# Patient Record
Sex: FEMALE | Race: BLACK OR AFRICAN AMERICAN | NOT HISPANIC OR LATINO | Employment: FULL TIME | URBAN - METROPOLITAN AREA
[De-identification: names, ages, dates, MRNs, and addresses within clinical notes are randomized per-mention and may not be internally consistent; named-entity substitution may affect disease eponyms.]

---

## 2017-02-06 ENCOUNTER — OFFICE VISIT (OUTPATIENT)
Dept: INTERNAL MEDICINE | Facility: CLINIC | Age: 52
End: 2017-02-06

## 2017-02-06 VITALS
DIASTOLIC BLOOD PRESSURE: 64 MMHG | SYSTOLIC BLOOD PRESSURE: 102 MMHG | BODY MASS INDEX: 35.49 KG/M2 | WEIGHT: 213 LBS | HEIGHT: 65 IN | RESPIRATION RATE: 14 BRPM

## 2017-02-06 DIAGNOSIS — E78.5 HYPERLIPIDEMIA, UNSPECIFIED HYPERLIPIDEMIA TYPE: Primary | ICD-10-CM

## 2017-02-06 LAB
CHOLEST SERPL-MCNC: 156 MG/DL (ref 0–200)
HDLC SERPL-MCNC: 55 MG/DL (ref 40–81)
LDLC SERPL CALC-MCNC: 92 MG/DL (ref 0–100)
LDLC/HDLC SERPL: 1.67 {RATIO}
TRIGL SERPL-MCNC: 46 MG/DL (ref 0–150)
VLDLC SERPL-MCNC: 9.2 MG/DL

## 2017-02-06 PROCEDURE — 80061 LIPID PANEL: CPT | Performed by: INTERNAL MEDICINE

## 2017-02-06 PROCEDURE — 99212 OFFICE O/P EST SF 10 MIN: CPT | Performed by: INTERNAL MEDICINE

## 2017-02-06 PROCEDURE — 36415 COLL VENOUS BLD VENIPUNCTURE: CPT | Performed by: INTERNAL MEDICINE

## 2017-02-06 NOTE — PROGRESS NOTES
"Chief Complaint   Patient presents with   • Hyperlipidemia     6 month check up         Subjective   Palma Roblero is a 51 y.o. female     Hyperlipidemia   This is a chronic problem. The problem is controlled. She has no history of diabetes or liver disease. There are no known factors aggravating her hyperlipidemia. Pertinent negatives include no chest pain, leg pain, myalgias or shortness of breath. Current antihyperlipidemic treatment includes statins.   :     The following portions of the patient's history were reviewed and updated as appropriate: allergies, current medications, past social history, problem list, past surgical history    Review of Systems   Constitutional: Negative for activity change and appetite change.   HENT: Negative for nosebleeds.    Eyes: Negative for visual disturbance.   Respiratory: Negative for cough and shortness of breath.    Cardiovascular: Negative for chest pain, palpitations and leg swelling.   Musculoskeletal: Negative for myalgias.   Neurological: Negative for headaches.   Psychiatric/Behavioral: Negative for sleep disturbance.       Visit Vitals   • /64 (BP Location: Left arm, Patient Position: Sitting, Cuff Size: Adult)   • Resp 14   • Ht 65.25\" (165.7 cm)   • Wt 213 lb (96.6 kg)   • BMI 35.17 kg/m2        Objective   Physical Exam   Constitutional: She is oriented to person, place, and time. She appears well-developed and well-nourished. No distress.   Neck: Normal carotid pulses present. Carotid bruit is not present.   Cardiovascular: Normal rate, regular rhythm, S1 normal, S2 normal and intact distal pulses.  Exam reveals no gallop and no friction rub.    No murmur heard.  Pulses:       Carotid pulses are 2+ on the right side, and 2+ on the left side.  Pulmonary/Chest: Effort normal and breath sounds normal. No respiratory distress. She has no wheezes. She has no rales. Chest wall is not dull to percussion.   Musculoskeletal: She exhibits no edema.   Neurological: " She is alert and oriented to person, place, and time.   Skin: Skin is warm and dry.   Nursing note and vitals reviewed.      Assessment/Plan   Problem List Items Addressed This Visit        Cardiovascular and Mediastinum    Hyperlipidemia - Primary    Relevant Orders    Lipid Panel

## 2017-03-25 DIAGNOSIS — E78.00 HYPERCHOLESTEREMIA: ICD-10-CM

## 2017-03-27 RX ORDER — ATORVASTATIN CALCIUM 10 MG/1
TABLET, FILM COATED ORAL
Qty: 90 TABLET | Refills: 1 | Status: SHIPPED | OUTPATIENT
Start: 2017-03-27 | End: 2017-08-30 | Stop reason: SDUPTHER

## 2017-03-30 ENCOUNTER — OFFICE VISIT (OUTPATIENT)
Dept: ONCOLOGY | Facility: CLINIC | Age: 52
End: 2017-03-30

## 2017-03-30 ENCOUNTER — LAB (OUTPATIENT)
Dept: LAB | Facility: HOSPITAL | Age: 52
End: 2017-03-30

## 2017-03-30 VITALS
WEIGHT: 209.6 LBS | TEMPERATURE: 98.2 F | BODY MASS INDEX: 35.78 KG/M2 | SYSTOLIC BLOOD PRESSURE: 132 MMHG | HEART RATE: 80 BPM | DIASTOLIC BLOOD PRESSURE: 80 MMHG | RESPIRATION RATE: 16 BRPM | HEIGHT: 64 IN

## 2017-03-30 DIAGNOSIS — C50.511 MALIGNANT NEOPLASM OF LOWER-OUTER QUADRANT OF RIGHT FEMALE BREAST (HCC): Primary | ICD-10-CM

## 2017-03-30 DIAGNOSIS — C50.911 MALIGNANT NEOPLASM OF RIGHT FEMALE BREAST, UNSPECIFIED SITE OF BREAST: ICD-10-CM

## 2017-03-30 LAB
ALBUMIN SERPL-MCNC: 4.1 G/DL (ref 3.5–5.2)
ALBUMIN/GLOB SERPL: 1.3 G/DL (ref 1.1–2.4)
ALP SERPL-CCNC: 60 U/L (ref 38–116)
ALT SERPL W P-5'-P-CCNC: 16 U/L (ref 0–33)
ANION GAP SERPL CALCULATED.3IONS-SCNC: 11.1 MMOL/L
AST SERPL-CCNC: 18 U/L (ref 0–32)
BASOPHILS # BLD AUTO: 0.04 10*3/MM3 (ref 0–0.1)
BASOPHILS NFR BLD AUTO: 0.7 % (ref 0–1.1)
BILIRUB SERPL-MCNC: 0.8 MG/DL (ref 0.1–1.2)
BUN BLD-MCNC: 13 MG/DL (ref 6–20)
BUN/CREAT SERPL: 16.5 (ref 7.3–30)
CALCIUM SPEC-SCNC: 9.2 MG/DL (ref 8.5–10.2)
CHLORIDE SERPL-SCNC: 102 MMOL/L (ref 98–107)
CO2 SERPL-SCNC: 27.9 MMOL/L (ref 22–29)
CREAT BLD-MCNC: 0.79 MG/DL (ref 0.6–1.1)
DEPRECATED RDW RBC AUTO: 41.3 FL (ref 37–49)
EOSINOPHIL # BLD AUTO: 0.09 10*3/MM3 (ref 0–0.36)
EOSINOPHIL NFR BLD AUTO: 1.6 % (ref 1–5)
ERYTHROCYTE [DISTWIDTH] IN BLOOD BY AUTOMATED COUNT: 13.7 % (ref 11.7–14.5)
GFR SERPL CREATININE-BSD FRML MDRD: 92 ML/MIN/1.73
GLOBULIN UR ELPH-MCNC: 3.1 GM/DL (ref 1.8–3.5)
GLUCOSE BLD-MCNC: 93 MG/DL (ref 74–124)
HCT VFR BLD AUTO: 41.1 % (ref 34–45)
HGB BLD-MCNC: 13.1 G/DL (ref 11.5–14.9)
IMM GRANULOCYTES # BLD: 0.02 10*3/MM3 (ref 0–0.03)
IMM GRANULOCYTES NFR BLD: 0.4 % (ref 0–0.5)
LYMPHOCYTES # BLD AUTO: 1.69 10*3/MM3 (ref 1–3.5)
LYMPHOCYTES NFR BLD AUTO: 30.7 % (ref 20–49)
MCH RBC QN AUTO: 26.3 PG (ref 27–33)
MCHC RBC AUTO-ENTMCNC: 31.9 G/DL (ref 32–35)
MCV RBC AUTO: 82.5 FL (ref 83–97)
MONOCYTES # BLD AUTO: 0.43 10*3/MM3 (ref 0.25–0.8)
MONOCYTES NFR BLD AUTO: 7.8 % (ref 4–12)
NEUTROPHILS # BLD AUTO: 3.23 10*3/MM3 (ref 1.5–7)
NEUTROPHILS NFR BLD AUTO: 58.8 % (ref 39–75)
NRBC BLD MANUAL-RTO: 0 /100 WBC (ref 0–0)
PLATELET # BLD AUTO: 172 10*3/MM3 (ref 150–375)
PMV BLD AUTO: 11.8 FL (ref 8.9–12.1)
POTASSIUM BLD-SCNC: 4 MMOL/L (ref 3.5–4.7)
PROT SERPL-MCNC: 7.2 G/DL (ref 6.3–8)
RBC # BLD AUTO: 4.98 10*6/MM3 (ref 3.9–5)
SODIUM BLD-SCNC: 141 MMOL/L (ref 134–145)
WBC NRBC COR # BLD: 5.5 10*3/MM3 (ref 4–10)

## 2017-03-30 PROCEDURE — 85025 COMPLETE CBC W/AUTO DIFF WBC: CPT | Performed by: INTERNAL MEDICINE

## 2017-03-30 PROCEDURE — 36415 COLL VENOUS BLD VENIPUNCTURE: CPT | Performed by: INTERNAL MEDICINE

## 2017-03-30 PROCEDURE — 80053 COMPREHEN METABOLIC PANEL: CPT | Performed by: INTERNAL MEDICINE

## 2017-03-30 PROCEDURE — 99213 OFFICE O/P EST LOW 20 MIN: CPT | Performed by: INTERNAL MEDICINE

## 2017-03-30 NOTE — PROGRESS NOTES
Subjective   REASON FOR FOLLOWUP:   History of stage IIIA infiltrating ductal carcinoma of the right breast with 4 positive nodes, ER positive, HER-2/julián negative; treated with lumpectomy, status post 6 cycles of TAC. Received tamoxifen between February 2007 and March 2012. Femara was started in March 2012. Patient switched to Arimidex as of 06/21/2012 because of side effects with severe ankle joint pain with Femara.    10 years of adjuvant therapy completed  March 2017.    HISTORY OF PRESENT ILLNESS:     History of Present Illness Ms. Roblero is a 51-year-old  female with the above noted history of stage IIIA carcinoma of the right breast, treated with lumpectomy, chemotherapy and radiation. She even extended hormonal therapy with 5 years of tamoxifen followed by 5 years of aromatase inhibitor.  She reports that she discontinued her Arimidex a few days ago and is thrilled to be finished with the medication.     She is here today for routine 6 month followup and seems to be doing well.  She will be due for her annual mammogram in September.  Allergies completed hormonal therapy will be seeing her on a yearly basis unless she has any new problems or concerns.    Past Medical History, Past Surgical History, Social History, Family History have been reviewed and are without significant changes except as mentioned.    Review of Systems   Constitutional: Negative for activity change, appetite change, fatigue, fever and unexpected weight change.   HENT: Negative for hearing loss, nosebleeds, trouble swallowing and voice change.    Eyes: Negative for visual disturbance.   Respiratory: Negative for cough, shortness of breath and wheezing.    Cardiovascular: Negative for chest pain and palpitations.   Gastrointestinal: Negative for abdominal pain, diarrhea, nausea and vomiting.   Genitourinary: Negative for difficulty urinating, frequency, hematuria and urgency.   Musculoskeletal: Negative for back pain and  "neck pain.   Skin: Negative for rash.   Neurological: Negative for dizziness, seizures, syncope and headaches.   Hematological: Negative for adenopathy. Does not bruise/bleed easily.   Psychiatric/Behavioral: Negative for behavioral problems. The patient is not nervous/anxious.       A comprehensive 14 point review of systems was performed and was negative except as mentioned.    Medications:  The current medication list was reviewed in the EMR    ALLERGIES:  No Known Allergies    Objective      Vitals:    03/30/17 1320   BP: 132/80   Pulse: 80   Resp: 16   Temp: 98.2 °F (36.8 °C)   Weight: 209 lb 9.6 oz (95.1 kg)   Height: 64.17\" (163 cm)  Comment: new ht. without shoes   PainSc: 0-No pain     Current Status 3/30/2017   ECOG score 0       Physical Exam   Constitutional: She is oriented to person, place, and time. She appears well-developed and well-nourished. No distress.   HENT:   Head: Normocephalic.   Eyes: Conjunctivae and EOM are normal. Pupils are equal, round, and reactive to light. No scleral icterus.   Neck: Normal range of motion. Neck supple. No JVD present. No thyromegaly present.   Cardiovascular: Normal rate and regular rhythm.  Exam reveals no gallop and no friction rub.    No murmur heard.  Pulmonary/Chest: Effort normal and breath sounds normal. She has no wheezes. She has no rales. Right breast exhibits no mass, no nipple discharge and no skin change. Left breast exhibits no mass, no nipple discharge and no skin change.   Right lumpectomy scar.    Abdominal: Soft. Bowel sounds are normal. She exhibits no distension and no mass. There is no tenderness.   Musculoskeletal: Normal range of motion. She exhibits no edema or deformity.   Lymphadenopathy:     She has no cervical adenopathy.   Neurological: She is alert and oriented to person, place, and time. She has normal reflexes. No cranial nerve deficit.   Skin: Skin is warm and dry. No rash noted. No erythema.   Psychiatric: She has a normal mood " and affect. Her behavior is normal. Judgment normal.        RECENT LABS:  Hematology WBC   Date Value Ref Range Status   03/30/2017 5.50 4.00 - 10.00 10*3/mm3 Final     RBC   Date Value Ref Range Status   03/30/2017 4.98 3.90 - 5.00 10*6/mm3 Final     Hemoglobin   Date Value Ref Range Status   03/30/2017 13.1 11.5 - 14.9 g/dL Final     Hematocrit   Date Value Ref Range Status   03/30/2017 41.1 34.0 - 45.0 % Final     Platelets   Date Value Ref Range Status   03/30/2017 172 150 - 375 10*3/mm3 Final            Assessment/Plan     Stage IIIA infiltrating ductal carcinoma of the right breast treated with a lumpectomy, radiation, 6 cycles of TAC chemotherapy and ongoing hormonal therapy. She has now completed 10 years of adjuvant hormonal therapy with 5 years of tamoxifen followed by 5 years of Arimidex.    PLAN:   1. She will discontinue Arimidex at this point after 10 years of hormonal therapy total.  2. She now will be seeing us on an annual basis but knows that she can call any time if she has new concerns.   3. Her next annual mammogram should be due in late September 2017.            3/30/2017      CC:

## 2017-08-14 ENCOUNTER — OFFICE VISIT (OUTPATIENT)
Dept: INTERNAL MEDICINE | Facility: CLINIC | Age: 52
End: 2017-08-14

## 2017-08-14 VITALS
BODY MASS INDEX: 32.95 KG/M2 | TEMPERATURE: 98.1 F | WEIGHT: 193 LBS | SYSTOLIC BLOOD PRESSURE: 112 MMHG | DIASTOLIC BLOOD PRESSURE: 78 MMHG

## 2017-08-14 DIAGNOSIS — L02.212 ABSCESS OF BACK: Primary | ICD-10-CM

## 2017-08-14 PROCEDURE — 99213 OFFICE O/P EST LOW 20 MIN: CPT | Performed by: NURSE PRACTITIONER

## 2017-08-14 RX ORDER — CEPHALEXIN 500 MG/1
500 CAPSULE ORAL 3 TIMES DAILY
Qty: 21 CAPSULE | Refills: 0 | Status: SHIPPED | OUTPATIENT
Start: 2017-08-14 | End: 2017-08-21

## 2017-08-14 NOTE — PROGRESS NOTES
Subjective   Palma Roblero is a 52 y.o. female.     HPI Comments: She presents secondary to lesion noted on her left back. She noticed the area several years ago. However in the past 2-3 weeks it has gotten bigger and become irritated. Her spouse has expelled some  discharge from the area. She denies any fever or chills. She has not placed anything on it.    Cyst   This is a new problem. The current episode started more than 1 year ago. The problem occurs constantly. The problem has been gradually worsening. Pertinent negatives include no abdominal pain, chills, fatigue, fever, nausea or vomiting. Exacerbated by: her bra  Treatments tried: expelling discharge out of it  The treatment provided mild relief.        The following portions of the patient's history were reviewed and updated as appropriate: allergies, current medications, past family history, past medical history, past social history, past surgical history and problem list.    Review of Systems   Constitutional: Negative for activity change, appetite change, chills, fatigue and fever.   Gastrointestinal: Negative for abdominal pain, diarrhea, nausea and vomiting.   Skin:        Lesion noted to left back area        Objective   Physical Exam   Constitutional: She is oriented to person, place, and time. She appears well-developed and well-nourished.   HENT:   Head: Normocephalic.   Nose: Nose normal.   Cardiovascular: Regular rhythm and normal heart sounds.  Exam reveals no S3 and no S4.    No murmur heard.  Pulmonary/Chest: Effort normal and breath sounds normal. She has no decreased breath sounds. She has no wheezes. She has no rhonchi. She has no rales.   Musculoskeletal: She exhibits no edema.   Neurological: She is alert and oriented to person, place, and time. Gait normal.   Skin: Skin is warm and dry. Lesion (7 x 3.5 cm ) noted.   Abscess noted to left thoracic back area with induration measuring  7 cm x 3.5 cm. She has minimal brownish discharge  with odor.    Psychiatric: She has a normal mood and affect.       Assessment/Plan   Palma was seen today for skin problem.    Diagnoses and all orders for this visit:    Abscess of back  Comments:  warm soaks tid; will refer to gen surgery secondary size  Orders:  -     Ambulatory Referral to General Surgery  -     cephalexin (KEFLEX) 500 MG capsule; Take 1 capsule by mouth 3 (Three) Times a Day for 7 days.    She saw Dr Vidal several years ago so will refer back to her if available.

## 2017-08-14 NOTE — PATIENT INSTRUCTIONS
Abscess  An abscess is an infected area that contains a collection of pus and debris. It can occur in almost any part of the body. An abscess is also known as a furuncle or boil.  CAUSES   An abscess occurs when tissue gets infected. This can occur from blockage of oil or sweat glands, infection of hair follicles, or a minor injury to the skin. As the body tries to fight the infection, pus collects in the area and creates pressure under the skin. This pressure causes pain. People with weakened immune systems have difficulty fighting infections and get certain abscesses more often.   SYMPTOMS  Usually an abscess develops on the skin and becomes a painful mass that is red, warm, and tender. If the abscess forms under the skin, you may feel a moveable soft area under the skin. Some abscesses break open (rupture) on their own, but most will continue to get worse without care. The infection can spread deeper into the body and eventually into the bloodstream, causing you to feel ill.   DIAGNOSIS   Your caregiver will take your medical history and perform a physical exam. A sample of fluid may also be taken from the abscess to determine what is causing your infection.  TREATMENT   Your caregiver may prescribe antibiotic medicines to fight the infection. However, taking antibiotics alone usually does not cure an abscess. Your caregiver may need to make a small cut (incision) in the abscess to drain the pus. In some cases, gauze is packed into the abscess to reduce pain and to continue draining the area.  HOME CARE INSTRUCTIONS   · Only take over-the-counter or prescription medicines for pain, discomfort, or fever as directed by your caregiver.  · If you were prescribed antibiotics, take them as directed. Finish them even if you start to feel better.  · If gauze is used, follow your caregiver's directions for changing the gauze.  · To avoid spreading the infection:    Keep your draining abscess covered with a bandage.     Wash your hands well.    Do not share personal care items, towels, or whirlpools with others.    Avoid skin contact with others.  · Keep your skin and clothes clean around the abscess.  · Keep all follow-up appointments as directed by your caregiver.  SEEK MEDICAL CARE IF:   · You have increased pain, swelling, redness, fluid drainage, or bleeding.  · You have muscle aches, chills, or a general ill feeling.  · You have a fever.  MAKE SURE YOU:   · Understand these instructions.  · Will watch your condition.  · Will get help right away if you are not doing well or get worse.     This information is not intended to replace advice given to you by your health care provider. Make sure you discuss any questions you have with your health care provider.     Document Released: 09/27/2006 Document Revised: 06/18/2013 Document Reviewed: 10/26/2016  [employer] Interactive Patient Education ©2017 [employer]

## 2017-08-22 ENCOUNTER — TRANSCRIBE ORDERS (OUTPATIENT)
Dept: INTERNAL MEDICINE | Facility: CLINIC | Age: 52
End: 2017-08-22

## 2017-08-22 DIAGNOSIS — Z12.31 ENCOUNTER FOR MAMMOGRAM TO ESTABLISH BASELINE MAMMOGRAM: Primary | ICD-10-CM

## 2017-08-23 ENCOUNTER — OFFICE VISIT (OUTPATIENT)
Dept: SURGERY | Facility: CLINIC | Age: 52
End: 2017-08-23

## 2017-08-23 VITALS — BODY MASS INDEX: 32.61 KG/M2 | HEIGHT: 64 IN | OXYGEN SATURATION: 98 % | WEIGHT: 191 LBS | HEART RATE: 95 BPM

## 2017-08-23 DIAGNOSIS — L72.3 SEBACEOUS CYST: Primary | ICD-10-CM

## 2017-08-23 PROCEDURE — 99203 OFFICE O/P NEW LOW 30 MIN: CPT | Performed by: SURGERY

## 2017-08-23 RX ORDER — CLINDAMYCIN HYDROCHLORIDE 150 MG/1
150 CAPSULE ORAL 3 TIMES DAILY
Qty: 40 CAPSULE | Refills: 0 | Status: SHIPPED | OUTPATIENT
Start: 2017-08-23 | End: 2017-09-02

## 2017-08-23 RX ORDER — CEPHALEXIN 500 MG/1
500 CAPSULE ORAL 2 TIMES DAILY
COMMUNITY
End: 2017-08-30

## 2017-08-23 RX ORDER — SODIUM CHLORIDE 0.9 % (FLUSH) 0.9 %
1-10 SYRINGE (ML) INJECTION AS NEEDED
Status: CANCELLED | OUTPATIENT
Start: 2017-09-05

## 2017-08-30 ENCOUNTER — APPOINTMENT (OUTPATIENT)
Dept: PREADMISSION TESTING | Facility: HOSPITAL | Age: 52
End: 2017-08-30

## 2017-08-30 VITALS
RESPIRATION RATE: 16 BRPM | DIASTOLIC BLOOD PRESSURE: 72 MMHG | BODY MASS INDEX: 32.91 KG/M2 | SYSTOLIC BLOOD PRESSURE: 108 MMHG | TEMPERATURE: 98.4 F | OXYGEN SATURATION: 100 % | WEIGHT: 192.8 LBS | HEIGHT: 64 IN | HEART RATE: 84 BPM

## 2017-08-30 DIAGNOSIS — L72.3 SEBACEOUS CYST: ICD-10-CM

## 2017-08-30 LAB
ANION GAP SERPL CALCULATED.3IONS-SCNC: 14.8 MMOL/L
BASOPHILS # BLD AUTO: 0.02 10*3/MM3 (ref 0–0.2)
BASOPHILS NFR BLD AUTO: 0.3 % (ref 0–1.5)
BUN BLD-MCNC: 15 MG/DL (ref 6–20)
BUN/CREAT SERPL: 17.9 (ref 7–25)
CALCIUM SPEC-SCNC: 9 MG/DL (ref 8.6–10.5)
CHLORIDE SERPL-SCNC: 101 MMOL/L (ref 98–107)
CO2 SERPL-SCNC: 25.2 MMOL/L (ref 22–29)
CREAT BLD-MCNC: 0.84 MG/DL (ref 0.57–1)
DEPRECATED RDW RBC AUTO: 42 FL (ref 37–54)
EOSINOPHIL # BLD AUTO: 0.13 10*3/MM3 (ref 0–0.7)
EOSINOPHIL NFR BLD AUTO: 1.8 % (ref 0.3–6.2)
ERYTHROCYTE [DISTWIDTH] IN BLOOD BY AUTOMATED COUNT: 13.7 % (ref 11.7–13)
GFR SERPL CREATININE-BSD FRML MDRD: 86 ML/MIN/1.73
GLUCOSE BLD-MCNC: 121 MG/DL (ref 65–99)
HCT VFR BLD AUTO: 41.7 % (ref 35.6–45.5)
HGB BLD-MCNC: 13.4 G/DL (ref 11.9–15.5)
IMM GRANULOCYTES # BLD: 0 10*3/MM3 (ref 0–0.03)
IMM GRANULOCYTES NFR BLD: 0 % (ref 0–0.5)
LYMPHOCYTES # BLD AUTO: 1.65 10*3/MM3 (ref 0.9–4.8)
LYMPHOCYTES NFR BLD AUTO: 23 % (ref 19.6–45.3)
MCH RBC QN AUTO: 27.3 PG (ref 26.9–32)
MCHC RBC AUTO-ENTMCNC: 32.1 G/DL (ref 32.4–36.3)
MCV RBC AUTO: 84.9 FL (ref 80.5–98.2)
MONOCYTES # BLD AUTO: 0.44 10*3/MM3 (ref 0.2–1.2)
MONOCYTES NFR BLD AUTO: 6.1 % (ref 5–12)
NEUTROPHILS # BLD AUTO: 4.93 10*3/MM3 (ref 1.9–8.1)
NEUTROPHILS NFR BLD AUTO: 68.8 % (ref 42.7–76)
PLATELET # BLD AUTO: 200 10*3/MM3 (ref 140–500)
PMV BLD AUTO: 12 FL (ref 6–12)
POTASSIUM BLD-SCNC: 4 MMOL/L (ref 3.5–5.2)
RBC # BLD AUTO: 4.91 10*6/MM3 (ref 3.9–5.2)
SODIUM BLD-SCNC: 141 MMOL/L (ref 136–145)
WBC NRBC COR # BLD: 7.17 10*3/MM3 (ref 4.5–10.7)

## 2017-08-30 PROCEDURE — 80048 BASIC METABOLIC PNL TOTAL CA: CPT | Performed by: SURGERY

## 2017-08-30 PROCEDURE — 85025 COMPLETE CBC W/AUTO DIFF WBC: CPT | Performed by: SURGERY

## 2017-08-30 PROCEDURE — 36415 COLL VENOUS BLD VENIPUNCTURE: CPT

## 2017-08-30 RX ORDER — TRIAMCINOLONE ACETONIDE 55 UG/1
2 SPRAY, METERED NASAL DAILY PRN
COMMUNITY
End: 2017-10-20 | Stop reason: SDUPTHER

## 2017-08-30 RX ORDER — ATORVASTATIN CALCIUM 10 MG/1
10 TABLET, FILM COATED ORAL DAILY
COMMUNITY
End: 2019-03-14

## 2017-09-05 ENCOUNTER — ANESTHESIA EVENT (OUTPATIENT)
Dept: PERIOP | Facility: HOSPITAL | Age: 52
End: 2017-09-05

## 2017-09-05 ENCOUNTER — TELEPHONE (OUTPATIENT)
Dept: SURGERY | Facility: CLINIC | Age: 52
End: 2017-09-05

## 2017-09-05 ENCOUNTER — ANESTHESIA (OUTPATIENT)
Dept: PERIOP | Facility: HOSPITAL | Age: 52
End: 2017-09-05

## 2017-09-05 ENCOUNTER — HOSPITAL ENCOUNTER (OUTPATIENT)
Facility: HOSPITAL | Age: 52
Setting detail: HOSPITAL OUTPATIENT SURGERY
Discharge: HOME | End: 2017-09-05
Attending: SURGERY | Admitting: SURGERY

## 2017-09-05 VITALS
DIASTOLIC BLOOD PRESSURE: 69 MMHG | HEART RATE: 67 BPM | WEIGHT: 193 LBS | BODY MASS INDEX: 32.95 KG/M2 | HEIGHT: 64 IN | OXYGEN SATURATION: 100 % | SYSTOLIC BLOOD PRESSURE: 101 MMHG | TEMPERATURE: 97.6 F | RESPIRATION RATE: 16 BRPM

## 2017-09-05 DIAGNOSIS — L72.3 SEBACEOUS CYST: ICD-10-CM

## 2017-09-05 PROCEDURE — 25010000002 MIDAZOLAM PER 1 MG: Performed by: ANESTHESIOLOGY

## 2017-09-05 PROCEDURE — 88304 TISSUE EXAM BY PATHOLOGIST: CPT | Performed by: SURGERY

## 2017-09-05 PROCEDURE — 25010000002 FENTANYL CITRATE (PF) 100 MCG/2ML SOLUTION: Performed by: ANESTHESIOLOGY

## 2017-09-05 PROCEDURE — 11404 EXC TR-EXT B9+MARG 3.1-4 CM: CPT | Performed by: SURGERY

## 2017-09-05 PROCEDURE — 25010000002 PROPOFOL 1000 MG/ML EMULSION: Performed by: ANESTHESIOLOGY

## 2017-09-05 PROCEDURE — 25010000002 PROPOFOL 10 MG/ML EMULSION: Performed by: ANESTHESIOLOGY

## 2017-09-05 RX ORDER — HYDRALAZINE HYDROCHLORIDE 20 MG/ML
5 INJECTION INTRAMUSCULAR; INTRAVENOUS
Status: DISCONTINUED | OUTPATIENT
Start: 2017-09-05 | End: 2017-09-05 | Stop reason: HOSPADM

## 2017-09-05 RX ORDER — LABETALOL HYDROCHLORIDE 5 MG/ML
5 INJECTION, SOLUTION INTRAVENOUS
Status: DISCONTINUED | OUTPATIENT
Start: 2017-09-05 | End: 2017-09-05 | Stop reason: HOSPADM

## 2017-09-05 RX ORDER — HYDROMORPHONE HYDROCHLORIDE 1 MG/ML
0.5 INJECTION, SOLUTION INTRAMUSCULAR; INTRAVENOUS; SUBCUTANEOUS
Status: DISCONTINUED | OUTPATIENT
Start: 2017-09-05 | End: 2017-09-05 | Stop reason: HOSPADM

## 2017-09-05 RX ORDER — CLINDAMYCIN PHOSPHATE 600 MG/50ML
INJECTION INTRAVENOUS
Status: DISCONTINUED
Start: 2017-09-05 | End: 2017-09-05 | Stop reason: HOSPADM

## 2017-09-05 RX ORDER — MIDAZOLAM HYDROCHLORIDE 1 MG/ML
2 INJECTION INTRAMUSCULAR; INTRAVENOUS
Status: DISCONTINUED | OUTPATIENT
Start: 2017-09-05 | End: 2017-09-05 | Stop reason: HOSPADM

## 2017-09-05 RX ORDER — FAMOTIDINE 10 MG/ML
20 INJECTION, SOLUTION INTRAVENOUS ONCE
Status: COMPLETED | OUTPATIENT
Start: 2017-09-05 | End: 2017-09-05

## 2017-09-05 RX ORDER — OXYCODONE AND ACETAMINOPHEN 7.5; 325 MG/1; MG/1
1 TABLET ORAL ONCE AS NEEDED
Status: DISCONTINUED | OUTPATIENT
Start: 2017-09-05 | End: 2017-09-05 | Stop reason: HOSPADM

## 2017-09-05 RX ORDER — PROMETHAZINE HYDROCHLORIDE 25 MG/ML
12.5 INJECTION, SOLUTION INTRAMUSCULAR; INTRAVENOUS ONCE AS NEEDED
Status: DISCONTINUED | OUTPATIENT
Start: 2017-09-05 | End: 2017-09-05 | Stop reason: HOSPADM

## 2017-09-05 RX ORDER — SODIUM CHLORIDE 0.9 % (FLUSH) 0.9 %
1-10 SYRINGE (ML) INJECTION AS NEEDED
Status: DISCONTINUED | OUTPATIENT
Start: 2017-09-05 | End: 2017-09-05 | Stop reason: HOSPADM

## 2017-09-05 RX ORDER — FENTANYL CITRATE 50 UG/ML
50 INJECTION, SOLUTION INTRAMUSCULAR; INTRAVENOUS
Status: DISCONTINUED | OUTPATIENT
Start: 2017-09-05 | End: 2017-09-05 | Stop reason: HOSPADM

## 2017-09-05 RX ORDER — PROMETHAZINE HYDROCHLORIDE 25 MG/1
12.5 TABLET ORAL ONCE AS NEEDED
Status: DISCONTINUED | OUTPATIENT
Start: 2017-09-05 | End: 2017-09-05 | Stop reason: HOSPADM

## 2017-09-05 RX ORDER — HYDROCODONE BITARTRATE AND ACETAMINOPHEN 7.5; 325 MG/1; MG/1
1 TABLET ORAL ONCE AS NEEDED
Status: DISCONTINUED | OUTPATIENT
Start: 2017-09-05 | End: 2017-09-05 | Stop reason: HOSPADM

## 2017-09-05 RX ORDER — ONDANSETRON 2 MG/ML
4 INJECTION INTRAMUSCULAR; INTRAVENOUS ONCE AS NEEDED
Status: DISCONTINUED | OUTPATIENT
Start: 2017-09-05 | End: 2017-09-05 | Stop reason: HOSPADM

## 2017-09-05 RX ORDER — PROMETHAZINE HYDROCHLORIDE 25 MG/1
25 TABLET ORAL ONCE AS NEEDED
Status: DISCONTINUED | OUTPATIENT
Start: 2017-09-05 | End: 2017-09-05 | Stop reason: HOSPADM

## 2017-09-05 RX ORDER — MIDAZOLAM HYDROCHLORIDE 1 MG/ML
1 INJECTION INTRAMUSCULAR; INTRAVENOUS
Status: DISCONTINUED | OUTPATIENT
Start: 2017-09-05 | End: 2017-09-05 | Stop reason: HOSPADM

## 2017-09-05 RX ORDER — NALOXONE HCL 0.4 MG/ML
0.2 VIAL (ML) INJECTION AS NEEDED
Status: DISCONTINUED | OUTPATIENT
Start: 2017-09-05 | End: 2017-09-05 | Stop reason: HOSPADM

## 2017-09-05 RX ORDER — SODIUM CHLORIDE, SODIUM LACTATE, POTASSIUM CHLORIDE, CALCIUM CHLORIDE 600; 310; 30; 20 MG/100ML; MG/100ML; MG/100ML; MG/100ML
9 INJECTION, SOLUTION INTRAVENOUS CONTINUOUS
Status: DISCONTINUED | OUTPATIENT
Start: 2017-09-05 | End: 2017-09-05 | Stop reason: HOSPADM

## 2017-09-05 RX ORDER — PROMETHAZINE HYDROCHLORIDE 25 MG/1
25 SUPPOSITORY RECTAL ONCE AS NEEDED
Status: DISCONTINUED | OUTPATIENT
Start: 2017-09-05 | End: 2017-09-05 | Stop reason: HOSPADM

## 2017-09-05 RX ORDER — DIPHENHYDRAMINE HYDROCHLORIDE 50 MG/ML
12.5 INJECTION INTRAMUSCULAR; INTRAVENOUS
Status: DISCONTINUED | OUTPATIENT
Start: 2017-09-05 | End: 2017-09-05 | Stop reason: HOSPADM

## 2017-09-05 RX ORDER — PROPOFOL 10 MG/ML
VIAL (ML) INTRAVENOUS AS NEEDED
Status: DISCONTINUED | OUTPATIENT
Start: 2017-09-05 | End: 2017-09-05 | Stop reason: SURG

## 2017-09-05 RX ORDER — MAGNESIUM HYDROXIDE 1200 MG/15ML
LIQUID ORAL AS NEEDED
Status: DISCONTINUED | OUTPATIENT
Start: 2017-09-05 | End: 2017-09-05 | Stop reason: HOSPADM

## 2017-09-05 RX ORDER — FENTANYL CITRATE 50 UG/ML
INJECTION, SOLUTION INTRAMUSCULAR; INTRAVENOUS AS NEEDED
Status: DISCONTINUED | OUTPATIENT
Start: 2017-09-05 | End: 2017-09-05 | Stop reason: SURG

## 2017-09-05 RX ORDER — OXYCODONE AND ACETAMINOPHEN 2.5; 325 MG/1; MG/1
1 TABLET ORAL EVERY 6 HOURS PRN
Qty: 10 TABLET | Refills: 0 | Status: SHIPPED | OUTPATIENT
Start: 2017-09-05 | End: 2017-09-20

## 2017-09-05 RX ORDER — EPHEDRINE SULFATE 50 MG/ML
5 INJECTION, SOLUTION INTRAVENOUS ONCE AS NEEDED
Status: DISCONTINUED | OUTPATIENT
Start: 2017-09-05 | End: 2017-09-05 | Stop reason: HOSPADM

## 2017-09-05 RX ORDER — FLUMAZENIL 0.1 MG/ML
0.2 INJECTION INTRAVENOUS AS NEEDED
Status: DISCONTINUED | OUTPATIENT
Start: 2017-09-05 | End: 2017-09-05 | Stop reason: HOSPADM

## 2017-09-05 RX ADMIN — SODIUM CHLORIDE, POTASSIUM CHLORIDE, SODIUM LACTATE AND CALCIUM CHLORIDE 9 ML/HR: 600; 310; 30; 20 INJECTION, SOLUTION INTRAVENOUS at 07:51

## 2017-09-05 RX ADMIN — FENTANYL CITRATE 100 MCG: 50 INJECTION INTRAMUSCULAR; INTRAVENOUS at 08:02

## 2017-09-05 RX ADMIN — MIDAZOLAM 1 MG: 1 INJECTION INTRAMUSCULAR; INTRAVENOUS at 07:53

## 2017-09-05 RX ADMIN — PROPOFOL 120 MCG/KG/MIN: 10 INJECTION, EMULSION INTRAVENOUS at 08:06

## 2017-09-05 RX ADMIN — PROPOFOL 50 MG: 10 INJECTION, EMULSION INTRAVENOUS at 08:06

## 2017-09-05 RX ADMIN — FAMOTIDINE 20 MG: 10 INJECTION, SOLUTION INTRAVENOUS at 07:52

## 2017-09-05 RX ADMIN — CLINDAMYCIN PHOSPHATE 600 MG: 150 INJECTION, SOLUTION, CONCENTRATE INTRAVENOUS at 08:05

## 2017-09-05 NOTE — ANESTHESIA POSTPROCEDURE EVALUATION
"Patient: Palma Roblero    Procedure Summary     Date Anesthesia Start Anesthesia Stop Room / Location    09/05/17 0802 0855  [facility] / [facility]       Procedure Diagnosis Surgeon Provider    EXCISION OF SEBACEOUS CYST FROM BACK  (N/A ) Sebaceous cyst  (Sebaceous cyst [L72.3]) MD Corey Garcia MD          Anesthesia Type: MAC  Last vitals  BP   90/70 (09/05/17 0915)    Temp   36.4 °C (97.6 °F) (09/05/17 0851)    Pulse   62 (09/05/17 0915)   Resp   16 (09/05/17 0915)    SpO2   100 % (09/05/17 0915)      Post Anesthesia Care and Evaluation    Patient location during evaluation: PHASE II  Patient participation: complete - patient participated  Level of consciousness: awake and alert  Pain management: adequate  Airway patency: patent  Anesthetic complications: No anesthetic complications    Cardiovascular status: acceptable  Respiratory status: acceptable  Hydration status: acceptable    Comments: BP 90/70  Pulse 62  Temp 36.4 °C (97.6 °F) (Oral)   Resp 16  Ht 64\" (162.6 cm)  Wt 193 lb (87.5 kg)  SpO2 100%  BMI 33.13 kg/m2        "

## 2017-09-05 NOTE — PLAN OF CARE
Problem: Patient Care Overview (Adult)  Goal: Plan of Care Review  Outcome: Ongoing (interventions implemented as appropriate)    09/05/17 0652   Coping/Psychosocial Response Interventions   Plan Of Care Reviewed With patient   Patient Care Overview   Progress no change       Goal: Adult Individualization and Mutuality  Outcome: Ongoing (interventions implemented as appropriate)    09/05/17 0652   Individualization   Patient Specific Preferences prefers to b called ronnie       Goal: Discharge Needs Assessment  Outcome: Ongoing (interventions implemented as appropriate)    09/05/17 0652   Discharge Needs Assessment   Concerns To Be Addressed no discharge needs identified;denies needs/concerns at this time         Problem: Perioperative Period (Adult)  Goal: Signs and Symptoms of Listed Potential Problems Will be Absent or Manageable (Perioperative Period)  Outcome: Ongoing (interventions implemented as appropriate)    09/05/17 0652   Perioperative Period   Problems Assessed (Perioperative Period) all   Problems Present (Perioperative Period) none

## 2017-09-05 NOTE — INTERVAL H&P NOTE
H&P reviewed. The patient was examined and there are no changes to the H&P.     Rosendo Crum MD  General and Endoscopic Surgery  [facility] [place], operated [facility]    [address], [address]  [place], [place], [address]  P: [phone]  F: [phone]

## 2017-09-05 NOTE — PLAN OF CARE
Problem: Perioperative Period (Adult)  Goal: Signs and Symptoms of Listed Potential Problems Will be Absent or Manageable (Perioperative Period)  Outcome: Outcome(s) achieved Date Met:  09/05/17 09/05/17 0908   Perioperative Period   Problems Assessed (Perioperative Period) all   Problems Present (Perioperative Period) none

## 2017-09-05 NOTE — PLAN OF CARE
Problem: Patient Care Overview (Adult)  Goal: Discharge Needs Assessment  Outcome: Outcome(s) achieved Date Met:  09/05/17 09/05/17 1829   Discharge Needs Assessment   Concerns To Be Addressed denies needs/concerns at this time

## 2017-09-05 NOTE — TELEPHONE ENCOUNTER
Per Dr Crum she can take Tylenol or Ibuprofen for pain. Spoke with patient and informed her of this

## 2017-09-05 NOTE — PLAN OF CARE
Problem: Patient Care Overview (Adult)  Goal: Adult Individualization and Mutuality  Outcome: Outcome(s) achieved Date Met:  09/05/17

## 2017-09-05 NOTE — H&P (VIEW-ONLY)
Cc: Inflamed sebaceous cyst on back    History of presenting illness:   This is a very nice 52-year-old lady who says that several months ago she noticed a small blackheads in the mid back which seem to wax and wane in size, but a couple of weeks ago she noticed that it would become quite a bit larger.  She had a small amount of drainage after her  attempted to squeeze it.  Is not been associated with any fever.  There is some mild pain which is constant and does not radiate.  She was placed on Keflex about a week ago and hasn't seen much substantial improvement, although there does seem to be slightly less drainage.    Past Medical History: Breast cancer, hyperlipidemia    Past Surgical History: Right breast lumpectomy    Medications: Atorvastatin, Keflex, vitamin D    Allergies: None known    Social History: Patient does not smoke and does not use alcohol.  I stressed with her the importance of avoiding tobacco products.      Review of Systems:  Constitutional: Negative for fever, chills, change in weight  Neck: no swollen glands or dysphagia or odynophagia  Respiratory: negative for SOB, cough, hemoptysis or wheezing  Cardiovascular: negative for chest pain, palpitations or peripheral edema  Gastrointestinal: Negative for abdominal pain, rectal bleeding, constipation or vomiting      Physical Exam:    General: alert and oriented, appropriate, no acute distress  Neck: Supple without lymphadenopathy or thyromegaly, trachea is in the midline  Respiratory: Lungs are clear bilaterally without wheezing, no use of accessory muscles is noted  Cardiovascular: Regular rate and rhythm without murmur, no peripheral edema  Gastrointestinal: Abdomen is soft, benign, no hernia or hepatosplenomegaly, bowel sounds are positive.  Skin: On the mid back just to the right of midline there is an approximately 5 x 7 cm area of induration and inflammation.  There is no significant cellulitis.  There is a poor overlying this  consistent with an inflamed sebaceous cyst.        Assessment and plan:   Inflamed and uncomfortable sebaceous cyst on back, minimally responsive to antibiotic therapy to this point.    I discussed the options with the patient.  I told her that I don't think there is active infection requiring drainage at this time, but that I think allowing it to improve over the course of another week or so and then attempt a formal excision would be wise.  It may be challenging given the overall size and so I suggested doing this in the operating room under optimal conditions.  The patient understands that excision of this cyst may turn into a drainage procedure if there is true fluctuance or abscess in that dressings may become necessary.  She also understands that recurrence is a possibility as well as bleeding infection and pain.  She agrees to proceed with the planned procedure of excision of this large sebaceous cyst.      Rosendo Crum MD, FACS  General, Minimally Invasive and Endoscopic Surgery  [facility]    [address], [address]  [place], [place], [address]  P: [phone]  F: [phone]

## 2017-09-05 NOTE — PLAN OF CARE
Problem: Patient Care Overview (Adult)  Goal: Plan of Care Review  Outcome: Outcome(s) achieved Date Met:  09/05/17 09/05/17 0915   Coping/Psychosocial Response Interventions   Plan Of Care Reviewed With patient;spouse   Patient Care Overview   Progress improving   Outcome Evaluation   Outcome Summary/Follow up Plan ready for discharge

## 2017-09-06 LAB
CYTO UR: NORMAL
LAB AP CASE REPORT: NORMAL
Lab: NORMAL
PATH REPORT.FINAL DX SPEC: NORMAL
PATH REPORT.GROSS SPEC: NORMAL

## 2017-09-08 ENCOUNTER — OFFICE VISIT (OUTPATIENT)
Dept: INTERNAL MEDICINE | Facility: CLINIC | Age: 52
End: 2017-09-08

## 2017-09-08 VITALS
BODY MASS INDEX: 32.78 KG/M2 | SYSTOLIC BLOOD PRESSURE: 104 MMHG | HEIGHT: 64 IN | WEIGHT: 192 LBS | DIASTOLIC BLOOD PRESSURE: 62 MMHG

## 2017-09-08 DIAGNOSIS — E78.5 HYPERLIPIDEMIA, UNSPECIFIED HYPERLIPIDEMIA TYPE: Primary | ICD-10-CM

## 2017-09-08 DIAGNOSIS — Z79.899 ENCOUNTER FOR MEDICATION MANAGEMENT: ICD-10-CM

## 2017-09-08 LAB
ALBUMIN SERPL-MCNC: 3.9 G/DL (ref 3.5–5.2)
ALP SERPL-CCNC: 65 U/L (ref 39–117)
ALT SERPL W P-5'-P-CCNC: 18 U/L (ref 1–33)
AST SERPL-CCNC: 18 U/L (ref 1–32)
BILIRUB CONJ SERPL-MCNC: <0.2 MG/DL (ref 0–0.3)
BILIRUB INDIRECT SERPL-MCNC: NORMAL MG/DL
BILIRUB SERPL-MCNC: 0.6 MG/DL (ref 0.1–1.2)
CHOLEST SERPL-MCNC: 201 MG/DL (ref 0–200)
HDLC SERPL-MCNC: 57 MG/DL (ref 40–60)
LDLC SERPL CALC-MCNC: 131 MG/DL (ref 0–100)
LDLC/HDLC SERPL: 2.29 {RATIO}
PROT SERPL-MCNC: 7.7 G/DL (ref 6–8.5)
TRIGL SERPL-MCNC: 67 MG/DL (ref 0–150)
VLDLC SERPL-MCNC: 13.4 MG/DL (ref 5–40)

## 2017-09-08 PROCEDURE — 36415 COLL VENOUS BLD VENIPUNCTURE: CPT | Performed by: INTERNAL MEDICINE

## 2017-09-08 PROCEDURE — 80061 LIPID PANEL: CPT | Performed by: INTERNAL MEDICINE

## 2017-09-08 PROCEDURE — 99212 OFFICE O/P EST SF 10 MIN: CPT | Performed by: INTERNAL MEDICINE

## 2017-09-08 PROCEDURE — 80076 HEPATIC FUNCTION PANEL: CPT | Performed by: INTERNAL MEDICINE

## 2017-09-08 NOTE — PROGRESS NOTES
"Chief Complaint   Patient presents with   • Hyperlipidemia     6 month follow up        Subjective   Palma Roblero is a 52 y.o. female     HPI: Hyperlipidemia:  This is a chronic problem.   No management changes were made at her last appointment.   Her most recent lipid panel was done  2/2017.  Total cholesterol was 156, Triglycerides 46, HDL 55, LDL 92.   Current treatment: statin.   Prudent diet and regular exercise have also been recommended. By report, there is good compliance with treatment and good tolerance.    She has not experienced statin associated myalgias, dyspepsia.  She has not had liver enzyme elevation.              The following portions of the patient's history were reviewed and updated as appropriate: allergies, current medications, past social history, problem list, past surgical history    Review of Systems   Constitutional: Negative for activity change, appetite change and fatigue.   HENT: Negative for nosebleeds.    Eyes: Negative for visual disturbance.   Respiratory: Negative for cough and shortness of breath.    Cardiovascular: Negative for chest pain, palpitations and leg swelling.   Neurological: Negative for headaches.   Psychiatric/Behavioral: Positive for sleep disturbance (wakes up during the night).           Objective     /62  Ht 64\" (162.6 cm)  Wt 192 lb (87.1 kg)  BMI 32.96 kg/m2     Physical Exam   Constitutional: She is oriented to person, place, and time. She appears well-developed and well-nourished. No distress.   Neck: Normal carotid pulses present. Carotid bruit is not present.   Cardiovascular: Normal rate, regular rhythm, S1 normal, S2 normal and intact distal pulses.  Exam reveals no gallop and no friction rub.    No murmur heard.  Pulses:       Carotid pulses are 2+ on the right side, and 2+ on the left side.  Pulmonary/Chest: Effort normal and breath sounds normal. No respiratory distress. She has no wheezes. She has no rhonchi. She has no rales. Chest wall " is not dull to percussion.   Musculoskeletal: She exhibits no edema.   Neurological: She is alert and oriented to person, place, and time.   Skin: Skin is warm and dry.   Nursing note and vitals reviewed.      Assessment/Plan   Problem List Items Addressed This Visit        Cardiovascular and Mediastinum    Hyperlipidemia - Primary    Relevant Orders    Lipid Panel      Other Visit Diagnoses     Encounter for medication management        Relevant Orders    Hepatic Function Panel

## 2017-09-20 ENCOUNTER — OFFICE VISIT (OUTPATIENT)
Dept: SURGERY | Facility: CLINIC | Age: 52
End: 2017-09-20

## 2017-09-20 DIAGNOSIS — L72.3 SEBACEOUS CYST: Primary | ICD-10-CM

## 2017-09-20 PROCEDURE — 99024 POSTOP FOLLOW-UP VISIT: CPT | Performed by: SURGERY

## 2017-09-20 NOTE — PROGRESS NOTES
Follow-up excision of sebaceous cyst from back    Subjective:  No complaints, no pain    Objective:  Incision is nicely healed.  Sutures were removed.    Assessment and plan:  Status post excision of sebaceous cyst from back.  Wound looks quite good.  Steri-Strips were placed.  Patient may follow-up as needed.    Rosendo Crum MD  General and Endoscopic Surgery  [facility]    [address], [address]  [place], [place], [address]  P: [phone]  F: [phone]

## 2017-09-22 ENCOUNTER — HOSPITAL ENCOUNTER (OUTPATIENT)
Dept: MAMMOGRAPHY | Facility: HOSPITAL | Age: 52
Discharge: HOME | End: 2017-09-22
Attending: INTERNAL MEDICINE | Admitting: INTERNAL MEDICINE

## 2017-09-22 DIAGNOSIS — Z12.31 ENCOUNTER FOR MAMMOGRAM TO ESTABLISH BASELINE MAMMOGRAM: ICD-10-CM

## 2017-09-22 PROCEDURE — G0202 SCR MAMMO BI INCL CAD: HCPCS

## 2017-09-28 DIAGNOSIS — E78.00 HYPERCHOLESTEREMIA: ICD-10-CM

## 2017-09-28 RX ORDER — ATORVASTATIN CALCIUM 10 MG/1
TABLET, FILM COATED ORAL
Qty: 90 TABLET | Refills: 1 | Status: SHIPPED | OUTPATIENT
Start: 2017-09-28 | End: 2017-10-20

## 2017-10-20 ENCOUNTER — OFFICE VISIT (OUTPATIENT)
Dept: INTERNAL MEDICINE | Facility: CLINIC | Age: 52
End: 2017-10-20

## 2017-10-20 VITALS
HEIGHT: 64 IN | BODY MASS INDEX: 31.92 KG/M2 | HEART RATE: 83 BPM | DIASTOLIC BLOOD PRESSURE: 72 MMHG | SYSTOLIC BLOOD PRESSURE: 110 MMHG | WEIGHT: 187 LBS | TEMPERATURE: 98.3 F | OXYGEN SATURATION: 98 %

## 2017-10-20 DIAGNOSIS — J06.9 UPPER RESPIRATORY TRACT INFECTION, UNSPECIFIED TYPE: Primary | ICD-10-CM

## 2017-10-20 PROCEDURE — 99213 OFFICE O/P EST LOW 20 MIN: CPT | Performed by: INTERNAL MEDICINE

## 2017-10-20 RX ORDER — TRIAMCINOLONE ACETONIDE 55 UG/1
2 SPRAY, METERED NASAL DAILY PRN
Qty: 16.5 G | Refills: 1 | Status: SHIPPED | OUTPATIENT
Start: 2017-10-20 | End: 2018-03-09 | Stop reason: SDUPTHER

## 2017-10-20 RX ORDER — AZITHROMYCIN 250 MG/1
TABLET, FILM COATED ORAL
Qty: 6 TABLET | Refills: 0 | Status: SHIPPED | OUTPATIENT
Start: 2017-10-20 | End: 2018-03-09

## 2017-10-20 NOTE — PROGRESS NOTES
Subjective   Palma Roblero is a 52 y.o. female.     URI    This is a new problem. The current episode started in the past 7 days. Associated symptoms include coughing ( Some yellow sputum) and a sore throat. Pertinent negatives include no chest pain or wheezing.        The following portions of the patient's history were reviewed and updated as appropriate: allergies, current medications, past family history, past medical history, past social history, past surgical history and problem list.    Review of Systems   Constitutional:        Here for acute clinic   HENT: Positive for sinus pressure and sore throat.    Respiratory: Positive for cough ( Some yellow sputum). Negative for wheezing.    Cardiovascular: Negative for chest pain and palpitations.   Gastrointestinal: Negative.    Genitourinary: Negative.    Musculoskeletal: Negative.        Objective   Physical Exam   Constitutional: She appears well-developed.   HENT:   Head: Normocephalic.   Nose: Nose normal.   Mouth/Throat: Oropharynx is clear and moist.   No sinus tenderness to percussion   Eyes: EOM are normal.   Neck: Neck supple.   Cardiovascular: Normal rate, regular rhythm and normal heart sounds.    Repeat 110/70   Pulmonary/Chest: Effort normal and breath sounds normal.   Scattered rhonchi   Musculoskeletal: Normal range of motion.   Lymphadenopathy:     She has no cervical adenopathy.   Neurological: She is alert.       Assessment/Plan   Palma was seen today for uri.    Diagnoses and all orders for this visit:    Upper respiratory tract infection, unspecified type  -     azithromycin (ZITHROMAX) 250 MG tablet; Take 2 tablets the first day, then 1 tablet daily for 4 days.  -     Triamcinolone Acetonide (NASACORT) 55 MCG/ACT nasal inhaler; 2 sprays into each nostril Daily As Needed (nasal congestion).    Advised usual OTC RXs

## 2017-11-09 ENCOUNTER — TELEPHONE (OUTPATIENT)
Dept: INTERNAL MEDICINE | Facility: CLINIC | Age: 52
End: 2017-11-09

## 2017-11-09 NOTE — TELEPHONE ENCOUNTER
----- Message from Alivia Velazquez sent at 11/9/2017  1:00 PM EST -----  Contact: Patient  Patient called.  She had seen Dr. Pedraza a few weeks ago, acute clinic.  Patient states pharmacy has never received a script from us for     Triamcinolone Acetonide (NASACORT) 55 MCG/ACT nasal inhaler    Please advise.  Patient needing her inhaler.      Patient:  [phone]    Pharmacy:  [facility] [address] [place] [address] [id number] AT [facility] - [phone] PH - [phone] FX

## 2017-11-09 NOTE — TELEPHONE ENCOUNTER
[facility]ed [facility] to check with them , they have it on file and ready for pt to p/u . Called pt after that informed her that [facility] have it but you have to pay for it ($100). Pt said she will not pay that amount for NASACORT. Advised pt to try nasal spray  instead which is cheaper. She said she will pay OTC.

## 2018-03-09 ENCOUNTER — OFFICE VISIT (OUTPATIENT)
Dept: INTERNAL MEDICINE | Facility: CLINIC | Age: 53
End: 2018-03-09

## 2018-03-09 VITALS
HEIGHT: 64 IN | DIASTOLIC BLOOD PRESSURE: 72 MMHG | BODY MASS INDEX: 30.05 KG/M2 | SYSTOLIC BLOOD PRESSURE: 106 MMHG | WEIGHT: 176 LBS

## 2018-03-09 DIAGNOSIS — Z00.00 ANNUAL PHYSICAL EXAM: Primary | ICD-10-CM

## 2018-03-09 DIAGNOSIS — R09.81 SINUS CONGESTION: ICD-10-CM

## 2018-03-09 LAB
ALBUMIN SERPL-MCNC: 4.1 G/DL (ref 3.5–5.2)
ALBUMIN/GLOB SERPL: 1.2 G/DL
ALP SERPL-CCNC: 60 U/L (ref 39–117)
ALT SERPL W P-5'-P-CCNC: 22 U/L (ref 1–33)
ANION GAP SERPL CALCULATED.3IONS-SCNC: 4.7 MMOL/L
AST SERPL-CCNC: 25 U/L (ref 1–32)
BASOPHILS # BLD AUTO: 0.03 10*3/MM3 (ref 0–0.2)
BASOPHILS NFR BLD AUTO: 0.5 % (ref 0–2)
BILIRUB SERPL-MCNC: 0.9 MG/DL (ref 0.1–1.2)
BILIRUB UR QL STRIP: NEGATIVE
BUN BLD-MCNC: 16 MG/DL (ref 6–20)
BUN/CREAT SERPL: 22.5 (ref 7–25)
CALCIUM SPEC-SCNC: 9 MG/DL (ref 8.6–10.5)
CHLORIDE SERPL-SCNC: 103 MMOL/L (ref 98–107)
CHOLEST SERPL-MCNC: 202 MG/DL (ref 0–200)
CLARITY UR: CLEAR
CO2 SERPL-SCNC: 32.3 MMOL/L (ref 22–29)
COLOR UR: YELLOW
CREAT BLD-MCNC: 0.71 MG/DL (ref 0.57–1)
DEPRECATED RDW RBC AUTO: 42.3 FL (ref 37–54)
EOSINOPHIL # BLD AUTO: 0.1 10*3/MM3 (ref 0–0.7)
EOSINOPHIL NFR BLD AUTO: 1.7 % (ref 0–5)
ERYTHROCYTE [DISTWIDTH] IN BLOOD BY AUTOMATED COUNT: 14.2 % (ref 11.5–15)
GFR SERPL CREATININE-BSD FRML MDRD: 104 ML/MIN/1.73
GLOBULIN UR ELPH-MCNC: 3.3 GM/DL
GLUCOSE BLD-MCNC: 92 MG/DL (ref 65–99)
GLUCOSE UR STRIP-MCNC: NEGATIVE MG/DL
HCT VFR BLD AUTO: 39.3 % (ref 34.1–44.9)
HDLC SERPL-MCNC: 74 MG/DL (ref 40–60)
HGB BLD-MCNC: 12.9 G/DL (ref 11.2–15.7)
HGB UR QL STRIP.AUTO: NEGATIVE
KETONES UR QL STRIP: NEGATIVE
LDLC SERPL CALC-MCNC: 118 MG/DL (ref 0–100)
LDLC/HDLC SERPL: 1.59 {RATIO}
LEUKOCYTE ESTERASE UR QL STRIP.AUTO: NEGATIVE
LYMPHOCYTES # BLD AUTO: 1.43 10*3/MM3 (ref 0.8–7)
LYMPHOCYTES NFR BLD AUTO: 24.5 % (ref 10–60)
MCH RBC QN AUTO: 27.4 PG (ref 26–34)
MCHC RBC AUTO-ENTMCNC: 32.8 G/DL (ref 31–37)
MCV RBC AUTO: 83.4 FL (ref 80–100)
MONOCYTES # BLD AUTO: 0.49 10*3/MM3 (ref 0–1)
MONOCYTES NFR BLD AUTO: 8.4 % (ref 0–13)
NEUTROPHILS # BLD AUTO: 3.78 10*3/MM3 (ref 1–11)
NEUTROPHILS NFR BLD AUTO: 64.9 % (ref 30–85)
NITRITE UR QL STRIP: NEGATIVE
PH UR STRIP.AUTO: 6 [PH] (ref 5–8)
PLATELET # BLD AUTO: 156 10*3/MM3 (ref 150–450)
PMV BLD AUTO: 12.6 FL (ref 6–12)
POTASSIUM BLD-SCNC: 4.2 MMOL/L (ref 3.5–5.2)
PROT SERPL-MCNC: 7.4 G/DL (ref 6–8.5)
PROT UR QL STRIP: NEGATIVE
RBC # BLD AUTO: 4.71 10*6/MM3 (ref 3.93–5.22)
SODIUM BLD-SCNC: 140 MMOL/L (ref 136–145)
SP GR UR STRIP: 1.02 (ref 1–1.03)
TRIGL SERPL-MCNC: 50 MG/DL (ref 0–150)
TSH SERPL DL<=0.05 MIU/L-ACNC: 1.81 MIU/ML (ref 0.27–4.2)
UROBILINOGEN UR QL STRIP: NORMAL
VLDLC SERPL-MCNC: 10 MG/DL (ref 5–40)
WBC NRBC COR # BLD: 5.83 10*3/MM3 (ref 5–10)

## 2018-03-09 PROCEDURE — 80053 COMPREHEN METABOLIC PANEL: CPT | Performed by: INTERNAL MEDICINE

## 2018-03-09 PROCEDURE — 81003 URINALYSIS AUTO W/O SCOPE: CPT | Performed by: INTERNAL MEDICINE

## 2018-03-09 PROCEDURE — 84443 ASSAY THYROID STIM HORMONE: CPT | Performed by: INTERNAL MEDICINE

## 2018-03-09 PROCEDURE — 99396 PREV VISIT EST AGE 40-64: CPT | Performed by: INTERNAL MEDICINE

## 2018-03-09 PROCEDURE — 85025 COMPLETE CBC W/AUTO DIFF WBC: CPT | Performed by: INTERNAL MEDICINE

## 2018-03-09 PROCEDURE — 80061 LIPID PANEL: CPT | Performed by: INTERNAL MEDICINE

## 2018-03-09 RX ORDER — TRIAMCINOLONE ACETONIDE 55 UG/1
2 SPRAY, METERED NASAL DAILY PRN
Qty: 16.5 G | Refills: 1 | Status: SHIPPED | OUTPATIENT
Start: 2018-03-09 | End: 2021-03-24 | Stop reason: SDUPTHER

## 2018-03-09 RX ORDER — AZELASTINE HYDROCHLORIDE 0.5 MG/ML
1 SOLUTION/ DROPS OPHTHALMIC DAILY
Qty: 1 EACH | Refills: 5 | Status: SHIPPED | OUTPATIENT
Start: 2018-03-09 | End: 2021-03-12 | Stop reason: SDUPTHER

## 2018-03-09 NOTE — PROGRESS NOTES
"Chief Complaint   Patient presents with   • Annual Exam     physical        Subjective   Palma Roblero is a 52 y.o. female     HPI:   She is here for annual examination.    She generally feels healthy.    Her  appetite is good.    She  follows a prudent diet.   She sleeps well.    Her  energy is good.     She exercises regularly.  She is up to date on dental and eye exams. She is due for colonoscopy this year.         The following portions of the patient's history were reviewed and updated as appropriate: allergies, current medications, past social history, problem list, past surgical history    Review of Systems   Constitutional: Negative for appetite change, chills, fatigue, fever and unexpected weight change.   HENT: Positive for congestion (sinus). Negative for sore throat and trouble swallowing.    Eyes: Negative for visual disturbance.   Respiratory: Negative for cough and shortness of breath.    Cardiovascular: Negative for chest pain, palpitations and leg swelling.   Gastrointestinal: Negative for abdominal pain, constipation, diarrhea, nausea and vomiting.   Endocrine: Negative for cold intolerance, heat intolerance, polydipsia and polyuria.   Genitourinary: Negative for dysuria.   Musculoskeletal: Positive for arthralgias (knees sometimes, no swelling).   Skin: Negative for rash.   Psychiatric/Behavioral: Positive for sleep disturbance (hot flashes). Negative for dysphoric mood. The patient is not nervous/anxious.            Objective     /72  Ht 162.6 cm (64\")  Wt 79.8 kg (176 lb)  LMP  (LMP Unknown)  BMI 30.21 kg/m2     Physical Exam   Constitutional: She is oriented to person, place, and time. She appears well-developed and well-nourished. No distress.   HENT:   Right Ear: Hearing, tympanic membrane, external ear and ear canal normal.   Left Ear: Hearing, tympanic membrane, external ear and ear canal normal.   Nose: Right sinus exhibits no maxillary sinus tenderness and no frontal sinus " tenderness. Left sinus exhibits no maxillary sinus tenderness and no frontal sinus tenderness.   Eyes: Conjunctivae, EOM and lids are normal. Pupils are equal, round, and reactive to light.   Neck: Trachea normal and phonation normal. Neck supple. Normal carotid pulses and no JVD present. Carotid bruit is not present. No thyroid mass and no thyromegaly present.   Cardiovascular: Normal rate, regular rhythm, S1 normal and S2 normal.  PMI is not displaced.  Exam reveals no gallop and no friction rub.    No murmur heard.  Pulses:       Carotid pulses are 2+ on the right side, and 2+ on the left side.       Radial pulses are 2+ on the right side, and 2+ on the left side.        Dorsalis pedis pulses are 2+ on the right side, and 2+ on the left side.   Pulmonary/Chest: Effort normal and breath sounds normal. She has no wheezes. She has no rhonchi. She has no rales. Chest wall is not dull to percussion.   Abdominal: Soft. Normal appearance, normal aorta and bowel sounds are normal. She exhibits no abdominal bruit and no mass. There is no hepatosplenomegaly. There is no tenderness.   Musculoskeletal: Normal range of motion. She exhibits no edema or tenderness.        Right knee: She exhibits normal range of motion. No tenderness found.        Left knee: She exhibits normal range of motion. No tenderness found.   Pre-patellar palpable crepitus bilaterally   Lymphadenopathy:     She has no cervical adenopathy.        Right: No supraclavicular adenopathy present.        Left: No supraclavicular adenopathy present.   Neurological: She is alert and oriented to person, place, and time. She has normal strength and normal reflexes. No cranial nerve deficit or sensory deficit. Coordination normal.   Skin: Skin is warm and dry. No rash noted.   Psychiatric: She has a normal mood and affect. Her speech is normal and behavior is normal. Judgment and thought content normal. Cognition and memory are normal. She is attentive.   Nursing  note and vitals reviewed.      Assessment/Plan   Problem List Items Addressed This Visit     None      Visit Diagnoses     Annual physical exam    -  Primary    Relevant Orders    Comprehensive Metabolic Panel    CBC & Differential    Urinalysis With / Microscopic If Indicated - Urine, Clean Catch (Completed)    Lipid Panel    TSH Rfx On Abnormal To Free T4    CBC Auto Differential    Sinus congestion        Relevant Medications    Triamcinolone Acetonide (NASACORT) 55 MCG/ACT nasal inhaler

## 2018-04-02 ENCOUNTER — APPOINTMENT (OUTPATIENT)
Dept: ONCOLOGY | Facility: CLINIC | Age: 53
End: 2018-04-02

## 2018-04-02 ENCOUNTER — APPOINTMENT (OUTPATIENT)
Dept: LAB | Facility: HOSPITAL | Age: 53
End: 2018-04-02

## 2018-04-03 ENCOUNTER — OFFICE VISIT (OUTPATIENT)
Dept: ONCOLOGY | Facility: CLINIC | Age: 53
End: 2018-04-03

## 2018-04-03 ENCOUNTER — LAB (OUTPATIENT)
Dept: LAB | Facility: HOSPITAL | Age: 53
End: 2018-04-03

## 2018-04-03 VITALS
BODY MASS INDEX: 30.01 KG/M2 | HEIGHT: 64 IN | SYSTOLIC BLOOD PRESSURE: 112 MMHG | RESPIRATION RATE: 14 BRPM | HEART RATE: 70 BPM | TEMPERATURE: 98.2 F | OXYGEN SATURATION: 99 % | WEIGHT: 175.8 LBS | DIASTOLIC BLOOD PRESSURE: 82 MMHG

## 2018-04-03 DIAGNOSIS — Z17.0 MALIGNANT NEOPLASM OF LOWER-OUTER QUADRANT OF RIGHT BREAST OF FEMALE, ESTROGEN RECEPTOR POSITIVE (HCC): Primary | ICD-10-CM

## 2018-04-03 DIAGNOSIS — C50.511 MALIGNANT NEOPLASM OF LOWER-OUTER QUADRANT OF RIGHT BREAST OF FEMALE, ESTROGEN RECEPTOR POSITIVE (HCC): ICD-10-CM

## 2018-04-03 DIAGNOSIS — Z17.0 MALIGNANT NEOPLASM OF LOWER-OUTER QUADRANT OF RIGHT BREAST OF FEMALE, ESTROGEN RECEPTOR POSITIVE (HCC): ICD-10-CM

## 2018-04-03 DIAGNOSIS — C50.511 MALIGNANT NEOPLASM OF LOWER-OUTER QUADRANT OF RIGHT BREAST OF FEMALE, ESTROGEN RECEPTOR POSITIVE (HCC): Primary | ICD-10-CM

## 2018-04-03 LAB
ALBUMIN SERPL-MCNC: 4.2 G/DL (ref 3.5–5.2)
ALBUMIN/GLOB SERPL: 1.2 G/DL (ref 1.1–2.4)
ALP SERPL-CCNC: 72 U/L (ref 38–116)
ALT SERPL W P-5'-P-CCNC: 26 U/L (ref 0–33)
ANION GAP SERPL CALCULATED.3IONS-SCNC: 10.7 MMOL/L
AST SERPL-CCNC: 28 U/L (ref 0–32)
BASOPHILS # BLD AUTO: 0.04 10*3/MM3 (ref 0–0.1)
BASOPHILS NFR BLD AUTO: 0.6 % (ref 0–1.1)
BILIRUB SERPL-MCNC: 0.4 MG/DL (ref 0.1–1.2)
BUN BLD-MCNC: 21 MG/DL (ref 6–20)
BUN/CREAT SERPL: 25.6 (ref 7.3–30)
CALCIUM SPEC-SCNC: 9.2 MG/DL (ref 8.5–10.2)
CHLORIDE SERPL-SCNC: 103 MMOL/L (ref 98–107)
CO2 SERPL-SCNC: 28.3 MMOL/L (ref 22–29)
CREAT BLD-MCNC: 0.82 MG/DL (ref 0.6–1.1)
DEPRECATED RDW RBC AUTO: 43.2 FL (ref 37–49)
EOSINOPHIL # BLD AUTO: 0.14 10*3/MM3 (ref 0–0.36)
EOSINOPHIL NFR BLD AUTO: 2.3 % (ref 1–5)
ERYTHROCYTE [DISTWIDTH] IN BLOOD BY AUTOMATED COUNT: 14 % (ref 11.7–14.5)
GFR SERPL CREATININE-BSD FRML MDRD: 88 ML/MIN/1.73
GLOBULIN UR ELPH-MCNC: 3.5 GM/DL (ref 1.8–3.5)
GLUCOSE BLD-MCNC: 96 MG/DL (ref 74–124)
HCT VFR BLD AUTO: 43.8 % (ref 34–45)
HGB BLD-MCNC: 14 G/DL (ref 11.5–14.9)
IMM GRANULOCYTES # BLD: 0.02 10*3/MM3 (ref 0–0.03)
IMM GRANULOCYTES NFR BLD: 0.3 % (ref 0–0.5)
LYMPHOCYTES # BLD AUTO: 1.98 10*3/MM3 (ref 1–3.5)
LYMPHOCYTES NFR BLD AUTO: 31.9 % (ref 20–49)
MCH RBC QN AUTO: 27 PG (ref 27–33)
MCHC RBC AUTO-ENTMCNC: 32 G/DL (ref 32–35)
MCV RBC AUTO: 84.6 FL (ref 83–97)
MONOCYTES # BLD AUTO: 0.47 10*3/MM3 (ref 0.25–0.8)
MONOCYTES NFR BLD AUTO: 7.6 % (ref 4–12)
NEUTROPHILS # BLD AUTO: 3.56 10*3/MM3 (ref 1.5–7)
NEUTROPHILS NFR BLD AUTO: 57.3 % (ref 39–75)
NRBC BLD MANUAL-RTO: 0 /100 WBC (ref 0–0)
PLATELET # BLD AUTO: 201 10*3/MM3 (ref 150–375)
PMV BLD AUTO: 12 FL (ref 8.9–12.1)
POTASSIUM BLD-SCNC: 4.4 MMOL/L (ref 3.5–4.7)
PROT SERPL-MCNC: 7.7 G/DL (ref 6.3–8)
RBC # BLD AUTO: 5.18 10*6/MM3 (ref 3.9–5)
SODIUM BLD-SCNC: 142 MMOL/L (ref 134–145)
WBC NRBC COR # BLD: 6.21 10*3/MM3 (ref 4–10)

## 2018-04-03 PROCEDURE — 99213 OFFICE O/P EST LOW 20 MIN: CPT | Performed by: INTERNAL MEDICINE

## 2018-04-03 PROCEDURE — 80053 COMPREHEN METABOLIC PANEL: CPT | Performed by: INTERNAL MEDICINE

## 2018-04-03 PROCEDURE — 36415 COLL VENOUS BLD VENIPUNCTURE: CPT | Performed by: INTERNAL MEDICINE

## 2018-04-03 PROCEDURE — 85025 COMPLETE CBC W/AUTO DIFF WBC: CPT | Performed by: INTERNAL MEDICINE

## 2018-04-03 NOTE — PROGRESS NOTES
Subjective   REASON FOR FOLLOWUP:   History of stage IIIA infiltrating ductal carcinoma of the right breast with 4 positive nodes, ER positive, HER-2/julián negative; treated with lumpectomy, status post 6 cycles of TAC. Received tamoxifen between February 2007 and March 2012. Femara was started in March 2012. Patient switched to Arimidex as of 06/21/2012 because of side effects with severe ankle joint pain with Femara.    10 years of adjuvant therapy completed  March 2017.    HISTORY OF PRESENT ILLNESS:     History of Present Illness Ms. Roblero is a 52 y.o.  female with the above noted history of stage IIIA carcinoma of the right breast, treated with lumpectomy, chemotherapy and radiation. She received extended hormonal therapy with 5 years of tamoxifen followed by 5 years of aromatase inhibitor.  She completed her 10 years of hormonal therapy in March 2017  .     She is here today for routine annual followup and seems to be doing well.  Her annual mammogram was performed on 9/22/2017 with no suspicious findings.    She has been staying active and has actually lost 35-40 pounds in the past year.    Past Medical History, Past Surgical History, Social History, Family History have been reviewed and are without significant changes except as mentioned.    Review of Systems   Constitutional: Negative for activity change, appetite change, fatigue, fever and unexpected weight change.   HENT: Negative for hearing loss, nosebleeds, trouble swallowing and voice change.    Eyes: Negative for visual disturbance.   Respiratory: Negative for cough, shortness of breath and wheezing.    Cardiovascular: Negative for chest pain and palpitations.   Gastrointestinal: Negative for abdominal pain, diarrhea, nausea and vomiting.   Genitourinary: Negative for difficulty urinating, frequency, hematuria and urgency.   Musculoskeletal: Negative for back pain and neck pain.   Skin: Negative for rash.   Neurological: Negative  "for dizziness, seizures, syncope and headaches.   Hematological: Negative for adenopathy. Does not bruise/bleed easily.   Psychiatric/Behavioral: Negative for behavioral problems. The patient is not nervous/anxious.       A comprehensive 14 point review of systems was performed and was negative except as mentioned.    Medications:  The current medication list was reviewed in the EMR    ALLERGIES:  No Known Allergies    Objective      Vitals:    04/03/18 1628   BP: 112/82   Pulse: 70   Resp: 14   Temp: 98.2 °F (36.8 °C)   TempSrc: Oral   SpO2: 99%   Weight: 79.7 kg (175 lb 12.8 oz)   Height: 163.4 cm (64.33\")   PainSc: 1  Comment: R knee- pt has been jogging     Current Status 4/3/2018   ECOG score 0       Physical Exam   Constitutional: She is oriented to person, place, and time. She appears well-developed and well-nourished. No distress.   HENT:   Head: Normocephalic.   Eyes: Conjunctivae and EOM are normal. Pupils are equal, round, and reactive to light. No scleral icterus.   Neck: Normal range of motion. Neck supple. No JVD present. No thyromegaly present.   Cardiovascular: Normal rate and regular rhythm.  Exam reveals no gallop and no friction rub.    No murmur heard.  Pulmonary/Chest: Effort normal and breath sounds normal. She has no wheezes. She has no rales. Right breast exhibits no mass, no nipple discharge and no skin change. Left breast exhibits no mass, no nipple discharge and no skin change.   Right lumpectomy scar.    Abdominal: Soft. Bowel sounds are normal. She exhibits no distension and no mass. There is no tenderness.   Musculoskeletal: Normal range of motion. She exhibits no edema or deformity.   Lymphadenopathy:     She has no cervical adenopathy.   Neurological: She is alert and oriented to person, place, and time. She has normal reflexes. No cranial nerve deficit.   Skin: Skin is warm and dry. No rash noted. No erythema.   Psychiatric: She has a normal mood and affect. Her behavior is normal. " Judgment normal.        RECENT LABS:  Hematology WBC   Date Value Ref Range Status   04/03/2018 6.21 4.00 - 10.00 10*3/mm3 Final     RBC   Date Value Ref Range Status   04/03/2018 5.18 (H) 3.90 - 5.00 10*6/mm3 Final     Hemoglobin   Date Value Ref Range Status   04/03/2018 14.0 11.5 - 14.9 g/dL Final     Hematocrit   Date Value Ref Range Status   04/03/2018 43.8 34.0 - 45.0 % Final     Platelets   Date Value Ref Range Status   04/03/2018 201 150 - 375 10*3/mm3 Final            Assessment/Plan     Stage IIIA infiltrating ductal carcinoma of the right breast treated with a lumpectomy, radiation, 6 cycles of TAC chemotherapy and ongoing hormonal therapy. She completed 10 years of adjuvant hormonal therapy with 5 years of tamoxifen followed by 5 years of Arimidex.    PLAN:   1. She will continue seeing us on an annual basis but knows that she can call any time if she has new concerns.   2. Her next annual mammogram should be due in late September 2018.            4/3/2018      CC:

## 2018-04-04 ENCOUNTER — PREP FOR SURGERY (OUTPATIENT)
Dept: OTHER | Facility: HOSPITAL | Age: 53
End: 2018-04-04

## 2018-04-04 DIAGNOSIS — Z86.010 HISTORY OF COLON POLYPS: ICD-10-CM

## 2018-04-04 DIAGNOSIS — Z12.11 COLON CANCER SCREENING: Primary | ICD-10-CM

## 2018-04-04 DIAGNOSIS — Z85.3 HISTORY OF BREAST CANCER: ICD-10-CM

## 2018-04-06 DIAGNOSIS — E78.00 HYPERCHOLESTEREMIA: ICD-10-CM

## 2018-04-06 RX ORDER — ATORVASTATIN CALCIUM 10 MG/1
TABLET, FILM COATED ORAL
Qty: 90 TABLET | Refills: 0 | Status: SHIPPED | OUTPATIENT
Start: 2018-04-06 | End: 2018-06-11 | Stop reason: HOSPADM

## 2018-04-18 PROBLEM — Z86.010 HISTORY OF COLON POLYPS: Status: ACTIVE | Noted: 2018-04-18

## 2018-04-18 PROBLEM — Z85.3 HISTORY OF BREAST CANCER: Status: ACTIVE | Noted: 2018-04-18

## 2018-04-18 PROBLEM — Z12.11 COLON CANCER SCREENING: Status: ACTIVE | Noted: 2018-04-18

## 2018-04-18 PROBLEM — Z86.0100 HISTORY OF COLON POLYPS: Status: ACTIVE | Noted: 2018-04-18

## 2018-06-11 ENCOUNTER — HOSPITAL ENCOUNTER (OUTPATIENT)
Facility: HOSPITAL | Age: 53
Setting detail: HOSPITAL OUTPATIENT SURGERY
Discharge: HOME | End: 2018-06-11
Attending: SURGERY | Admitting: SURGERY

## 2018-06-11 ENCOUNTER — ANESTHESIA (OUTPATIENT)
Dept: GASTROENTEROLOGY | Facility: HOSPITAL | Age: 53
End: 2018-06-11

## 2018-06-11 ENCOUNTER — ANESTHESIA EVENT (OUTPATIENT)
Dept: GASTROENTEROLOGY | Facility: HOSPITAL | Age: 53
End: 2018-06-11

## 2018-06-11 VITALS
HEIGHT: 64 IN | SYSTOLIC BLOOD PRESSURE: 108 MMHG | OXYGEN SATURATION: 100 % | TEMPERATURE: 97.8 F | HEART RATE: 66 BPM | DIASTOLIC BLOOD PRESSURE: 72 MMHG | BODY MASS INDEX: 29.76 KG/M2 | RESPIRATION RATE: 16 BRPM | WEIGHT: 174.3 LBS

## 2018-06-11 DIAGNOSIS — Z86.010 HISTORY OF COLON POLYPS: ICD-10-CM

## 2018-06-11 DIAGNOSIS — Z12.11 COLON CANCER SCREENING: ICD-10-CM

## 2018-06-11 DIAGNOSIS — Z85.3 HISTORY OF BREAST CANCER: ICD-10-CM

## 2018-06-11 PROCEDURE — 25010000002 PROPOFOL 10 MG/ML EMULSION: Performed by: ANESTHESIOLOGY

## 2018-06-11 PROCEDURE — 88305 TISSUE EXAM BY PATHOLOGIST: CPT | Performed by: SURGERY

## 2018-06-11 PROCEDURE — 45385 COLONOSCOPY W/LESION REMOVAL: CPT | Performed by: SURGERY

## 2018-06-11 PROCEDURE — 25010000002 GLUCAGON (RDNA) PER 1 MG: Performed by: SURGERY

## 2018-06-11 RX ORDER — PROPOFOL 10 MG/ML
VIAL (ML) INTRAVENOUS AS NEEDED
Status: DISCONTINUED | OUTPATIENT
Start: 2018-06-11 | End: 2018-06-11 | Stop reason: SURG

## 2018-06-11 RX ORDER — LIDOCAINE HYDROCHLORIDE 20 MG/ML
INJECTION, SOLUTION INFILTRATION; PERINEURAL AS NEEDED
Status: DISCONTINUED | OUTPATIENT
Start: 2018-06-11 | End: 2018-06-11 | Stop reason: SURG

## 2018-06-11 RX ORDER — PROPOFOL 10 MG/ML
VIAL (ML) INTRAVENOUS CONTINUOUS PRN
Status: DISCONTINUED | OUTPATIENT
Start: 2018-06-11 | End: 2018-06-11 | Stop reason: SURG

## 2018-06-11 RX ORDER — SODIUM CHLORIDE, SODIUM LACTATE, POTASSIUM CHLORIDE, CALCIUM CHLORIDE 600; 310; 30; 20 MG/100ML; MG/100ML; MG/100ML; MG/100ML
1000 INJECTION, SOLUTION INTRAVENOUS CONTINUOUS
Status: DISCONTINUED | OUTPATIENT
Start: 2018-06-11 | End: 2018-06-11 | Stop reason: HOSPADM

## 2018-06-11 RX ADMIN — PROPOFOL 100 MCG/KG/MIN: 10 INJECTION, EMULSION INTRAVENOUS at 08:15

## 2018-06-11 RX ADMIN — LIDOCAINE HYDROCHLORIDE 60 MG: 20 INJECTION, SOLUTION INFILTRATION; PERINEURAL at 08:15

## 2018-06-11 RX ADMIN — PROPOFOL 100 MG: 10 INJECTION, EMULSION INTRAVENOUS at 08:15

## 2018-06-11 RX ADMIN — SODIUM CHLORIDE, POTASSIUM CHLORIDE, SODIUM LACTATE AND CALCIUM CHLORIDE 1000 ML: 600; 310; 30; 20 INJECTION, SOLUTION INTRAVENOUS at 07:19

## 2018-06-11 NOTE — H&P
Cc: Endoscopy Visit    HPI: 53 y.o. female here for screening with a prior history of breast cancer, with 3 tubulovillous adenomas in 2014.  She has no family history of colon cancer.    Past Medical History:   Diagnosis Date   • Breast cancer 2006    Right infiltrating ductal carcinoma, grade I, T2N1, ER/MN positive, 90% HER-2/julián negative   • Cervical polyp     History of benign cervical polyp   • Colon polyps    • Hypercholesteremia    • Hyperthyroidism     NO LONGER PROBLEM   • PONV (postoperative nausea and vomiting)    • Radiation    • Sebaceous cyst     BACK       Past Surgical History:   Procedure Laterality Date   • BREAST BIOPSY Right 08/03/2006    stereotactic biopsy   • BREAST LUMPECTOMY Right 08/18/2006    Right axillary sentinel node biopsy (positive), right axillay dissection, right needle lumpectomy-Dr. Quyen Vidal   • CERVICAL CONE BIOPSY  2005    [facility]   • COLONOSCOPY N/A 04/07/2014    3 mm cecal polyp, 6 mm ascending colon polyp, 8 mm transvese colon polyp-Dr. Quyen Vidal   • COLONOSCOPY N/A 03/16/2009    Tortuous colon-Dr. Quyen Vidal   • COLPOSCOPY     • DILATATION AND CURETTAGE  03/23/2005   • EXCISION MASS TRUNK N/A 9/5/2017    Procedure: EXCISION OF SEBACEOUS CYST FROM BACK ;  Surgeon: Rosendo Crum MD;  Location: [facility];  Service:    • VENOUS ACCESS DEVICE (PORT) INSERTION Left 10/06/2006    Left subclavian Oalrjz-v-[id number] placement-Dr. Quyen Vidal   • VENOUS ACCESS DEVICE (PORT) REMOVAL Left 05/29/2007    Left subclavian Suitol-f-Gywp removal-Dr. Quyen Vidal       has No Known Allergies.       Medication List      ASK your doctor about these medications    * atorvastatin 10 MG tablet  Commonly known as:  LIPITOR     * atorvastatin 10 MG tablet  Commonly known as:  LIPITOR  TAKE 1 TABLET BY MOUTH DAILY     azelastine 0.05 % ophthalmic solution  Commonly known as:  OPTIVAR  Administer 1 drop to both eyes Daily.     CALCIUM 500 + D PO     Triamcinolone Acetonide 55  MCG/ACT nasal inhaler  Commonly known as:  NASACORT  2 sprays into each nostril Daily As Needed (nasal congestion).        * This list has 2 medication(s) that are the same as other medications   prescribed for you. Read the directions carefully, and ask your doctor or   other care provider to review them with you.              Family History   Problem Relation Age of Onset   • Hyperlipidemia Sister    • Alzheimer's disease Mother    • No Known Problems Father    • No Known Problems Brother    • No Known Problems Daughter    • ADD / ADHD Son    • No Known Problems Maternal Grandmother    • No Known Problems Paternal Grandmother    • No Known Problems Maternal Aunt    • No Known Problems Paternal Aunt    • BRCA 1/2 Neg Hx    • Breast cancer Neg Hx    • Colon cancer Neg Hx    • Endometrial cancer Neg Hx    • Ovarian cancer Neg Hx    • Malig Hyperthermia Neg Hx        Social History     Social History   • Marital status:      Spouse name: Philip   • Number of children: N/A   • Years of education: N/A     Occupational History   •  [employer]     Social History Main Topics   • Smoking status: Never Smoker   • Smokeless tobacco: Never Used   • Alcohol use No   • Drug use: No   • Sexual activity: Defer     Other Topics Concern   • Not on file     Social History Narrative   • No narrative on file       Vitals:    06/11/18 0659   BP: 104/75   Pulse: 65   Resp: 18   Temp: 97.8 °F (36.6 °C)   SpO2: 98%       Body mass index is 29.92 kg/m².    Physical Exam    General: No acute distress  Lungs: No labored breathing, Pulse oximetry on room air is 98%.  Heart: RRR  Abdo: Soft  Mental:  Awake, alert, and oriented    Imp:     · Screening  · History of breast cancer  · History of 3 tubulovillous adenomas in 2014     Plan:  · c scope    Quyen Vidal MD  8:17 AM

## 2018-06-11 NOTE — ANESTHESIA POSTPROCEDURE EVALUATION
"Patient: Palma Roblero    Procedure Summary     Date:  06/11/18 Room / Location:   [facility]    Anesthesia Start:  0813 Anesthesia Stop:  0841    Procedure:  COLONOSCOPY TO CECUM TO TERMINAL ILEUM WITH HOT SNARE POLYPECTOMY (N/A ) Diagnosis:       Colon cancer screening      History of breast cancer      History of colon polyps      (Colon cancer screening [Z12.11])      (History of breast cancer [Z85.3])      (History of colon polyps [Z86.010])    Surgeon:  Quyen Vidal MD Provider:  Shahbaz Lowry MD    Anesthesia Type:  MAC ASA Status:  2          Anesthesia Type: MAC  Last vitals  BP   108/72 (06/11/18 0853)   Temp   36.6 °C (97.8 °F) (06/11/18 0659)   Pulse   66 (06/11/18 0853)   Resp   16 (06/11/18 0853)     SpO2   100 % (06/11/18 0853)     Post Anesthesia Care and Evaluation    Patient location during evaluation: PACU  Patient participation: complete - patient participated  Level of consciousness: awake  Pain score: 0  Pain management: adequate  Airway patency: patent  Anesthetic complications: No anesthetic complications  PONV Status: none  Cardiovascular status: acceptable  Respiratory status: acceptable  Hydration status: acceptable    Comments: /72 (BP Location: Left arm, Patient Position: Sitting)   Pulse 66   Temp 36.6 °C (97.8 °F) (Oral)   Resp 16   Ht 162.6 cm (64\")   Wt 79.1 kg (174 lb 4.8 oz)   LMP  (LMP Unknown)   SpO2 100%   BMI 29.92 kg/m²       "

## 2018-06-11 NOTE — ANESTHESIA PREPROCEDURE EVALUATION
Anesthesia Evaluation     Patient summary reviewed and Nursing notes reviewed   history of anesthetic complications: PONV               Airway   Mallampati: I  TM distance: <3 FB  Neck ROM: full  no difficulty expected  Dental - normal exam     Pulmonary - negative pulmonary ROS and normal exam   Cardiovascular - normal exam    (+) hyperlipidemia,       Neuro/Psych- negative ROS  GI/Hepatic/Renal/Endo    (+)   hyperthyroidism    Musculoskeletal (-) negative ROS    Abdominal  - normal exam    Bowel sounds: normal.   Substance History - negative use     OB/GYN negative ob/gyn ROS         Other      history of cancer                    Anesthesia Plan    ASA 2     MAC     Anesthetic plan and risks discussed with patient.

## 2018-06-11 NOTE — OP NOTE
Colonoscopy Procedure Note  Palma Roblero  1965  Date of Procedure: 06/11/18    Pre-operative Diagnosis:    · Screening  · History of breast cancer  · History of 3 tubulovillous adenomas in 2014    Post-operative Diagnosis:  · Ileocecal valve polyp, 6 mm    Procedure: Colonoscopy with snare cautery polypectomy, terminal ileoscopy     Findings/Treatments:   · Ileocecal valve polyp, 6 mm;  Removed via snare cautery       Recommendations:   · C scope based on path, but likely 5 years  · Copy of photographs to be given from today's procedure prior to discharge.    Surgeon: Young    Anesthetic: MAC per Shahbaz Lowry MD    Indications:  As above    Scope Withdrawal Time:  6 minutes  38 seconds    Procedure Details     MAC anesthesia was induced.  The 180 Colonoscopy was inserted blindly into the rectum and advanced to the cecum, with relative ease, but with need for pressure, and lift, at the splenic flexure, without turning.  Cecum was identified by ileocecal valve and appendiceal orifice and photographed for documentation.  Terminal ileum was intubated and was normal.    Prep quality was excellent.  A careful inspection was made as the colonoscope was withdrawn, including a retroflexed view of the rectum; there was no suggestion of presence of angiodysplasias, or but there was the aforementioned polyp, without colitis or diverticula. Settings on the cautery were 1 and 11 with tissue retrieved via suction thru the scope.     Retroflexion in the rectum revealed no abnormalities.    Quyen Vidal MD  06/11/18  8:34 AM

## 2018-06-12 NOTE — PROGRESS NOTES
Nikki (endoscopy liaison),    Please call patient to inform them of these findings and recommendations and ensure that any pamphlets that were to be given to the patient at the hospital were received.     Please make sure a letter is sent to the patient, recall method entered into the computer and the HIM tab updated as far as need for endoscopy follow up.    Thanks  Dr Vidal    Date of Procedure: 06/11/18     Pre-operative Diagnosis:    · Screening  · History of breast cancer  · History of 3 tubulovillous adenomas in 2014     Post-operative Diagnosis:  · Ileocecal valve polyp, 6 mm     Procedure: Colonoscopy with snare cautery polypectomy, terminal ileoscopy      Findings/Treatments:   · Ileocecal valve polyp, 6 mm;  Removed via snare cautery;  TUBULAR ADENOMA, TRIGGERING REPEAT C SCOPE IN 5 YEARS    Recommendations:   · C scope based on path, but likely 5 years  · Copy of photographs to be given from today's procedure prior to discharge.

## 2018-06-14 ENCOUNTER — TELEPHONE (OUTPATIENT)
Dept: SURGERY | Facility: CLINIC | Age: 53
End: 2018-06-14

## 2018-06-14 NOTE — TELEPHONE ENCOUNTER
----- Message from Quyen Vidal MD sent at 6/12/2018  2:13 PM EDT -----  Nikki (endoscopy liaison),    Please call patient to inform them of these findings and recommendations and ensure that any pamphlets that were to be given to the patient at the hospital were received.     Please make sure a letter is sent to the patient, recall method entered into the computer and the HIM tab updated as far as need for endoscopy follow up.    Thanks  Dr Vidal    Date of Procedure: 06/11/18     Pre-operative Diagnosis:    · Screening  · History of breast cancer  · History of 3 tubulovillous adenomas in 2014     Post-operative Diagnosis:  · Ileocecal valve polyp, 6 mm     Procedure: Colonoscopy with snare cautery polypectomy, terminal ileoscopy      Findings/Treatments:   · Ileocecal valve polyp, 6 mm;  Removed via snare cautery;  TUBULAR ADENOMA, TRIGGERING REPEAT C SCOPE IN 5 YEARS                                        Recommendations:   · C scope based on path, but likely 5 years  · Copy of photographs to be given from today's procedure prior to discharge.

## 2018-07-06 ENCOUNTER — OFFICE VISIT (OUTPATIENT)
Dept: INTERNAL MEDICINE | Facility: CLINIC | Age: 53
End: 2018-07-06

## 2018-07-06 VITALS
HEIGHT: 64 IN | BODY MASS INDEX: 30.22 KG/M2 | WEIGHT: 177 LBS | SYSTOLIC BLOOD PRESSURE: 110 MMHG | DIASTOLIC BLOOD PRESSURE: 70 MMHG

## 2018-07-06 DIAGNOSIS — L72.3 SEBACEOUS CYST: Primary | ICD-10-CM

## 2018-07-06 PROCEDURE — 99213 OFFICE O/P EST LOW 20 MIN: CPT | Performed by: NURSE PRACTITIONER

## 2018-07-06 RX ORDER — CEPHALEXIN 500 MG/1
500 CAPSULE ORAL 3 TIMES DAILY
Qty: 21 CAPSULE | Refills: 0 | Status: SHIPPED | OUTPATIENT
Start: 2018-07-06 | End: 2018-07-13

## 2018-07-06 NOTE — PATIENT INSTRUCTIONS
Epidermal Cyst  An epidermal cyst is sometimes called an epidermal inclusion cyst or an infundibular cyst. It is a sac made of skin tissue. The sac contains a substance called keratin. Keratin is a protein that is normally secreted through the hair follicles. When keratin becomes trapped in the top layer of skin (epidermis), it can form an epidermal cyst.  Epidermal cysts are usually found on the face, neck, trunk, and genitals. These cysts are usually harmless (benign), and they may not cause symptoms unless they become infected. It is important not to pop epidermal cysts yourself.  What are the causes?  This condition may be caused by:  · A blocked hair follicle.  · A hair that curls and re-enters the skin instead of growing straight out of the skin (ingrown hair).  · A blocked pore.  · Irritated skin.  · An injury to the skin.  · Certain conditions that are passed along from parent to child (inherited).  · Human papillomavirus (HPV).    What increases the risk?  The following factors may make you more likely to develop an epidermal cyst:  · Having acne.  · Being overweight.  · Wearing tight clothing.    What are the signs or symptoms?  The only symptom of this condition may be a small, painless lump underneath the skin. When an epidermal cyst becomes infected, symptoms may include:  · Redness.  · Inflammation.  · Tenderness.  · Warmth.  · Fever.  · Keratin draining from the cyst. Keratin may look like a grayish-white, bad-smelling substance.  · Pus draining from the cyst.    How is this diagnosed?  This condition is diagnosed with a physical exam. In some cases, you may have a sample of tissue (biopsy) taken from your cyst to be examined under a microscope or tested for bacteria. You may be referred to a health care provider who specializes in skin care (dermatologist).  How is this treated?  In many cases, epidermal cysts go away on their own without treatment. If a cyst becomes infected, treatment may  include:  · Opening and draining the cyst. After draining, minor surgery to remove the rest of the cyst may be done.  · Antibiotic medicine to help prevent infection.  · Injections of medicines (steroids) that help to reduce inflammation.  · Surgery to remove the cyst. Surgery may be done if:  ? The cyst becomes large.  ? The cyst bothers you.  ? There is a chance that the cyst could turn into cancer.    Follow these instructions at home:  · Take over-the-counter and prescription medicines only as told by your health care provider.  · If you were prescribed an antibiotic, use it as told by your health care provider. Do not stop using the antibiotic even if you start to feel better.  · Keep the area around your cyst clean and dry.  · Wear loose, dry clothing.  · Do not try to pop your cyst.  · Avoid touching your cyst.  · Check your cyst every day for signs of infection.  · Keep all follow-up visits as told by your health care provider. This is important.  How is this prevented?  · Wear clean, dry, clothing.  · Avoid wearing tight clothing.  · Keep your skin clean and dry. Shower or take baths every day.  · Wash your body with a benzoyl peroxide wash when you shower or bathe.  Contact a health care provider if:  · Your cyst develops symptoms of infection.  · Your condition is not improving or is getting worse.  · You develop a cyst that looks different from other cysts you have had.  · You have a fever.  Get help right away if:  · Redness spreads from the cyst into the surrounding area.  This information is not intended to replace advice given to you by your health care provider. Make sure you discuss any questions you have with your health care provider.  Document Released: 11/18/2005 Document Revised: 08/16/2017 Document Reviewed: 10/19/2016  [employer] Patient Education © 2018 [employer]

## 2018-07-06 NOTE — PROGRESS NOTES
Subjective   Palma Roblero is a 53 y.o. female.     Cyst   This is a new problem. The current episode started more than 1 month ago. The problem has been gradually worsening. Pertinent negatives include no chest pain, chills, fever, nausea or vomiting. Treatments tried: attempted to drain but unsuccessful         The following portions of the patient's history were reviewed and updated as appropriate: allergies, current medications, past social history and problem list.    Review of Systems   Constitutional: Negative for chills and fever.   Cardiovascular: Negative for chest pain.   Gastrointestinal: Negative for nausea and vomiting.   Skin:        Cyst to left suprapubic area        Objective   Physical Exam   Constitutional: She is oriented to person, place, and time. She appears well-developed and well-nourished.   HENT:   Head: Normocephalic.   Nose: Nose normal.   Cardiovascular: Regular rhythm and normal heart sounds.  Exam reveals no S3 and no S4.    No murmur heard.  Pulmonary/Chest: Effort normal and breath sounds normal. She has no decreased breath sounds. She has no wheezes. She has no rhonchi. She has no rales.   Musculoskeletal: She exhibits no edema.   Neurological: She is alert and oriented to person, place, and time. Gait normal.   Skin: Skin is warm and dry.   Large sebaceous cyst to left suprapubic area with mild erythema, no pus noted    Psychiatric: She has a normal mood and affect.       Assessment/Plan   Palma was seen today for cyst.    Diagnoses and all orders for this visit:    Sebaceous cyst  Comments:  (left suprapubic) apply warm compresses, if no relief will schedule with Gen surg   Orders:  -     cephalexin (KEFLEX) 500 MG capsule; Take 1 capsule by mouth 3 (Three) Times a Day for 7 days.

## 2018-07-26 DIAGNOSIS — E78.00 HYPERCHOLESTEREMIA: ICD-10-CM

## 2018-07-26 RX ORDER — ATORVASTATIN CALCIUM 10 MG/1
TABLET, FILM COATED ORAL
Qty: 90 TABLET | Refills: 1 | Status: SHIPPED | OUTPATIENT
Start: 2018-07-26 | End: 2018-07-30 | Stop reason: SDUPTHER

## 2018-07-30 ENCOUNTER — OFFICE VISIT (OUTPATIENT)
Dept: INTERNAL MEDICINE | Facility: CLINIC | Age: 53
End: 2018-07-30

## 2018-07-30 VITALS
WEIGHT: 174 LBS | BODY MASS INDEX: 29.87 KG/M2 | DIASTOLIC BLOOD PRESSURE: 80 MMHG | SYSTOLIC BLOOD PRESSURE: 120 MMHG | TEMPERATURE: 98 F

## 2018-07-30 DIAGNOSIS — N30.90 CYSTITIS: ICD-10-CM

## 2018-07-30 DIAGNOSIS — R30.0 DYSURIA: Primary | ICD-10-CM

## 2018-07-30 LAB
BACTERIA UR QL AUTO: ABNORMAL /HPF
BILIRUB UR QL STRIP: NEGATIVE
CLARITY UR: ABNORMAL
COLOR UR: YELLOW
GLUCOSE UR STRIP-MCNC: NEGATIVE MG/DL
HGB UR QL STRIP.AUTO: ABNORMAL
HYALINE CASTS UR QL AUTO: ABNORMAL /LPF
KETONES UR QL STRIP: NEGATIVE
LEUKOCYTE ESTERASE UR QL STRIP.AUTO: ABNORMAL
MUCOUS THREADS URNS QL MICRO: ABNORMAL /HPF
NITRITE UR QL STRIP: NEGATIVE
PH UR STRIP.AUTO: 6.5 [PH] (ref 5–8)
PROT UR QL STRIP: ABNORMAL
RBC # UR: ABNORMAL /HPF
REF LAB TEST METHOD: ABNORMAL
SP GR UR STRIP: 1.02 (ref 1–1.03)
SQUAMOUS #/AREA URNS HPF: ABNORMAL /HPF
UROBILINOGEN UR QL STRIP: ABNORMAL
WBC UR QL AUTO: ABNORMAL /HPF

## 2018-07-30 PROCEDURE — 87077 CULTURE AEROBIC IDENTIFY: CPT | Performed by: NURSE PRACTITIONER

## 2018-07-30 PROCEDURE — 81001 URINALYSIS AUTO W/SCOPE: CPT | Performed by: NURSE PRACTITIONER

## 2018-07-30 PROCEDURE — 87186 SC STD MICRODIL/AGAR DIL: CPT | Performed by: NURSE PRACTITIONER

## 2018-07-30 PROCEDURE — 87086 URINE CULTURE/COLONY COUNT: CPT | Performed by: NURSE PRACTITIONER

## 2018-07-30 PROCEDURE — 99213 OFFICE O/P EST LOW 20 MIN: CPT | Performed by: NURSE PRACTITIONER

## 2018-07-30 RX ORDER — SULFAMETHOXAZOLE AND TRIMETHOPRIM 800; 160 MG/1; MG/1
1 TABLET ORAL 2 TIMES DAILY
Qty: 10 TABLET | Refills: 0 | Status: SHIPPED | OUTPATIENT
Start: 2018-07-30 | End: 2018-08-04

## 2018-07-30 NOTE — PROGRESS NOTES
Subjective   Palma Roblero is a 53 y.o. female.     Difficulty Urinating   This is a new problem. The current episode started in the past 7 days. The problem has been gradually worsening. Associated symptoms include urinary symptoms (frequency, urgency, pressure ). Pertinent negatives include no abdominal pain, chest pain, chills, fatigue, fever, nausea or vomiting. Associated symptoms comments: Lower back pain . Treatments tried: cranberry juice and increased water intake  The treatment provided mild relief.        The following portions of the patient's history were reviewed and updated as appropriate: allergies, current medications, past social history and problem list.  .    Review of Systems   Constitutional: Negative for chills, fatigue and fever.   Cardiovascular: Negative for chest pain.   Gastrointestinal: Negative for abdominal pain, nausea and vomiting.   Genitourinary: Positive for difficulty urinating, frequency and urgency. Negative for dysuria, hematuria and vaginal discharge.       Objective   Physical Exam   Constitutional: She is oriented to person, place, and time. She appears well-developed and well-nourished.   HENT:   Head: Normocephalic.   Nose: Nose normal.   Cardiovascular: Regular rhythm and normal heart sounds.  Exam reveals no S3 and no S4.    No murmur heard.  Pulmonary/Chest: Effort normal and breath sounds normal. She has no decreased breath sounds. She has no wheezes. She has no rhonchi. She has no rales.   Abdominal: Normal appearance and bowel sounds are normal. There is no tenderness. There is no CVA tenderness.   Musculoskeletal: She exhibits no edema.   Neurological: She is alert and oriented to person, place, and time. Gait normal.   Skin: Skin is warm and dry.   Psychiatric: She has a normal mood and affect.       Assessment/Plan   Palma was seen today for difficulty urinating.    Diagnoses and all orders for this visit:    Dysuria  -     Urinalysis With Microscopic If  Indicated (No Culture) - Urine, Clean Catch; Future  -     Urinalysis With Microscopic If Indicated (No Culture) - Urine, Clean Catch  -     Urinalysis, Microscopic Only - Urine, Clean Catch; Future  -     Urinalysis, Microscopic Only - Urine, Clean Catch  -     Urine Culture - Urine, Urine, Clean Catch; Future  -     Urine Culture - Urine, Urine, Clean Catch    Cystitis  -     sulfamethoxazole-trimethoprim (BACTRIM DS,SEPTRA DS) 800-160 MG per tablet; Take 1 tablet by mouth 2 (Two) Times a Day for 5 days.    Urine with wbc, rbc, and bacteria. Will start treatment and adjust if warranted based on urine culture.

## 2018-08-01 LAB — BACTERIA SPEC AEROBE CULT: ABNORMAL

## 2018-08-17 ENCOUNTER — OFFICE VISIT (OUTPATIENT)
Dept: INTERNAL MEDICINE | Facility: CLINIC | Age: 53
End: 2018-08-17

## 2018-08-17 VITALS
TEMPERATURE: 98 F | SYSTOLIC BLOOD PRESSURE: 112 MMHG | BODY MASS INDEX: 30.21 KG/M2 | WEIGHT: 176 LBS | DIASTOLIC BLOOD PRESSURE: 74 MMHG

## 2018-08-17 DIAGNOSIS — M54.50 BILATERAL LOW BACK PAIN WITHOUT SCIATICA, UNSPECIFIED CHRONICITY: Primary | ICD-10-CM

## 2018-08-17 LAB
BACTERIA UR QL AUTO: ABNORMAL /HPF
BILIRUB UR QL STRIP: NEGATIVE
CLARITY UR: CLEAR
COLOR UR: YELLOW
GLUCOSE UR STRIP-MCNC: NEGATIVE MG/DL
HGB UR QL STRIP.AUTO: NEGATIVE
HYALINE CASTS UR QL AUTO: ABNORMAL /LPF
KETONES UR QL STRIP: NEGATIVE
LEUKOCYTE ESTERASE UR QL STRIP.AUTO: ABNORMAL
NITRITE UR QL STRIP: NEGATIVE
PH UR STRIP.AUTO: 7 [PH] (ref 5–8)
PROT UR QL STRIP: NEGATIVE
RBC # UR: ABNORMAL /HPF
REF LAB TEST METHOD: ABNORMAL
SP GR UR STRIP: 1.01 (ref 1–1.03)
SQUAMOUS #/AREA URNS HPF: ABNORMAL /HPF
UROBILINOGEN UR QL STRIP: ABNORMAL
WBC UR QL AUTO: ABNORMAL /HPF

## 2018-08-17 PROCEDURE — 99213 OFFICE O/P EST LOW 20 MIN: CPT | Performed by: NURSE PRACTITIONER

## 2018-08-17 PROCEDURE — 81001 URINALYSIS AUTO W/SCOPE: CPT | Performed by: NURSE PRACTITIONER

## 2018-08-17 NOTE — PROGRESS NOTES
Subjective   Palma Roblero is a 53 y.o. female.     She is due for vacation this upcoming weekend . She started with back pain this morning.       Back Pain   This is a new problem. The current episode started today. The problem occurs constantly. The pain is present in the lumbar spine. The quality of the pain is described as aching. The pain does not radiate. The pain is at a severity of 2/10. The pain is mild. The symptoms are aggravated by sitting. Pertinent negatives include no abdominal pain, bladder incontinence, bowel incontinence, chest pain, dysuria, fever, leg pain, numbness or tingling.        The following portions of the patient's history were reviewed and updated as appropriate: allergies, current medications, past family history, past medical history, past social history, past surgical history and problem list.    Review of Systems   Constitutional: Negative for chills and fever.   Cardiovascular: Negative for chest pain and leg swelling.   Gastrointestinal: Negative for abdominal pain and bowel incontinence.   Genitourinary: Negative for bladder incontinence, difficulty urinating, dysuria, frequency, hematuria and urgency.   Musculoskeletal: Positive for back pain (lower back ).   Neurological: Negative for tingling and numbness.       Objective   Physical Exam   Constitutional: She is oriented to person, place, and time. She appears well-developed and well-nourished.   HENT:   Head: Normocephalic.   Nose: Nose normal.   Cardiovascular: Regular rhythm and normal heart sounds.  Exam reveals no S3 and no S4.    No murmur heard.  Pulmonary/Chest: Effort normal and breath sounds normal. She has no decreased breath sounds. She has no wheezes. She has no rhonchi. She has no rales.   Musculoskeletal: She exhibits no edema.        Lumbar back: She exhibits tenderness. She exhibits normal range of motion, no bony tenderness, no swelling, no pain and no spasm.        Back:    (-) SLR    Neurological: She is  alert and oriented to person, place, and time. Gait normal.   Reflex Scores:       Patellar reflexes are 2+ on the right side and 2+ on the left side.  Skin: Skin is warm and dry.   Psychiatric: She has a normal mood and affect.       Assessment/Plan   Palma was seen today for back pain.    Diagnoses and all orders for this visit:    Bilateral low back pain without sciatica, unspecified chronicity  Comments:  may use heat, ibuprofen,   Orders:  -     Urinalysis With Microscopic If Indicated (No Culture) - Urine, Clean Catch; Future  -     Urinalysis With Microscopic If Indicated (No Culture) - Urine, Clean Catch  -     Urinalysis, Microscopic Only - Urine, Clean Catch; Future  -     Urinalysis, Microscopic Only - Urine, Clean Catch      Urine normal. She will stretch (handout provided), apply heat and massage. May use ibuprofen/salon pas as needed.

## 2018-09-10 ENCOUNTER — OFFICE VISIT (OUTPATIENT)
Dept: INTERNAL MEDICINE | Facility: CLINIC | Age: 53
End: 2018-09-10

## 2018-09-10 VITALS
BODY MASS INDEX: 30.05 KG/M2 | WEIGHT: 176 LBS | DIASTOLIC BLOOD PRESSURE: 64 MMHG | HEIGHT: 64 IN | SYSTOLIC BLOOD PRESSURE: 94 MMHG

## 2018-09-10 DIAGNOSIS — E78.5 HYPERLIPIDEMIA, UNSPECIFIED HYPERLIPIDEMIA TYPE: Primary | ICD-10-CM

## 2018-09-10 DIAGNOSIS — Z11.59 SCREENING FOR VIRAL DISEASE: ICD-10-CM

## 2018-09-10 LAB
ALBUMIN SERPL-MCNC: 4.1 G/DL (ref 3.5–5.2)
ALBUMIN/GLOB SERPL: 1.3 G/DL
ALP SERPL-CCNC: 53 U/L (ref 39–117)
ALT SERPL-CCNC: 27 U/L (ref 1–33)
AST SERPL-CCNC: 19 U/L (ref 1–32)
BILIRUB SERPL-MCNC: 0.8 MG/DL (ref 0.1–1.2)
BUN SERPL-MCNC: 16 MG/DL (ref 6–20)
BUN/CREAT SERPL: 18.8 (ref 7–25)
CALCIUM SERPL-MCNC: 9.4 MG/DL (ref 8.6–10.5)
CHLORIDE SERPL-SCNC: 103 MMOL/L (ref 98–107)
CHOLEST SERPL-MCNC: 175 MG/DL (ref 0–200)
CO2 SERPL-SCNC: 28.9 MMOL/L (ref 22–29)
CREAT SERPL-MCNC: 0.85 MG/DL (ref 0.57–1)
GLOBULIN SER CALC-MCNC: 3.1 GM/DL
GLUCOSE SERPL-MCNC: 88 MG/DL (ref 65–99)
HDLC SERPL-MCNC: 72 MG/DL (ref 40–60)
LDLC SERPL CALC-MCNC: 92 MG/DL (ref 0–100)
POTASSIUM SERPL-SCNC: 4.1 MMOL/L (ref 3.5–5.2)
PROT SERPL-MCNC: 7.2 G/DL (ref 6–8.5)
SODIUM SERPL-SCNC: 142 MMOL/L (ref 136–145)
TRIGL SERPL-MCNC: 57 MG/DL (ref 0–150)
VLDLC SERPL-MCNC: 11.4 MG/DL (ref 5–40)

## 2018-09-10 PROCEDURE — 99212 OFFICE O/P EST SF 10 MIN: CPT | Performed by: INTERNAL MEDICINE

## 2018-09-10 NOTE — PROGRESS NOTES
"Chief Complaint   Patient presents with   • Hyperlipidemia     6 month follow up       Subjective   Palma Roblero is a 53 y.o. female.     History of Present Illness     Hyperlipidemia:  This is a chronic problem.   Her most recent lipid panel was done on 3/9/2018.    Total cholesterol was 202, Triglycerides 50, HDL 74, .   Current treatment: Statin medication  Prudent diet and regular exercise have also been recommended.  No management changes were made at her last appointment.   By report, there is good compliance with treatment, good tolerance of treatment.  She has not experienced statin associated myalgias, dyspepsia.  She has not had liver enzyme elevation.              The following portions of the patient's history were reviewed and updated as appropriate: allergies, current medications, past family history, past medical history, past social history, past surgical history and problem list.    Review of Systems   Eyes: Negative for visual disturbance.   Respiratory: Negative for shortness of breath.    Cardiovascular: Negative for chest pain, palpitations and leg swelling.   Musculoskeletal: Positive for back pain ( Occasional).   Neurological: Negative for dizziness and light-headedness.         Current Outpatient Prescriptions:   •  atorvastatin (LIPITOR) 10 MG tablet, Take 10 mg by mouth Daily., Disp: , Rfl:   •  azelastine (OPTIVAR) 0.05 % ophthalmic solution, Administer 1 drop to both eyes Daily., Disp: 1 each, Rfl: 5  •  Triamcinolone Acetonide (NASACORT) 55 MCG/ACT nasal inhaler, 2 sprays into each nostril Daily As Needed (nasal congestion)., Disp: 16.5 g, Rfl: 1        Objective     BP 94/64   Ht 162.6 cm (64\")   Wt 79.8 kg (176 lb)   LMP  (LMP Unknown)   BMI 30.21 kg/m²     Physical Exam   Constitutional: She is oriented to person, place, and time. She appears well-developed and well-nourished. No distress.   Neck: Normal carotid pulses present. Carotid bruit is not present. "   Cardiovascular: Regular rhythm, S1 normal and S2 normal.  Exam reveals no gallop and no friction rub.    No murmur heard.  Pulses:       Carotid pulses are 2+ on the right side, and 2+ on the left side.  Pulmonary/Chest: Effort normal and breath sounds normal. She has no wheezes. She has no rhonchi. She has no rales. Chest wall is not dull to percussion.   Musculoskeletal: She exhibits no edema.        Lumbar back: Normal. She exhibits no tenderness.   Neurological: She is alert and oriented to person, place, and time.   Skin: Skin is warm and dry.   Nursing note and vitals reviewed.        Assessment/Plan   Palma was seen today for hyperlipidemia.    Diagnoses and all orders for this visit:    Hyperlipidemia, unspecified hyperlipidemia type  -     Comprehensive Metabolic Panel; Future  -     Lipid Panel; Future  -     Comprehensive Metabolic Panel  -     Lipid Panel    Screening for viral disease  -     Hepatitis C Antibody; Future  -     Hepatitis C Antibody

## 2018-09-11 LAB — HCV AB S/CO SERPL IA: 0.1 S/CO RATIO (ref 0–0.9)

## 2019-01-25 NOTE — ANESTHESIA PREPROCEDURE EVALUATION
Anesthesia Evaluation     Patient summary reviewed and Nursing notes reviewed   history of anesthetic complications: PONV         Airway   Mallampati: II  TM distance: >3 FB  Neck ROM: full  no difficulty expected  Dental - normal exam     Pulmonary - negative pulmonary ROS    breath sounds clear to auscultation  (-) shortness of breath, sleep apnea, decreased breath sounds, wheezes  Cardiovascular - normal exam  Exercise tolerance: good (4-7 METS)    Rhythm: regular  Rate: normal    (+) hyperlipidemia  (-) past MI, angina, CHF, orthopnea, PND, STAFFORD, PVD      Neuro/Psych- negative ROS  (-) seizures, neuromuscular disease, TIA, CVA, dizziness/light headedness, weakness, numbness  GI/Hepatic/Renal/Endo    (+) obesity,  hyperthyroidism  (-) liver disease, diabetes    Musculoskeletal (-) negative ROS    Abdominal  - normal exam   Substance History - negative use  (-) alcohol use, drug use     OB/GYN negative ob/gyn ROS         Other      history of cancer (Rt Breast) remission                                    Anesthesia Plan    ASA 2     MAC     intravenous induction   Anesthetic plan and risks discussed with patient.    Plan discussed with CRNA.       Leon Alfredo MD  [facility] [address] [facility]             Call, Hb and fe still low likley due to recent colitis. Vargas venofer 200 mg x 5 doses again, then maintenance q month. Labs and MD with 1st maintenance dose      Pt aware and verbalized understanding.

## 2019-01-29 DIAGNOSIS — E78.00 HYPERCHOLESTEREMIA: ICD-10-CM

## 2019-01-29 RX ORDER — ATORVASTATIN CALCIUM 10 MG/1
TABLET, FILM COATED ORAL
Qty: 90 TABLET | Refills: 1 | Status: SHIPPED | OUTPATIENT
Start: 2019-01-29 | End: 2019-08-12 | Stop reason: SDUPTHER

## 2019-03-14 ENCOUNTER — OFFICE VISIT (OUTPATIENT)
Dept: INTERNAL MEDICINE | Facility: CLINIC | Age: 54
End: 2019-03-14

## 2019-03-14 VITALS
BODY MASS INDEX: 30.56 KG/M2 | HEIGHT: 64 IN | WEIGHT: 179 LBS | SYSTOLIC BLOOD PRESSURE: 108 MMHG | DIASTOLIC BLOOD PRESSURE: 64 MMHG

## 2019-03-14 DIAGNOSIS — Z00.00 ANNUAL PHYSICAL EXAM: Primary | ICD-10-CM

## 2019-03-14 LAB
ALBUMIN SERPL-MCNC: 4 G/DL (ref 3.5–5.2)
ALBUMIN/GLOB SERPL: 1.3 G/DL
ALP SERPL-CCNC: 45 U/L (ref 39–117)
ALT SERPL W P-5'-P-CCNC: 23 U/L (ref 1–33)
ANION GAP SERPL CALCULATED.3IONS-SCNC: 10.1 MMOL/L
AST SERPL-CCNC: 21 U/L (ref 1–32)
BASOPHILS # BLD AUTO: 0.02 10*3/MM3 (ref 0–0.2)
BASOPHILS NFR BLD AUTO: 0.5 % (ref 0–1.5)
BILIRUB SERPL-MCNC: 0.8 MG/DL (ref 0.1–1.2)
BILIRUB UR QL STRIP: NEGATIVE
BUN BLD-MCNC: 16 MG/DL (ref 6–20)
BUN/CREAT SERPL: 19.5 (ref 7–25)
CALCIUM SPEC-SCNC: 9.3 MG/DL (ref 8.6–10.5)
CHLORIDE SERPL-SCNC: 102 MMOL/L (ref 98–107)
CHOLEST SERPL-MCNC: 176 MG/DL (ref 0–200)
CLARITY UR: CLEAR
CO2 SERPL-SCNC: 28.9 MMOL/L (ref 22–29)
COLOR UR: YELLOW
CREAT BLD-MCNC: 0.82 MG/DL (ref 0.57–1)
DEPRECATED RDW RBC AUTO: 40.8 FL (ref 37–54)
EOSINOPHIL # BLD AUTO: 0.08 10*3/MM3 (ref 0–0.4)
EOSINOPHIL NFR BLD AUTO: 1.9 % (ref 0.3–6.2)
ERYTHROCYTE [DISTWIDTH] IN BLOOD BY AUTOMATED COUNT: 13.8 % (ref 12.3–15.4)
GFR SERPL CREATININE-BSD FRML MDRD: 88 ML/MIN/1.73
GLOBULIN UR ELPH-MCNC: 3.2 GM/DL
GLUCOSE BLD-MCNC: 88 MG/DL (ref 65–99)
GLUCOSE UR STRIP-MCNC: NEGATIVE MG/DL
HCT VFR BLD AUTO: 39.9 % (ref 34–46.6)
HDLC SERPL-MCNC: 73 MG/DL (ref 40–60)
HGB BLD-MCNC: 13.1 G/DL (ref 12–15.9)
HGB UR QL STRIP.AUTO: NEGATIVE
KETONES UR QL STRIP: NEGATIVE
LDLC SERPL CALC-MCNC: 97 MG/DL (ref 0–100)
LDLC/HDLC SERPL: 1.32 {RATIO}
LEUKOCYTE ESTERASE UR QL STRIP.AUTO: NEGATIVE
LYMPHOCYTES # BLD AUTO: 1.61 10*3/MM3 (ref 0.7–3.1)
LYMPHOCYTES NFR BLD AUTO: 37.7 % (ref 19.6–45.3)
MCH RBC QN AUTO: 27.3 PG (ref 26.6–33)
MCHC RBC AUTO-ENTMCNC: 32.8 G/DL (ref 31.5–35.7)
MCV RBC AUTO: 83.3 FL (ref 79–97)
MONOCYTES # BLD AUTO: 0.38 10*3/MM3 (ref 0.1–0.9)
MONOCYTES NFR BLD AUTO: 8.9 % (ref 5–12)
NEUTROPHILS # BLD AUTO: 2.18 10*3/MM3 (ref 1.4–7)
NEUTROPHILS NFR BLD AUTO: 51 % (ref 42.7–76)
NITRITE UR QL STRIP: NEGATIVE
PH UR STRIP.AUTO: 6.5 [PH] (ref 5–8)
PLATELET # BLD AUTO: 163 10*3/MM3 (ref 140–450)
PMV BLD AUTO: 12.4 FL (ref 6–12)
POTASSIUM BLD-SCNC: 3.9 MMOL/L (ref 3.5–5.2)
PROT SERPL-MCNC: 7.2 G/DL (ref 6–8.5)
PROT UR QL STRIP: NEGATIVE
RBC # BLD AUTO: 4.79 10*6/MM3 (ref 3.77–5.28)
SODIUM BLD-SCNC: 141 MMOL/L (ref 136–145)
SP GR UR STRIP: 1.02 (ref 1–1.03)
TRIGL SERPL-MCNC: 32 MG/DL (ref 0–150)
TSH SERPL DL<=0.05 MIU/L-ACNC: 3.41 MIU/ML (ref 0.27–4.2)
UROBILINOGEN UR QL STRIP: NORMAL
VLDLC SERPL-MCNC: 6.4 MG/DL (ref 5–40)
WBC NRBC COR # BLD: 4.27 10*3/MM3 (ref 3.4–10.8)

## 2019-03-14 PROCEDURE — 80061 LIPID PANEL: CPT | Performed by: INTERNAL MEDICINE

## 2019-03-14 PROCEDURE — 99396 PREV VISIT EST AGE 40-64: CPT | Performed by: INTERNAL MEDICINE

## 2019-03-14 PROCEDURE — 93000 ELECTROCARDIOGRAM COMPLETE: CPT | Performed by: INTERNAL MEDICINE

## 2019-03-14 PROCEDURE — 81003 URINALYSIS AUTO W/O SCOPE: CPT | Performed by: INTERNAL MEDICINE

## 2019-03-14 PROCEDURE — 36415 COLL VENOUS BLD VENIPUNCTURE: CPT | Performed by: INTERNAL MEDICINE

## 2019-03-14 PROCEDURE — 85025 COMPLETE CBC W/AUTO DIFF WBC: CPT | Performed by: INTERNAL MEDICINE

## 2019-03-14 PROCEDURE — 84443 ASSAY THYROID STIM HORMONE: CPT | Performed by: INTERNAL MEDICINE

## 2019-03-14 PROCEDURE — 80053 COMPREHEN METABOLIC PANEL: CPT | Performed by: INTERNAL MEDICINE

## 2019-03-14 NOTE — PROGRESS NOTES
Chief Complaint   Patient presents with   • Annual Exam       Subjective   Palma Rolbero is a 53 y.o. female.     History of Present Illness     She is here for annual examination.  She generally feels healthy. Her  appetite is good.  She  follows a prudent diet.   She gets about 4-5 hours of sleep.  She falls asleep OK but then wakes up.  Her  energy is good.     She exercises 3-4 days a week.  She is up to date on dental and eye exams.               The following portions of the patient's history were reviewed and updated as appropriate: allergies, current medications, past family history, past medical history, past social history, past surgical history and problem list.    Review of Systems   Constitutional: Negative for appetite change, chills, fatigue, fever, unexpected weight gain and unexpected weight loss.   HENT: Negative for congestion, nosebleeds, sinus pressure, sore throat, tinnitus, trouble swallowing and voice change.    Eyes: Negative for blurred vision and double vision.   Respiratory: Negative for cough and shortness of breath.    Cardiovascular: Negative for chest pain, palpitations and leg swelling.   Gastrointestinal: Negative for abdominal pain, constipation, diarrhea, nausea, vomiting and indigestion.   Endocrine: Negative for cold intolerance, heat intolerance, polydipsia and polyuria.   Genitourinary: Negative for breast discharge, dysuria, frequency, breast lump and breast pain.   Musculoskeletal: Negative for arthralgias and back pain.   Skin: Negative for rash.   Neurological: Negative for dizziness and headache.   Hematological: Negative for adenopathy. Does not bruise/bleed easily.   Psychiatric/Behavioral: Positive for sleep disturbance. Negative for depressed mood. The patient is not nervous/anxious.          Current Outpatient Medications:   •  atorvastatin (LIPITOR) 10 MG tablet, TAKE 1 TABLET BY MOUTH DAILY, Disp: 90 tablet, Rfl: 1  •  azelastine (OPTIVAR) 0.05 % ophthalmic  "solution, Administer 1 drop to both eyes Daily., Disp: 1 each, Rfl: 5  •  Triamcinolone Acetonide (NASACORT) 55 MCG/ACT nasal inhaler, 2 sprays into each nostril Daily As Needed (nasal congestion)., Disp: 16.5 g, Rfl: 1        Objective     /64 (BP Location: Left arm, Patient Position: Sitting, Cuff Size: Adult)   Ht 162.6 cm (64\")   Wt 81.2 kg (179 lb)   LMP  (LMP Unknown)   BMI 30.73 kg/m²       Physical Exam   Constitutional: She is oriented to person, place, and time. She appears well-developed and well-nourished. No distress.   HENT:   Right Ear: Tympanic membrane and ear canal normal.   Left Ear: Tympanic membrane and ear canal normal.   Nose: Right sinus exhibits no maxillary sinus tenderness and no frontal sinus tenderness. Left sinus exhibits no maxillary sinus tenderness and no frontal sinus tenderness.   Mouth/Throat: Oropharynx is clear and moist.   Eyes: Conjunctivae and EOM are normal. Pupils are equal, round, and reactive to light. No scleral icterus.   Neck: Normal range of motion. Neck supple. Normal carotid pulses present. Carotid bruit is not present. No tracheal deviation present. No thyromegaly present.   Cardiovascular: Regular rhythm, S1 normal, S2 normal and intact distal pulses. Exam reveals no gallop and no friction rub.   No murmur heard.  Pulses:       Carotid pulses are 2+ on the right side, and 2+ on the left side.  Pulmonary/Chest: Effort normal and breath sounds normal. She has no wheezes. She has no rhonchi. She has no rales. Chest wall is not dull to percussion. Right breast exhibits skin change (biopsy scar). Right breast exhibits no inverted nipple, no mass, no nipple discharge and no tenderness. Left breast exhibits no inverted nipple, no mass, no nipple discharge, no skin change and no tenderness.   Abdominal: Soft. Normal appearance and bowel sounds are normal. She exhibits no abdominal bruit. There is no hepatosplenomegaly. There is no tenderness. There is no rebound " and no guarding.   Musculoskeletal: She exhibits no edema.   Lymphadenopathy:     She has no cervical adenopathy.   Neurological: She is alert and oriented to person, place, and time. No cranial nerve deficit. Coordination normal.   Skin: Skin is warm and dry.   Psychiatric: She has a normal mood and affect.   Nursing note and vitals reviewed.      ECG 12 Lead  Date/Time: 3/14/2019 8:26 AM  Performed by: Alondra Chin LPN  Authorized by: Selena Saunders MD   Comparison: compared with previous ECG from 2/13/2015  Similar to previous ECG  Rhythm: sinus bradycardia  Rate: bradycardic  BPM: 57  Conduction: conduction normal  ST Segments: ST segments normal  T Waves: T waves normal  QRS axis: normal  Clinical impression comment: Normal except for rate              Assessment/Plan   Palma was seen today for annual exam.    Diagnoses and all orders for this visit:    Annual physical exam  -     ECG 12 Lead  -     Comprehensive Metabolic Panel; Future  -     CBC & Differential; Future  -     Urinalysis With Microscopic If Indicated (No Culture) - Urine, Clean Catch; Future  -     Lipid Panel; Future  -     TSH Rfx On Abnormal To Free T4; Future      She will continue prudent diet, regular exercise. Recommended annual flu vaccine.

## 2019-04-18 ENCOUNTER — OFFICE VISIT (OUTPATIENT)
Dept: ONCOLOGY | Facility: CLINIC | Age: 54
End: 2019-04-18

## 2019-04-18 ENCOUNTER — LAB (OUTPATIENT)
Dept: LAB | Facility: HOSPITAL | Age: 54
End: 2019-04-18

## 2019-04-18 VITALS
TEMPERATURE: 98.4 F | SYSTOLIC BLOOD PRESSURE: 115 MMHG | RESPIRATION RATE: 16 BRPM | WEIGHT: 180.1 LBS | DIASTOLIC BLOOD PRESSURE: 72 MMHG | OXYGEN SATURATION: 100 % | HEIGHT: 64 IN | HEART RATE: 67 BPM | BODY MASS INDEX: 30.75 KG/M2

## 2019-04-18 DIAGNOSIS — Z17.0 MALIGNANT NEOPLASM OF LOWER-OUTER QUADRANT OF RIGHT BREAST OF FEMALE, ESTROGEN RECEPTOR POSITIVE (HCC): Primary | ICD-10-CM

## 2019-04-18 DIAGNOSIS — C50.511 MALIGNANT NEOPLASM OF LOWER-OUTER QUADRANT OF RIGHT BREAST OF FEMALE, ESTROGEN RECEPTOR POSITIVE (HCC): Primary | ICD-10-CM

## 2019-04-18 DIAGNOSIS — C50.011 MALIGNANT NEOPLASM OF AREOLA OF RIGHT BREAST IN FEMALE, UNSPECIFIED ESTROGEN RECEPTOR STATUS (HCC): Primary | ICD-10-CM

## 2019-04-18 LAB
ALBUMIN SERPL-MCNC: 4.3 G/DL (ref 3.5–5.2)
ALBUMIN/GLOB SERPL: 1.2 G/DL (ref 1.1–2.4)
ALP SERPL-CCNC: 57 U/L (ref 38–116)
ALT SERPL W P-5'-P-CCNC: 21 U/L (ref 0–33)
ANION GAP SERPL CALCULATED.3IONS-SCNC: 11.5 MMOL/L
AST SERPL-CCNC: 21 U/L (ref 0–32)
BASOPHILS # BLD AUTO: 0.03 10*3/MM3 (ref 0–0.2)
BASOPHILS NFR BLD AUTO: 0.6 % (ref 0–1.5)
BILIRUB SERPL-MCNC: 1.4 MG/DL (ref 0.2–1.2)
BUN BLD-MCNC: 10 MG/DL (ref 6–20)
BUN/CREAT SERPL: 12.8 (ref 7.3–30)
CALCIUM SPEC-SCNC: 9.6 MG/DL (ref 8.5–10.2)
CHLORIDE SERPL-SCNC: 101 MMOL/L (ref 98–107)
CO2 SERPL-SCNC: 28.5 MMOL/L (ref 22–29)
CREAT BLD-MCNC: 0.78 MG/DL (ref 0.6–1.1)
DEPRECATED RDW RBC AUTO: 41.3 FL (ref 37–54)
EOSINOPHIL # BLD AUTO: 0.07 10*3/MM3 (ref 0–0.4)
EOSINOPHIL NFR BLD AUTO: 1.4 % (ref 0.3–6.2)
ERYTHROCYTE [DISTWIDTH] IN BLOOD BY AUTOMATED COUNT: 13.4 % (ref 12.3–15.4)
GFR SERPL CREATININE-BSD FRML MDRD: 93 ML/MIN/1.73
GLOBULIN UR ELPH-MCNC: 3.6 GM/DL (ref 1.8–3.5)
GLUCOSE BLD-MCNC: 87 MG/DL (ref 74–124)
HCT VFR BLD AUTO: 43.6 % (ref 34–46.6)
HGB BLD-MCNC: 14 G/DL (ref 12–15.9)
IMM GRANULOCYTES # BLD AUTO: 0.01 10*3/MM3 (ref 0–0.05)
IMM GRANULOCYTES NFR BLD AUTO: 0.2 % (ref 0–0.5)
LYMPHOCYTES # BLD AUTO: 1.59 10*3/MM3 (ref 0.7–3.1)
LYMPHOCYTES NFR BLD AUTO: 32.4 % (ref 19.6–45.3)
MCH RBC QN AUTO: 27.2 PG (ref 26.6–33)
MCHC RBC AUTO-ENTMCNC: 32.1 G/DL (ref 31.5–35.7)
MCV RBC AUTO: 84.7 FL (ref 79–97)
MONOCYTES # BLD AUTO: 0.43 10*3/MM3 (ref 0.1–0.9)
MONOCYTES NFR BLD AUTO: 8.8 % (ref 5–12)
NEUTROPHILS # BLD AUTO: 2.77 10*3/MM3 (ref 1.4–7)
NEUTROPHILS NFR BLD AUTO: 56.6 % (ref 42.7–76)
NRBC BLD AUTO-RTO: 0 /100 WBC (ref 0–0)
PLATELET # BLD AUTO: 191 10*3/MM3 (ref 140–450)
PMV BLD AUTO: 11.2 FL (ref 6–12)
POTASSIUM BLD-SCNC: 3.9 MMOL/L (ref 3.5–4.7)
PROT SERPL-MCNC: 7.9 G/DL (ref 6.3–8)
RBC # BLD AUTO: 5.15 10*6/MM3 (ref 3.77–5.28)
SODIUM BLD-SCNC: 141 MMOL/L (ref 134–145)
WBC NRBC COR # BLD: 4.9 10*3/MM3 (ref 3.4–10.8)

## 2019-04-18 PROCEDURE — 36415 COLL VENOUS BLD VENIPUNCTURE: CPT | Performed by: INTERNAL MEDICINE

## 2019-04-18 PROCEDURE — 99213 OFFICE O/P EST LOW 20 MIN: CPT | Performed by: INTERNAL MEDICINE

## 2019-04-18 PROCEDURE — 80053 COMPREHEN METABOLIC PANEL: CPT | Performed by: INTERNAL MEDICINE

## 2019-04-18 PROCEDURE — 85025 COMPLETE CBC W/AUTO DIFF WBC: CPT | Performed by: INTERNAL MEDICINE

## 2019-04-18 NOTE — PROGRESS NOTES
Subjective   REASON FOR FOLLOWUP:   History of stage IIIA infiltrating ductal carcinoma of the right breast with 4 positive nodes, ER positive, HER-2/julián negative; treated with lumpectomy, status post 6 cycles of TAC. Received tamoxifen between February 2007 and March 2012. Femara was started in March 2012. Patient switched to Arimidex as of 06/21/2012 because of side effects with severe ankle joint pain with Femara.    10 years of adjuvant therapy completed  March 2017.    HISTORY OF PRESENT ILLNESS:     History of Present Illness Ms. Roblero is a 53 y.o.  female with the above noted history of stage IIIA carcinoma of the right breast, treated with lumpectomy, chemotherapy and radiation. She received extended hormonal therapy with 5 years of tamoxifen followed by 5 years of aromatase inhibitor.  She completed her 10 years of hormonal therapy in March 2017  .     She is here today for routine annual followup and seems to be doing well.  Her annual mammogram was performed on 9/22/2017 with no suspicious findings.    She has been staying active and has actually lost 35-40 pounds in the past year.    Past Medical History, Past Surgical History, Social History, Family History have been reviewed and are without significant changes except as mentioned.    Review of Systems   Constitutional: Negative for activity change, appetite change, fatigue, fever and unexpected weight change.   HENT: Negative for hearing loss, nosebleeds, trouble swallowing and voice change.    Eyes: Negative for visual disturbance.   Respiratory: Negative for cough, shortness of breath and wheezing.    Cardiovascular: Negative for chest pain and palpitations.   Gastrointestinal: Negative for abdominal pain, diarrhea, nausea and vomiting.   Genitourinary: Negative for difficulty urinating, frequency, hematuria and urgency.   Musculoskeletal: Negative for back pain and neck pain.   Skin: Negative for rash.   Neurological: Negative  "for dizziness, seizures, syncope and headaches.   Hematological: Negative for adenopathy. Does not bruise/bleed easily.   Psychiatric/Behavioral: Negative for behavioral problems. The patient is not nervous/anxious.       A comprehensive 14 point review of systems was performed and was negative except as mentioned.    Medications:  The current medication list was reviewed in the EMR    ALLERGIES:  No Known Allergies    Objective      Vitals:    04/18/19 1531   BP: 115/72   Pulse: 67   Resp: 16   Temp: 98.4 °F (36.9 °C)   TempSrc: Oral   SpO2: 100%   Weight: 81.7 kg (180 lb 1.6 oz)   Height: 162.6 cm (64.02\")   PainSc: 0-No pain     Current Status 4/18/2019   ECOG score 0       Physical Exam   Constitutional: She is oriented to person, place, and time. She appears well-developed and well-nourished. No distress.   HENT:   Head: Normocephalic.   Eyes: Conjunctivae and EOM are normal. Pupils are equal, round, and reactive to light. No scleral icterus.   Neck: Normal range of motion. Neck supple. No JVD present. No thyromegaly present.   Cardiovascular: Normal rate and regular rhythm. Exam reveals no gallop and no friction rub.   No murmur heard.  Pulmonary/Chest: Effort normal and breath sounds normal. She has no wheezes. She has no rales. Right breast exhibits no mass, no nipple discharge and no skin change. Left breast exhibits no mass, no nipple discharge and no skin change.   Right lumpectomy scar.    Abdominal: Soft. Bowel sounds are normal. She exhibits no distension and no mass. There is no tenderness.   Musculoskeletal: Normal range of motion. She exhibits no edema or deformity.   Lymphadenopathy:     She has no cervical adenopathy.   Neurological: She is alert and oriented to person, place, and time. She has normal reflexes. No cranial nerve deficit.   Skin: Skin is warm and dry. No rash noted. No erythema.   Psychiatric: She has a normal mood and affect. Her behavior is normal. Judgment normal.        RECENT " LABS:  Hematology WBC   Date Value Ref Range Status   04/18/2019 4.90 3.40 - 10.80 10*3/mm3 Final     RBC   Date Value Ref Range Status   04/18/2019 5.15 3.77 - 5.28 10*6/mm3 Final     Hemoglobin   Date Value Ref Range Status   04/18/2019 14.0 12.0 - 15.9 g/dL Final     Hematocrit   Date Value Ref Range Status   04/18/2019 43.6 34.0 - 46.6 % Final     Platelets   Date Value Ref Range Status   04/18/2019 191 140 - 450 10*3/mm3 Final            Assessment/Plan     Stage IIIA infiltrating ductal carcinoma of the right breast treated with a lumpectomy, radiation, 6 cycles of TAC chemotherapy and ongoing hormonal therapy. She completed 10 years of adjuvant hormonal therapy with 5 years of tamoxifen followed by 5 years of Arimidex.    PLAN:   1. She will continue seeing us on an annual basis but knows that she can call any time if she has new concerns.   2. Her next annual mammogram should be due in late September 2019.            4/18/2019      CC:

## 2019-08-12 DIAGNOSIS — E78.00 HYPERCHOLESTEREMIA: ICD-10-CM

## 2019-08-12 RX ORDER — ATORVASTATIN CALCIUM 10 MG/1
TABLET, FILM COATED ORAL
Qty: 90 TABLET | Refills: 0 | Status: SHIPPED | OUTPATIENT
Start: 2019-08-12 | End: 2019-11-19 | Stop reason: SDUPTHER

## 2019-09-19 ENCOUNTER — TRANSCRIBE ORDERS (OUTPATIENT)
Dept: ADMINISTRATIVE | Facility: HOSPITAL | Age: 54
End: 2019-09-19

## 2019-09-19 DIAGNOSIS — Z12.39 SCREENING BREAST EXAMINATION: Primary | ICD-10-CM

## 2019-09-20 ENCOUNTER — HOSPITAL ENCOUNTER (OUTPATIENT)
Dept: MAMMOGRAPHY | Facility: HOSPITAL | Age: 54
Discharge: HOME | End: 2019-09-20
Admitting: INTERNAL MEDICINE

## 2019-09-20 DIAGNOSIS — Z12.39 SCREENING BREAST EXAMINATION: ICD-10-CM

## 2019-09-20 PROCEDURE — 77063 BREAST TOMOSYNTHESIS BI: CPT

## 2019-09-20 PROCEDURE — 77067 SCR MAMMO BI INCL CAD: CPT

## 2019-09-26 ENCOUNTER — OFFICE VISIT (OUTPATIENT)
Dept: INTERNAL MEDICINE | Facility: CLINIC | Age: 54
End: 2019-09-26

## 2019-09-26 VITALS
HEIGHT: 64 IN | OXYGEN SATURATION: 97 % | WEIGHT: 186 LBS | SYSTOLIC BLOOD PRESSURE: 96 MMHG | HEART RATE: 76 BPM | BODY MASS INDEX: 31.76 KG/M2 | DIASTOLIC BLOOD PRESSURE: 58 MMHG

## 2019-09-26 DIAGNOSIS — E78.5 HYPERLIPIDEMIA, UNSPECIFIED HYPERLIPIDEMIA TYPE: Primary | ICD-10-CM

## 2019-09-26 LAB
ALBUMIN SERPL-MCNC: 4.3 G/DL (ref 3.5–5.2)
ALBUMIN/GLOB SERPL: 1.6 G/DL
ALP SERPL-CCNC: 50 U/L (ref 39–117)
ALT SERPL-CCNC: 18 U/L (ref 1–33)
AST SERPL-CCNC: 21 U/L (ref 1–32)
BILIRUB SERPL-MCNC: 0.7 MG/DL (ref 0.2–1.2)
BUN SERPL-MCNC: 13 MG/DL (ref 6–20)
BUN/CREAT SERPL: 15.7 (ref 7–25)
CALCIUM SERPL-MCNC: 9.2 MG/DL (ref 8.6–10.5)
CHLORIDE SERPL-SCNC: 103 MMOL/L (ref 98–107)
CHOLEST SERPL-MCNC: 196 MG/DL (ref 0–200)
CO2 SERPL-SCNC: 27.5 MMOL/L (ref 22–29)
CREAT SERPL-MCNC: 0.83 MG/DL (ref 0.57–1)
GLOBULIN SER CALC-MCNC: 2.7 GM/DL
GLUCOSE SERPL-MCNC: 91 MG/DL (ref 65–99)
HDLC SERPL-MCNC: 77 MG/DL (ref 40–60)
LDLC SERPL CALC-MCNC: 110 MG/DL (ref 0–100)
POTASSIUM SERPL-SCNC: 4.5 MMOL/L (ref 3.5–5.2)
PROT SERPL-MCNC: 7 G/DL (ref 6–8.5)
SODIUM SERPL-SCNC: 142 MMOL/L (ref 136–145)
TRIGL SERPL-MCNC: 43 MG/DL (ref 0–150)
VLDLC SERPL-MCNC: 8.6 MG/DL

## 2019-09-26 PROCEDURE — 99212 OFFICE O/P EST SF 10 MIN: CPT | Performed by: INTERNAL MEDICINE

## 2019-09-26 NOTE — PROGRESS NOTES
"Chief Complaint   Patient presents with   • Hyperlipidemia     6 month follow u p       Subjective   Palma Roblero is a 54 y.o. female.     Hyperlipidemia   This is a chronic problem. The problem is controlled. Recent lipid tests were reviewed and are normal. Pertinent negatives include no chest pain, myalgias or shortness of breath. Current antihyperlipidemic treatment includes statins. The current treatment provides significant improvement of lipids. There are no compliance problems.         The following portions of the patient's history were reviewed and updated as appropriate: allergies, current medications, past family history, past medical history, past social history, past surgical history and problem list.    Review of Systems   Constitutional: Negative for appetite change.   HENT: Negative for nosebleeds.    Eyes: Negative for blurred vision and double vision.   Respiratory: Negative for cough and shortness of breath.    Cardiovascular: Negative for chest pain, palpitations and leg swelling.   Musculoskeletal: Negative for myalgias.         Current Outpatient Medications:   •  atorvastatin (LIPITOR) 10 MG tablet, TAKE 1 TABLET BY MOUTH DAILY, Disp: 90 tablet, Rfl: 0  •  azelastine (OPTIVAR) 0.05 % ophthalmic solution, Administer 1 drop to both eyes Daily., Disp: 1 each, Rfl: 5  •  Triamcinolone Acetonide (NASACORT) 55 MCG/ACT nasal inhaler, 2 sprays into each nostril Daily As Needed (nasal congestion)., Disp: 16.5 g, Rfl: 1        Objective     BP 96/58   Pulse 76   Ht 162.6 cm (64\")   Wt 84.4 kg (186 lb)   LMP  (LMP Unknown)   SpO2 97%   BMI 31.93 kg/m²     Physical Exam   Constitutional: She is oriented to person, place, and time. She appears well-developed and well-nourished. No distress.   Neck: Normal carotid pulses present. Carotid bruit is not present.   Cardiovascular: Regular rhythm, S1 normal and S2 normal. Exam reveals no gallop and no friction rub.   No murmur heard.  Pulses:       " Carotid pulses are 2+ on the right side, and 2+ on the left side.  Pulmonary/Chest: Effort normal and breath sounds normal. She has no wheezes. She has no rhonchi. She has no rales. Chest wall is not dull to percussion.   Musculoskeletal: She exhibits no edema.   Neurological: She is alert and oriented to person, place, and time.   Skin: Skin is warm and dry.   Nursing note and vitals reviewed.        Assessment/Plan   Palma was seen today for hyperlipidemia.    Diagnoses and all orders for this visit:    Hyperlipidemia, unspecified hyperlipidemia type  -     Comprehensive Metabolic Panel; Future  -     Lipid Panel; Future

## 2019-11-19 DIAGNOSIS — E78.00 HYPERCHOLESTEREMIA: ICD-10-CM

## 2019-11-19 RX ORDER — ATORVASTATIN CALCIUM 10 MG/1
TABLET, FILM COATED ORAL
Qty: 90 TABLET | Refills: 1 | Status: SHIPPED | OUTPATIENT
Start: 2019-11-19 | End: 2020-10-15

## 2019-11-22 ENCOUNTER — OFFICE VISIT (OUTPATIENT)
Dept: INTERNAL MEDICINE | Facility: CLINIC | Age: 54
End: 2019-11-22

## 2019-11-22 VITALS
WEIGHT: 190 LBS | HEART RATE: 66 BPM | SYSTOLIC BLOOD PRESSURE: 116 MMHG | BODY MASS INDEX: 32.61 KG/M2 | DIASTOLIC BLOOD PRESSURE: 80 MMHG | OXYGEN SATURATION: 98 %

## 2019-11-22 DIAGNOSIS — M25.562 ACUTE PAIN OF LEFT KNEE: Primary | ICD-10-CM

## 2019-11-22 DIAGNOSIS — M25.562 ACUTE PAIN OF LEFT KNEE: ICD-10-CM

## 2019-11-22 PROCEDURE — 99213 OFFICE O/P EST LOW 20 MIN: CPT | Performed by: NURSE PRACTITIONER

## 2019-11-22 RX ORDER — MELOXICAM 15 MG/1
15 TABLET ORAL DAILY
Qty: 30 TABLET | Refills: 0 | Status: SHIPPED | OUTPATIENT
Start: 2019-11-22 | End: 2019-11-22 | Stop reason: SDUPTHER

## 2019-11-22 NOTE — PROGRESS NOTES
Subjective     Palma Roblero is a 54 y.o. female.         Patient presents with:  Knee Pain: left knee      She is a jogger.      Knee Pain    The incident occurred 5 to 7 days ago. There was no injury mechanism. The pain is present in the left knee. The pain is mild. The pain has been constant since onset. Pertinent negatives include no inability to bear weight, loss of motion, loss of sensation, muscle weakness, numbness or tingling. Associated symptoms comments: L knee edema. The symptoms are aggravated by movement and weight bearing. She has tried nothing for the symptoms.        The following portions of the patient's history were reviewed and updated as appropriate: allergies, current medications, past social history and problem list.    Review of Systems   Constitutional: Positive for activity change. Negative for fever.   Respiratory: Negative for shortness of breath.    Cardiovascular: Negative for chest pain.   Musculoskeletal: Positive for arthralgias (L knee) and joint swelling (L knee). Negative for gait problem.   Skin: Negative for color change, rash and wound.   Neurological: Negative for tingling and numbness.       Objective     /80 (BP Location: Left arm, Patient Position: Sitting, Cuff Size: Adult)   Pulse 66   Wt 86.2 kg (190 lb)   LMP  (LMP Unknown)   SpO2 98%   BMI 32.61 kg/m²     Physical Exam   Constitutional: She appears well-developed and well-nourished.   HENT:   Head: Normocephalic and atraumatic.   Pulmonary/Chest: Effort normal. No respiratory distress.   Musculoskeletal: Normal range of motion.        Left knee: She exhibits swelling and bony tenderness (distal lateral knee region). She exhibits normal range of motion and no erythema. No tenderness found.   Negative anterior and posterior drawer. Mild pain with L knee internal and external rotation.     Neurological: She is alert.   Skin: Skin is warm and dry.   Psychiatric: She has a normal mood and affect. Her behavior  is normal. Judgment and thought content normal.   Vitals reviewed.      Assessment/Plan     Palma was seen today for knee pain.    Diagnoses and all orders for this visit:    Acute pain of left knee  -     meloxicam (MOBIC) 15 MG tablet; Take 1 tablet by mouth Daily. Take with food    She will not take other NSAIDs but can use tylenol.    She will follow [facility], handout given.    Exercises were demonstrated in office in order to strengthen the surrounding muscles.    Return for worsening of sx.

## 2019-11-22 NOTE — PATIENT INSTRUCTIONS
RICE Therapy for Routine Care of Injuries  Many injuries can be cared for with rest, ice, compression, and elevation (RICE therapy). This includes:  · Resting the injured part.  · Putting ice on the injury.  · Putting pressure (compression) on the injury.  · Raising the injured part (elevation).  Using RICE therapy can help to lessen pain and swelling.  Supplies needed:  · Ice.  · Plastic bag.  · Towel.  · Elastic bandage.  · Pillow or pillows to raise (elevate) your injured body part.  How to care for your injury with RICE therapy  Rest  Limit your normal activities, and try not to use the injured part of your body. You can go back to your normal activities when your doctor says it is okay to do them and you feel okay. Ask your doctor if you should do exercises to help your injury get better.  Ice  Put ice on the injured area. Do not put ice on your bare skin.  · Put ice in a plastic bag.  · Place a towel between your skin and the bag.  · Leave the ice on for 20 minutes, 2-3 times a day. Use ice on as many days as told by your doctor.    Compression  Compression means putting pressure on the injured area. This can be done with an elastic bandage. If an elastic bandage has been put on your injury:  · Do not wrap the bandage too tight. Wrap the bandage more loosely if part of your body away from the bandage is blue, swollen, cold, painful, or loses feeling (gets numb).  · Take off the bandage and put it on again. Do this every 3-4 hours or as told by your doctor.  · See your doctor if the bandage seems to make your problems worse.    Elevation  Elevation means keeping the injured area raised. If you can, raise the injured area above your heart or the center of your chest.  Contact a doctor if:  · You keep having pain and swelling.  · Your symptoms get worse.  Get help right away if:  · You have sudden bad pain at your injury or lower than your injury.  · You have redness or more swelling around your injury.  · You  have tingling or numbness at your injury or lower than your injury, and it does not go away when you take off the bandage.  Summary  · Many injuries can be cared for using rest, ice, compression, and elevation (RICE therapy).  · You can go back to your normal activities when you feel okay and your doctor says it is okay.  · Put ice on the injured area as told by your doctor.  · Get help if your symptoms get worse or if you keep having pain and swelling.  This information is not intended to replace advice given to you by your health care provider. Make sure you discuss any questions you have with your health care provider.  Document Released: 06/05/2009 Document Revised: 09/07/2018 Document Reviewed: 09/07/2018  Elsevier Interactive Patient Education © 2019 [employer]

## 2019-11-25 RX ORDER — MELOXICAM 15 MG/1
TABLET ORAL
Qty: 90 TABLET | Refills: 1 | Status: SHIPPED | OUTPATIENT
Start: 2019-11-25 | End: 2021-03-24

## 2020-04-15 NOTE — PROGRESS NOTES
Cc: Inflamed sebaceous cyst on back    History of presenting illness:   This is a very nice 52-year-old lady who says that several months ago she noticed a small blackheads in the mid back which seem to wax and wane in size, but a couple of weeks ago she noticed that it would become quite a bit larger.  She had a small amount of drainage after her  attempted to squeeze it.  Is not been associated with any fever.  There is some mild pain which is constant and does not radiate.  She was placed on Keflex about a week ago and hasn't seen much substantial improvement, although there does seem to be slightly less drainage.    Past Medical History: Breast cancer, hyperlipidemia    Past Surgical History: Right breast lumpectomy    Medications: Atorvastatin, Keflex, vitamin D    Allergies: None known    Social History: Patient does not smoke and does not use alcohol.  I stressed with her the importance of avoiding tobacco products.      Review of Systems:  Constitutional: Negative for fever, chills, change in weight  Neck: no swollen glands or dysphagia or odynophagia  Respiratory: negative for SOB, cough, hemoptysis or wheezing  Cardiovascular: negative for chest pain, palpitations or peripheral edema  Gastrointestinal: Negative for abdominal pain, rectal bleeding, constipation or vomiting      Physical Exam:    General: alert and oriented, appropriate, no acute distress  Neck: Supple without lymphadenopathy or thyromegaly, trachea is in the midline  Respiratory: Lungs are clear bilaterally without wheezing, no use of accessory muscles is noted  Cardiovascular: Regular rate and rhythm without murmur, no peripheral edema  Gastrointestinal: Abdomen is soft, benign, no hernia or hepatosplenomegaly, bowel sounds are positive.  Skin: On the mid back just to the right of midline there is an approximately 5 x 7 cm area of induration and inflammation.  There is no significant cellulitis.  There is a poor overlying this  consistent with an inflamed sebaceous cyst.        Assessment and plan:   Inflamed and uncomfortable sebaceous cyst on back, minimally responsive to antibiotic therapy to this point.    I discussed the options with the patient.  I told her that I don't think there is active infection requiring drainage at this time, but that I think allowing it to improve over the course of another week or so and then attempt a formal excision would be wise.  It may be challenging given the overall size and so I suggested doing this in the operating room under optimal conditions.  The patient understands that excision of this cyst may turn into a drainage procedure if there is true fluctuance or abscess in that dressings may become necessary.  She also understands that recurrence is a possibility as well as bleeding infection and pain.  She agrees to proceed with the planned procedure of excision of this large sebaceous cyst.      Rosendo Crum MD, FACS  General, Minimally Invasive and Endoscopic Surgery  [facility]    [address], [address]  [place], [place], [address]  P: [phone]  F: [phone]          1

## 2020-06-24 NOTE — OP NOTE
OZURDEX 9 18 19 - MILD EDEMA - INCREASING 9 4 19 - RECURRENT. Operative Note :   MD Palma Kam  1965    Procedure Date: 09/05/17    Pre-op Diagnosis:  · Large sebaceous cyst of the mid back    Post-op Diagnosis:  · Same, ruptured    Procedure:   · Excision of large sebaceous cyst of the back, size is 3X4 cm    Surgeon: Rosendo Crum MD    Assistant: Palma Navarrete PAC    Anesthesia:  MAC (monitored anesthetic care)    Indications:  · 52-year-old lady with a large cyst on her back which had previously been drained and recently recurred.  She is placed on antibiotics and seemed to get smaller but in the office it was still quite large.  I told her we could re-drainage or bring her back after.  Antibiotics with an attempted formal excision.    Findings:   · Large chronically inflamed sebaceous cyst    Recommendations:   · Routine postoperative care    Description of procedure:    After obtaining informed consent, patient brought to the operating room placed under monitored anesthesia care.  Her back was sterilely prepped and draped.  She was positioned left lateral decubitus.  The area surrounding the incision was infiltrated with a mixture of lidocaine and Marcaine.  An incision was made directly over the pore, horizontally oriented.  Then began dissecting around the cyst wall.  It was ruptured and seemed to have a couple tunnels went in different directions.  Eventually I was able to dissect around it entirely get back to what appeared to be healthy fat.  Total size of the area excised was estimated at 3 cm x 4 cm.  There was an area which extended superiorly and further to the patient's left side.  This was also excised.  The wound was then thoroughly irrigated and then I closed the deep tissues with 3-0 Vicryl suture and the skin was closed with interrupted 4-0 nylon's in a vertical mattress fashion.  Patient tolerated the procedure well.  There were no problems or complications identified.    Rosendo Crum MD  General and Endoscopic  Surgery  [facility]    [address], [address]  [place], [place], [address]  P: [phone]  F: [phone]

## 2020-08-20 ENCOUNTER — OFFICE VISIT (OUTPATIENT)
Dept: INTERNAL MEDICINE | Facility: CLINIC | Age: 55
End: 2020-08-20

## 2020-08-20 VITALS
DIASTOLIC BLOOD PRESSURE: 60 MMHG | HEIGHT: 64 IN | OXYGEN SATURATION: 99 % | TEMPERATURE: 97.8 F | WEIGHT: 194 LBS | HEART RATE: 65 BPM | BODY MASS INDEX: 33.12 KG/M2 | SYSTOLIC BLOOD PRESSURE: 104 MMHG

## 2020-08-20 DIAGNOSIS — Z00.00 ANNUAL PHYSICAL EXAM: Primary | ICD-10-CM

## 2020-08-20 DIAGNOSIS — Z17.0 MALIGNANT NEOPLASM OF LOWER-OUTER QUADRANT OF RIGHT BREAST OF FEMALE, ESTROGEN RECEPTOR POSITIVE (HCC): ICD-10-CM

## 2020-08-20 DIAGNOSIS — C50.511 MALIGNANT NEOPLASM OF LOWER-OUTER QUADRANT OF RIGHT BREAST OF FEMALE, ESTROGEN RECEPTOR POSITIVE (HCC): ICD-10-CM

## 2020-08-20 PROCEDURE — 99396 PREV VISIT EST AGE 40-64: CPT | Performed by: INTERNAL MEDICINE

## 2020-08-20 NOTE — PROGRESS NOTES
"Chief Complaint   Patient presents with   • Annual Exam     physical       Subjective   Palma Roblero is a 55 y.o. female.     History of Present Illness     She is here for annual examination.    She generally feels healthy.  Her appetite is good.  She tries to follow a balanced diet.  She sleeps well.  Her energy is good.  She exercises regularly.  She is up to date on dental and eye exams. She sees gyn for pap smears.  She is scheduled for mammogram next month.     She sometimes has a \"different sensation\" down her right arm to her thumb.  It is very mild, she has a hard time characterizing it, maybe like electricity. It has not caused problems with using her arm, her  strength is good.      The following portions of the patient's history were reviewed and updated as appropriate: allergies, current medications, past family history, past medical history, past social history, past surgical history and problem list.    Review of Systems   Constitutional: Negative for appetite change, chills, fatigue, fever, unexpected weight gain and unexpected weight loss.   HENT: Negative for congestion, sinus pressure, sore throat and trouble swallowing.    Eyes: Negative for blurred vision and double vision.   Respiratory: Negative for cough and shortness of breath.    Cardiovascular: Negative for chest pain, palpitations and leg swelling.   Gastrointestinal: Negative for abdominal pain, constipation, diarrhea, nausea, vomiting and indigestion.   Endocrine: Negative for cold intolerance, heat intolerance, polydipsia and polyuria.   Genitourinary: Negative for frequency.   Musculoskeletal: Negative for arthralgias and back pain.   Skin: Negative for rash.   Neurological: Negative for dizziness and headache.   Hematological: Negative for adenopathy. Does not bruise/bleed easily.   Psychiatric/Behavioral: Negative for sleep disturbance and depressed mood. The patient is not nervous/anxious.          Current Outpatient " "Medications:   •  atorvastatin (LIPITOR) 10 MG tablet, TAKE 1 TABLET BY MOUTH DAILY, Disp: 90 tablet, Rfl: 1  •  azelastine (OPTIVAR) 0.05 % ophthalmic solution, Administer 1 drop to both eyes Daily., Disp: 1 each, Rfl: 5  •  meloxicam (MOBIC) 15 MG tablet, TAKE 1 TABLET BY MOUTH DAILY WITH FOOD, Disp: 90 tablet, Rfl: 1  •  Triamcinolone Acetonide (NASACORT) 55 MCG/ACT nasal inhaler, 2 sprays into each nostril Daily As Needed (nasal congestion)., Disp: 16.5 g, Rfl: 1        Objective     /60   Pulse 65   Temp 97.8 °F (36.6 °C)   Ht 162.6 cm (64\")   Wt 88 kg (194 lb)   LMP  (LMP Unknown)   SpO2 99%   BMI 33.30 kg/m²     Physical Exam   Constitutional: She is oriented to person, place, and time. She appears well-developed and well-nourished. No distress.   HENT:   Right Ear: Tympanic membrane and ear canal normal.   Left Ear: Tympanic membrane and ear canal normal.   Nose: Right sinus exhibits no maxillary sinus tenderness and no frontal sinus tenderness. Left sinus exhibits no maxillary sinus tenderness and no frontal sinus tenderness.   Mouth/Throat: Oropharynx is clear and moist.   Eyes: Pupils are equal, round, and reactive to light. Conjunctivae and EOM are normal. No scleral icterus.   Neck: Normal range of motion. Neck supple. Normal carotid pulses present. Carotid bruit is not present. No tracheal deviation present. No thyromegaly present.   Cardiovascular: Regular rhythm, S1 normal, S2 normal and intact distal pulses. Exam reveals no gallop and no friction rub.   No murmur heard.  Pulses:       Carotid pulses are 2+ on the right side, and 2+ on the left side.  Pulmonary/Chest: Effort normal and breath sounds normal. She has no wheezes. She has no rhonchi. She has no rales. Chest wall is not dull to percussion. Right breast exhibits skin change (biospy scar). Right breast exhibits no inverted nipple, no mass, no nipple discharge and no tenderness. Left breast exhibits no inverted nipple, no mass, " no nipple discharge, no skin change and no tenderness.   Abdominal: Soft. Normal appearance and bowel sounds are normal. She exhibits no abdominal bruit. There is no hepatosplenomegaly. There is no tenderness. There is no rebound and no guarding.   Musculoskeletal: She exhibits no edema.   Lymphadenopathy:     She has no cervical adenopathy.   Neurological: She is alert and oriented to person, place, and time. No cranial nerve deficit. Coordination normal.   Skin: Skin is warm and dry.   Psychiatric: She has a normal mood and affect.   Nursing note and vitals reviewed.        Assessment/Plan   Palma was seen today for annual exam.    Diagnoses and all orders for this visit:    Annual physical exam  -     Comprehensive Metabolic Panel  -     Lipid Panel With / Chol / HDL Ratio  -     CBC (No Diff)  -     Urinalysis With Culture If Indicated - Urine, Clean Catch  -     TSH Rfx On Abnormal To Free T4      Encouraged prudent diet, regular exercise, maintenance of healthy weight, good sleep habits,  avoidance of tobacco products, excessive alcohol.   In regard to COVID pandemic, encouraged social distancing, wearing a mask while out, frequent handwashing.

## 2020-08-22 LAB
ALBUMIN SERPL-MCNC: 4.5 G/DL (ref 3.5–5.2)
ALBUMIN/GLOB SERPL: 2 G/DL
ALP SERPL-CCNC: 48 U/L (ref 39–117)
ALT SERPL-CCNC: 16 U/L (ref 1–33)
APPEARANCE UR: CLEAR
AST SERPL-CCNC: 19 U/L (ref 1–32)
BACTERIA #/AREA URNS HPF: ABNORMAL /HPF
BACTERIA UR CULT: NORMAL
BACTERIA UR CULT: NORMAL
BILIRUB SERPL-MCNC: 0.8 MG/DL (ref 0–1.2)
BILIRUB UR QL STRIP: NEGATIVE
BUN SERPL-MCNC: 15 MG/DL (ref 6–20)
BUN/CREAT SERPL: 18.1 (ref 7–25)
CALCIUM SERPL-MCNC: 9.2 MG/DL (ref 8.6–10.5)
CHLORIDE SERPL-SCNC: 102 MMOL/L (ref 98–107)
CHOLEST SERPL-MCNC: 195 MG/DL (ref 0–200)
CHOLEST/HDLC SERPL: 2.87 {RATIO}
CO2 SERPL-SCNC: 30.2 MMOL/L (ref 22–29)
COLOR UR: YELLOW
CREAT SERPL-MCNC: 0.83 MG/DL (ref 0.57–1)
EPI CELLS #/AREA URNS HPF: ABNORMAL /HPF (ref 0–10)
ERYTHROCYTE [DISTWIDTH] IN BLOOD BY AUTOMATED COUNT: 13.3 % (ref 12.3–15.4)
GLOBULIN SER CALC-MCNC: 2.3 GM/DL
GLUCOSE SERPL-MCNC: 88 MG/DL (ref 65–99)
GLUCOSE UR QL: NEGATIVE
HCT VFR BLD AUTO: 41.6 % (ref 34–46.6)
HDLC SERPL-MCNC: 68 MG/DL (ref 40–60)
HGB BLD-MCNC: 13.6 G/DL (ref 12–15.9)
HGB UR QL STRIP: NEGATIVE
KETONES UR QL STRIP: NEGATIVE
LDLC SERPL CALC-MCNC: 116 MG/DL (ref 0–100)
LEUKOCYTE ESTERASE UR QL STRIP: ABNORMAL
MCH RBC QN AUTO: 26.8 PG (ref 26.6–33)
MCHC RBC AUTO-ENTMCNC: 32.7 G/DL (ref 31.5–35.7)
MCV RBC AUTO: 82.1 FL (ref 79–97)
MICRO URNS: ABNORMAL
MUCOUS THREADS URNS QL MICRO: PRESENT /HPF
NITRITE UR QL STRIP: NEGATIVE
PH UR STRIP: 6 [PH] (ref 5–7.5)
PLATELET # BLD AUTO: 175 10*3/MM3 (ref 140–450)
POTASSIUM SERPL-SCNC: 3.8 MMOL/L (ref 3.5–5.2)
PROT SERPL-MCNC: 6.8 G/DL (ref 6–8.5)
PROT UR QL STRIP: NEGATIVE
RBC # BLD AUTO: 5.07 10*6/MM3 (ref 3.77–5.28)
RBC #/AREA URNS HPF: ABNORMAL /HPF (ref 0–2)
SODIUM SERPL-SCNC: 141 MMOL/L (ref 136–145)
SP GR UR: 1.02 (ref 1–1.03)
TRIGL SERPL-MCNC: 53 MG/DL (ref 0–150)
TSH SERPL DL<=0.005 MIU/L-ACNC: 2.11 UIU/ML (ref 0.27–4.2)
URINALYSIS REFLEX: ABNORMAL
UROBILINOGEN UR STRIP-MCNC: 0.2 MG/DL (ref 0.2–1)
VLDLC SERPL CALC-MCNC: 10.6 MG/DL
WBC # BLD AUTO: 5.11 10*3/MM3 (ref 3.4–10.8)
WBC #/AREA URNS HPF: >30 /HPF (ref 0–5)

## 2020-09-16 ENCOUNTER — TRANSCRIBE ORDERS (OUTPATIENT)
Dept: ADMINISTRATIVE | Facility: HOSPITAL | Age: 55
End: 2020-09-16

## 2020-09-16 DIAGNOSIS — Z12.31 VISIT FOR SCREENING MAMMOGRAM: Primary | ICD-10-CM

## 2020-10-15 DIAGNOSIS — E78.00 HYPERCHOLESTEREMIA: ICD-10-CM

## 2020-10-15 RX ORDER — ATORVASTATIN CALCIUM 10 MG/1
TABLET, FILM COATED ORAL
Qty: 90 TABLET | Refills: 1 | Status: SHIPPED | OUTPATIENT
Start: 2020-10-15 | End: 2021-06-15

## 2020-12-09 ENCOUNTER — TELEPHONE (OUTPATIENT)
Dept: INTERNAL MEDICINE | Facility: CLINIC | Age: 55
End: 2020-12-09

## 2020-12-09 NOTE — TELEPHONE ENCOUNTER
Patient previously tested positive for covid    Hub was recently sent new changes regarding procedures, but I am unclear if we can do in-office re-testing for Covid.     Please advise patient if she can get done here or if needs to go back to urgent care.    [phone]

## 2020-12-09 NOTE — TELEPHONE ENCOUNTER
Patient was informed per provider there is no need to re-test again for COVID. Patient verbalized she understood and thank you.

## 2020-12-11 ENCOUNTER — APPOINTMENT (OUTPATIENT)
Dept: MAMMOGRAPHY | Facility: HOSPITAL | Age: 55
End: 2020-12-11

## 2021-01-07 ENCOUNTER — TELEPHONE (OUTPATIENT)
Dept: ONCOLOGY | Facility: CLINIC | Age: 56
End: 2021-01-07

## 2021-01-07 NOTE — TELEPHONE ENCOUNTER
CALLED PT WITH HER APPT DATE AND TIME AS SHE WAS NOT ABLE TO SCHEDULE APPT LAST YEAR DUE TO COVID - SHE WANTED TO MAKE SURE THAT SHE WAS SCHEDULED FOR THIS YEAR

## 2021-01-07 NOTE — TELEPHONE ENCOUNTER
Caller: Palma    Relationship to patient: Pt    Best call back number: [phone]    Type of visit: Yearly lab and follow up     Requested date: 01/11 @ 2:30pm     Additional notes: Palma missed her yearly follow up in 2020 due to pandemic

## 2021-01-11 ENCOUNTER — LAB (OUTPATIENT)
Dept: LAB | Facility: HOSPITAL | Age: 56
End: 2021-01-11

## 2021-01-11 ENCOUNTER — OFFICE VISIT (OUTPATIENT)
Dept: ONCOLOGY | Facility: CLINIC | Age: 56
End: 2021-01-11

## 2021-01-11 VITALS
BODY MASS INDEX: 32.01 KG/M2 | HEIGHT: 65 IN | SYSTOLIC BLOOD PRESSURE: 115 MMHG | DIASTOLIC BLOOD PRESSURE: 73 MMHG | RESPIRATION RATE: 16 BRPM | TEMPERATURE: 98 F | WEIGHT: 192.1 LBS | HEART RATE: 79 BPM | OXYGEN SATURATION: 100 %

## 2021-01-11 DIAGNOSIS — C50.511 MALIGNANT NEOPLASM OF LOWER-OUTER QUADRANT OF RIGHT BREAST OF FEMALE, ESTROGEN RECEPTOR POSITIVE (HCC): Primary | ICD-10-CM

## 2021-01-11 DIAGNOSIS — Z17.0 MALIGNANT NEOPLASM OF LOWER-OUTER QUADRANT OF RIGHT BREAST OF FEMALE, ESTROGEN RECEPTOR POSITIVE (HCC): Primary | ICD-10-CM

## 2021-01-11 LAB
ALBUMIN SERPL-MCNC: 4 G/DL (ref 3.5–5.2)
ALBUMIN/GLOB SERPL: 1.2 G/DL (ref 1.1–2.4)
ALP SERPL-CCNC: 63 U/L (ref 38–116)
ALT SERPL W P-5'-P-CCNC: 15 U/L (ref 0–33)
ANION GAP SERPL CALCULATED.3IONS-SCNC: 7.6 MMOL/L (ref 5–15)
AST SERPL-CCNC: 17 U/L (ref 0–32)
BASOPHILS # BLD AUTO: 0.03 10*3/MM3 (ref 0–0.2)
BASOPHILS NFR BLD AUTO: 0.6 % (ref 0–1.5)
BILIRUB SERPL-MCNC: 0.6 MG/DL (ref 0.2–1.2)
BUN SERPL-MCNC: 13 MG/DL (ref 6–20)
BUN/CREAT SERPL: 17.1 (ref 7.3–30)
CALCIUM SPEC-SCNC: 9.3 MG/DL (ref 8.5–10.2)
CHLORIDE SERPL-SCNC: 105 MMOL/L (ref 98–107)
CO2 SERPL-SCNC: 28.4 MMOL/L (ref 22–29)
CREAT SERPL-MCNC: 0.76 MG/DL (ref 0.6–1.1)
DEPRECATED RDW RBC AUTO: 41.7 FL (ref 37–54)
EOSINOPHIL # BLD AUTO: 0.07 10*3/MM3 (ref 0–0.4)
EOSINOPHIL NFR BLD AUTO: 1.4 % (ref 0.3–6.2)
ERYTHROCYTE [DISTWIDTH] IN BLOOD BY AUTOMATED COUNT: 13.8 % (ref 12.3–15.4)
GFR SERPL CREATININE-BSD FRML MDRD: 95 ML/MIN/1.73
GLOBULIN UR ELPH-MCNC: 3.3 GM/DL (ref 1.8–3.5)
GLUCOSE SERPL-MCNC: 127 MG/DL (ref 74–124)
HCT VFR BLD AUTO: 38.4 % (ref 34–46.6)
HGB BLD-MCNC: 12.4 G/DL (ref 12–15.9)
IMM GRANULOCYTES # BLD AUTO: 0.01 10*3/MM3 (ref 0–0.05)
IMM GRANULOCYTES NFR BLD AUTO: 0.2 % (ref 0–0.5)
LYMPHOCYTES # BLD AUTO: 1.33 10*3/MM3 (ref 0.7–3.1)
LYMPHOCYTES NFR BLD AUTO: 27 % (ref 19.6–45.3)
MCH RBC QN AUTO: 26.9 PG (ref 26.6–33)
MCHC RBC AUTO-ENTMCNC: 32.3 G/DL (ref 31.5–35.7)
MCV RBC AUTO: 83.3 FL (ref 79–97)
MONOCYTES # BLD AUTO: 0.49 10*3/MM3 (ref 0.1–0.9)
MONOCYTES NFR BLD AUTO: 10 % (ref 5–12)
NEUTROPHILS NFR BLD AUTO: 2.99 10*3/MM3 (ref 1.7–7)
NEUTROPHILS NFR BLD AUTO: 60.8 % (ref 42.7–76)
NRBC BLD AUTO-RTO: 0 /100 WBC (ref 0–0.2)
PLATELET # BLD AUTO: 203 10*3/MM3 (ref 140–450)
PMV BLD AUTO: 11.1 FL (ref 6–12)
POTASSIUM SERPL-SCNC: 4.1 MMOL/L (ref 3.5–4.7)
PROT SERPL-MCNC: 7.3 G/DL (ref 6.3–8)
RBC # BLD AUTO: 4.61 10*6/MM3 (ref 3.77–5.28)
SODIUM SERPL-SCNC: 141 MMOL/L (ref 134–145)
WBC # BLD AUTO: 4.92 10*3/MM3 (ref 3.4–10.8)

## 2021-01-11 PROCEDURE — 85025 COMPLETE CBC W/AUTO DIFF WBC: CPT

## 2021-01-11 PROCEDURE — 99213 OFFICE O/P EST LOW 20 MIN: CPT | Performed by: INTERNAL MEDICINE

## 2021-01-11 PROCEDURE — 36415 COLL VENOUS BLD VENIPUNCTURE: CPT

## 2021-01-11 PROCEDURE — 80053 COMPREHEN METABOLIC PANEL: CPT

## 2021-01-11 NOTE — PROGRESS NOTES
Subjective   REASON FOR FOLLOWUP:   History of stage IIIA infiltrating ductal carcinoma of the right breast with 4 positive nodes, ER positive, HER-2/julián negative; treated with lumpectomy, status post 6 cycles of TAC. Received tamoxifen between February 2007 and March 2012. Femara was started in March 2012. Patient switched to Arimidex as of 06/21/2012 because of side effects with severe ankle joint pain with Femara.    10 years of adjuvant therapy completed  March 2017.    HISTORY OF PRESENT ILLNESS:     History of Present Illness Ms. Roblero is a 55 y.o.  female with the above noted history of stage IIIA carcinoma of the right breast, treated with lumpectomy, chemotherapy and radiation. She received extended hormonal therapy with 5 years of tamoxifen followed by 5 years of aromatase inhibitor.  She completed her 10 years of hormonal therapy in March 2017  .     She is here today for routine annual followup and seems to be doing well.  Her annual mammogram was performed on 9/20/2019 with no suspicious findings.    Her mammogram is delayed due to the fact that she developed Covid in November.  She reports that she had a mild case with loss of taste and few days of fever.  She did not require hospitalization and now seems to be fully recovered.  She tells me her mammogram has actually been scheduled for later this week on Thursday, 1/14/2021.      Past Medical History, Past Surgical History, Social History, Family History have been reviewed and are without significant changes except as mentioned.    Review of Systems   Constitutional: Negative for activity change, appetite change, fatigue, fever and unexpected weight change.   HENT: Negative for hearing loss, nosebleeds, trouble swallowing and voice change.    Eyes: Negative for visual disturbance.   Respiratory: Negative for cough, shortness of breath and wheezing.    Cardiovascular: Negative for chest pain and palpitations.   Gastrointestinal:  "Negative for abdominal pain, diarrhea, nausea and vomiting.   Genitourinary: Negative for difficulty urinating, frequency, hematuria and urgency.   Musculoskeletal: Negative for back pain and neck pain.   Skin: Negative for rash.   Neurological: Negative for dizziness, seizures, syncope and headaches.   Hematological: Negative for adenopathy. Does not bruise/bleed easily.   Psychiatric/Behavioral: Negative for behavioral problems. The patient is not nervous/anxious.       A comprehensive 14 point review of systems was performed and was negative except as mentioned.    Medications:  The current medication list was reviewed in the EMR    ALLERGIES:  No Known Allergies    Objective      Vitals:    01/11/21 1523   BP: 115/73   Pulse: 79   Resp: 16   Temp: 98 °F (36.7 °C)   TempSrc: Skin   SpO2: 100%   Weight: 87.1 kg (192 lb 1.6 oz)   Height: 165 cm (64.96\")  Comment: new height   PainSc: 0-No pain     Current Status 4/18/2019   ECOG score 0       Physical Exam   Constitutional: She is oriented to person, place, and time. She appears well-developed. No distress.   HENT:   Head: Normocephalic.   Eyes: Pupils are equal, round, and reactive to light. Conjunctivae are normal. No scleral icterus.   Neck: Normal range of motion. Neck supple. No JVD present. No thyromegaly present.   Cardiovascular: Normal rate and regular rhythm. Exam reveals no gallop and no friction rub.   No murmur heard.  Pulmonary/Chest: Effort normal and breath sounds normal. She has no wheezes. She has no rales. Right breast exhibits no mass, no nipple discharge and no skin change. Left breast exhibits no mass, no nipple discharge and no skin change.   Right lumpectomy scar.    Abdominal: Soft. Bowel sounds are normal. She exhibits no distension and no mass. There is no abdominal tenderness.   Musculoskeletal: Normal range of motion. No deformity.   Lymphadenopathy:     She has no cervical adenopathy.   Neurological: She is alert and oriented to " person, place, and time. She has normal reflexes. No cranial nerve deficit.   Skin: Skin is warm and dry. No rash noted. No erythema.   Psychiatric: Her behavior is normal. Judgment normal.        RECENT LABS:  Hematology WBC   Date Value Ref Range Status   01/11/2021 4.92 3.40 - 10.80 10*3/mm3 Final   08/20/2020 5.11 3.40 - 10.80 10*3/mm3 Final     RBC   Date Value Ref Range Status   01/11/2021 4.61 3.77 - 5.28 10*6/mm3 Final   08/20/2020 5.07 3.77 - 5.28 10*6/mm3 Final     Hemoglobin   Date Value Ref Range Status   01/11/2021 12.4 12.0 - 15.9 g/dL Final     Hematocrit   Date Value Ref Range Status   01/11/2021 38.4 34.0 - 46.6 % Final     Platelets   Date Value Ref Range Status   01/11/2021 203 140 - 450 10*3/mm3 Final            Assessment/Plan     Stage IIIA infiltrating ductal carcinoma of the right breast treated with a lumpectomy, radiation, 6 cycles of TAC chemotherapy and ongoing hormonal therapy. She completed 10 years of adjuvant hormonal therapy with 5 years of tamoxifen followed by 5 years of Arimidex.    PLAN:   1. She will continue seeing us on an annual basis but knows that she can call any time if she has new concerns.   2. Her annual mammogram is actually scheduled for later this week at [facility] and we have requested that we receive a copy of the report..            1/11/2021      CC:

## 2021-01-14 ENCOUNTER — HOSPITAL ENCOUNTER (OUTPATIENT)
Dept: MAMMOGRAPHY | Facility: HOSPITAL | Age: 56
Discharge: HOME | End: 2021-01-14
Admitting: INTERNAL MEDICINE

## 2021-01-14 DIAGNOSIS — Z12.31 VISIT FOR SCREENING MAMMOGRAM: ICD-10-CM

## 2021-01-14 PROCEDURE — 77067 SCR MAMMO BI INCL CAD: CPT

## 2021-01-14 PROCEDURE — 77063 BREAST TOMOSYNTHESIS BI: CPT

## 2021-03-12 RX ORDER — AZELASTINE HYDROCHLORIDE 0.5 MG/ML
1 SOLUTION/ DROPS OPHTHALMIC DAILY
Qty: 1 EACH | Refills: 5 | Status: SHIPPED | OUTPATIENT
Start: 2021-03-12 | End: 2021-03-18

## 2021-03-12 NOTE — TELEPHONE ENCOUNTER
Caller: Palma Roblero    Relationship: Self    Best call back number: [phone]     Medication needed:   Requested Prescriptions     Pending Prescriptions Disp Refills   • azelastine (OPTIVAR) 0.05 % ophthalmic solution 1 each 5     Sig: Administer 1 drop to both eyes Daily.       When do you need the refill by: 03/12/21    Does the patient have less than a 3 day supply:  [x] Yes  [] No    What is the patient's preferred pharmacy: [id number] [id number] AT [facility] - [phone] [facility] [phone] FX

## 2021-03-18 RX ORDER — OLOPATADINE HYDROCHLORIDE 1 MG/ML
1 SOLUTION/ DROPS OPHTHALMIC 2 TIMES DAILY
Qty: 5 ML | Refills: 5 | Status: SHIPPED | OUTPATIENT
Start: 2021-03-18 | End: 2021-03-24 | Stop reason: SDUPTHER

## 2021-03-24 ENCOUNTER — OFFICE VISIT (OUTPATIENT)
Dept: INTERNAL MEDICINE | Facility: CLINIC | Age: 56
End: 2021-03-24

## 2021-03-24 VITALS
WEIGHT: 200 LBS | BODY MASS INDEX: 34.15 KG/M2 | OXYGEN SATURATION: 100 % | HEIGHT: 64 IN | TEMPERATURE: 97.8 F | DIASTOLIC BLOOD PRESSURE: 60 MMHG | SYSTOLIC BLOOD PRESSURE: 104 MMHG | HEART RATE: 74 BPM

## 2021-03-24 DIAGNOSIS — R09.81 SINUS CONGESTION: ICD-10-CM

## 2021-03-24 DIAGNOSIS — E78.5 HYPERLIPIDEMIA, UNSPECIFIED HYPERLIPIDEMIA TYPE: Primary | Chronic | ICD-10-CM

## 2021-03-24 PROCEDURE — 99213 OFFICE O/P EST LOW 20 MIN: CPT | Performed by: INTERNAL MEDICINE

## 2021-03-24 RX ORDER — OLOPATADINE HCL 0.1 %
1 DROPS OPHTHALMIC (EYE) 2 TIMES DAILY
Qty: 5 ML | Refills: 5 | Status: SHIPPED | OUTPATIENT
Start: 2021-03-24

## 2021-03-24 RX ORDER — TRIAMCINOLONE ACETONIDE 55 UG/1
2 SPRAY, METERED NASAL DAILY PRN
Qty: 16.5 G | Refills: 5 | Status: SHIPPED | OUTPATIENT
Start: 2021-03-24

## 2021-03-24 NOTE — PROGRESS NOTES
"Chief Complaint   Patient presents with   • Hyperlipidemia       Subjective   Palma Roblero is a 55 y.o. female.     Hyperlipidemia  Pertinent negatives include no chest pain or shortness of breath.        Hyperlipidemia:    This is a chronic problem.  Her most recent lipid panel showed:  Lab Results   Component Value Date    CHOL 176 03/14/2019    CHLPL 195 08/20/2020    TRIG 53 08/20/2020    HDL 68 (H) 08/20/2020     (H) 08/20/2020     Current treatment: atorvastatin.  Prudent diet and regular exercise have also been recommended.  No management changes were made at her last appointment.      The following portions of the patient's history were reviewed and updated as appropriate: allergies, current medications, past family history, past medical history, past social history, past surgical history and problem list.    Review of Systems   Constitutional: Negative for appetite change.   HENT: Negative for nosebleeds.    Eyes: Negative for blurred vision and double vision.   Respiratory: Negative for cough and shortness of breath.    Cardiovascular: Negative for chest pain, palpitations and leg swelling.           Current Outpatient Medications:   •  atorvastatin (LIPITOR) 10 MG tablet, TAKE 1 TABLET BY MOUTH DAILY, Disp: 90 tablet, Rfl: 1  •  Patanol 0.1 % ophthalmic solution, Administer 1 drop to both eyes 2 (Two) Times a Day., Disp: 5 mL, Rfl: 5  •  Triamcinolone Acetonide (NASACORT) 55 MCG/ACT nasal inhaler, 2 sprays into the nostril(s) as directed by provider Daily As Needed (nasal congestion)., Disp: 16.5 g, Rfl: 5        Objective     /60   Pulse 74   Temp 97.8 °F (36.6 °C)   Ht 162.6 cm (64\")   Wt 90.7 kg (200 lb)   LMP  (LMP Unknown)   SpO2 100%   BMI 34.33 kg/m²     Physical Exam  Vitals and nursing note reviewed.   Constitutional:       General: She is not in acute distress.     Appearance: She is well-developed.   Neck:      Vascular: Normal carotid pulses. No carotid bruit. "   Cardiovascular:      Rate and Rhythm: Regular rhythm.      Pulses:           Carotid pulses are 2+ on the right side and 2+ on the left side.     Heart sounds: S1 normal and S2 normal. No murmur heard.   No friction rub. No gallop.    Pulmonary:      Effort: Pulmonary effort is normal.      Breath sounds: Normal breath sounds. No wheezing, rhonchi or rales.   Musculoskeletal:      Right lower leg: No edema.      Left lower leg: No edema.   Skin:     General: Skin is warm and dry.   Neurological:      Mental Status: She is alert and oriented to person, place, and time.           Assessment/Plan   Diagnoses and all orders for this visit:    1. Hyperlipidemia, unspecified hyperlipidemia type (Primary)  -     Comprehensive Metabolic Panel  -     Lipid Panel With / Chol / HDL Ratio    2. Sinus congestion  -     Triamcinolone Acetonide (NASACORT) 55 MCG/ACT nasal inhaler; 2 sprays into the nostril(s) as directed by provider Daily As Needed (nasal congestion).  Dispense: 16.5 g; Refill: 5    Other orders  -     Patanol 0.1 % ophthalmic solution; Administer 1 drop to both eyes 2 (Two) Times a Day.  Dispense: 5 mL; Refill: 5

## 2021-03-25 LAB
ALBUMIN SERPL-MCNC: 4.3 G/DL (ref 3.5–5.2)
ALBUMIN/GLOB SERPL: 1.5 G/DL
ALP SERPL-CCNC: 56 U/L (ref 39–117)
ALT SERPL-CCNC: 19 U/L (ref 1–33)
AST SERPL-CCNC: 18 U/L (ref 1–32)
BILIRUB SERPL-MCNC: 0.9 MG/DL (ref 0–1.2)
BUN SERPL-MCNC: 14 MG/DL (ref 6–20)
BUN/CREAT SERPL: 17.3 (ref 7–25)
CALCIUM SERPL-MCNC: 9.3 MG/DL (ref 8.6–10.5)
CHLORIDE SERPL-SCNC: 103 MMOL/L (ref 98–107)
CHOLEST SERPL-MCNC: 201 MG/DL (ref 0–200)
CHOLEST/HDLC SERPL: 2.64 {RATIO}
CO2 SERPL-SCNC: 28.6 MMOL/L (ref 22–29)
CREAT SERPL-MCNC: 0.81 MG/DL (ref 0.57–1)
GLOBULIN SER CALC-MCNC: 2.9 GM/DL
GLUCOSE SERPL-MCNC: 86 MG/DL (ref 65–99)
HDLC SERPL-MCNC: 76 MG/DL (ref 40–60)
LDLC SERPL CALC-MCNC: 114 MG/DL (ref 0–100)
POTASSIUM SERPL-SCNC: 3.8 MMOL/L (ref 3.5–5.2)
PROT SERPL-MCNC: 7.2 G/DL (ref 6–8.5)
SODIUM SERPL-SCNC: 140 MMOL/L (ref 136–145)
TRIGL SERPL-MCNC: 59 MG/DL (ref 0–150)
VLDLC SERPL CALC-MCNC: 11 MG/DL (ref 5–40)

## 2021-03-30 ENCOUNTER — BULK ORDERING (OUTPATIENT)
Dept: CASE MANAGEMENT | Facility: OTHER | Age: 56
End: 2021-03-30

## 2021-03-30 DIAGNOSIS — Z23 IMMUNIZATION DUE: ICD-10-CM

## 2021-04-05 ENCOUNTER — IMMUNIZATION (OUTPATIENT)
Dept: VACCINE CLINIC | Facility: HOSPITAL | Age: 56
End: 2021-04-05

## 2021-04-05 DIAGNOSIS — Z23 IMMUNIZATION DUE: ICD-10-CM

## 2021-04-05 PROCEDURE — 91300 HC SARSCOV02 VAC 30MCG/0.3ML IM: CPT | Performed by: INTERNAL MEDICINE

## 2021-04-05 PROCEDURE — 0001A: CPT | Performed by: INTERNAL MEDICINE

## 2021-04-26 ENCOUNTER — IMMUNIZATION (OUTPATIENT)
Dept: VACCINE CLINIC | Facility: HOSPITAL | Age: 56
End: 2021-04-26

## 2021-04-26 PROCEDURE — 91300 HC SARSCOV02 VAC 30MCG/0.3ML IM: CPT | Performed by: INTERNAL MEDICINE

## 2021-04-26 PROCEDURE — 0002A: CPT | Performed by: INTERNAL MEDICINE

## 2021-06-15 DIAGNOSIS — E78.00 HYPERCHOLESTEREMIA: ICD-10-CM

## 2021-06-15 RX ORDER — ATORVASTATIN CALCIUM 10 MG/1
TABLET, FILM COATED ORAL
Qty: 90 TABLET | Refills: 1 | Status: SHIPPED | OUTPATIENT
Start: 2021-06-15 | End: 2022-01-06

## 2021-11-01 ENCOUNTER — OFFICE VISIT (OUTPATIENT)
Dept: INTERNAL MEDICINE | Facility: CLINIC | Age: 56
End: 2021-11-01

## 2021-11-01 VITALS
WEIGHT: 209 LBS | BODY MASS INDEX: 34.82 KG/M2 | TEMPERATURE: 98.1 F | HEIGHT: 65 IN | SYSTOLIC BLOOD PRESSURE: 134 MMHG | OXYGEN SATURATION: 99 % | HEART RATE: 75 BPM | DIASTOLIC BLOOD PRESSURE: 66 MMHG

## 2021-11-01 DIAGNOSIS — Z00.00 ANNUAL PHYSICAL EXAM: Primary | ICD-10-CM

## 2021-11-01 PROCEDURE — 99396 PREV VISIT EST AGE 40-64: CPT | Performed by: INTERNAL MEDICINE

## 2021-11-01 NOTE — PROGRESS NOTES
Chief Complaint   Patient presents with   • Annual Exam       Subjective   Palma Roblero is a 56 y.o. female.     History of Present Illness     She is here for annual examination.    She generally feels healthy.    Her appetite is good.    She tries to follow a balanced diet.    She sleeps well most of the time.    Her energy is good.    She exercises regularly.    She is up to date on dental and eye exams.       The following portions of the patient's history were reviewed and updated as appropriate: allergies, current medications, past family history, past medical history, past social history, past surgical history and problem list.      Current Outpatient Medications:   •  atorvastatin (LIPITOR) 10 MG tablet, TAKE 1 TABLET BY MOUTH DAILY, Disp: 90 tablet, Rfl: 1  •  Patanol 0.1 % ophthalmic solution, Administer 1 drop to both eyes 2 (Two) Times a Day., Disp: 5 mL, Rfl: 5  •  Triamcinolone Acetonide (NASACORT) 55 MCG/ACT nasal inhaler, 2 sprays into the nostril(s) as directed by provider Daily As Needed (nasal congestion)., Disp: 16.5 g, Rfl: 5           Review of Systems   Constitutional: Negative for appetite change, fever, unexpected weight gain and unexpected weight loss.   HENT: Positive for sinus pressure (at times). Negative for ear pain, nosebleeds, sore throat and trouble swallowing.    Eyes: Negative for blurred vision and double vision.   Respiratory: Negative for cough and shortness of breath.    Cardiovascular: Negative for chest pain, palpitations and leg swelling.   Gastrointestinal: Negative for abdominal pain, constipation, diarrhea, nausea and vomiting.   Endocrine: Negative for cold intolerance, heat intolerance, polydipsia and polyuria.   Genitourinary: Negative for breast discharge, breast lump and frequency.   Musculoskeletal: Negative for back pain.   Skin: Negative for rash.   Neurological: Negative for headache.   Hematological: Negative for adenopathy. Does not bruise/bleed easily.  "  Psychiatric/Behavioral: Negative for sleep disturbance and depressed mood. The patient is not nervous/anxious.            Objective     /66   Pulse 75   Temp 98.1 °F (36.7 °C)   Ht 165 cm (64.96\")   Wt 94.8 kg (209 lb)   LMP  (LMP Unknown)   SpO2 99%   BMI 34.82 kg/m²       Physical Exam  Vitals and nursing note reviewed.   Constitutional:       General: She is not in acute distress.     Appearance: Normal appearance. She is well-developed.   HENT:      Right Ear: Tympanic membrane and ear canal normal.      Left Ear: Tympanic membrane and ear canal normal.      Nose:      Right Sinus: No maxillary sinus tenderness or frontal sinus tenderness.      Left Sinus: No maxillary sinus tenderness or frontal sinus tenderness.   Eyes:      General: No scleral icterus.     Conjunctiva/sclera: Conjunctivae normal.      Pupils: Pupils are equal, round, and reactive to light.   Neck:      Thyroid: No thyromegaly.      Vascular: Normal carotid pulses. No carotid bruit.      Trachea: No tracheal deviation.   Cardiovascular:      Rate and Rhythm: Regular rhythm.      Pulses:           Carotid pulses are 2+ on the right side and 2+ on the left side.     Heart sounds: S1 normal and S2 normal. No murmur heard.  No friction rub. No gallop.    Pulmonary:      Effort: Pulmonary effort is normal.      Breath sounds: Normal breath sounds. No wheezing, rhonchi or rales.   Abdominal:      General: Bowel sounds are normal.      Palpations: Abdomen is soft.      Tenderness: There is no abdominal tenderness. There is no guarding or rebound.   Musculoskeletal:      Cervical back: Normal range of motion and neck supple.      Right lower leg: No edema.      Left lower leg: No edema.   Lymphadenopathy:      Cervical: No cervical adenopathy.   Skin:     General: Skin is warm and dry.   Neurological:      Mental Status: She is alert and oriented to person, place, and time.      Cranial Nerves: No cranial nerve deficit.      " Coordination: Coordination normal.      Deep Tendon Reflexes:      Reflex Scores:       Patellar reflexes are 1+ on the right side and 1+ on the left side.      ECG 12 Lead    Date/Time: 11/1/2021 9:21 AM  Performed by: Rosi Mercado MA  Authorized by: Selena Saunders MD   Comparison: compared with previous ECG from 3/14/2019  Similar to previous ECG  Rhythm: sinus bradycardia  Rate: bradycardic  BPM: 55  Conduction: conduction normal  ST Segments: ST segments normal  T Waves: T waves normal  QRS axis: normal  Clinical impression comment: Normal ECG except for rate                  Assessment/Plan   Diagnoses and all orders for this visit:    1. Annual physical exam (Primary)  -     ECG 12 Lead  -     Comprehensive Metabolic Panel  -     Lipid Panel With / Chol / HDL Ratio  -     CBC (No Diff)  -     TSH Rfx On Abnormal To Free T4  -     Urinalysis With Microscopic If Indicated (No Culture) - Urine, Clean Catch    Encouraged prudent diet, regular exercise, maintenance of healthy weight, good sleep habits,  avoidance of tobacco products, excessive alcohol.                Return in about 6 months (around 5/1/2022) for 30 (THIRTY) min, Fasting.

## 2021-11-02 LAB
ALBUMIN SERPL-MCNC: 4.4 G/DL (ref 3.8–4.9)
ALBUMIN/GLOB SERPL: 1.5 {RATIO} (ref 1.2–2.2)
ALP SERPL-CCNC: 62 IU/L (ref 44–121)
ALT SERPL-CCNC: 21 IU/L (ref 0–32)
APPEARANCE UR: CLEAR
AST SERPL-CCNC: 35 IU/L (ref 0–40)
BACTERIA #/AREA URNS HPF: NORMAL /[HPF]
BILIRUB SERPL-MCNC: 0.7 MG/DL (ref 0–1.2)
BILIRUB UR QL STRIP: NEGATIVE
BUN SERPL-MCNC: 13 MG/DL (ref 6–24)
BUN/CREAT SERPL: 16 (ref 9–23)
CALCIUM SERPL-MCNC: 9.4 MG/DL (ref 8.7–10.2)
CASTS URNS QL MICRO: NORMAL /LPF
CHLORIDE SERPL-SCNC: 103 MMOL/L (ref 96–106)
CHOLEST SERPL-MCNC: 189 MG/DL (ref 100–199)
CHOLEST/HDLC SERPL: 2.8 RATIO (ref 0–4.4)
CO2 SERPL-SCNC: 27 MMOL/L (ref 20–29)
COLOR UR: YELLOW
CREAT SERPL-MCNC: 0.8 MG/DL (ref 0.57–1)
EPI CELLS #/AREA URNS HPF: NORMAL /HPF (ref 0–10)
ERYTHROCYTE [DISTWIDTH] IN BLOOD BY AUTOMATED COUNT: 13.2 % (ref 11.7–15.4)
GLOBULIN SER CALC-MCNC: 3 G/DL (ref 1.5–4.5)
GLUCOSE SERPL-MCNC: 95 MG/DL (ref 65–99)
GLUCOSE UR QL: NEGATIVE
HCT VFR BLD AUTO: 41.7 % (ref 34–46.6)
HDLC SERPL-MCNC: 68 MG/DL
HGB BLD-MCNC: 13.6 G/DL (ref 11.1–15.9)
HGB UR QL STRIP: NEGATIVE
KETONES UR QL STRIP: NEGATIVE
LDLC SERPL CALC-MCNC: 110 MG/DL (ref 0–99)
LEUKOCYTE ESTERASE UR QL STRIP: ABNORMAL
Lab: NORMAL
MCH RBC QN AUTO: 26.5 PG (ref 26.6–33)
MCHC RBC AUTO-ENTMCNC: 32.6 G/DL (ref 31.5–35.7)
MCV RBC AUTO: 81 FL (ref 79–97)
MICRO URNS: ABNORMAL
NITRITE UR QL STRIP: NEGATIVE
PH UR STRIP: 6 [PH] (ref 5–7.5)
PLATELET # BLD AUTO: 189 X10E3/UL (ref 150–450)
POTASSIUM SERPL-SCNC: 3.8 MMOL/L (ref 3.5–5.2)
PROT SERPL-MCNC: 7.4 G/DL (ref 6–8.5)
PROT UR QL STRIP: NEGATIVE
RBC # BLD AUTO: 5.14 X10E6/UL (ref 3.77–5.28)
RBC #/AREA URNS HPF: NORMAL /HPF (ref 0–2)
SODIUM SERPL-SCNC: 141 MMOL/L (ref 134–144)
SP GR UR: 1.01 (ref 1–1.03)
TRIGL SERPL-MCNC: 61 MG/DL (ref 0–149)
TSH SERPL DL<=0.005 MIU/L-ACNC: 1.94 UIU/ML (ref 0.45–4.5)
UROBILINOGEN UR STRIP-MCNC: 0.2 MG/DL (ref 0.2–1)
VLDLC SERPL CALC-MCNC: 11 MG/DL (ref 5–40)
WBC # BLD AUTO: 5.9 X10E3/UL (ref 3.4–10.8)
WBC #/AREA URNS HPF: NORMAL /HPF (ref 0–5)

## 2021-12-28 ENCOUNTER — APPOINTMENT (OUTPATIENT)
Dept: VACCINE CLINIC | Facility: HOSPITAL | Age: 56
End: 2021-12-28

## 2021-12-29 ENCOUNTER — IMMUNIZATION (OUTPATIENT)
Dept: VACCINE CLINIC | Facility: HOSPITAL | Age: 56
End: 2021-12-29

## 2021-12-29 PROCEDURE — 91300 HC SARSCOV02 VAC 30MCG/0.3ML IM: CPT | Performed by: INTERNAL MEDICINE

## 2021-12-29 PROCEDURE — 0004A HC ADM SARSCOV2 30MCG/0.3ML BOOSTER: CPT | Performed by: INTERNAL MEDICINE

## 2022-01-06 DIAGNOSIS — E78.00 HYPERCHOLESTEREMIA: ICD-10-CM

## 2022-01-06 RX ORDER — ATORVASTATIN CALCIUM 10 MG/1
TABLET, FILM COATED ORAL
Qty: 90 TABLET | Refills: 3 | Status: SHIPPED | OUTPATIENT
Start: 2022-01-06 | End: 2023-01-30 | Stop reason: SDUPTHER

## 2022-01-13 ENCOUNTER — TELEMEDICINE (OUTPATIENT)
Dept: ONCOLOGY | Facility: CLINIC | Age: 57
End: 2022-01-13

## 2022-01-13 ENCOUNTER — LAB (OUTPATIENT)
Dept: LAB | Facility: HOSPITAL | Age: 57
End: 2022-01-13

## 2022-01-13 VITALS
OXYGEN SATURATION: 100 % | RESPIRATION RATE: 16 BRPM | TEMPERATURE: 97.1 F | DIASTOLIC BLOOD PRESSURE: 67 MMHG | HEART RATE: 74 BPM | SYSTOLIC BLOOD PRESSURE: 103 MMHG | HEIGHT: 64 IN | WEIGHT: 206 LBS | BODY MASS INDEX: 35.17 KG/M2

## 2022-01-13 DIAGNOSIS — C50.511 MALIGNANT NEOPLASM OF LOWER-OUTER QUADRANT OF RIGHT BREAST OF FEMALE, ESTROGEN RECEPTOR POSITIVE: Primary | ICD-10-CM

## 2022-01-13 DIAGNOSIS — Z17.0 MALIGNANT NEOPLASM OF LOWER-OUTER QUADRANT OF RIGHT BREAST OF FEMALE, ESTROGEN RECEPTOR POSITIVE: Primary | ICD-10-CM

## 2022-01-13 LAB
ALBUMIN SERPL-MCNC: 4.1 G/DL (ref 3.5–5.2)
ALBUMIN/GLOB SERPL: 1.3 G/DL (ref 1.1–2.4)
ALP SERPL-CCNC: 62 U/L (ref 38–116)
ALT SERPL W P-5'-P-CCNC: 18 U/L (ref 0–33)
ANION GAP SERPL CALCULATED.3IONS-SCNC: 8.3 MMOL/L (ref 5–15)
AST SERPL-CCNC: 17 U/L (ref 0–32)
BASOPHILS # BLD AUTO: 0.04 10*3/MM3 (ref 0–0.2)
BASOPHILS NFR BLD AUTO: 0.6 % (ref 0–1.5)
BILIRUB SERPL-MCNC: 0.3 MG/DL (ref 0.2–1.2)
BUN SERPL-MCNC: 19 MG/DL (ref 6–20)
BUN/CREAT SERPL: 25 (ref 7.3–30)
CALCIUM SPEC-SCNC: 9.4 MG/DL (ref 8.5–10.2)
CHLORIDE SERPL-SCNC: 104 MMOL/L (ref 98–107)
CO2 SERPL-SCNC: 28.7 MMOL/L (ref 22–29)
CREAT SERPL-MCNC: 0.76 MG/DL (ref 0.6–1.1)
DEPRECATED RDW RBC AUTO: 41.6 FL (ref 37–54)
EOSINOPHIL # BLD AUTO: 0.11 10*3/MM3 (ref 0–0.4)
EOSINOPHIL NFR BLD AUTO: 1.7 % (ref 0.3–6.2)
ERYTHROCYTE [DISTWIDTH] IN BLOOD BY AUTOMATED COUNT: 13.5 % (ref 12.3–15.4)
GFR SERPL CREATININE-BSD FRML MDRD: 95 ML/MIN/1.73
GLOBULIN UR ELPH-MCNC: 3.2 GM/DL (ref 1.8–3.5)
GLUCOSE SERPL-MCNC: 87 MG/DL (ref 74–124)
HCT VFR BLD AUTO: 41.8 % (ref 34–46.6)
HGB BLD-MCNC: 13.3 G/DL (ref 12–15.9)
IMM GRANULOCYTES # BLD AUTO: 0.02 10*3/MM3 (ref 0–0.05)
IMM GRANULOCYTES NFR BLD AUTO: 0.3 % (ref 0–0.5)
LYMPHOCYTES # BLD AUTO: 2.25 10*3/MM3 (ref 0.7–3.1)
LYMPHOCYTES NFR BLD AUTO: 35.1 % (ref 19.6–45.3)
MCH RBC QN AUTO: 26.9 PG (ref 26.6–33)
MCHC RBC AUTO-ENTMCNC: 31.8 G/DL (ref 31.5–35.7)
MCV RBC AUTO: 84.4 FL (ref 79–97)
MONOCYTES # BLD AUTO: 0.52 10*3/MM3 (ref 0.1–0.9)
MONOCYTES NFR BLD AUTO: 8.1 % (ref 5–12)
NEUTROPHILS NFR BLD AUTO: 3.47 10*3/MM3 (ref 1.7–7)
NEUTROPHILS NFR BLD AUTO: 54.2 % (ref 42.7–76)
NRBC BLD AUTO-RTO: 0 /100 WBC (ref 0–0.2)
PLATELET # BLD AUTO: 210 10*3/MM3 (ref 140–450)
PMV BLD AUTO: 11.2 FL (ref 6–12)
POTASSIUM SERPL-SCNC: 3.9 MMOL/L (ref 3.5–4.7)
PROT SERPL-MCNC: 7.3 G/DL (ref 6.3–8)
RBC # BLD AUTO: 4.95 10*6/MM3 (ref 3.77–5.28)
SODIUM SERPL-SCNC: 141 MMOL/L (ref 134–145)
WBC NRBC COR # BLD: 6.41 10*3/MM3 (ref 3.4–10.8)

## 2022-01-13 PROCEDURE — 80053 COMPREHEN METABOLIC PANEL: CPT

## 2022-01-13 PROCEDURE — 85025 COMPLETE CBC W/AUTO DIFF WBC: CPT

## 2022-01-13 PROCEDURE — 36415 COLL VENOUS BLD VENIPUNCTURE: CPT

## 2022-01-13 PROCEDURE — 99213 OFFICE O/P EST LOW 20 MIN: CPT | Performed by: INTERNAL MEDICINE

## 2022-01-13 NOTE — PROGRESS NOTES
This was an audio and video enabled telemedicine encounter.    Subjective   REASON FOR FOLLOWUP:   History of stage IIIA infiltrating ductal carcinoma of the right breast with 4 positive nodes, ER positive, HER-2/julián negative; treated with lumpectomy, status post 6 cycles of TAC. Received tamoxifen between February 2007 and March 2012. Femara was started in March 2012. Patient switched to Arimidex as of 06/21/2012 because of side effects with severe ankle joint pain with Femara.    10 years of adjuvant therapy completed  March 2017.    HISTORY OF PRESENT ILLNESS:     History of Present Illness Ms. Roblero is a 56 y.o.  female with the above noted history of stage IIIA carcinoma of the right breast, treated with lumpectomy, chemotherapy and radiation. She received extended hormonal therapy with 5 years of tamoxifen followed by 5 years of aromatase inhibitor.  She completed her 10 years of hormonal therapy in March 2017  .     She is here today for routine annual followup and seems to be doing well.  Her annual mammogram is due later this month. She reports she had an abnormal finding on her annual PAP smear and will be following up with her Gyn Dr Hood for further evaluation.      Past Medical History, Past Surgical History, Social History, Family History have been reviewed and are without significant changes except as mentioned.    Review of Systems   Constitutional: Negative for activity change, appetite change, fatigue, fever and unexpected weight change.   HENT: Negative for hearing loss, nosebleeds, trouble swallowing and voice change.    Eyes: Negative for visual disturbance.   Respiratory: Negative for cough, shortness of breath and wheezing.    Cardiovascular: Negative for chest pain and palpitations.   Gastrointestinal: Negative for abdominal pain, diarrhea, nausea and vomiting.   Genitourinary: Negative for difficulty urinating, frequency, hematuria and urgency.   Musculoskeletal:  "Negative for back pain and neck pain.   Skin: Negative for rash.   Neurological: Negative for dizziness, seizures, syncope and headaches.   Hematological: Negative for adenopathy. Does not bruise/bleed easily.   Psychiatric/Behavioral: Negative for behavioral problems. The patient is not nervous/anxious.       A comprehensive 14 point review of systems was performed and was negative except as mentioned.    Medications:  The current medication list was reviewed in the EMR    ALLERGIES:  No Known Allergies    Objective      Vitals:    01/13/22 1548   BP: 103/67   Pulse: 74   Resp: 16   Temp: 97.1 °F (36.2 °C)   TempSrc: Temporal   SpO2: 100%   Weight: 93.4 kg (206 lb)   Height: 162.5 cm (63.98\")  Comment: new ht without shoes   PainSc: 0-No pain     Current Status 1/13/2022   ECOG score 0       Physical Exam   Constitutional: She is oriented to person, place, and time. She appears well-developed. No distress.   HENT:   Head: Normocephalic.   Eyes: Pupils are equal, round, and reactive to light. Conjunctivae are normal. No scleral icterus.   Neck: No JVD present. No thyromegaly present.   Cardiovascular: Normal rate and regular rhythm. Exam reveals no gallop and no friction rub.   No murmur heard.  Pulmonary/Chest: Effort normal and breath sounds normal. She has no wheezes. She has no rales. Right breast exhibits no mass, no nipple discharge and no skin change. Left breast exhibits no mass, no nipple discharge and no skin change.   Right lumpectomy scar.    Abdominal: Soft. Bowel sounds are normal. She exhibits no distension and no mass. There is no abdominal tenderness.   Musculoskeletal: Normal range of motion. No deformity.   Lymphadenopathy:     She has no cervical adenopathy.   Neurological: She is alert and oriented to person, place, and time. She has normal reflexes. No cranial nerve deficit.   Skin: Skin is warm and dry. No rash noted. No erythema.   Psychiatric: Her behavior is normal. Judgment normal.    "     RECENT LABS:  Hematology WBC   Date Value Ref Range Status   01/13/2022 6.41 3.40 - 10.80 10*3/mm3 Final   11/01/2021 5.9 3.4 - 10.8 x10E3/uL Final     RBC   Date Value Ref Range Status   01/13/2022 4.95 3.77 - 5.28 10*6/mm3 Final   11/01/2021 5.14 3.77 - 5.28 x10E6/uL Final     Hemoglobin   Date Value Ref Range Status   01/13/2022 13.3 12.0 - 15.9 g/dL Final     Hematocrit   Date Value Ref Range Status   01/13/2022 41.8 34.0 - 46.6 % Final     Platelets   Date Value Ref Range Status   01/13/2022 210 140 - 450 10*3/mm3 Final            Assessment/Plan     Stage IIIA infiltrating ductal carcinoma of the right breast treated with a lumpectomy, radiation, 6 cycles of TAC chemotherapy and ongoing hormonal therapy. She completed 10 years of adjuvant hormonal therapy with 5 years of tamoxifen followed by 5 years of Arimidex.    PLAN:   1. She will continue seeing us on an annual basis but knows that she can call any time if she has new concerns.   2. Her annual mammogram is due this month but has not yet been scheduled. She plans to get it scheduled ASAP.            1/13/2022      CC:

## 2022-01-17 ENCOUNTER — TRANSCRIBE ORDERS (OUTPATIENT)
Dept: ADMINISTRATIVE | Facility: HOSPITAL | Age: 57
End: 2022-01-17

## 2022-01-17 DIAGNOSIS — Z12.39 BREAST SCREENING: Primary | ICD-10-CM

## 2022-04-06 ENCOUNTER — APPOINTMENT (OUTPATIENT)
Dept: GENERAL RADIOLOGY | Facility: HOSPITAL | Age: 57
End: 2022-04-06

## 2022-04-06 ENCOUNTER — HOSPITAL ENCOUNTER (EMERGENCY)
Facility: HOSPITAL | Age: 57
Discharge: HOME | End: 2022-04-06
Attending: EMERGENCY MEDICINE | Admitting: EMERGENCY MEDICINE

## 2022-04-06 VITALS
HEART RATE: 97 BPM | SYSTOLIC BLOOD PRESSURE: 143 MMHG | DIASTOLIC BLOOD PRESSURE: 86 MMHG | TEMPERATURE: 96.9 F | OXYGEN SATURATION: 98 % | RESPIRATION RATE: 18 BRPM
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
## 2022-04-06 DIAGNOSIS — S20.212A CHEST WALL CONTUSION, LEFT, INITIAL ENCOUNTER: ICD-10-CM

## 2022-04-06 DIAGNOSIS — S10.91XA ABRASION OF NECK, INITIAL ENCOUNTER: Primary | ICD-10-CM

## 2022-04-06 LAB — QT INTERVAL: 430 MS

## 2022-04-06 PROCEDURE — 93010 ELECTROCARDIOGRAM REPORT: CPT | Performed by: INTERNAL MEDICINE

## 2022-04-06 PROCEDURE — 99283 EMERGENCY DEPT VISIT LOW MDM: CPT

## 2022-04-06 PROCEDURE — 90715 TDAP VACCINE 7 YRS/> IM: CPT | Performed by: NURSE PRACTITIONER

## 2022-04-06 PROCEDURE — 25010000002 TETANUS-DIPHTH-ACELL PERTUSSIS 5-2.5-18.5 LF-MCG/0.5 SUSPENSION PREFILLED SYRINGE: Performed by: NURSE PRACTITIONER

## 2022-04-06 PROCEDURE — 73000 X-RAY EXAM OF COLLAR BONE: CPT

## 2022-04-06 PROCEDURE — 90471 IMMUNIZATION ADMIN: CPT | Performed by: NURSE PRACTITIONER

## 2022-04-06 PROCEDURE — 93005 ELECTROCARDIOGRAM TRACING: CPT | Performed by: NURSE PRACTITIONER

## 2022-04-06 PROCEDURE — 71045 X-RAY EXAM CHEST 1 VIEW: CPT

## 2022-04-06 RX ADMIN — TETANUS TOXOID, REDUCED DIPHTHERIA TOXOID AND ACELLULAR PERTUSSIS VACCINE, ADSORBED 0.5 ML: 5; 2.5; 8; 8; 2.5 SUSPENSION INTRAMUSCULAR at 17:44
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
## 2022-04-06 NOTE — DISCHARGE INSTRUCTIONS
Clean the left neck abrasion twice a day with soap and water.  Apply a thin film of over-the-counter triple antibiotic ointment twice a day until healed    Please expect soreness    Return Precautions    Although you are being discharged from the ED today, I encourage you to return for worsening symptoms.  Things can, and do, change such that treatment at home with medication may not be adequate.      Specifically, return for any of the following:    Chest pain, shortness of breath, pain or nausea and vomiting not controlled by medications provided.    Please make a follow up with your Primary Care Provider for a blood pressure recheck.

## 2022-04-08 ENCOUNTER — HOSPITAL ENCOUNTER (OUTPATIENT)
Dept: MAMMOGRAPHY | Facility: HOSPITAL | Age: 57
Discharge: HOME | End: 2022-04-08
Admitting: INTERNAL MEDICINE

## 2022-04-08 DIAGNOSIS — Z12.39 BREAST SCREENING: ICD-10-CM

## 2022-04-08 PROCEDURE — 77067 SCR MAMMO BI INCL CAD: CPT

## 2022-04-08 PROCEDURE — 77063 BREAST TOMOSYNTHESIS BI: CPT

## 2022-05-24 ENCOUNTER — OFFICE VISIT (OUTPATIENT)
Dept: INTERNAL MEDICINE | Facility: CLINIC | Age: 57
End: 2022-05-24

## 2022-05-24 VITALS
BODY MASS INDEX: 35 KG/M2 | WEIGHT: 205 LBS | SYSTOLIC BLOOD PRESSURE: 94 MMHG | TEMPERATURE: 97.9 F | DIASTOLIC BLOOD PRESSURE: 60 MMHG | OXYGEN SATURATION: 97 % | HEART RATE: 75 BPM | HEIGHT: 64 IN

## 2022-05-24 DIAGNOSIS — Z76.89 ENCOUNTER TO ESTABLISH CARE: Primary | ICD-10-CM

## 2022-05-24 DIAGNOSIS — E78.5 HYPERLIPIDEMIA, UNSPECIFIED HYPERLIPIDEMIA TYPE: Chronic | ICD-10-CM

## 2022-05-24 PROCEDURE — 99213 OFFICE O/P EST LOW 20 MIN: CPT | Performed by: FAMILY MEDICINE

## 2022-05-24 NOTE — PROGRESS NOTES
"    Chief Complaint  Establish Care    Subjective    History of Present Illness {CC  Problem List  Visit  Diagnosis   Encounters  Notes  Medications  Labs  Result Review Imaging  Media :23}     Palma Roblero presents to [facility] for   History of Present Illness   58 yo female present to establish care. She is new to me, previously seeing Dr. Saunders. She has a history of breast cancer, hyperlipidemia, and allergies.   States she seeing orthopedic for shoulder pain. She is took steroids which help. Still some pain night when laying down. Has MRI scheduled soon.        Social History     Socioeconomic History   • Marital status:      Spouse name: Philip   Tobacco Use   • Smoking status: Never Smoker   • Smokeless tobacco: Never Used   Vaping Use   • Vaping Use: Never used   Substance and Sexual Activity   • Alcohol use: No   • Drug use: No   • Sexual activity: Not Currently     Partners: Male     Birth control/protection: None      Objective     Vital Signs:   BP 94/60   Pulse 75   Temp 97.9 °F (36.6 °C)   Ht 162.5 cm (63.98\")   Wt 93 kg (205 lb)   SpO2 97%   BMI 35.21 kg/m²   Physical Exam  Constitutional:       General: She is not in acute distress.     Appearance: She is not ill-appearing.   HENT:      Head: Normocephalic.   Eyes:      Conjunctiva/sclera: Conjunctivae normal.   Cardiovascular:      Rate and Rhythm: Regular rhythm.      Heart sounds: Normal heart sounds.   Pulmonary:      Effort: No respiratory distress.      Breath sounds: Normal breath sounds. No wheezing.   Musculoskeletal:      Right lower leg: No edema.      Left lower leg: No edema.   Neurological:      Mental Status: She is alert.          Result Review  Data Reviewed:{ Labs  Result Review  Imaging  Med Tab  Media :23}   The following data was reviewed by: Alivia Lubin MD on 05/24/2022  Lab Results - Last 18 Months   Lab Units 01/13/22  1543 11/01/21  0000 03/24/21  1508 " 01/11/21  1531   BUN mg/dL 19 13 14 13   CREATININE mg/dL 0.76 0.80 0.81 0.76   EGFR IF NONAFRICN AM mL/min/1.73  --  82 73  --    EGFR IF AFRICN AM mL/min/1.73 95 94 88 95   SODIUM mmol/L 141 141 140 141   POTASSIUM mmol/L 3.9 3.8 3.8 4.1   CHLORIDE mmol/L 104 103 103 105   CALCIUM mg/dL 9.4 9.4 9.3 9.3   ALBUMIN g/dL 4.10 4.4 4.30 4.00   BILIRUBIN mg/dL 0.3 0.7 0.9 0.6   ALK PHOS U/L 62 62 56 63   AST (SGOT) U/L 17 35 18 17   ALT (SGPT) U/L 18 21 19 15   CHOLESTEROL mg/dL  --  189 201*  --    TRIGLYCERIDES mg/dL  --  61 59  --    HDL CHOL mg/dL  --  68 76*  --    VLDL CHOLESTEROL LISSETH mg/dL  --  11 11  --    LDL CHOL mg/dL  --  110* 114*  --    WBC 10*3/mm3 6.41 5.9  --  4.92   RBC 10*6/mm3 4.95 5.14  --  4.61   HEMATOCRIT % 41.8 41.7  --  38.4   MCV fL 84.4 81  --  83.3   MCH pg 26.9 26.5*  --  26.9   TSH uIU/mL  --  1.940  --   --               Current Outpatient Medications:   •  atorvastatin (LIPITOR) 10 MG tablet, TAKE 1 TABLET BY MOUTH DAILY, Disp: 90 tablet, Rfl: 3  •  Patanol 0.1 % ophthalmic solution, Administer 1 drop to both eyes 2 (Two) Times a Day., Disp: 5 mL, Rfl: 5  •  Triamcinolone Acetonide (NASACORT) 55 MCG/ACT nasal inhaler, 2 sprays into the nostril(s) as directed by provider Daily As Needed (nasal congestion)., Disp: 16.5 g, Rfl: 5      Assessment and Plan {CC Problem List  Visit Diagnosis  ROS  Review (Popup)  Health Maintenance  Quality  BestPractice  Medications  SmartSets  SnapShot Encounters  Media :23}   Problem List Items Addressed This Visit        Cardiac and Vasculature    Hyperlipidemia (Chronic)    Current Assessment & Plan     Lipid abnormalities are chronic, stable .  check lipid panel today   Continue current medication   Lipids will be reassessed in 6 months.           Relevant Orders    Lipid panel    Comprehensive metabolic panel      Other Visit Diagnoses     Encounter to establish care    -  Primary          Follow Up   Return in about 6 months (around  11/24/2022) for Annual physical.  Patient was given instructions and counseling regarding her condition or for health maintenance advice. Please see specific information pulled into the AVS if appropriate.    EpicAct:MR_WS_AMB_ORDERS,RunParams:STARTUPTYPE=FOLLOW    MR_[email or web address]_DISCHARGE

## 2022-05-24 NOTE — ASSESSMENT & PLAN NOTE
Lipid abnormalities are chronic, stable .  check lipid panel today   Continue current medication   Lipids will be reassessed in 6 months.

## 2022-05-25 LAB
ALBUMIN SERPL-MCNC: 4.2 G/DL (ref 3.8–4.9)
ALBUMIN/GLOB SERPL: 1.3 {RATIO} (ref 1.2–2.2)
ALP SERPL-CCNC: 64 IU/L (ref 44–121)
ALT SERPL-CCNC: 16 IU/L (ref 0–32)
AST SERPL-CCNC: 17 IU/L (ref 0–40)
BILIRUB SERPL-MCNC: 0.9 MG/DL (ref 0–1.2)
BUN SERPL-MCNC: 15 MG/DL (ref 6–24)
BUN/CREAT SERPL: 17 (ref 9–23)
CALCIUM SERPL-MCNC: 9.5 MG/DL (ref 8.7–10.2)
CHLORIDE SERPL-SCNC: 100 MMOL/L (ref 96–106)
CHOLEST SERPL-MCNC: 197 MG/DL (ref 100–199)
CO2 SERPL-SCNC: 25 MMOL/L (ref 20–29)
CREAT SERPL-MCNC: 0.88 MG/DL (ref 0.57–1)
EGFRCR SERPLBLD CKD-EPI 2021: 76 ML/MIN/1.73
GLOBULIN SER CALC-MCNC: 3.2 G/DL (ref 1.5–4.5)
GLUCOSE SERPL-MCNC: 89 MG/DL (ref 65–99)
HDLC SERPL-MCNC: 64 MG/DL
LDLC SERPL CALC-MCNC: 122 MG/DL (ref 0–99)
POTASSIUM SERPL-SCNC: 4.1 MMOL/L (ref 3.5–5.2)
PROT SERPL-MCNC: 7.4 G/DL (ref 6–8.5)
SODIUM SERPL-SCNC: 139 MMOL/L (ref 134–144)
TRIGL SERPL-MCNC: 57 MG/DL (ref 0–149)
VLDLC SERPL CALC-MCNC: 11 MG/DL (ref 5–40)

## 2022-07-15 ENCOUNTER — TRANSCRIBE ORDERS (OUTPATIENT)
Dept: ADMINISTRATIVE | Facility: HOSPITAL | Age: 57
End: 2022-07-15

## 2022-07-15 ENCOUNTER — LAB (OUTPATIENT)
Dept: LAB | Facility: HOSPITAL | Age: 57
End: 2022-07-15

## 2022-07-15 ENCOUNTER — HOSPITAL ENCOUNTER (OUTPATIENT)
Dept: CARDIOLOGY | Facility: HOSPITAL | Age: 57
Discharge: HOME | End: 2022-07-15

## 2022-07-15 DIAGNOSIS — Z01.818 PRE-OP EXAM: Primary | ICD-10-CM

## 2022-07-15 DIAGNOSIS — Z01.818 PRE-OP EXAM: ICD-10-CM

## 2022-07-15 LAB
ANION GAP SERPL CALCULATED.3IONS-SCNC: 8 MMOL/L (ref 5–15)
BUN SERPL-MCNC: 15 MG/DL (ref 6–20)
BUN/CREAT SERPL: 17.6 (ref 7–25)
CALCIUM SPEC-SCNC: 9.2 MG/DL (ref 8.6–10.5)
CHLORIDE SERPL-SCNC: 105 MMOL/L (ref 98–107)
CO2 SERPL-SCNC: 27 MMOL/L (ref 22–29)
CREAT SERPL-MCNC: 0.85 MG/DL (ref 0.57–1)
DEPRECATED RDW RBC AUTO: 38 FL (ref 37–54)
EGFRCR SERPLBLD CKD-EPI 2021: 79 ML/MIN/1.73
ERYTHROCYTE [DISTWIDTH] IN BLOOD BY AUTOMATED COUNT: 12.8 % (ref 12.3–15.4)
GLUCOSE SERPL-MCNC: 82 MG/DL (ref 65–99)
HCT VFR BLD AUTO: 41.7 % (ref 34–46.6)
HGB BLD-MCNC: 13.7 G/DL (ref 12–15.9)
MCH RBC QN AUTO: 27.1 PG (ref 26.6–33)
MCHC RBC AUTO-ENTMCNC: 32.9 G/DL (ref 31.5–35.7)
MCV RBC AUTO: 82.6 FL (ref 79–97)
PLATELET # BLD AUTO: 190 10*3/MM3 (ref 140–450)
PMV BLD AUTO: 11.9 FL (ref 6–12)
POTASSIUM SERPL-SCNC: 3.9 MMOL/L (ref 3.5–5.2)
QT INTERVAL: 381 MS
RBC # BLD AUTO: 5.05 10*6/MM3 (ref 3.77–5.28)
SODIUM SERPL-SCNC: 140 MMOL/L (ref 136–145)
WBC NRBC COR # BLD: 6.53 10*3/MM3 (ref 3.4–10.8)

## 2022-07-15 PROCEDURE — 80048 BASIC METABOLIC PNL TOTAL CA: CPT

## 2022-07-15 PROCEDURE — 85027 COMPLETE CBC AUTOMATED: CPT

## 2022-07-15 PROCEDURE — 93010 ELECTROCARDIOGRAM REPORT: CPT | Performed by: INTERNAL MEDICINE

## 2022-07-15 PROCEDURE — 36415 COLL VENOUS BLD VENIPUNCTURE: CPT

## 2022-07-15 PROCEDURE — 93005 ELECTROCARDIOGRAM TRACING: CPT | Performed by: SPECIALIST

## 2022-11-30 ENCOUNTER — OFFICE VISIT (OUTPATIENT)
Dept: INTERNAL MEDICINE | Facility: CLINIC | Age: 57
End: 2022-11-30

## 2022-11-30 VITALS
SYSTOLIC BLOOD PRESSURE: 114 MMHG | HEIGHT: 63 IN | OXYGEN SATURATION: 99 % | HEART RATE: 74 BPM | DIASTOLIC BLOOD PRESSURE: 80 MMHG | TEMPERATURE: 97.7 F | WEIGHT: 209.6 LBS | BODY MASS INDEX: 37.14 KG/M2

## 2022-11-30 DIAGNOSIS — E78.5 HYPERLIPIDEMIA, UNSPECIFIED HYPERLIPIDEMIA TYPE: Chronic | ICD-10-CM

## 2022-11-30 DIAGNOSIS — Z00.00 ROUTINE GENERAL MEDICAL EXAMINATION AT A HEALTH CARE FACILITY: Primary | ICD-10-CM

## 2022-11-30 PROCEDURE — 99396 PREV VISIT EST AGE 40-64: CPT | Performed by: FAMILY MEDICINE

## 2022-12-01 LAB
ALBUMIN SERPL-MCNC: 4.2 G/DL (ref 3.5–5.2)
ALBUMIN/GLOB SERPL: 1.8 G/DL
ALP SERPL-CCNC: 59 U/L (ref 39–117)
ALT SERPL-CCNC: 18 U/L (ref 1–33)
AST SERPL-CCNC: 15 U/L (ref 1–32)
BILIRUB SERPL-MCNC: 0.9 MG/DL (ref 0–1.2)
BUN SERPL-MCNC: 13 MG/DL (ref 6–20)
BUN/CREAT SERPL: 17.3 (ref 7–25)
CALCIUM SERPL-MCNC: 9.2 MG/DL (ref 8.6–10.5)
CHLORIDE SERPL-SCNC: 105 MMOL/L (ref 98–107)
CHOLEST SERPL-MCNC: 196 MG/DL (ref 0–200)
CO2 SERPL-SCNC: 28 MMOL/L (ref 22–29)
CREAT SERPL-MCNC: 0.75 MG/DL (ref 0.57–1)
EGFRCR SERPLBLD CKD-EPI 2021: 91.8 ML/MIN/1.73
GLOBULIN SER CALC-MCNC: 2.4 GM/DL
GLUCOSE SERPL-MCNC: 90 MG/DL (ref 65–99)
HDLC SERPL-MCNC: 62 MG/DL (ref 40–60)
LDLC SERPL CALC-MCNC: 123 MG/DL (ref 0–100)
POTASSIUM SERPL-SCNC: 3.7 MMOL/L (ref 3.5–5.2)
PROT SERPL-MCNC: 6.6 G/DL (ref 6–8.5)
SODIUM SERPL-SCNC: 141 MMOL/L (ref 136–145)
TRIGL SERPL-MCNC: 62 MG/DL (ref 0–150)
TSH SERPL DL<=0.005 MIU/L-ACNC: 1.36 UIU/ML (ref 0.27–4.2)
VLDLC SERPL CALC-MCNC: 11 MG/DL (ref 5–40)

## 2022-12-01 NOTE — ASSESSMENT & PLAN NOTE
Lipid abnormalities are chronic on medication.  monitor lipid panel, continue current medication adjust if indicated  Lipids will be reassessed in 6 months.

## 2023-01-12 ENCOUNTER — LAB (OUTPATIENT)
Dept: LAB | Facility: HOSPITAL | Age: 58
End: 2023-01-12
Payer: COMMERCIAL

## 2023-01-12 ENCOUNTER — OFFICE VISIT (OUTPATIENT)
Dept: ONCOLOGY | Facility: CLINIC | Age: 58
End: 2023-01-12
Payer: COMMERCIAL

## 2023-01-12 VITALS
WEIGHT: 212.2 LBS | TEMPERATURE: 97.5 F | OXYGEN SATURATION: 98 % | HEIGHT: 63 IN | BODY MASS INDEX: 37.6 KG/M2 | SYSTOLIC BLOOD PRESSURE: 107 MMHG | DIASTOLIC BLOOD PRESSURE: 69 MMHG | HEART RATE: 78 BPM | RESPIRATION RATE: 16 BRPM

## 2023-01-12 DIAGNOSIS — Z17.0 MALIGNANT NEOPLASM OF LOWER-OUTER QUADRANT OF RIGHT BREAST OF FEMALE, ESTROGEN RECEPTOR POSITIVE: ICD-10-CM

## 2023-01-12 DIAGNOSIS — C50.511 MALIGNANT NEOPLASM OF LOWER-OUTER QUADRANT OF RIGHT BREAST OF FEMALE, ESTROGEN RECEPTOR POSITIVE: ICD-10-CM

## 2023-01-12 DIAGNOSIS — Z17.0 MALIGNANT NEOPLASM OF LOWER-OUTER QUADRANT OF RIGHT BREAST OF FEMALE, ESTROGEN RECEPTOR POSITIVE: Primary | ICD-10-CM

## 2023-01-12 DIAGNOSIS — C50.511 MALIGNANT NEOPLASM OF LOWER-OUTER QUADRANT OF RIGHT BREAST OF FEMALE, ESTROGEN RECEPTOR POSITIVE: Primary | ICD-10-CM

## 2023-01-12 LAB
ALBUMIN SERPL-MCNC: 4 G/DL (ref 3.5–5.2)
ALBUMIN/GLOB SERPL: 1.2 G/DL (ref 1.1–2.4)
ALP SERPL-CCNC: 65 U/L (ref 38–116)
ALT SERPL W P-5'-P-CCNC: 21 U/L (ref 0–33)
ANION GAP SERPL CALCULATED.3IONS-SCNC: 10.7 MMOL/L (ref 5–15)
AST SERPL-CCNC: 18 U/L (ref 0–32)
BASOPHILS # BLD AUTO: 0.05 10*3/MM3 (ref 0–0.2)
BASOPHILS NFR BLD AUTO: 0.8 % (ref 0–1.5)
BILIRUB SERPL-MCNC: 0.4 MG/DL (ref 0.2–1.2)
BUN SERPL-MCNC: 14 MG/DL (ref 6–20)
BUN/CREAT SERPL: 17.7 (ref 7.3–30)
CALCIUM SPEC-SCNC: 9.8 MG/DL (ref 8.5–10.2)
CHLORIDE SERPL-SCNC: 106 MMOL/L (ref 98–107)
CO2 SERPL-SCNC: 27.3 MMOL/L (ref 22–29)
CREAT SERPL-MCNC: 0.79 MG/DL (ref 0.6–1.1)
DEPRECATED RDW RBC AUTO: 42 FL (ref 37–54)
EGFRCR SERPLBLD CKD-EPI 2021: 86.3 ML/MIN/1.73
EOSINOPHIL # BLD AUTO: 0.13 10*3/MM3 (ref 0–0.4)
EOSINOPHIL NFR BLD AUTO: 2 % (ref 0.3–6.2)
ERYTHROCYTE [DISTWIDTH] IN BLOOD BY AUTOMATED COUNT: 13.6 % (ref 12.3–15.4)
GLOBULIN UR ELPH-MCNC: 3.3 GM/DL (ref 1.8–3.5)
GLUCOSE SERPL-MCNC: 110 MG/DL (ref 74–124)
HCT VFR BLD AUTO: 43.6 % (ref 34–46.6)
HGB BLD-MCNC: 13.8 G/DL (ref 12–15.9)
IMM GRANULOCYTES # BLD AUTO: 0.03 10*3/MM3 (ref 0–0.05)
IMM GRANULOCYTES NFR BLD AUTO: 0.5 % (ref 0–0.5)
LYMPHOCYTES # BLD AUTO: 2.02 10*3/MM3 (ref 0.7–3.1)
LYMPHOCYTES NFR BLD AUTO: 30.6 % (ref 19.6–45.3)
MCH RBC QN AUTO: 26.7 PG (ref 26.6–33)
MCHC RBC AUTO-ENTMCNC: 31.7 G/DL (ref 31.5–35.7)
MCV RBC AUTO: 84.3 FL (ref 79–97)
MONOCYTES # BLD AUTO: 0.44 10*3/MM3 (ref 0.1–0.9)
MONOCYTES NFR BLD AUTO: 6.7 % (ref 5–12)
NEUTROPHILS NFR BLD AUTO: 3.93 10*3/MM3 (ref 1.7–7)
NEUTROPHILS NFR BLD AUTO: 59.4 % (ref 42.7–76)
NRBC BLD AUTO-RTO: 0 /100 WBC (ref 0–0.2)
PLATELET # BLD AUTO: 215 10*3/MM3 (ref 140–450)
PMV BLD AUTO: 11.5 FL (ref 6–12)
POTASSIUM SERPL-SCNC: 4.1 MMOL/L (ref 3.5–4.7)
PROT SERPL-MCNC: 7.3 G/DL (ref 6.3–8)
RBC # BLD AUTO: 5.17 10*6/MM3 (ref 3.77–5.28)
SODIUM SERPL-SCNC: 144 MMOL/L (ref 134–145)
WBC NRBC COR # BLD: 6.6 10*3/MM3 (ref 3.4–10.8)

## 2023-01-12 PROCEDURE — 85025 COMPLETE CBC W/AUTO DIFF WBC: CPT

## 2023-01-12 PROCEDURE — 80053 COMPREHEN METABOLIC PANEL: CPT

## 2023-01-12 PROCEDURE — 36415 COLL VENOUS BLD VENIPUNCTURE: CPT

## 2023-01-12 PROCEDURE — 99213 OFFICE O/P EST LOW 20 MIN: CPT | Performed by: INTERNAL MEDICINE

## 2023-01-12 NOTE — PROGRESS NOTES
Subjective   REASON FOR FOLLOWUP:   History of stage IIIA infiltrating ductal carcinoma of the right breast with 4 positive nodes, ER positive, HER-2/julián negative; treated with lumpectomy, status post 6 cycles of TAC. Received tamoxifen between February 2007 and March 2012. Femara was started in March 2012. Patient switched to Arimidex as of 06/21/2012 because of side effects with severe ankle joint pain with Femara.    10 years of adjuvant therapy completed  March 2017.    HISTORY OF PRESENT ILLNESS:     History of Present Illness Ms. Roblero is a 57 y.o.  female with the above noted history of stage IIIA carcinoma of the right breast, treated with lumpectomy, chemotherapy and radiation. She received extended hormonal therapy with 5 years of tamoxifen followed by 5 years of aromatase inhibitor.  She completed her 10 years of hormonal therapy in March 2017  .     She is here today for routine annual followup and seems to be doing well.  Her annual mammogram was performed on 4/8/2022 with no suspicious findings.    Past Medical History, Past Surgical History, Social History, Family History have been reviewed and are without significant changes except as mentioned.    Review of Systems   Constitutional: Negative for activity change, appetite change, fatigue, fever and unexpected weight change.   HENT: Negative for hearing loss, nosebleeds, trouble swallowing and voice change.    Eyes: Negative for visual disturbance.   Respiratory: Negative for cough, shortness of breath and wheezing.    Cardiovascular: Negative for chest pain and palpitations.   Gastrointestinal: Negative for abdominal pain, diarrhea, nausea and vomiting.   Genitourinary: Negative for difficulty urinating, frequency, hematuria and urgency.   Musculoskeletal: Negative for back pain and neck pain.   Skin: Negative for rash.   Neurological: Negative for dizziness, seizures, syncope and headaches.   Hematological: Negative for adenopathy.  "Does not bruise/bleed easily.   Psychiatric/Behavioral: Negative for behavioral problems. The patient is not nervous/anxious.       A comprehensive 14 point review of systems was performed and was negative except as mentioned.    Medications:  The current medication list was reviewed in the EMR    ALLERGIES:  No Known Allergies    Objective      Vitals:    01/12/23 1631   BP: 107/69   Pulse: 78   Resp: 16   Temp: 97.5 °F (36.4 °C)   TempSrc: Temporal   SpO2: 98%   Weight: 96.3 kg (212 lb 3.2 oz)   Height: 160 cm (62.99\")   PainSc: 0-No pain     Current Status 1/12/2023   ECOG score 0       Physical Exam   Constitutional: She is oriented to person, place, and time. She appears well-developed. No distress.   HENT:   Head: Normocephalic.   Eyes: Pupils are equal, round, and reactive to light. Conjunctivae are normal. No scleral icterus.   Neck: No JVD present. No thyromegaly present.   Cardiovascular: Normal rate and regular rhythm. Exam reveals no gallop and no friction rub.   No murmur heard.  Pulmonary/Chest: Effort normal and breath sounds normal. She has no wheezes. She has no rales. Right breast exhibits no mass, no nipple discharge and no skin change. Left breast exhibits no mass, no nipple discharge and no skin change.   Right lumpectomy scar.    Abdominal: Soft. Bowel sounds are normal. She exhibits no distension and no mass. There is no abdominal tenderness.   Musculoskeletal: Normal range of motion. No deformity.   Lymphadenopathy:     She has no cervical adenopathy.   Neurological: She is alert and oriented to person, place, and time. She has normal reflexes. No cranial nerve deficit.   Skin: Skin is warm and dry. No rash noted. No erythema.   Psychiatric: Her behavior is normal. Judgment normal.        RECENT LABS:  Hematology WBC   Date Value Ref Range Status   01/12/2023 6.60 3.40 - 10.80 10*3/mm3 Final   11/01/2021 5.9 3.4 - 10.8 x10E3/uL Final     RBC   Date Value Ref Range Status   01/12/2023 5.17 " 3.77 - 5.28 10*6/mm3 Final   11/01/2021 5.14 3.77 - 5.28 x10E6/uL Final     Hemoglobin   Date Value Ref Range Status   01/12/2023 13.8 12.0 - 15.9 g/dL Final     Hematocrit   Date Value Ref Range Status   01/12/2023 43.6 34.0 - 46.6 % Final     Platelets   Date Value Ref Range Status   01/12/2023 215 140 - 450 10*3/mm3 Final          MAMMO SCREENING DIGITAL TOMOSYNTHESIS BILATERAL W CAD- 4/8/2022     CLINICAL INDICATION: 56 years old female presents for annual screening  mammogram.      COMPARISON: Prior examinations dating back to 8/9/2013.     TECHNIQUE: 2-D and tomosynthesis MLO and CC views of the breast(s) were  obtained      FINDINGS:  There are scattered areas of fibroglandular density.     There are benign-appearing postsurgical and treatment-related changes in  the right breast and axilla. There are no suspicious masses,  calcifications, or areas of architectural distortion.        IMPRESSION/RECOMMENDATION(S):  No mammographic evidence of malignancy. Recommend annual screening  mammogram in one year.     BI-RADS Category 2: Benign     Assessment & Plan     Stage IIIA infiltrating ductal carcinoma of the right breast treated with a lumpectomy, radiation, 6 cycles of TAC chemotherapy and ongoing hormonal therapy. She completed 10 years of adjuvant hormonal therapy with 5 years of tamoxifen followed by 5 years of Arimidex.    PLAN:   1. She will continue seeing us on an annual basis but knows that she can call any time if she has new concerns.   2. Her annual mammogram will be due again in April..            1/12/2023      CC:

## 2023-01-30 DIAGNOSIS — E78.00 HYPERCHOLESTEREMIA: ICD-10-CM

## 2023-01-30 RX ORDER — ATORVASTATIN CALCIUM 10 MG/1
10 TABLET, FILM COATED ORAL DAILY
Qty: 90 TABLET | Refills: 3 | Status: SHIPPED | OUTPATIENT
Start: 2023-01-30

## 2023-01-30 NOTE — TELEPHONE ENCOUNTER
Caller: Palma Roblero    Relationship: Self    Best call back number: [phone]    Requested Prescriptions:   Requested Prescriptions     Pending Prescriptions Disp Refills   • atorvastatin (LIPITOR) 10 MG tablet 90 tablet 3     Sig: Take 1 tablet by mouth Daily.        Ph[facility] be sent[id number] [place], [place] - [address] [facility] [facility] [phone] [facility] [phone] FX     Additional details provided by patient: PATIENT HAS ONE DAY SUPPLY LEFT    Does the patient have less than a 3 day supply:  [x] Yes  [] No    Would you like a call back once the refill request has been completed: [x] Yes [] No    If the office needs to give you a call back, can they leave a voicemail: [x] Yes [] No    Felipe Lambert Rep   01/30/23 09:34 EST

## 2023-03-23 ENCOUNTER — PREP FOR SURGERY (OUTPATIENT)
Dept: OTHER | Facility: HOSPITAL | Age: 58
End: 2023-03-23
Payer: COMMERCIAL

## 2023-03-23 DIAGNOSIS — Z12.11 SCREENING FOR MALIGNANT NEOPLASM OF COLON: Primary | ICD-10-CM

## 2023-03-23 DIAGNOSIS — Z86.010 HISTORY OF ADENOMATOUS POLYP OF COLON: ICD-10-CM

## 2023-04-14 ENCOUNTER — HOSPITAL ENCOUNTER (OUTPATIENT)
Dept: MAMMOGRAPHY | Facility: HOSPITAL | Age: 58
Discharge: HOME | End: 2023-04-14
Admitting: INTERNAL MEDICINE
Payer: COMMERCIAL

## 2023-04-14 DIAGNOSIS — Z17.0 MALIGNANT NEOPLASM OF LOWER-OUTER QUADRANT OF RIGHT BREAST OF FEMALE, ESTROGEN RECEPTOR POSITIVE: ICD-10-CM

## 2023-04-14 DIAGNOSIS — C50.511 MALIGNANT NEOPLASM OF LOWER-OUTER QUADRANT OF RIGHT BREAST OF FEMALE, ESTROGEN RECEPTOR POSITIVE: ICD-10-CM

## 2023-04-14 PROCEDURE — 77063 BREAST TOMOSYNTHESIS BI: CPT

## 2023-04-14 PROCEDURE — 77067 SCR MAMMO BI INCL CAD: CPT

## 2023-04-17 ENCOUNTER — TELEPHONE (OUTPATIENT)
Dept: ONCOLOGY | Facility: CLINIC | Age: 58
End: 2023-04-17
Payer: COMMERCIAL

## 2023-04-17 DIAGNOSIS — R92.8 ABNORMALITY OF RIGHT BREAST ON SCREENING MAMMOGRAM: Primary | ICD-10-CM

## 2023-04-17 DIAGNOSIS — Z17.0 MALIGNANT NEOPLASM OF LOWER-OUTER QUADRANT OF RIGHT BREAST OF FEMALE, ESTROGEN RECEPTOR POSITIVE: ICD-10-CM

## 2023-04-17 DIAGNOSIS — C50.511 MALIGNANT NEOPLASM OF LOWER-OUTER QUADRANT OF RIGHT BREAST OF FEMALE, ESTROGEN RECEPTOR POSITIVE: ICD-10-CM

## 2023-04-17 NOTE — TELEPHONE ENCOUNTER
----- Message from Weston Solares MD sent at 4/14/2023  4:48 PM EDT -----  Rebecca,    Please put in an order for the recommended additional imaging studies of the right breast.    Thanks  ----- Message -----  From: Interface, Rad Results [place] In  Sent: 4/14/2023   4:40 PM EDT  To: Weston Solares MD    Patient made aware of the need for additional imaging. Coordinating with .

## 2023-04-18 ENCOUNTER — TELEPHONE (OUTPATIENT)
Dept: ONCOLOGY | Facility: CLINIC | Age: 58
End: 2023-04-18
Payer: COMMERCIAL

## 2023-04-18 NOTE — TELEPHONE ENCOUNTER
----- Message from Rebecca Paula RN sent at 4/17/2023 12:35 PM EDT -----  Please kindly schedule patient for the mammo and ultrasound, orders were placed, patient awaiting call for the appts. Thank you!  ----- Message -----  From: Weston Solares MD  Sent: 4/14/2023   4:49 PM EDT  To: JAIRO Prather,    Please put in an order for the recommended additional imaging studies of the right breast.    Thanks  ----- Message -----  From: Interface, Rad Results [place] In  Sent: 4/14/2023   4:40 PM EDT  To: Weston Solares MD

## 2023-05-17 ENCOUNTER — HOSPITAL ENCOUNTER (OUTPATIENT)
Dept: MAMMOGRAPHY | Facility: HOSPITAL | Age: 58
Discharge: HOME | End: 2023-05-17
Payer: COMMERCIAL

## 2023-05-17 ENCOUNTER — HOSPITAL ENCOUNTER (OUTPATIENT)
Dept: ULTRASOUND IMAGING | Facility: HOSPITAL | Age: 58
Discharge: HOME | End: 2023-05-17
Payer: COMMERCIAL

## 2023-05-17 DIAGNOSIS — R92.8 ABNORMALITY OF RIGHT BREAST ON SCREENING MAMMOGRAM: ICD-10-CM

## 2023-05-17 DIAGNOSIS — Z17.0 MALIGNANT NEOPLASM OF LOWER-OUTER QUADRANT OF RIGHT BREAST OF FEMALE, ESTROGEN RECEPTOR POSITIVE: ICD-10-CM

## 2023-05-17 DIAGNOSIS — R92.8 ABNORMAL MAMMOGRAM OF RIGHT BREAST: Primary | ICD-10-CM

## 2023-05-17 DIAGNOSIS — C50.511 MALIGNANT NEOPLASM OF LOWER-OUTER QUADRANT OF RIGHT BREAST OF FEMALE, ESTROGEN RECEPTOR POSITIVE: ICD-10-CM

## 2023-05-17 PROCEDURE — G0279 TOMOSYNTHESIS, MAMMO: HCPCS

## 2023-05-17 PROCEDURE — 77065 DX MAMMO INCL CAD UNI: CPT

## 2023-05-17 PROCEDURE — 76642 ULTRASOUND BREAST LIMITED: CPT

## 2023-05-19 ENCOUNTER — TELEPHONE (OUTPATIENT)
Dept: ONCOLOGY | Facility: CLINIC | Age: 58
End: 2023-05-19

## 2023-05-19 ENCOUNTER — TELEPHONE (OUTPATIENT)
Dept: ONCOLOGY | Facility: CLINIC | Age: 58
End: 2023-05-19
Payer: COMMERCIAL

## 2023-05-19 DIAGNOSIS — R92.8 ABNORMALITY OF RIGHT BREAST ON SCREENING MAMMOGRAM: ICD-10-CM

## 2023-05-19 DIAGNOSIS — Z17.0 MALIGNANT NEOPLASM OF LOWER-OUTER QUADRANT OF RIGHT BREAST OF FEMALE, ESTROGEN RECEPTOR POSITIVE: ICD-10-CM

## 2023-05-19 DIAGNOSIS — R92.8 ABNORMAL MAMMOGRAM OF RIGHT BREAST: Primary | ICD-10-CM

## 2023-05-19 DIAGNOSIS — C50.511 MALIGNANT NEOPLASM OF LOWER-OUTER QUADRANT OF RIGHT BREAST OF FEMALE, ESTROGEN RECEPTOR POSITIVE: ICD-10-CM

## 2023-05-19 NOTE — TELEPHONE ENCOUNTER
Caller: Palma Roblero    Relationship: Self    Best call back number: [phone]    Who are you requesting to speak with (clinical staff, provider,  specific staff member): CLINICAL      What was the call regarding: PATIENT CALLING TO VERIFY THE STATUS OF BREAST BIOPSY SCHEDULING. COULD NOT VERIFY ORDERS IN CHART    Do you require a callback: YES

## 2023-05-19 NOTE — PROGRESS NOTES
Weston Solares MD Guevara, Jeane Kathleen P, RN Jeane,     Please contact the patient and let her know about the worrisome findings of the mammogram.  If she is agreeable please enter order for ultrasound-guided breast biopsy.     Patient has been called and is aware. She is amenable to undergo biopsy. She will await another call to get this scheduled.

## 2023-06-01 NOTE — PROGRESS NOTES
06/08/23 0001   Pre-Procedure Phone Call   Procedure Time Verified Yes   Arrival Time 0930   Procedure Location Verified Yes   Medical History Reviewed Yes   NPO Status Reinforced No   Ride and Caregiver Arranged No   Patient Knows to Bring Current Medications No   Bring Outside Films Requested No

## 2023-06-08 ENCOUNTER — HOSPITAL ENCOUNTER (OUTPATIENT)
Dept: MAMMOGRAPHY | Facility: HOSPITAL | Age: 58
Discharge: HOME | End: 2023-06-08
Payer: COMMERCIAL

## 2023-06-08 ENCOUNTER — HOSPITAL ENCOUNTER (OUTPATIENT)
Dept: ULTRASOUND IMAGING | Facility: HOSPITAL | Age: 58
Discharge: HOME | End: 2023-06-08
Payer: COMMERCIAL

## 2023-06-08 VITALS
TEMPERATURE: 98.2 F | BODY MASS INDEX: 36.19 KG/M2 | RESPIRATION RATE: 17 BRPM | WEIGHT: 212 LBS | OXYGEN SATURATION: 100 % | HEART RATE: 74 BPM | HEIGHT: 64 IN | SYSTOLIC BLOOD PRESSURE: 117 MMHG | DIASTOLIC BLOOD PRESSURE: 68 MMHG

## 2023-06-08 DIAGNOSIS — Z17.0 MALIGNANT NEOPLASM OF LOWER-OUTER QUADRANT OF RIGHT BREAST OF FEMALE, ESTROGEN RECEPTOR POSITIVE: ICD-10-CM

## 2023-06-08 DIAGNOSIS — C50.511 MALIGNANT NEOPLASM OF LOWER-OUTER QUADRANT OF RIGHT BREAST OF FEMALE, ESTROGEN RECEPTOR POSITIVE: ICD-10-CM

## 2023-06-08 DIAGNOSIS — R92.8 ABNORMAL MAMMOGRAM OF RIGHT BREAST: ICD-10-CM

## 2023-06-08 PROCEDURE — 88360 TUMOR IMMUNOHISTOCHEM/MANUAL: CPT | Performed by: INTERNAL MEDICINE

## 2023-06-08 PROCEDURE — 88341 IMHCHEM/IMCYTCHM EA ADD ANTB: CPT | Performed by: INTERNAL MEDICINE

## 2023-06-08 PROCEDURE — A4648 IMPLANTABLE TISSUE MARKER: HCPCS

## 2023-06-08 PROCEDURE — 0 LIDOCAINE 1 % SOLUTION: Performed by: RADIOLOGY

## 2023-06-08 PROCEDURE — 88305 TISSUE EXAM BY PATHOLOGIST: CPT | Performed by: INTERNAL MEDICINE

## 2023-06-08 PROCEDURE — 77065 DX MAMMO INCL CAD UNI: CPT

## 2023-06-08 PROCEDURE — 88342 IMHCHEM/IMCYTCHM 1ST ANTB: CPT | Performed by: INTERNAL MEDICINE

## 2023-06-08 RX ORDER — LIDOCAINE HYDROCHLORIDE 10 MG/ML
10 INJECTION, SOLUTION INFILTRATION; PERINEURAL ONCE
Status: COMPLETED | OUTPATIENT
Start: 2023-06-08 | End: 2023-06-08

## 2023-06-08 RX ORDER — DIAZEPAM 5 MG/1
5 TABLET ORAL ONCE AS NEEDED
Status: DISCONTINUED | OUTPATIENT
Start: 2023-06-08 | End: 2023-06-09 | Stop reason: HOSPADM

## 2023-06-08 RX ADMIN — LIDOCAINE HYDROCHLORIDE 8 ML: 10 INJECTION, SOLUTION INFILTRATION; PERINEURAL at 10:49

## 2023-06-08 RX ADMIN — LIDOCAINE HYDROCHLORIDE 10 ML: 10; .005 INJECTION, SOLUTION EPIDURAL; INFILTRATION; INTRACAUDAL; PERINEURAL at 10:49

## 2023-06-08 NOTE — NURSING NOTE
Biopsy site to outer right breast clear with Exofin dry and intact. No firmness or swelling noted at or around biopsy site. Denies pain. Ice pack with protective covering applied to biopsy site. Discharge instructions discussed with understanding voiced by patient. Copies provided to patient. No distress noted. To home via personal vehicle.

## 2023-06-08 NOTE — H&P
Name: Palma Roblero ADMIT: 2023   : 1965  PCP: Alivia Lubin MD    MRN: [id number] LOS: 0 days   AGE/SEX: 58 y.o. female  ROOM: Room/bed info not found       Chief complaint right breast lesions x 2    Present Illness or Internal History:  Patient is a 58 y.o. female presents with right breast lesions x 2.     Past Surgical History:  Past Surgical History:   Procedure Laterality Date    BREAST BIOPSY Right 2006    stereotactic biopsy    BREAST LUMPECTOMY Right 2006    Right axillary sentinel node biopsy (positive), right axillay dissection, right needle lumpectomy-Dr. Quyen Vidal    CERVICAL CONE BIOPSY      [facility]    COLONOSCOPY N/A 2014    3 mm cecal polyp, 6 mm ascending colon polyp, 8 mm transvese colon polyp-Dr. Quyen Vidal    COLONOSCOPY N/A 2009    Tortuous colon-Dr. Quyen Vidal    COLONOSCOPY N/A 2018    Procedure: COLONOSCOPY TO CECUM TO TERMINAL ILEUM WITH HOT SNARE POLYPECTOMY;  Surgeon: Quyen Vidal MD;  Location: [facility] ENDOSCOPY;  Service: Gastroenterology    COLPOSCOPY      DILATATION AND CURETTAGE  2005    EXCISION MASS TRUNK N/A 2017    Procedure: EXCISION OF SEBACEOUS CYST FROM BACK ;  Surgeon: Rosendo Crum MD;  Location: [facility] OR;  Service:     SHOULDER ROTATOR CUFF REPAIR Right 2022    VENOUS ACCESS DEVICE (PORT) INSERTION Left 10/06/2006    Left subclavian Eieukg-n-Gmzu placement-Dr. Quyen Vidal    VENOUS ACCESS DEVICE (PORT) REMOVAL Left 2007    Left subclavian Ujqqea-h-Stpr removal-Dr. Quyen Vidal       Past Medical History:  Past Medical History:   Diagnosis Date    Allergic     Breast cancer 2006    Right infiltrating ductal carcinoma, grade I, T2N1, ER/IL positive, 90% HER-2/julián negative    Cervical polyp     History of benign cervical polyp    Colon polyps     Drug therapy     Hx of radiation therapy     Hypercholesteremia     Hyperthyroidism     NO LONGER PROBLEM    PONV  (postoperative nausea and vomiting)     Radiation     Sebaceous cyst     BACK       Home Medications:  (Not in a hospital admission)      Allergies:  Patient has no known allergies.    Family History:  Family History   Problem Relation Age of Onset    Hyperlipidemia Sister     Alzheimer's disease Mother     No Known Problems Father     No Known Problems Brother     No Known Problems Daughter     ADD / ADHD Son     No Known Problems Maternal Grandmother     No Known Problems Paternal Grandmother     No Known Problems Maternal Aunt     No Known Problems Paternal Aunt     BRCA 1/2 Neg Hx     Breast cancer Neg Hx     Colon cancer Neg Hx     Endometrial cancer Neg Hx     Ovarian cancer Neg Hx     Malig Hyperthermia Neg Hx        Social History:  Social History     Tobacco Use    Smoking status: Never    Smokeless tobacco: Never   Vaping Use    Vaping Use: Never used   Substance Use Topics    Alcohol use: No    Drug use: No        Objective     Physical Exam:    No exam performed today,    Vital Signs  Temp:  [98.2 °F (36.8 °C)] 98.2 °F (36.8 °C)  Heart Rate:  [91] 91  Resp:  [17] 17  BP: (126)/(79) 126/79    Anticipated Surgical Procedure:  Ultrasound guided right breast biopsies with clip placement x 2    The risks, benefits and alternatives of this procedure have been discussed with the patient or responsible party: Yes        Christian Omalley Jr., MD  06/08/23  10:34 EDT

## 2023-06-09 ENCOUNTER — TELEPHONE (OUTPATIENT)
Dept: MAMMOGRAPHY | Facility: HOSPITAL | Age: 58
End: 2023-06-09
Payer: COMMERCIAL

## 2023-06-09 LAB
CYTO UR: NORMAL
LAB AP CASE REPORT: NORMAL
LAB AP CLINICAL INFORMATION: NORMAL
LAB AP INTRADEPARTMENTAL CONSULT: NORMAL
LAB AP SPECIAL STAINS: NORMAL
LAB AP SYNOPTIC CHECKLIST: NORMAL
PATH REPORT.FINAL DX SPEC: NORMAL
PATH REPORT.GROSS SPEC: NORMAL

## 2023-06-09 NOTE — TELEPHONE ENCOUNTER
Left message checking on patient after biopsies yesterday. Requested call back with any questions or concerns.

## 2023-06-12 ENCOUNTER — TELEPHONE (OUTPATIENT)
Dept: SURGERY | Facility: CLINIC | Age: 58
End: 2023-06-12
Payer: COMMERCIAL

## 2023-06-12 ENCOUNTER — TELEPHONE (OUTPATIENT)
Dept: ONCOLOGY | Facility: CLINIC | Age: 58
End: 2023-06-12
Payer: COMMERCIAL

## 2023-06-12 DIAGNOSIS — Z17.0 MALIGNANT NEOPLASM OF LOWER-OUTER QUADRANT OF RIGHT BREAST OF FEMALE, ESTROGEN RECEPTOR POSITIVE: Primary | ICD-10-CM

## 2023-06-12 DIAGNOSIS — C50.511 MALIGNANT NEOPLASM OF LOWER-OUTER QUADRANT OF RIGHT BREAST OF FEMALE, ESTROGEN RECEPTOR POSITIVE: Primary | ICD-10-CM

## 2023-06-12 NOTE — TELEPHONE ENCOUNTER
----- Message from Weston Solares MD sent at 6/12/2023 11:47 AM EDT -----  Palma's recent breast biopsies were positive for carcinoma.  We need to get her scheduled to see Dr. Clay Vidal as soon as possible (she did her previous breast surgery and knows the patient well).    She currently is scheduled for follow-up MD visit at [place] with me on Friday, 6/16/2023 but we can cancel that appointment and we can see her back in the office sometime after she has seen Dr. Vidal.    Thanks

## 2023-06-12 NOTE — TELEPHONE ENCOUNTER
----- Message from Weston Solares MD sent at 6/12/2023 11:47 AM EDT -----  Palma's recent breast biopsies were positive for carcinoma.  We need to get her scheduled to see Dr. Clay Vidal as soon as possible (she did her previous breast surgery and knows the patient well).    She currently is scheduled for follow-up MD visit at [place] with me on Friday, 6/16/2023 but we can cancel that appointment and we can see her back in the office sometime after she has seen Dr. Vidal.    Thanks

## 2023-06-12 NOTE — TELEPHONE ENCOUNTER
DR CARRIZALES ASKED ME TO CONTACT THIS PT REGARDING HER APPT ON 6/14 @ 11 AM. SHE'S NOT HERE THAT DAY & NEEDED THE PT MOVED TO 6/13 @ 11 AM. CALLED THE PT TO INFORM & SHE'S ABLE TO MAKE IT ON 6/13.

## 2023-06-13 ENCOUNTER — OFFICE VISIT (OUTPATIENT)
Dept: SURGERY | Facility: CLINIC | Age: 58
End: 2023-06-13
Payer: COMMERCIAL

## 2023-06-13 VITALS
WEIGHT: 215.6 LBS | BODY MASS INDEX: 36.81 KG/M2 | DIASTOLIC BLOOD PRESSURE: 72 MMHG | SYSTOLIC BLOOD PRESSURE: 122 MMHG | HEIGHT: 64 IN

## 2023-06-13 DIAGNOSIS — C50.511 MALIGNANT NEOPLASM OF LOWER-OUTER QUADRANT OF RIGHT BREAST OF FEMALE, ESTROGEN RECEPTOR POSITIVE: Primary | ICD-10-CM

## 2023-06-13 DIAGNOSIS — Z17.0 MALIGNANT NEOPLASM OF LOWER-OUTER QUADRANT OF RIGHT BREAST OF FEMALE, ESTROGEN RECEPTOR POSITIVE: Primary | ICD-10-CM

## 2023-06-13 NOTE — PROGRESS NOTES
SURGERY  Palma ANTHONY Roblero   1965 06/12/23    Chief Complaint: Newly diagnosed right lower outer quadrant invasive ductal breast cancer, 6:30, 2.7 cm, grade 2, ER/IL positive, HER2/julián equivocal    HPI    Ms. Roblero is a very nice 58 y.o. female known to me from my care for her in 2016 with a right upper outer quadrant breast cancer treated with lumpectomy and radiation postop chemo and hormonal modulation.  She now comes in with screening test showing below.    Screening mammogram 4/14/2023;  - Right breast focal asymmetry middle third retroareolar separate from postsurgical change.    Diagnostic mammogram 5/17/2023;  - Right breast middle third retroareolar area of asymmetry that persist, 2.8 cm in greatest dimension, with ill-defined margins at the 6:00 location.    Ultrasound right breast 5/17/2023;  - Right breast irregular 1 cm hypoechoic mass with internal peripheral vascularity at 6:30 position, 3 cm from the nipple, immediately adjacent to a 1.9 cm oval circumscribed hypoechoic mass, together both masses measuring 2.7 cm.    Ultrasound-guided biopsy x2 6/8/2023;  - TWO lesions sampled, clips at each, with post mammogram showing no clip migration and clip  by 9 mm    Pathology;  -Right breast 6:30 location both sites invasive mammary carcinoma, ductal, Cory grade 2, measuring 10 and 6 mm respectively, no DCIS or lymphovascular invasion, ER positive (91 to 100%), IL negative (less than 1%), HER2/julián equivocal, 2+, FISH pending.   Right lateral sample portion with Ki 67 of 35%   Right medial sample portion with Ki 67 of 40%    Her pathology from 2016 was a Cory 1 rather than 9 him to, 2.5 cm with DCIS with necrosis, with 2 positive sentinel nodes, axillary dissection total of 16 nodes.  It was ER/IL positive HER2/julián negative, PT2N1.  Her hormonal modulation was continued for 10 years postop.  She had a left subclavian port placed which was subsequently removed.  She has not had  difficulty with lymphedema.    We talked about genetic testing, the effect of that resolved on decision making, the need to wait for HER2/julián results to determine if preop chemo is needed, need for mastectomy rather than lumpectomy with prior radiation, attentive need to lymphedema risk with prior axillary dissection, option of bilateral mastectomy even if genetic testing negative but no medical reason to do so, and opportunity for referral to plastic surgery.  Discussed the need for care of the whole person rather than just the breast cancer, in particular for maintaining arm mobility, absence of lymphedema, avoidance of cardiac injury with chemotherapy etc.  Discussed the option of Oncotype DX if it becomes a question as to whether chemo is needed.    This was done in the presence of her daughter Morenita who participated and asked questions and took Some notes for our patient.    Past Medical History:   Diagnosis Date    Allergic     Breast cancer 2006    Right infiltrating ductal carcinoma, grade I, T2N1, ER/MS positive, 90% HER-2/julián negative    Cervical polyp     History of benign cervical polyp    Colon polyps     Drug therapy     Hx of radiation therapy     Hypercholesteremia     Hyperthyroidism     NO LONGER PROBLEM    PONV (postoperative nausea and vomiting)     Radiation     Sebaceous cyst     BACK     Past Surgical History:   Procedure Laterality Date    BREAST BIOPSY Right 08/03/2006    stereotactic biopsy    BREAST LUMPECTOMY Right 08/18/2006    Right axillary sentinel node biopsy (positive), right axillay dissection, right needle lumpectomy-Dr. Quyen Vidal    CERVICAL CONE BIOPSY  2005    [facility]    COLONOSCOPY N/A 04/07/2014    3 mm cecal polyp, 6 mm ascending colon polyp, 8 mm transvese colon polyp-Dr. Quyen Vidal    COLONOSCOPY N/A 03/16/2009    Tortuous colon-Dr. Quyen Vidal    COLONOSCOPY N/A 06/11/2018    Procedure: COLONOSCOPY TO CECUM TO TERMINAL ILEUM WITH HOT SNARE POLYPECTOMY;   Surgeon: Quyen Vidal MD;  Location: [facility] ENDOSCOPY;  Service: Gastroenterology    COLPOSCOPY      DILATATION AND CURETTAGE  03/23/2005    EXCISION MASS TRUNK N/A 09/05/2017    Procedure: EXCISION OF SEBACEOUS CYST FROM BACK ;  Surgeon: Rosendo Crum MD;  Location: [facility] OR;  Service:     SHOULDER ROTATOR CUFF REPAIR Right 07/2022    VENOUS ACCESS DEVICE (PORT) INSERTION Left 10/06/2006    Left subclavian Hdblsn-d-Tfug placement-Dr. Quyen Vidal    VENOUS ACCESS DEVICE (PORT) REMOVAL Left 05/29/2007    Left subclavian Uvhptg-e-Myvu removal-Dr. Quyen Vidal     Family History   Problem Relation Age of Onset    Hyperlipidemia Sister     Alzheimer's disease Mother     No Known Problems Father     No Known Problems Brother     No Known Problems Daughter     ADD / ADHD Son     No Known Problems Maternal Grandmother     No Known Problems Paternal Grandmother     No Known Problems Maternal Aunt     No Known Problems Paternal Aunt     BRCA 1/2 Neg Hx     Breast cancer Neg Hx     Colon cancer Neg Hx     Endometrial cancer Neg Hx     Ovarian cancer Neg Hx     Malig Hyperthermia Neg Hx      Social History     Socioeconomic History    Marital status:      Spouse name: Philip   Tobacco Use    Smoking status: Never    Smokeless tobacco: Never   Vaping Use    Vaping Use: Never used   Substance and Sexual Activity    Alcohol use: No    Drug use: No    Sexual activity: Not Currently     Partners: Male     Birth control/protection: None         Current Outpatient Medications:     atorvastatin (LIPITOR) 10 MG tablet, Take 1 tablet by mouth Daily., Disp: 90 tablet, Rfl: 3    Patanol 0.1 % ophthalmic solution, Administer 1 drop to both eyes 2 (Two) Times a Day., Disp: 5 mL, Rfl: 5    Triamcinolone Acetonide (NASACORT) 55 MCG/ACT nasal inhaler, 2 sprays into the nostril(s) as directed by provider Daily As Needed (nasal congestion)., Disp: 16.5 g, Rfl: 5    No Known Allergies    PHYSICAL EXAM:    /72   Ht  "162.6 cm (64\")   Wt 97.8 kg (215 lb 9.6 oz)   LMP  (LMP Unknown)   BMI 37.01 kg/m²     Constitutional: well developed, well nourished, appears healthy, stated age, -American  Eyes: sclera nonicteric, conjunctiva not injected   ENMT: Hearing intact, trachea midline, dentitia in good order  CVS: RRR, no murmur, peripheral edema not present  Respiratory: CTA, normal respiratory effort   Gastrointestinal: no hepatosplenomegaly, abdomen soft, nontender  Musculoskeletal: gait normal, muscle mass normal  Skin: warm and dry, no rashes visible.  Good integrity, even in the breast, with thick versus crepey skin and little appearance of radiation injury.  Neurological: awake and alert, seems to have reasonable capacity for understanding for medical decision making  Psychiatric: appears to have reasonable judgement   Lymphatics: no cervical adenopathy or axillary adenopathy.  She does have considerable fibrosis in her right axilla where her prior axillary dissection was carried out but no distinctly palpable lymph nodes  Breasts: Double D at least on the right, triple D at least on the left, indentation in the right upper outer quadrant where prior lumpectomy was carried out, no palpable mass at the retroareolar region, nick incision about 8 cm from the areola, at about 730, no bruising.  No skin dimpling or nipple changes other than the postsurgical findings in the right upper outer quadrant.    Radiographic Findings:   SCREENING 4/14/23      DIAGNOSTIC MAMMO 5/17/23 RIGHT      RIGHT BREAST US 5/17      Lab: Path as above    Reviewed: Treatment options for breast cancer    IMPRESSION:  Second breast cancer right breast, right lower outer quadrant, grade 2, ER positive NV negative, HER2/julián equivocal, Ki-67 35 to 40%, 2.7 cm  Initial breast cancer right upper outer quadrant, grade 1, ER/NV positive HER2/julián negative, treated with lumpectomy, sentinel node (positive), concurrent axillary dissection (2 of 16 nodes " positive), radiation, chemo, hormonal modulation, 2006  History of tubulovillous adenomatous polyps, last colonoscopy 2014.  Scope had been planned for July 2023  Body mass index is 37.01 kg/m².  Obesity    PLAN:  Await final FISH on HER2/julián.  If positive then she understands she will need to have chemo neoadjuvantly and thus a port placement.  We will plan for an attempt on the left side again since she will have a second axillary surgery on the right and would like to avoid that side.  We will proceed with a neck access on the right if that becomes necessary.  Ultrasound of the right axilla in the context of the fibrosis just to make sure there is not a suspicious node and if there are ears we will do a preoperative needle biopsy.  Bilateral breast MRI to have a better assessment of the size of this tumor and an idea as to any suspicious right axillary nodes.  We discussed the fact that this tumor has different characteristics than her first and is in a different quadrant and thus is a second cancer versus a recurrence.  We discussed the difference in approach now of not getting frozen sections on axillary sentinel nodes, and a preference towards radiation if those nodes are positive on final pathology.  Right mastectomy with sentinel node and possibly axillary dissection if the sentinel node is not effective in the context of the prior dissection.  With her prior radiation a lumpectomy is not an option in my opinion.  Potential for left mastectomy at the same time if her genetic testing is positive or if she decides to do so in an effort to avoid future psychosocial stress and risk.  Referral to plastic surgery, Dr. Alan Brooke to discuss options for reconstruction.  Did mention with the prior radiation that those options may be reduced.  Referral to lymphedema clinic.  This is an absolute must with her prior axillary dissection.  Discussed the goal of maintaining range of motion, absence of lymphedema  etc..  Genetic testing today, blood draw.  If positive her daughter needs to be tested as well.    Quyen Vidal MD  06/12/23  12:10 EDT      In order to provide a more personal and interactive patient experience as well as improve efficiency, this note was started prior to the office visit, including review of past history, xrays.    Greater than 1 hour spent    ADDEND  -breast MRI with 2 sites of biopsy proven malignancy plus suspicious areas of enhancement extending up to the 2:00 to 3:00 area mostly retroareolar, measuring 8.2 cm in total.  No axillary adenopathy.  Post surgical area benign.  Clips in axilla from prior axillary dissection.   -FDS4dob right medial 60-70% positive, right lateral negative.   Await axillary US.

## 2023-06-14 ENCOUNTER — PREP FOR SURGERY (OUTPATIENT)
Dept: SURGERY | Facility: CLINIC | Age: 58
End: 2023-06-14

## 2023-06-14 ENCOUNTER — TELEPHONE (OUTPATIENT)
Dept: SURGERY | Facility: CLINIC | Age: 58
End: 2023-06-14

## 2023-06-14 DIAGNOSIS — C50.511 MALIGNANT NEOPLASM OF LOWER-OUTER QUADRANT OF RIGHT BREAST OF FEMALE, ESTROGEN RECEPTOR POSITIVE: Primary | ICD-10-CM

## 2023-06-14 DIAGNOSIS — Z17.0 MALIGNANT NEOPLASM OF LOWER-OUTER QUADRANT OF RIGHT BREAST OF FEMALE, ESTROGEN RECEPTOR POSITIVE: Primary | ICD-10-CM

## 2023-06-14 DIAGNOSIS — Z79.899 HIGH RISK MEDICATIONS (NOT ANTICOAGULANTS) LONG-TERM USE: ICD-10-CM

## 2023-06-14 LAB
CYTO UR: NORMAL
LAB AP CASE REPORT: NORMAL
LAB AP CLINICAL INFORMATION: NORMAL
LAB AP INTRADEPARTMENTAL CONSULT: NORMAL
LAB AP SPECIAL STAINS: NORMAL
LAB AP SYNOPTIC CHECKLIST: NORMAL
PATH REPORT.ADDENDUM SPEC: NORMAL
PATH REPORT.FINAL DX SPEC: NORMAL
PATH REPORT.GROSS SPEC: NORMAL

## 2023-06-14 RX ORDER — OXYCODONE HCL 10 MG/1
10 TABLET, FILM COATED, EXTENDED RELEASE ORAL ONCE
OUTPATIENT
Start: 2023-06-14 | End: 2023-06-14

## 2023-06-14 RX ORDER — ACETAMINOPHEN 325 MG/1
650 TABLET ORAL ONCE
OUTPATIENT
Start: 2023-06-14 | End: 2023-06-14

## 2023-06-14 NOTE — TELEPHONE ENCOUNTER
SPOKE TO PT REGARDING APPT W/DR ESTEVEZ ON 6/27 @ 2 PM, ARRIVAL 1:45 PM. GAVE OFFICE LOCATION & INSTRUCTED PT TO TAKE HER PIC ID, INS CARD & LIST OF ANY CURRENT MEDS.

## 2023-06-14 NOTE — PROGRESS NOTES
Orders entered for ECHO and referral to cardio-oncology re: planned Herceptin treatment per in-basket message Dr. Solares

## 2023-06-14 NOTE — PROGRESS NOTES
I reviewed the HER2/julián FISH results and discussed the case with Dr. Vidal and with Dr. Kesslre.  One of the patient's 2 biopsy specimens was positive for HER2/julián by FISH.  She will likely need neoadjuvant chemotherapy with Herceptin.  She is scheduled to undergo MRI of the breasts 6/17/2023 which may give us a better idea of the size of the tumor.    After the MRI results we plan to present her in the multidisciplinary breast conference.  I checked with our research staff and she is not eligible for any of our current clinical trials due to her previous history of breast cancer and prior treatment.    She will be scheduled for an echocardiogram given her previous treatment with anthracyclines and need for Herceptin therapy.  She likely will need to follow-up with cardiac oncology.    Dr. Vidal is planning on putting in a port in preparation for preoperative chemotherapy.

## 2023-06-16 ENCOUNTER — TELEPHONE (OUTPATIENT)
Dept: ONCOLOGY | Facility: CLINIC | Age: 58
End: 2023-06-16
Payer: COMMERCIAL

## 2023-06-16 NOTE — TELEPHONE ENCOUNTER
----- Message from Weston Solares MD sent at 6/14/2023 12:39 PM EDT -----  Please get Ms. Roblero scheduled for an echocardiogram as baseline prior to Herceptin.    She will also need a 2 unit MD appointment with me plus labs in 2 weeks.    Thanks

## 2023-06-16 NOTE — TELEPHONE ENCOUNTER
CALLED PT W/ APPT FOR DR BARBER EXPLAINED TO PT TO CALL ME IS THIS APPT NEEDS TO BE MOVED AROUND DUE TO ALL HER OTHER APPTS

## 2023-06-17 ENCOUNTER — HOSPITAL ENCOUNTER (OUTPATIENT)
Dept: MRI IMAGING | Facility: HOSPITAL | Age: 58
Discharge: HOME | End: 2023-06-17
Payer: COMMERCIAL

## 2023-06-17 DIAGNOSIS — C50.511 MALIGNANT NEOPLASM OF LOWER-OUTER QUADRANT OF RIGHT BREAST OF FEMALE, ESTROGEN RECEPTOR POSITIVE: ICD-10-CM

## 2023-06-17 DIAGNOSIS — Z17.0 MALIGNANT NEOPLASM OF LOWER-OUTER QUADRANT OF RIGHT BREAST OF FEMALE, ESTROGEN RECEPTOR POSITIVE: ICD-10-CM

## 2023-06-17 PROCEDURE — 82565 ASSAY OF CREATININE: CPT

## 2023-06-17 PROCEDURE — A9577 INJ MULTIHANCE: HCPCS | Performed by: SURGERY

## 2023-06-17 PROCEDURE — 0 GADOBENATE DIMEGLUMINE 529 MG/ML SOLUTION: Performed by: SURGERY

## 2023-06-17 PROCEDURE — 77049 MRI BREAST C-+ W/CAD BI: CPT

## 2023-06-17 RX ADMIN — GADOBENATE DIMEGLUMINE 19.8 ML: 529 INJECTION, SOLUTION INTRAVENOUS at 15:56

## 2023-06-18 LAB — CREAT BLDA-MCNC: 0.9 MG/DL (ref 0.6–1.3)

## 2023-06-28 ENCOUNTER — TELEPHONE (OUTPATIENT)
Dept: SURGERY | Facility: CLINIC | Age: 58
End: 2023-06-28

## 2023-06-28 NOTE — TELEPHONE ENCOUNTER
Provider: DR. BABITA CARRIZALES    RECEIVED LETTER FROM [facility] - OFFICE EVALUATION. INDEXED INTO CHART AS EXT MED RECS 6/27/23.

## 2023-06-29 ENCOUNTER — TELEPHONE (OUTPATIENT)
Dept: ONCOLOGY | Facility: CLINIC | Age: 58
End: 2023-06-29

## 2023-06-29 NOTE — TELEPHONE ENCOUNTER
Caller: MAY    Relationship: [employer]     Best call back number: [phone]    REF # [id number]     What is the best time to reach you: 8AM-8PM EST     Who are you requesting to speak with (clinical staff, provider,  specific staff member): DR BARBER OR NURSE        What was the call regarding:     NEEDING SOME ADDITIONAL INFORMATION ON THIS PATIENT.     CONFIRM IF SCHEDULED  FOR A SURGERY FOR DX     AND IF HOSPITALIZED DUE TO DX      TREATMENT PLAN / RESTRICTIONS AND LIMITATIONS    WILL ALSO BE SENDING A FAX FOR PAPER WORK ALSO TODAY [phone]

## 2023-07-03 PROBLEM — Z17.0 MALIGNANT NEOPLASM OF OVERLAPPING SITES OF RIGHT BREAST IN FEMALE, ESTROGEN RECEPTOR POSITIVE: Status: ACTIVE | Noted: 2023-07-03

## 2023-07-03 PROBLEM — C50.811 MALIGNANT NEOPLASM OF OVERLAPPING SITES OF RIGHT BREAST IN FEMALE, ESTROGEN RECEPTOR POSITIVE: Status: ACTIVE | Noted: 2023-07-03

## 2023-07-05 PROBLEM — C50.919 BREAST CANCER: Status: ACTIVE | Noted: 2023-07-05

## 2023-07-20 ENCOUNTER — TELEPHONE (OUTPATIENT)
Dept: ONCOLOGY | Facility: CLINIC | Age: 58
End: 2023-07-20

## 2023-07-20 NOTE — TELEPHONE ENCOUNTER
Caller: Palma Roblero    Relationship: Self    Best call back number: [phone]    What is the best time to reach you: ANYTIME TODAY UNTIL 3:30PM    Who are you requesting to speak with (clinical staff, provider,  specific staff member): MALLORY OVERTON         What was the call regarding:     FMLA FORM THAT WAS SENT, HAD SOME QUESTIONS ABOUT IT.     Is it okay if the provider responds through MyChart: NO

## 2023-07-31 ENCOUNTER — TELEPHONE (OUTPATIENT)
Dept: ONCOLOGY | Facility: CLINIC | Age: 58
End: 2023-07-31

## 2023-08-02 ENCOUNTER — PATIENT OUTREACH (OUTPATIENT)
Dept: OTHER | Facility: HOSPITAL | Age: 58
End: 2023-08-02
Payer: COMMERCIAL

## 2023-08-09 ENCOUNTER — PRE-ADMISSION TESTING (OUTPATIENT)
Dept: PREADMISSION TESTING | Facility: HOSPITAL | Age: 58
End: 2023-08-09
Payer: COMMERCIAL

## 2023-08-09 VITALS
WEIGHT: 218 LBS | TEMPERATURE: 98.2 F | OXYGEN SATURATION: 100 % | HEART RATE: 73 BPM | RESPIRATION RATE: 16 BRPM | DIASTOLIC BLOOD PRESSURE: 73 MMHG | BODY MASS INDEX: 37.22 KG/M2 | SYSTOLIC BLOOD PRESSURE: 113 MMHG | HEIGHT: 64 IN

## 2023-08-09 DIAGNOSIS — C50.511 MALIGNANT NEOPLASM OF LOWER-OUTER QUADRANT OF RIGHT BREAST OF FEMALE, ESTROGEN RECEPTOR POSITIVE: ICD-10-CM

## 2023-08-09 DIAGNOSIS — Z17.0 MALIGNANT NEOPLASM OF LOWER-OUTER QUADRANT OF RIGHT BREAST OF FEMALE, ESTROGEN RECEPTOR POSITIVE: ICD-10-CM

## 2023-08-09 LAB
ANION GAP SERPL CALCULATED.3IONS-SCNC: 8.2 MMOL/L (ref 5–15)
BASOPHILS # BLD AUTO: 0.05 10*3/MM3 (ref 0–0.2)
BASOPHILS NFR BLD AUTO: 1 % (ref 0–1.5)
BUN SERPL-MCNC: 13 MG/DL (ref 8–23)
BUN/CREAT SERPL: 15.5 (ref 7–25)
CALCIUM SPEC-SCNC: 9.1 MG/DL (ref 8.6–10.5)
CHLORIDE SERPL-SCNC: 105 MMOL/L (ref 98–107)
CO2 SERPL-SCNC: 26.8 MMOL/L (ref 22–29)
CREAT SERPL-MCNC: 0.84 MG/DL (ref 0.57–1)
DEPRECATED RDW RBC AUTO: 41 FL (ref 37–54)
EGFRCR SERPLBLD CKD-EPI 2021: 79.7 ML/MIN/1.73
EOSINOPHIL # BLD AUTO: 0.08 10*3/MM3 (ref 0–0.4)
EOSINOPHIL NFR BLD AUTO: 1.5 % (ref 0.3–6.2)
ERYTHROCYTE [DISTWIDTH] IN BLOOD BY AUTOMATED COUNT: 13.5 % (ref 12.3–15.4)
GLUCOSE SERPL-MCNC: 99 MG/DL (ref 65–99)
HCT VFR BLD AUTO: 44.6 % (ref 34–46.6)
HGB BLD-MCNC: 14.1 G/DL (ref 12–15.9)
IMM GRANULOCYTES # BLD AUTO: 0.02 10*3/MM3 (ref 0–0.05)
IMM GRANULOCYTES NFR BLD AUTO: 0.4 % (ref 0–0.5)
LYMPHOCYTES # BLD AUTO: 1.7 10*3/MM3 (ref 0.7–3.1)
LYMPHOCYTES NFR BLD AUTO: 32.7 % (ref 19.6–45.3)
MCH RBC QN AUTO: 26.2 PG (ref 26.6–33)
MCHC RBC AUTO-ENTMCNC: 31.6 G/DL (ref 31.5–35.7)
MCV RBC AUTO: 82.7 FL (ref 79–97)
MONOCYTES # BLD AUTO: 0.51 10*3/MM3 (ref 0.1–0.9)
MONOCYTES NFR BLD AUTO: 9.8 % (ref 5–12)
NEUTROPHILS NFR BLD AUTO: 2.84 10*3/MM3 (ref 1.7–7)
NEUTROPHILS NFR BLD AUTO: 54.6 % (ref 42.7–76)
NRBC BLD AUTO-RTO: 0 /100 WBC (ref 0–0.2)
PLATELET # BLD AUTO: 182 10*3/MM3 (ref 140–450)
PMV BLD AUTO: 11.6 FL (ref 6–12)
POTASSIUM SERPL-SCNC: 3.9 MMOL/L (ref 3.5–5.2)
RBC # BLD AUTO: 5.39 10*6/MM3 (ref 3.77–5.28)
SODIUM SERPL-SCNC: 140 MMOL/L (ref 136–145)
WBC NRBC COR # BLD: 5.2 10*3/MM3 (ref 3.4–10.8)

## 2023-08-09 PROCEDURE — 80048 BASIC METABOLIC PNL TOTAL CA: CPT

## 2023-08-09 PROCEDURE — 36415 COLL VENOUS BLD VENIPUNCTURE: CPT

## 2023-08-09 PROCEDURE — 85025 COMPLETE CBC W/AUTO DIFF WBC: CPT

## 2023-08-09 PROCEDURE — 87081 CULTURE SCREEN ONLY: CPT

## 2023-08-10 LAB — MRSA SPEC QL CULT: NORMAL

## 2023-08-16 ENCOUNTER — ANESTHESIA EVENT (OUTPATIENT)
Dept: PERIOP | Facility: HOSPITAL | Age: 58
End: 2023-08-16
Payer: COMMERCIAL

## 2023-08-16 ENCOUNTER — HOSPITAL ENCOUNTER (OUTPATIENT)
Facility: HOSPITAL | Age: 58
Discharge: HOME | End: 2023-08-17
Attending: SURGERY | Admitting: SURGERY
Payer: COMMERCIAL

## 2023-08-16 ENCOUNTER — HOSPITAL ENCOUNTER (OUTPATIENT)
Dept: NUCLEAR MEDICINE | Facility: HOSPITAL | Age: 58
Discharge: HOME | End: 2023-08-16
Payer: COMMERCIAL

## 2023-08-16 ENCOUNTER — ANESTHESIA (OUTPATIENT)
Dept: PERIOP | Facility: HOSPITAL | Age: 58
End: 2023-08-16
Payer: COMMERCIAL

## 2023-08-16 ENCOUNTER — APPOINTMENT (OUTPATIENT)
Dept: GENERAL RADIOLOGY | Facility: HOSPITAL | Age: 58
End: 2023-08-16
Payer: COMMERCIAL

## 2023-08-16 DIAGNOSIS — C50.511 MALIGNANT NEOPLASM OF LOWER-OUTER QUADRANT OF RIGHT BREAST OF FEMALE, ESTROGEN RECEPTOR POSITIVE: ICD-10-CM

## 2023-08-16 DIAGNOSIS — Z17.0 MALIGNANT NEOPLASM OF LOWER-OUTER QUADRANT OF RIGHT BREAST OF FEMALE, ESTROGEN RECEPTOR POSITIVE: ICD-10-CM

## 2023-08-16 DIAGNOSIS — Z17.0 MALIGNANT NEOPLASM OF OVERLAPPING SITES OF RIGHT BREAST IN FEMALE, ESTROGEN RECEPTOR POSITIVE: Primary | ICD-10-CM

## 2023-08-16 DIAGNOSIS — C50.811 MALIGNANT NEOPLASM OF OVERLAPPING SITES OF RIGHT BREAST IN FEMALE, ESTROGEN RECEPTOR POSITIVE: Primary | ICD-10-CM

## 2023-08-16 PROCEDURE — 88307 TISSUE EXAM BY PATHOLOGIST: CPT | Performed by: SURGERY

## 2023-08-16 PROCEDURE — 25010000002 DEXAMETHASONE SODIUM PHOSPHATE 20 MG/5ML SOLUTION: Performed by: NURSE ANESTHETIST, CERTIFIED REGISTERED

## 2023-08-16 PROCEDURE — 0 TECHETIUM TC99M TILMANOCEPT: Performed by: SURGERY

## 2023-08-16 PROCEDURE — 77001 FLUOROGUIDE FOR VEIN DEVICE: CPT | Performed by: SURGERY

## 2023-08-16 PROCEDURE — 25010000002 CEFAZOLIN PER 500 MG: Performed by: PLASTIC SURGERY

## 2023-08-16 PROCEDURE — 25010000002 ROPIVACAINE PER 1 MG: Performed by: PLASTIC SURGERY

## 2023-08-16 PROCEDURE — 25010000002 HYDROMORPHONE PER 4 MG: Performed by: NURSE ANESTHETIST, CERTIFIED REGISTERED

## 2023-08-16 PROCEDURE — 38900 IO MAP OF SENT LYMPH NODE: CPT | Performed by: SURGERY

## 2023-08-16 PROCEDURE — 19303 MAST SIMPLE COMPLETE: CPT | Performed by: PHYSICIAN ASSISTANT

## 2023-08-16 PROCEDURE — 38525 BIOPSY/REMOVAL LYMPH NODES: CPT | Performed by: SURGERY

## 2023-08-16 PROCEDURE — 25010000002 PROPOFOL 10 MG/ML EMULSION: Performed by: NURSE ANESTHETIST, CERTIFIED REGISTERED

## 2023-08-16 PROCEDURE — 38792 RA TRACER ID OF SENTINL NODE: CPT

## 2023-08-16 PROCEDURE — 25010000002 FENTANYL CITRATE (PF) 50 MCG/ML SOLUTION: Performed by: NURSE ANESTHETIST, CERTIFIED REGISTERED

## 2023-08-16 PROCEDURE — 25010000002 MAGNESIUM SULFATE PER 500 MG OF MAGNESIUM: Performed by: NURSE ANESTHETIST, CERTIFIED REGISTERED

## 2023-08-16 PROCEDURE — 25010000002 LABETALOL 5 MG/ML SOLUTION: Performed by: NURSE ANESTHETIST, CERTIFIED REGISTERED

## 2023-08-16 PROCEDURE — 88342 IMHCHEM/IMCYTCHM 1ST ANTB: CPT | Performed by: SURGERY

## 2023-08-16 PROCEDURE — 63710000001 ONDANSETRON PER 8 MG: Performed by: PLASTIC SURGERY

## 2023-08-16 PROCEDURE — 25010000002 CEFAZOLIN IN DEXTROSE 2-4 GM/100ML-% SOLUTION: Performed by: ANESTHESIOLOGY

## 2023-08-16 PROCEDURE — C1788 PORT, INDWELLING, IMP: HCPCS | Performed by: SURGERY

## 2023-08-16 PROCEDURE — 25010000002 INDOCYANINE GREEN 25 MG RECONSTITUTED SOLUTION: Performed by: NURSE ANESTHETIST, CERTIFIED REGISTERED

## 2023-08-16 PROCEDURE — 25010000002 PHENYLEPHRINE 10 MG/ML SOLUTION: Performed by: NURSE ANESTHETIST, CERTIFIED REGISTERED

## 2023-08-16 PROCEDURE — 76000 FLUOROSCOPY <1 HR PHYS/QHP: CPT

## 2023-08-16 PROCEDURE — 25010000002 ISOSULFAN BLUE 1 % SOLUTION: Performed by: SURGERY

## 2023-08-16 PROCEDURE — 25010000002 ONDANSETRON PER 1 MG: Performed by: NURSE ANESTHETIST, CERTIFIED REGISTERED

## 2023-08-16 PROCEDURE — C1789 PROSTHESIS, BREAST, IMP: HCPCS | Performed by: SURGERY

## 2023-08-16 PROCEDURE — 25010000002 CEFAZOLIN IN DEXTROSE 2000 MG/ 100 ML SOLUTION: Performed by: SURGERY

## 2023-08-16 PROCEDURE — 25010000002 LIDOCAINE PER 10 MG: Performed by: NURSE ANESTHETIST, CERTIFIED REGISTERED

## 2023-08-16 PROCEDURE — A9520 TC99 TILMANOCEPT DIAG 0.5MCI: HCPCS | Performed by: SURGERY

## 2023-08-16 PROCEDURE — 19303 MAST SIMPLE COMPLETE: CPT | Performed by: SURGERY

## 2023-08-16 PROCEDURE — 88341 IMHCHEM/IMCYTCHM EA ADD ANTB: CPT | Performed by: SURGERY

## 2023-08-16 PROCEDURE — 25010000002 GENTAMICIN PER 80 MG: Performed by: PLASTIC SURGERY

## 2023-08-16 PROCEDURE — 36561 INSERT TUNNELED CV CATH: CPT | Performed by: SURGERY

## 2023-08-16 DEVICE — HORIZON TI SMALL RED 6 CLIPS/CART
Type: IMPLANTABLE DEVICE | Site: BREAST | Status: FUNCTIONAL
Brand: WECK

## 2023-08-16 DEVICE — TISSUE EXPANDER, SMOOTH SURFACE, INTEGRATED PORT, FULL HEIGHT, 480-575 CC
Type: IMPLANTABLE DEVICE | Site: BREAST | Status: FUNCTIONAL
Brand: DERMASPAN TISSUE EXPANDER

## 2023-08-16 DEVICE — HORIZON TI MED 6/CART
Type: IMPLANTABLE DEVICE | Site: BREAST | Status: FUNCTIONAL
Brand: WECK

## 2023-08-16 DEVICE — PRT INTRO VASC/INTERV VORTEX FILL/HL DETACH/POLYURET/CATH 8F: Type: IMPLANTABLE DEVICE | Site: CHEST | Status: FUNCTIONAL

## 2023-08-16 DEVICE — GRFT TISS ALLODERM RTM PERF 16X20MM: Type: IMPLANTABLE DEVICE | Site: BREAST | Status: FUNCTIONAL

## 2023-08-16 RX ORDER — PROPOFOL 10 MG/ML
VIAL (ML) INTRAVENOUS AS NEEDED
Status: DISCONTINUED | OUTPATIENT
Start: 2023-08-16 | End: 2023-08-16 | Stop reason: SURG

## 2023-08-16 RX ORDER — HYDROMORPHONE HYDROCHLORIDE 1 MG/ML
0.25 INJECTION, SOLUTION INTRAMUSCULAR; INTRAVENOUS; SUBCUTANEOUS
Status: DISCONTINUED | OUTPATIENT
Start: 2023-08-16 | End: 2023-08-17 | Stop reason: HOSPADM

## 2023-08-16 RX ORDER — SODIUM CHLORIDE 9 MG/ML
40 INJECTION, SOLUTION INTRAVENOUS AS NEEDED
Status: DISCONTINUED | OUTPATIENT
Start: 2023-08-16 | End: 2023-08-16 | Stop reason: HOSPADM

## 2023-08-16 RX ORDER — ONDANSETRON 2 MG/ML
4 INJECTION INTRAMUSCULAR; INTRAVENOUS EVERY 6 HOURS PRN
Status: DISCONTINUED | OUTPATIENT
Start: 2023-08-16 | End: 2023-08-17 | Stop reason: HOSPADM

## 2023-08-16 RX ORDER — IPRATROPIUM BROMIDE AND ALBUTEROL SULFATE 2.5; .5 MG/3ML; MG/3ML
3 SOLUTION RESPIRATORY (INHALATION) ONCE AS NEEDED
Status: DISCONTINUED | OUTPATIENT
Start: 2023-08-16 | End: 2023-08-16 | Stop reason: HOSPADM

## 2023-08-16 RX ORDER — CEFAZOLIN SODIUM 2 G/100ML
INJECTION, SOLUTION INTRAVENOUS AS NEEDED
Status: DISCONTINUED | OUTPATIENT
Start: 2023-08-16 | End: 2023-08-16 | Stop reason: SURG

## 2023-08-16 RX ORDER — INDOCYANINE GREEN AND WATER 25 MG
KIT INJECTION AS NEEDED
Status: DISCONTINUED | OUTPATIENT
Start: 2023-08-16 | End: 2023-08-16 | Stop reason: SURG

## 2023-08-16 RX ORDER — CEFAZOLIN SODIUM 2 G/100ML
2 INJECTION, SOLUTION INTRAVENOUS ONCE
Status: DISCONTINUED | OUTPATIENT
Start: 2023-08-16 | End: 2023-08-16

## 2023-08-16 RX ORDER — OXYCODONE HCL 10 MG/1
10 TABLET, FILM COATED, EXTENDED RELEASE ORAL ONCE
Status: DISCONTINUED | OUTPATIENT
Start: 2023-08-16 | End: 2023-08-16

## 2023-08-16 RX ORDER — LIDOCAINE AND PRILOCAINE 25; 25 MG/G; MG/G
1 CREAM TOPICAL ONCE
Status: COMPLETED | OUTPATIENT
Start: 2023-08-16 | End: 2023-08-16

## 2023-08-16 RX ORDER — LIDOCAINE HYDROCHLORIDE 20 MG/ML
INJECTION, SOLUTION EPIDURAL; INFILTRATION; INTRACAUDAL; PERINEURAL AS NEEDED
Status: DISCONTINUED | OUTPATIENT
Start: 2023-08-16 | End: 2023-08-16 | Stop reason: SURG

## 2023-08-16 RX ORDER — DIAZEPAM 5 MG/1
10 TABLET ORAL ONCE
Status: COMPLETED | OUTPATIENT
Start: 2023-08-16 | End: 2023-08-16

## 2023-08-16 RX ORDER — SODIUM CHLORIDE 0.9 % (FLUSH) 0.9 %
10 SYRINGE (ML) INJECTION AS NEEDED
Status: DISCONTINUED | OUTPATIENT
Start: 2023-08-16 | End: 2023-08-16 | Stop reason: HOSPADM

## 2023-08-16 RX ORDER — ONDANSETRON 4 MG/1
8 TABLET, FILM COATED ORAL ONCE
Status: COMPLETED | OUTPATIENT
Start: 2023-08-16 | End: 2023-08-16

## 2023-08-16 RX ORDER — DEXAMETHASONE SODIUM PHOSPHATE 4 MG/ML
INJECTION, SOLUTION INTRA-ARTICULAR; INTRALESIONAL; INTRAMUSCULAR; INTRAVENOUS; SOFT TISSUE AS NEEDED
Status: DISCONTINUED | OUTPATIENT
Start: 2023-08-16 | End: 2023-08-16 | Stop reason: SURG

## 2023-08-16 RX ORDER — SODIUM CHLORIDE 0.9 % (FLUSH) 0.9 %
10 SYRINGE (ML) INJECTION EVERY 12 HOURS SCHEDULED
Status: DISCONTINUED | OUTPATIENT
Start: 2023-08-16 | End: 2023-08-16 | Stop reason: HOSPADM

## 2023-08-16 RX ORDER — CEFAZOLIN SODIUM 2 G/100ML
2 INJECTION, SOLUTION INTRAVENOUS ONCE
Status: COMPLETED | OUTPATIENT
Start: 2023-08-16 | End: 2023-08-16

## 2023-08-16 RX ORDER — SODIUM CHLORIDE, SODIUM LACTATE, POTASSIUM CHLORIDE, CALCIUM CHLORIDE 600; 310; 30; 20 MG/100ML; MG/100ML; MG/100ML; MG/100ML
9 INJECTION, SOLUTION INTRAVENOUS CONTINUOUS PRN
Status: DISCONTINUED | OUTPATIENT
Start: 2023-08-16 | End: 2023-08-16 | Stop reason: HOSPADM

## 2023-08-16 RX ORDER — MIDAZOLAM HYDROCHLORIDE 1 MG/ML
1 INJECTION INTRAMUSCULAR; INTRAVENOUS
Status: DISCONTINUED | OUTPATIENT
Start: 2023-08-16 | End: 2023-08-16 | Stop reason: HOSPADM

## 2023-08-16 RX ORDER — KETAMINE HYDROCHLORIDE 10 MG/ML
INJECTION INTRAMUSCULAR; INTRAVENOUS AS NEEDED
Status: DISCONTINUED | OUTPATIENT
Start: 2023-08-16 | End: 2023-08-16 | Stop reason: SURG

## 2023-08-16 RX ORDER — OXYCODONE HYDROCHLORIDE 5 MG/1
5 TABLET ORAL EVERY 4 HOURS PRN
Status: DISCONTINUED | OUTPATIENT
Start: 2023-08-16 | End: 2023-08-17 | Stop reason: HOSPADM

## 2023-08-16 RX ORDER — PHENYLEPHRINE HYDROCHLORIDE 10 MG/ML
INJECTION INTRAVENOUS AS NEEDED
Status: DISCONTINUED | OUTPATIENT
Start: 2023-08-16 | End: 2023-08-16 | Stop reason: SURG

## 2023-08-16 RX ORDER — FLUMAZENIL 0.1 MG/ML
0.2 INJECTION INTRAVENOUS AS NEEDED
Status: DISCONTINUED | OUTPATIENT
Start: 2023-08-16 | End: 2023-08-16 | Stop reason: HOSPADM

## 2023-08-16 RX ORDER — ONDANSETRON 4 MG/1
4 TABLET, FILM COATED ORAL EVERY 6 HOURS PRN
Status: DISCONTINUED | OUTPATIENT
Start: 2023-08-16 | End: 2023-08-17 | Stop reason: HOSPADM

## 2023-08-16 RX ORDER — SODIUM CHLORIDE 0.9 % (FLUSH) 0.9 %
3-10 SYRINGE (ML) INJECTION AS NEEDED
Status: DISCONTINUED | OUTPATIENT
Start: 2023-08-16 | End: 2023-08-17 | Stop reason: HOSPADM

## 2023-08-16 RX ORDER — ACETAMINOPHEN 325 MG/1
1000 TABLET ORAL ONCE
Status: DISCONTINUED | OUTPATIENT
Start: 2023-08-16 | End: 2023-08-16

## 2023-08-16 RX ORDER — DROPERIDOL 2.5 MG/ML
0.62 INJECTION, SOLUTION INTRAMUSCULAR; INTRAVENOUS
Status: DISCONTINUED | OUTPATIENT
Start: 2023-08-16 | End: 2023-08-16 | Stop reason: HOSPADM

## 2023-08-16 RX ORDER — ACETAMINOPHEN 325 MG/1
650 TABLET ORAL ONCE
Status: DISCONTINUED | OUTPATIENT
Start: 2023-08-16 | End: 2023-08-16

## 2023-08-16 RX ORDER — FENTANYL CITRATE 50 UG/ML
INJECTION, SOLUTION INTRAMUSCULAR; INTRAVENOUS AS NEEDED
Status: DISCONTINUED | OUTPATIENT
Start: 2023-08-16 | End: 2023-08-16 | Stop reason: SURG

## 2023-08-16 RX ORDER — HYDRALAZINE HYDROCHLORIDE 20 MG/ML
5 INJECTION INTRAMUSCULAR; INTRAVENOUS
Status: DISCONTINUED | OUTPATIENT
Start: 2023-08-16 | End: 2023-08-16 | Stop reason: HOSPADM

## 2023-08-16 RX ORDER — HYDROMORPHONE HYDROCHLORIDE 1 MG/ML
0.5 INJECTION, SOLUTION INTRAMUSCULAR; INTRAVENOUS; SUBCUTANEOUS
Status: DISCONTINUED | OUTPATIENT
Start: 2023-08-16 | End: 2023-08-16 | Stop reason: HOSPADM

## 2023-08-16 RX ORDER — OXYCODONE AND ACETAMINOPHEN 7.5; 325 MG/1; MG/1
1 TABLET ORAL EVERY 4 HOURS PRN
Status: DISCONTINUED | OUTPATIENT
Start: 2023-08-16 | End: 2023-08-16 | Stop reason: HOSPADM

## 2023-08-16 RX ORDER — ROCURONIUM BROMIDE 10 MG/ML
INJECTION, SOLUTION INTRAVENOUS AS NEEDED
Status: DISCONTINUED | OUTPATIENT
Start: 2023-08-16 | End: 2023-08-16 | Stop reason: SURG

## 2023-08-16 RX ORDER — GABAPENTIN 300 MG/1
300 CAPSULE ORAL ONCE
Status: COMPLETED | OUTPATIENT
Start: 2023-08-16 | End: 2023-08-16

## 2023-08-16 RX ORDER — ISOSULFAN BLUE 50 MG/5ML
INJECTION, SOLUTION SUBCUTANEOUS AS NEEDED
Status: DISCONTINUED | OUTPATIENT
Start: 2023-08-16 | End: 2023-08-16 | Stop reason: HOSPADM

## 2023-08-16 RX ORDER — DOXYCYCLINE 100 MG/1
100 CAPSULE ORAL EVERY 12 HOURS SCHEDULED
Status: DISCONTINUED | OUTPATIENT
Start: 2023-08-16 | End: 2023-08-17 | Stop reason: HOSPADM

## 2023-08-16 RX ORDER — ACETAMINOPHEN 500 MG
1000 TABLET ORAL ONCE
Status: COMPLETED | OUTPATIENT
Start: 2023-08-16 | End: 2023-08-16

## 2023-08-16 RX ORDER — DIPHENHYDRAMINE HYDROCHLORIDE 50 MG/ML
12.5 INJECTION INTRAMUSCULAR; INTRAVENOUS
Status: DISCONTINUED | OUTPATIENT
Start: 2023-08-16 | End: 2023-08-16 | Stop reason: HOSPADM

## 2023-08-16 RX ORDER — SODIUM CHLORIDE, SODIUM LACTATE, POTASSIUM CHLORIDE, CALCIUM CHLORIDE 600; 310; 30; 20 MG/100ML; MG/100ML; MG/100ML; MG/100ML
125 INJECTION, SOLUTION INTRAVENOUS CONTINUOUS
Status: DISCONTINUED | OUTPATIENT
Start: 2023-08-16 | End: 2023-08-17

## 2023-08-16 RX ORDER — BACITRACIN ZINC 500 [USP'U]/G
OINTMENT TOPICAL AS NEEDED
Status: DISCONTINUED | OUTPATIENT
Start: 2023-08-16 | End: 2023-08-16 | Stop reason: HOSPADM

## 2023-08-16 RX ORDER — GABAPENTIN 100 MG/1
100 CAPSULE ORAL EVERY 8 HOURS SCHEDULED
Status: DISCONTINUED | OUTPATIENT
Start: 2023-08-16 | End: 2023-08-17 | Stop reason: HOSPADM

## 2023-08-16 RX ORDER — ONDANSETRON 2 MG/ML
4 INJECTION INTRAMUSCULAR; INTRAVENOUS ONCE AS NEEDED
Status: DISCONTINUED | OUTPATIENT
Start: 2023-08-16 | End: 2023-08-16 | Stop reason: HOSPADM

## 2023-08-16 RX ORDER — DOXYCYCLINE 100 MG/1
200 CAPSULE ORAL ONCE
Status: COMPLETED | OUTPATIENT
Start: 2023-08-16 | End: 2023-08-16

## 2023-08-16 RX ORDER — ACETAMINOPHEN 325 MG/1
650 TABLET ORAL EVERY 4 HOURS PRN
Status: DISCONTINUED | OUTPATIENT
Start: 2023-08-16 | End: 2023-08-17 | Stop reason: HOSPADM

## 2023-08-16 RX ORDER — EPHEDRINE SULFATE 50 MG/ML
5 INJECTION, SOLUTION INTRAVENOUS ONCE AS NEEDED
Status: DISCONTINUED | OUTPATIENT
Start: 2023-08-16 | End: 2023-08-16 | Stop reason: HOSPADM

## 2023-08-16 RX ORDER — LIDOCAINE HYDROCHLORIDE ANHYDROUS AND DEXTROSE MONOHYDRATE 5; 400 G/100ML; MG/100ML
INJECTION, SOLUTION INTRAVENOUS CONTINUOUS PRN
Status: DISCONTINUED | OUTPATIENT
Start: 2023-08-16 | End: 2023-08-16 | Stop reason: SURG

## 2023-08-16 RX ORDER — PROMETHAZINE HYDROCHLORIDE 25 MG/1
25 SUPPOSITORY RECTAL ONCE AS NEEDED
Status: DISCONTINUED | OUTPATIENT
Start: 2023-08-16 | End: 2023-08-16 | Stop reason: HOSPADM

## 2023-08-16 RX ORDER — PROMETHAZINE HYDROCHLORIDE 25 MG/1
25 TABLET ORAL ONCE AS NEEDED
Status: DISCONTINUED | OUTPATIENT
Start: 2023-08-16 | End: 2023-08-16 | Stop reason: HOSPADM

## 2023-08-16 RX ORDER — NALOXONE HCL 0.4 MG/ML
0.1 VIAL (ML) INJECTION
Status: DISCONTINUED | OUTPATIENT
Start: 2023-08-16 | End: 2023-08-17 | Stop reason: HOSPADM

## 2023-08-16 RX ORDER — LABETALOL HYDROCHLORIDE 5 MG/ML
INJECTION, SOLUTION INTRAVENOUS AS NEEDED
Status: DISCONTINUED | OUTPATIENT
Start: 2023-08-16 | End: 2023-08-16

## 2023-08-16 RX ORDER — PREGABALIN 75 MG/1
75 CAPSULE ORAL ONCE
Status: DISCONTINUED | OUTPATIENT
Start: 2023-08-16 | End: 2023-08-16

## 2023-08-16 RX ORDER — SODIUM CHLORIDE 0.9 % (FLUSH) 0.9 %
3 SYRINGE (ML) INJECTION EVERY 12 HOURS SCHEDULED
Status: DISCONTINUED | OUTPATIENT
Start: 2023-08-16 | End: 2023-08-17 | Stop reason: HOSPADM

## 2023-08-16 RX ORDER — ROPIVACAINE HYDROCHLORIDE 5 MG/ML
INJECTION, SOLUTION EPIDURAL; INFILTRATION; PERINEURAL AS NEEDED
Status: DISCONTINUED | OUTPATIENT
Start: 2023-08-16 | End: 2023-08-16 | Stop reason: HOSPADM

## 2023-08-16 RX ORDER — OXYCODONE HYDROCHLORIDE 5 MG/1
10 TABLET ORAL ONCE
Status: DISCONTINUED | OUTPATIENT
Start: 2023-08-16 | End: 2023-08-17

## 2023-08-16 RX ORDER — SCOLOPAMINE TRANSDERMAL SYSTEM 1 MG/1
1 PATCH, EXTENDED RELEASE TRANSDERMAL ONCE
Status: DISCONTINUED | OUTPATIENT
Start: 2023-08-16 | End: 2023-08-17

## 2023-08-16 RX ORDER — MAGNESIUM SULFATE HEPTAHYDRATE 500 MG/ML
INJECTION, SOLUTION INTRAMUSCULAR; INTRAVENOUS AS NEEDED
Status: DISCONTINUED | OUTPATIENT
Start: 2023-08-16 | End: 2023-08-16 | Stop reason: SURG

## 2023-08-16 RX ORDER — CELECOXIB 200 MG/1
400 CAPSULE ORAL ONCE
Status: COMPLETED | OUTPATIENT
Start: 2023-08-16 | End: 2023-08-16

## 2023-08-16 RX ORDER — FENTANYL CITRATE 50 UG/ML
50 INJECTION, SOLUTION INTRAMUSCULAR; INTRAVENOUS
Status: DISCONTINUED | OUTPATIENT
Start: 2023-08-16 | End: 2023-08-16 | Stop reason: HOSPADM

## 2023-08-16 RX ORDER — ONDANSETRON 2 MG/ML
INJECTION INTRAMUSCULAR; INTRAVENOUS AS NEEDED
Status: DISCONTINUED | OUTPATIENT
Start: 2023-08-16 | End: 2023-08-16 | Stop reason: SURG

## 2023-08-16 RX ORDER — HYDROCODONE BITARTRATE AND ACETAMINOPHEN 7.5; 325 MG/1; MG/1
1 TABLET ORAL ONCE AS NEEDED
Status: DISCONTINUED | OUTPATIENT
Start: 2023-08-16 | End: 2023-08-16 | Stop reason: HOSPADM

## 2023-08-16 RX ORDER — LABETALOL HYDROCHLORIDE 5 MG/ML
INJECTION, SOLUTION INTRAVENOUS AS NEEDED
Status: DISCONTINUED | OUTPATIENT
Start: 2023-08-16 | End: 2023-08-16 | Stop reason: SURG

## 2023-08-16 RX ORDER — NALOXONE HCL 0.4 MG/ML
0.2 VIAL (ML) INJECTION AS NEEDED
Status: DISCONTINUED | OUTPATIENT
Start: 2023-08-16 | End: 2023-08-16 | Stop reason: HOSPADM

## 2023-08-16 RX ORDER — LABETALOL HYDROCHLORIDE 5 MG/ML
5 INJECTION, SOLUTION INTRAVENOUS
Status: DISCONTINUED | OUTPATIENT
Start: 2023-08-16 | End: 2023-08-16 | Stop reason: HOSPADM

## 2023-08-16 RX ADMIN — DOXYCYCLINE 100 MG: 100 CAPSULE ORAL at 23:33

## 2023-08-16 RX ADMIN — KETAMINE HYDROCHLORIDE 10 MG: 10 INJECTION INTRAMUSCULAR; INTRAVENOUS at 16:54

## 2023-08-16 RX ADMIN — GABAPENTIN 300 MG: 300 CAPSULE ORAL at 14:13

## 2023-08-16 RX ADMIN — SCOPALAMINE 1 PATCH: 1 PATCH, EXTENDED RELEASE TRANSDERMAL at 14:13

## 2023-08-16 RX ADMIN — INDOCYANINE GREEN 7.5 MG: KIT INTRAVENOUS at 20:29

## 2023-08-16 RX ADMIN — MAGNESIUM SULFATE HEPTAHYDRATE 2 G: 500 INJECTION, SOLUTION INTRAMUSCULAR; INTRAVENOUS at 16:16

## 2023-08-16 RX ADMIN — ONDANSETRON HYDROCHLORIDE 8 MG: 4 TABLET, FILM COATED ORAL at 14:13

## 2023-08-16 RX ADMIN — HYDROMORPHONE HYDROCHLORIDE 0.5 MG: 1 INJECTION, SOLUTION INTRAMUSCULAR; INTRAVENOUS; SUBCUTANEOUS at 21:26

## 2023-08-16 RX ADMIN — SODIUM CHLORIDE, POTASSIUM CHLORIDE, SODIUM LACTATE AND CALCIUM CHLORIDE 9 ML/HR: 600; 310; 30; 20 INJECTION, SOLUTION INTRAVENOUS at 13:30

## 2023-08-16 RX ADMIN — FENTANYL CITRATE 50 MCG: 50 INJECTION, SOLUTION INTRAMUSCULAR; INTRAVENOUS at 16:24

## 2023-08-16 RX ADMIN — DOXYCYCLINE 200 MG: 100 CAPSULE ORAL at 14:12

## 2023-08-16 RX ADMIN — PHENYLEPHRINE HYDROCHLORIDE 200 MCG: 10 INJECTION INTRAVENOUS at 19:16

## 2023-08-16 RX ADMIN — LIDOCAINE HYDROCHLORIDE 2 MG/KG/HR: 4 INJECTION, SOLUTION INTRAVENOUS at 15:52

## 2023-08-16 RX ADMIN — INDOCYANINE GREEN 7.5 MG: KIT INTRAVENOUS at 19:02

## 2023-08-16 RX ADMIN — PROPOFOL 150 MG: 10 INJECTION, EMULSION INTRAVENOUS at 15:55

## 2023-08-16 RX ADMIN — HYDROMORPHONE HYDROCHLORIDE 0.5 MG: 1 INJECTION, SOLUTION INTRAMUSCULAR; INTRAVENOUS; SUBCUTANEOUS at 21:38

## 2023-08-16 RX ADMIN — SODIUM CHLORIDE, POTASSIUM CHLORIDE, SODIUM LACTATE AND CALCIUM CHLORIDE: 600; 310; 30; 20 INJECTION, SOLUTION INTRAVENOUS at 18:21

## 2023-08-16 RX ADMIN — PHENYLEPHRINE HYDROCHLORIDE 200 MCG: 10 INJECTION INTRAVENOUS at 18:58

## 2023-08-16 RX ADMIN — KETAMINE HYDROCHLORIDE 10 MG: 10 INJECTION INTRAMUSCULAR; INTRAVENOUS at 17:58

## 2023-08-16 RX ADMIN — LIDOCAINE HYDROCHLORIDE 60 MG: 20 INJECTION, SOLUTION EPIDURAL; INFILTRATION; INTRACAUDAL; PERINEURAL at 15:56

## 2023-08-16 RX ADMIN — ONDANSETRON 4 MG: 2 INJECTION INTRAMUSCULAR; INTRAVENOUS at 16:01

## 2023-08-16 RX ADMIN — DIAZEPAM 10 MG: 5 TABLET ORAL at 14:13

## 2023-08-16 RX ADMIN — CEFAZOLIN SODIUM 2 G: 2 INJECTION, SOLUTION INTRAVENOUS at 19:42

## 2023-08-16 RX ADMIN — LABETALOL HYDROCHLORIDE 5 MG: 5 INJECTION, SOLUTION INTRAVENOUS at 16:55

## 2023-08-16 RX ADMIN — ACETAMINOPHEN 1000 MG: 500 TABLET, FILM COATED ORAL at 14:13

## 2023-08-16 RX ADMIN — FENTANYL CITRATE 50 MCG: 50 INJECTION, SOLUTION INTRAMUSCULAR; INTRAVENOUS at 19:39

## 2023-08-16 RX ADMIN — DEXAMETHASONE SODIUM PHOSPHATE 4 MG: 4 INJECTION, SOLUTION INTRAMUSCULAR; INTRAVENOUS at 16:01

## 2023-08-16 RX ADMIN — PHENYLEPHRINE HYDROCHLORIDE 200 MCG: 10 INJECTION INTRAVENOUS at 19:29

## 2023-08-16 RX ADMIN — ROCURONIUM BROMIDE 70 MG: 10 INJECTION, SOLUTION INTRAVENOUS at 15:58

## 2023-08-16 RX ADMIN — CEFAZOLIN SODIUM 2 G: 2 INJECTION, SOLUTION INTRAVENOUS at 15:42

## 2023-08-16 RX ADMIN — ONDANSETRON 4 MG: 2 INJECTION INTRAMUSCULAR; INTRAVENOUS at 20:34

## 2023-08-16 RX ADMIN — LIDOCAINE AND PRILOCAINE 1 APPLICATION: 25; 25 CREAM TOPICAL at 14:13

## 2023-08-16 RX ADMIN — PHENYLEPHRINE HYDROCHLORIDE 100 MCG: 10 INJECTION INTRAVENOUS at 18:43

## 2023-08-16 RX ADMIN — KETAMINE HYDROCHLORIDE 20 MG: 10 INJECTION INTRAMUSCULAR; INTRAVENOUS at 15:55

## 2023-08-16 RX ADMIN — KETAMINE HYDROCHLORIDE 10 MG: 10 INJECTION INTRAMUSCULAR; INTRAVENOUS at 18:58

## 2023-08-16 RX ADMIN — TILMANOCEPT 1 DOSE: KIT at 15:00

## 2023-08-16 RX ADMIN — GABAPENTIN 100 MG: 100 CAPSULE ORAL at 23:33

## 2023-08-16 RX ADMIN — CELECOXIB 400 MG: 200 CAPSULE ORAL at 14:13

## 2023-08-16 NOTE — ANESTHESIA PROCEDURE NOTES
Airway  Date/Time: 8/16/2023 3:58 PM    General Information and Staff    Patient location during procedure: OR    Indications and Patient Condition    Preoxygenated: yes  Mask difficulty assessment: 1 - vent by mask    Final Airway Details  Final airway type: endotracheal airway      Successful airway: ETT  Cuffed: yes   Successful intubation technique: direct laryngoscopy  Endotracheal tube insertion site: oral  Blade: Martin  Blade size: 3  ETT size (mm): 7.0  Number of attempts at approach: 1  Assessment: lips, teeth, and gum same as pre-op

## 2023-08-16 NOTE — H&P
SURGERY  Palma ANTHONY Roblero   1965 08/16/23       Chief Complaint: Newly diagnosed right lower outer quadrant invasive ductal breast cancer, 6:30, 2.7 cm, grade 2, ER/MO positive, HER2/julián equivocal, 2nd cancer in right breast     HPI    Here for bilateral mastectomy with right sentinel node (has had prior right axillary dissection) and mediport for second right breast cancer.  US and MRI axilla negative.      Ms. Roblero is a very nice 58 y.o. female known to me from my care for her in 2006 with a right upper outer quadrant breast cancer treated with lumpectomy and radiation postop chemo and hormonal modulation.  She now comes in with screening test showing below.     Screening mammogram 4/14/2023;  - Right breast focal asymmetry middle third retroareolar separate from postsurgical change.     Diagnostic mammogram 5/17/2023;  - Right breast middle third retroareolar area of asymmetry that persist, 2.8 cm in greatest dimension, with ill-defined margins at the 6:00 location.     Ultrasound right breast 5/17/2023;  - Right breast irregular 1 cm hypoechoic mass with internal peripheral vascularity at 6:30 position, 3 cm from the nipple, immediately adjacent to a 1.9 cm oval circumscribed hypoechoic mass, together both masses measuring 2.7 cm.     Ultrasound-guided biopsy x2 6/8/2023;  - TWO lesions sampled, clips at each, with post mammogram showing no clip migration and clip  by 9 mm     Pathology;  -Right breast 6:30 location both sites invasive mammary carcinoma, ductal, Perez grade 2, measuring 10 and 6 mm respectively, no DCIS or lymphovascular invasion, ER positive (91 to 100%), MO negative (less than 1%), HER2/julián equivocal, 2+, FISH pending.              Right lateral sample portion with Ki 67 of 35%              Right medial sample portion with Ki 67 of 40%     Path summary 2006;  Invasive ductal, grade 1, with DCIS, 2/16 + nodes, ERPR+ [id number] -     Her pathology from 2006 was a [place]  1, 2.5 cm with DCIS with necrosis, with 2 positive sentinel nodes (macros with extracapsular extension), axillary dissection total of 16 nodes.  It was ER/IL positive HER2/julián negative, PT2N1.  Her hormonal modulation was continued for 10 years postop.  She had a left subclavian port placed which was subsequently removed.  She has not had difficulty with lymphedema.     We talked about genetic testing, the effect of that resolved on decision making, the need to wait for HER2/julián results to determine if preop chemo is needed, need for mastectomy rather than lumpectomy with prior radiation, attentive need to lymphedema risk with prior axillary dissection, option of bilateral mastectomy even if genetic testing negative but no medical reason to do so, and opportunity for referral to plastic surgery.  Discussed the need for care of the whole person rather than just the breast cancer, in particular for maintaining arm mobility, absence of lymphedema, avoidance of cardiac injury with chemotherapy etc.  Discussed the option of Oncotype DX if it becomes a question as to whether chemo is needed.     This was done in the presence of her daughter Morenita who participated and asked questions and took Some notes for our patient.          Past Medical History:   Diagnosis Date    Allergic      Breast cancer 2006     Right infiltrating ductal carcinoma, grade I, T2N1, ER/IL positive, 90% HER-2/julián negative    Cervical polyp       History of benign cervical polyp    Colon polyps      Drug therapy      Hx of radiation therapy      Hypercholesteremia      Hyperthyroidism       NO LONGER PROBLEM    PONV (postoperative nausea and vomiting)      Radiation      Sebaceous cyst       BACK            Past Surgical History:   Procedure Laterality Date    BREAST BIOPSY Right 08/03/2006     stereotactic biopsy    BREAST LUMPECTOMY Right 08/18/2006     Right axillary sentinel node biopsy (positive), right axillay dissection, right needle  lumpectomy-Dr. Quyen Vidal    CERVICAL [place]E BIOPSY   2005     [facility]    COLONOSCOPY N/A 04/07/2014     3 mm cecal polyp, 6 mm ascending colon polyp, 8 mm transvese colon polyp-Dr. Quyen Vidal    COLONOSCOPY N/A 03/16/2009     Tortuous colon-Dr. Quyen Vidal    COLONOSCOPY N/A 06/11/2018     Procedure: COLONOSCOPY TO CECUM TO TERMINAL ILEUM WITH HOT SNARE POLYPECTOMY;  Surgeon: Quyen Vidal MD;  Location: [facility] ENDOSCOPY;  Service: Gastroenterology    COLPOSCOPY        DILATATION AND CURETTAGE   03/23/2005    EXCISION MASS TRUNK N/A 09/05/2017     Procedure: EXCISION OF SEBACEOUS CYST FROM BACK ;  Surgeon: Rosendo Crum MD;  Location: [facility] MAIN OR;  Service:     SHOULDER ROTATOR CUFF REPAIR Right 07/2022    VENOUS ACCESS DEVICE (PORT) INSERTION Left 10/06/2006     Left subclavian Heypvy-w-Tfbt placement-Dr. Quyen Vidal    VENOUS ACCESS DEVICE (PORT) REMOVAL Left 05/29/2007     Left subclavian Vdkwzh-v-Jait removal-Dr. Quyen Vidal            Family History   Problem Relation Age of Onset    Hyperlipidemia Sister      Alzheimer's disease Mother      No Known Problems Father      No Known Problems Brother      No Known Problems Daughter      ADD / ADHD Son      No Known Problems Maternal Grandmother      No Known Problems Paternal Grandmother      No Known Problems Maternal Aunt      No Known Problems Paternal Aunt      BRCA 1/2 Neg Hx      Breast cancer Neg Hx      Colon cancer Neg Hx      Endometrial cancer Neg Hx      Ovarian cancer Neg Hx      Malig Hyperthermia Neg Hx        Social History            Socioeconomic History    Marital status:        Spouse name: Philip   Tobacco Use    Smoking status: Never    Smokeless tobacco: Never   Vaping Use    Vaping Use: Never used   Substance and Sexual Activity    Alcohol use: No    Drug use: No    Sexual activity: Not Currently       Partners: Male       Birth control/protection: None            Current Outpatient Medications:      "atorvastatin (LIPITOR) 10 MG tablet, Take 1 tablet by mouth Daily., Disp: 90 tablet, Rfl: 3    Patanol 0.1 % ophthalmic solution, Administer 1 drop to both eyes 2 (Two) Times a Day., Disp: 5 mL, Rfl: 5    Triamcinolone Acetonide (NASACORT) 55 MCG/ACT nasal inhaler, 2 sprays into the nostril(s) as directed by provider Daily As Needed (nasal congestion)., Disp: 16.5 g, Rfl: 5     No Known Allergies     PHYSICAL EXAM:     /72   Ht 162.6 cm (64\")   Wt 97.8 kg (215 lb 9.6 oz)   LMP  (LMP Unknown)   BMI 37.01 kg/mý      Constitutional: well developed, well nourished, appears healthy, stated age, -American  Eyes: sclera nonicteric, conjunctiva not injected   ENMT: Hearing intact, trachea midline, dentitia in good order  CVS: RRR, no murmur, peripheral edema not present  Respiratory: CTA, normal respiratory effort   Gastrointestinal: no hepatosplenomegaly, abdomen soft, nontender  Musculoskeletal: gait normal, muscle mass normal  Skin: warm and dry, no rashes visible.  Good integrity, even in the breast, with thick versus crepey skin and little appearance of radiation injury.  Neurological: awake and alert, seems to have reasonable capacity for understanding for medical decision making  Psychiatric: appears to have reasonable judgement   Lymphatics: no cervical adenopathy or axillary adenopathy.  She does have considerable fibrosis in her right axilla where her prior axillary dissection was carried out but no distinctly palpable lymph nodes  Breasts: Double D at least on the right, triple D at least on the left, indentation in the right upper outer quadrant where prior lumpectomy was carried out, no palpable mass at the retroareolar region, nick incision about 8 cm from the areola, at about 730, no bruising.  No skin dimpling or nipple changes other than the postsurgical findings in the right upper outer quadrant.     Radiographic Findings:   SCREENING 4/14/23       DIAGNOSTIC MAMMO 5/17/23 RIGHT     "   RIGHT BREAST US 5/17       Lab: Path as above     Reviewed: Treatment options for breast cancer     IMPRESSION:  Second breast cancer right breast, right lower outer quadrant, grade 2, ER positive [place] negative, HER2/julián equivocal, Ki-67 35 to 40%, 2.7 cm  Initial breast cancer right upper outer quadrant, grade 1, ER/[place] positive HER2/julián negative, treated with lumpectomy, sentinel node (positive), concurrent axillary dissection (2 of 16 nodes positive), radiation, chemo, hormonal modulation, 2006  History of tubulovillous adenomatous polyps, last colonoscopy 2014.  Scope had been planned for July 2023  Body mass index is 37.01 kg/mý.  Obesity     PLAN:  Await final FISH on HER2/julián.  If positive then she understands she will need to have chemo neoadjuvantly and thus a port placement.  We will plan for an attempt on the left side again since she will have a second axillary surgery on the right and would like to avoid that side.  We will proceed with a neck access on the right if that becomes necessary.  Ultrasound of the right axilla in the context of the fibrosis just to make sure there is not a suspicious node and if there are ears we will do a preoperative needle biopsy.  Bilateral breast MRI to have a better assessment of the size of this tumor and an idea as to any suspicious right axillary nodes.  We discussed the fact that this tumor has different characteristics than her first and is in a different quadrant and thus is a second cancer versus a recurrence.  We discussed the difference in approach now of not getting frozen sections on axillary sentinel nodes, and a preference towards radiation if those nodes are positive on final pathology.  Right mastectomy with sentinel node and possibly axillary dissection if the sentinel node is not effective in the context of the prior dissection.  With her prior radiation a lumpectomy is not an option in my opinion.  Potential for left mastectomy at the same time if her  genetic testing is positive or if she decides to do so in an effort to avoid future psychosocial stress and risk.  Referral to plastic surgery, Dr. Alan Ziegler to discuss options for reconstruction.  Did mention with the prior radiation that those options may be reduced.  Referral to lymphedema clinic.  This is an absolute must with her prior axillary dissection.  Discussed the goal of maintaining range of motion, absence of lymphedema etc..  Genetic testing today, blood draw.  If positive her daughter needs to be tested as well.     Quyen Vidal MD  06/12/23  12:10 EDT        In order to provide a more personal and interactive patient experience as well as improve efficiency, this note was started prior to the office visit, including review of past history, xrays.     Greater than 1 hour spent     ADDEND  -breast MRI with 2 sites of biopsy proven malignancy plus suspicious areas of enhancement extending up to the 2:00 to 3:00 area mostly retroareolar, measuring 8.2 cm in total.  No axillary adenopathy.  Post surgical area benign.  Clips in axilla from prior axillary dissection.   -NAV1xsl right medial 60-70% positive, right lateral negative.   -Await axillary US.scheduled 7/3.  -genetic testing negative.      ADDEND  -Presented at tumor board this morning.  With MRI extending to the medial right breast just under the skin, need to be alert to get that area oiut.  -Onc reluctant to decide on chemo type until extent of disease known.  Recommend staging now (i lrdered CT chest, abdo, pelvis and bone scan) and proceed with breast and port surgery unless staging positive.  -phoned patient.  She is seeing Dr ziegler next Tuesday.  Will cancel port and reschedule.  She wants bilateral.    -lymphedema clinic scheduled 7/5.  -Dr Solares 7/3/2023  06/23/23     ADDEND  Right axilla US negative.  Orders placed for bilateral mastectomy with right sentinel node, possible axillary dissection (limited) if sentinel node  ineffective, plus port.  Can do port prior if desired which would be preferable.  Dr Brooke to do bilateral implants.  Scheduled in August.  Staging work up in progress.  Discussed with dr felipe.  Quyen Vidal MD  07/05/23     ADDEND  CT abdo and pelvis and chest negative for mets.    Bone scan negative.   On the schedule for August 16 for bilaterral mastectomy with reconstruction and port.   07/27/23

## 2023-08-16 NOTE — OP NOTE
SURGERY  Operative Note :  YOUNG    Palma CRUZ Roblero  1965    Procedure Date: 23    Pre-op Diagnosis:  Second breast cancer right breast, right lower outer quadrant, grade 2, ER positive [place] negative, HER2/julián 70% one sample, negative second sample, Ki-67 35 to 40%, 2.7 cm  Initial breast cancer right upper outer quadrant, grade 1, ER/[place] positive HER2/julián negative, treated with lumpectomy, sentinel node (positive), concurrent axillary dissection (2 of 16 nodes positive), radiation, chemo, hormonal modulation,   Bone scan, CT chest abdo negative.  MRI/US axilla negative.  Genetic testing negative.    Post-op Diagnosis:  same    Procedure:   Bilateral mastectomy, skin sparing, with  of lymphatic tracing and deep sentinel node biopsy due to absence of tracking of tracer.  Left subclavian mediport placement, angiodynamic 8 [place], with fluoroscopy    [place] Node Biopsy for Breast Cancer - Right  Operation performed with curative intent. Yes   Tracer(s) used to identify sentinel nodes in the upfront surgery (non-neoadjuvant) setting (select all that apply). Dye and Radioactive tracer   Tracer(s) used to identify sentinel nodes in the neoadjuvant setting (select all that apply). N/A   All nodes (colored or non-colored) present at the end of a dye-filled lymphatic channel were removed. No no tracking of dye.  All went medial and inferior   All significantly radioactive nodes were removed. No no nodes removed.    All palpably suspicious nodes were removed. No no palpable nodes, prior axillary dissection   Biopsy-proven positive nodes marked with clips prior to chemotherapy were identified and removed. N/A      Surgeon: Young    Assistant: daniel    Indications:  As above  Body mass index is 37.01 kg/mý.  Obesity    Findings:   Flaps somewhat irregular and very thin on the right where prior lumpectomy and radiation had occurred  Lymphatic tracing obviously went medial and inferior in the skin.   None to axilla.  Single lymphatic noted at the pectoralis border was actually going into the breast and medially.    Recommendations:   Will  regarding path postoperatively  CXR in recovery    Technique:     The patient went to radiology were technician sulfur colloid was injected at the areolar border on the right, then to the operating room were general anesthetic was induced.  Bilateral breasts were prepped with Hibiclens and draped sterilely.  5 ml of Lymphazurin was injected under the areolar border on the right prior to prepping to allow plenty of time for migration.    Elliptical excisions were enlarged, to include a skin paddle at the 2:00 and 7:00 area due to MRI findings..  Incision was made at the superior and inferior aspect on the right, carried down to the subcutaneous tissue with the cautery, then lifted with the Swain's.  The dissection superiorly on the right was difficult, with thin flaps and a very fatty breast.   I went up superiorly to the clavicle medially to the sternum inferiorly to the rectus and laterally to the anterior aspect of the serratus.  This was very tedious as I did this with a skin sparing approach, and lead to a lot of re-orientation and circumferential work.  I then scored the pectoralis fascia at the most radial aspect of the dissection, and brought the breast off the chest wall, with the cautery.    We then tried the sentinel node evaluation again.  Counts in the axilla were less than 100 directly on the tissue.  Lympatic visualization was not seen at all, with the use of loops for magnification.  Since she had a prior axillary dissection, had radiation tx and had negative US and MRI, i opted not to do the axillary dissection.    We then went to the opposite side and carried out our dissection similarly.  However, on this side, i did not resect the pectoralis fascia.  Once the superior flap was completed, i placed the wire for the port.  The port was sutured under  the flap, fluoro used to confirm that the length would be appropriate at 25.5 cm.  The sheath and dilator were placed over the wire and the pre cut catheter placed thru the tear away sheath.  The port had already been sutured in with PDS.  It aspirated and flushed well.    Dr. Brooke will now proceed with her reconstruction.    Quyen Vidal MD      was responsible for performing the following activities: Retraction, Suction, and Irrigation and their skilled assistance was necessary for the success of this case.

## 2023-08-16 NOTE — ANESTHESIA PREPROCEDURE EVALUATION
Anesthesia Evaluation     Patient summary reviewed and Nursing notes reviewed   history of anesthetic complications:  PONV  NPO Solid Status: > 8 hours  NPO Liquid Status: > 2 hours           Airway   Mallampati: II  TM distance: >3 FB  Neck ROM: full  No difficulty expected  Dental - normal exam   (+) partials    Pulmonary - negative pulmonary ROS and normal exam   Cardiovascular - normal exam  Exercise tolerance: good (4-7 METS)    (+) hyperlipidemia      Neuro/Psych- negative ROS  GI/Hepatic/Renal/Endo    (+) thyroid problem hypothyroidism    Musculoskeletal (-) negative ROS    Abdominal    Substance History - negative use     OB/GYN negative ob/gyn ROS         Other      history of cancer                  Anesthesia Plan    ASA 3     general     intravenous induction     Anesthetic plan, risks, benefits, and alternatives have been provided, discussed and informed consent has been obtained with: patient.    CODE STATUS:

## 2023-08-17 ENCOUNTER — TRANSCRIBE ORDERS (OUTPATIENT)
Dept: LAB | Facility: HOSPITAL | Age: 58
End: 2023-08-17
Payer: COMMERCIAL

## 2023-08-17 ENCOUNTER — ANCILLARY PROCEDURE (OUTPATIENT)
Dept: LAB | Facility: HOSPITAL | Age: 58
End: 2023-08-17
Payer: COMMERCIAL

## 2023-08-17 VITALS
BODY MASS INDEX: 37.22 KG/M2 | WEIGHT: 218 LBS | DIASTOLIC BLOOD PRESSURE: 64 MMHG | OXYGEN SATURATION: 97 % | RESPIRATION RATE: 16 BRPM | HEART RATE: 92 BPM | SYSTOLIC BLOOD PRESSURE: 110 MMHG | TEMPERATURE: 97 F | HEIGHT: 64 IN

## 2023-08-17 DIAGNOSIS — C50.919: ICD-10-CM

## 2023-08-17 DIAGNOSIS — C50.919: Primary | ICD-10-CM

## 2023-08-17 PROCEDURE — 76098 X-RAY EXAM SURGICAL SPECIMEN: CPT

## 2023-08-17 RX ORDER — ATORVASTATIN CALCIUM 20 MG/1
10 TABLET, FILM COATED ORAL DAILY
Status: DISCONTINUED | OUTPATIENT
Start: 2023-08-17 | End: 2023-08-17 | Stop reason: HOSPADM

## 2023-08-17 RX ORDER — HYDROCODONE BITARTRATE AND ACETAMINOPHEN 5; 325 MG/1; MG/1
1-2 TABLET ORAL EVERY 4 HOURS PRN
Qty: 20 TABLET | Refills: 0 | Status: SHIPPED | OUTPATIENT
Start: 2023-08-17

## 2023-08-17 RX ORDER — DOXYCYCLINE 100 MG/1
100 CAPSULE ORAL 2 TIMES DAILY
Qty: 10 CAPSULE | Refills: 0 | Status: SHIPPED | OUTPATIENT
Start: 2023-08-17 | End: 2023-08-22

## 2023-08-17 RX ORDER — AMOXICILLIN 250 MG
2 CAPSULE ORAL DAILY PRN
Qty: 30 TABLET | Refills: 1 | Status: SHIPPED | OUTPATIENT
Start: 2023-08-17 | End: 2024-08-16

## 2023-08-17 RX ORDER — ONDANSETRON 8 MG/1
8 TABLET, ORALLY DISINTEGRATING ORAL EVERY 8 HOURS PRN
Qty: 10 TABLET | Refills: 1 | Status: SHIPPED | OUTPATIENT
Start: 2023-08-17

## 2023-08-17 RX ORDER — ONDANSETRON 8 MG/1
8 TABLET, ORALLY DISINTEGRATING ORAL EVERY 6 HOURS PRN
Status: DISCONTINUED | OUTPATIENT
Start: 2023-08-17 | End: 2023-08-17 | Stop reason: HOSPADM

## 2023-08-17 RX ORDER — ACETAMINOPHEN 325 MG/1
650 TABLET ORAL EVERY 4 HOURS PRN
Qty: 60 TABLET | Refills: 0 | Status: SHIPPED | OUTPATIENT
Start: 2023-08-17

## 2023-08-17 RX ORDER — GABAPENTIN 100 MG/1
100 CAPSULE ORAL EVERY 8 HOURS
Qty: 60 CAPSULE | Refills: 2 | Status: SHIPPED | OUTPATIENT
Start: 2023-08-17

## 2023-08-17 RX ORDER — SODIUM CHLORIDE 9 MG/ML
40 INJECTION, SOLUTION INTRAVENOUS AS NEEDED
Status: DISCONTINUED | OUTPATIENT
Start: 2023-08-17 | End: 2023-08-17 | Stop reason: HOSPADM

## 2023-08-17 RX ORDER — DOCUSATE SODIUM 250 MG
250 CAPSULE ORAL 2 TIMES DAILY PRN
Qty: 60 CAPSULE | Refills: 1 | Status: SHIPPED | OUTPATIENT
Start: 2023-08-17

## 2023-08-17 RX ADMIN — GABAPENTIN 100 MG: 100 CAPSULE ORAL at 06:34

## 2023-08-17 RX ADMIN — DOXYCYCLINE 100 MG: 100 CAPSULE ORAL at 09:01

## 2023-08-17 RX ADMIN — SODIUM CHLORIDE, POTASSIUM CHLORIDE, SODIUM LACTATE AND CALCIUM CHLORIDE 125 ML/HR: 600; 310; 30; 20 INJECTION, SOLUTION INTRAVENOUS at 04:21

## 2023-08-17 RX ADMIN — OXYCODONE HYDROCHLORIDE 5 MG: 5 TABLET ORAL at 11:29

## 2023-08-17 NOTE — DISCHARGE SUMMARY
September 11, 2019       Patient: Angelina Jarquin   YOB: 1991   Date of Visit: 9/11/2019         To Whom It May Concern:    It is my medical opinion that Angelina Jarquin remain out of work until 9/25/19.    If you have any questions or concerns, please don't hesitate to call [phone]          Sincerely,          Ashleigh Arenas A.P.R.N.  Electronically Signed      SUBJECTIVE:   Did well overnight, has walked and tolerated PO intake.  Not yet voided but has the urge now.    OBJECTIVE:  Vitals:    08/17/23 0520   BP: 109/66   Pulse: 73   Resp: 16   Temp: 98.1 øF (36.7 øC)   SpO2: 98%     Physical Exam   Cardiovascular: Normal rate.     Pulmonary/Chest  Effort normal.   Incisions c/d/i, no fluid collection or erythema, drains serosanguinous  Flap skin viable    PLAN:  POD1 bilat ssm, left port placement, immediate expander recon  - doing well, ok for dc  - drain care teaching  - minimal activity  - f/u with me Wednesday    Dc Dx: breast cancer  Dc Condition: good

## 2023-08-17 NOTE — PROGRESS NOTES
Continued Stay Note  [facility]     Patient Name: Palma Roblero  MRN: [id number]  Today's Date: 8/17/2023    Admit Date: 8/16/2023    Plan: Home no needs   Discharge Plan       Row Name 08/17/23 1043       Plan    Plan Home no needs    Plan Comments Discharge order noted. Met with patient who confirmed DC plan is to return home. Family will assist as needed and will provide transportation at DC. Denies any needs/equipment.                   Discharge Codes    No documentation.                 Expected Discharge Date and Time       Expected Discharge Date Expected Discharge Time    Aug 17, 2023               Nikki Chambers RN

## 2023-08-17 NOTE — NURSING NOTE
Patient and daughter were given discharge instructions by Senia Diggs RN.  Daughter was instructed on drain care, and supplies were given at discharge.  Patient received her medications from the retail pharmacy prior to leaving.  We gave patient a softee camisole at discharge.  All questions answered.  Patient ready to go home.

## 2023-08-17 NOTE — OP NOTE
Pre-Operative Diagnosis: Acquired absence bilateral breast     Post-Operative Diagnosis: Same     Procedure Performed:   1. placement of bilateral breast prepectoral tissue expander for reconstruction (lpp [id number])  2. placement of bilateral breast alloderm acellular dermal matrix (10 x 16 cm in each)  3. Bilateral breast ICG laser angiography  4.  Adjacent tissue rearrangement left breast 10 x 6 cm     Surgeon: LISANDRO Brooke MD     Assistant: None     Anesthesia: General     Estimated Blood Loss: 30     Specimens: None     Complications: None     Indications: She presented after diagnosis of breast cancer. She discussed her treatment with breast oncology and decided on bilateral skin sparing mastectomy. We discussed the placement of an expander for reconstruction.  With her previous history of right breast radiation and thin skin we discussed the significant limitations in the ventral size of the reconstruction and the increased palpability of the implant beneath the skin envelope.     We discussed risks, benefits and alternatives including but not limited to: bleeding, infection, asymmetry, poor or slow wound healing, need for further surgery, possible recurrence.  The patient elected to proceed.     Description of Procedure: The patient was met in the preoperative holding area.  All questions were answered and informed consent was assured.       She was marked in a standing position of the breast landmarks were drawn and fusiform incision designed around the nac.  She was transferred to the operating placed supine on table with arms abducted and padded.     After induction of appropriate anesthesia, a timeout was performed correctly identifying the patient, operative site, and procedure to be performed.  All present were in agreement.     After completion of the mastectomy and left port placement the skin flaps did appear viable but the right-sided mastectomy was quite thin..   The breast was copiously  irrigated and immaculate hemostasis was achieved.      10cc ICG dye reconstituted and 3cc injected intravenously.  Laser angiography used and visualized good perfusion of the skin despite the thinness of the right skin flap.    On the left flap the significant redundant skin was overlapped and the inferior flap marked with overlap in this area de-epithelialized.  This large lateral mastectomy flap was then backcut where the overlap occurred in this lateral flap advanced medially and anchored to the chest wall to recreate a lateral breast border and consolidate some of the lateral dead space.  This flap measured 10 x 6 cm and was maintained on a random pattern subdermal pedicle based inferiorly.      50% Betadine solution was left to dwell for several minutes and rinsed clear with antibiotic solution.  Gloves were changed and the ADM and expander was brought on the field.  The ADM was then additionally fenestrated and anterior wrap created around the expander.  Expander was placed in the pocket after changing gloves and reprepped the skin.  The expander was sutured in 4 locations . 2 drains were placed. Topical TXA was placed throughout the pocket. Then closure performed with 2-0 Vicryl in the subcutaneous layer anchoring the de-epithelialized flap to the underside of the superior flap, 3-0 Monocryl deep dermal layer and 4-0 Monocryl in the intracuticular on the left side and a running 3-0 nylon on the right.     Skin was dressed with skin glue and Dermabond and Nitropaste applied and she was placed in a well-padded Ace wrap.     The patient was then aroused from anesthesia with ease and transferred to the postoperative care area in good condition. All sponge, needle, and instrument counts were correct.

## 2023-08-17 NOTE — PROGRESS NOTES
GENERAL SURGERY  Palma Roblero  1965  1 Day Post-Op    HPI:  POD #1 port, bilateral mastectomies ([facility]) with expanders ([facility]).  She looks great.  Charting indicates no prn pain meds given.  Looks great.  Sitting up in a chair.  Cheerful.    Diet:  regular     Vitals:    08/16/23 2200 08/16/23 2234 08/17/23 0153 08/17/23 0520   BP: 112/71 111/67 99/63 109/66   BP Location:  Left arm Left arm Left arm   Patient Position:  Lying Lying Lying   Pulse: 71 70 83 73   Resp: 16 16 16 16   Temp: 97.7 øF (36.5 øC) 97.7 øF (36.5 øC) 98 øF (36.7 øC) 98.1 øF (36.7 øC)   TempSrc: Oral Oral Oral Oral   SpO2: 96% 96% 95% 98%   Weight:       Height:         Intake & Output (last day)         08/16 0701  08/17 0700 08/17 0701  08/18 0700    I.V. (mL/kg) 3136 (31.7)     Total Intake(mL/kg) 3136 (31.7)     Urine (mL/kg/hr) 780 600 (5.7)    Drains 140     Total Output 920 600    Net +2216 -600          Urine Unmeasured Occurrence 0 x             Physical Exam  Port palpable left chest  Drains OK    Pertinent labs/imaging:        Assessment:   1 Day Post-Op after port, bilateral mastectomies with expanders    Plan  Discharge today.  I will call with path.     Quyen Vidal MD  08/17/23  08:08 EDT

## 2023-08-17 NOTE — PROGRESS NOTES
Case Management Discharge Note      Final Note: Discharged home. Nikki Chambers RN         Selected Continued Care - Discharged on 8/17/2023 Admission date: 8/16/2023 - Discharge disposition: Home or Self Care         Transportation Services  Private: Car    Final Discharge Disposition Code: 01 - home or self-care

## 2023-08-17 NOTE — ANESTHESIA POSTPROCEDURE EVALUATION
"Patient: Palma Roblero    Procedure Summary       Date: 08/16/23 Room / Location: [facility] OR  / [facility] MAIN OR    Anesthesia Start: 1545 Anesthesia Stop: 2106    Procedures:       BREAST MASTECTOMY BILATERAL (Bilateral: Breast)      with port placement using fluoroscopy and ultrasound      BILATERAL PREPECTORAL PLACEMENT  TISSUE EXPANDER AND ALLODERM, ADJACENT LEFT BREAST TISSUE REARRANGEMENT (Bilateral: Breast) Diagnosis:       Malignant neoplasm of lower-outer quadrant of right breast of female, estrogen receptor positive      (Malignant neoplasm of lower-outer quadrant of right breast of female, estrogen receptor positive [C50.511, Z17.0])    Surgeons: Quyen Vidal MD; Favian Brooke MD Provider: Jerry Holman MD    Anesthesia Type: general ASA Status: 3            Anesthesia Type: general    Vitals  Vitals Value Taken Time   /75 08/16/23 2145   Temp 36.5 øC (97.7 øF) 08/16/23 2100   Pulse 73 08/16/23 2145   Resp 16 08/16/23 2145   SpO2 98 % 08/16/23 2145           Post Anesthesia Care and Evaluation    Patient location during evaluation: bedside  Level of consciousness: awake  Pain management: adequate    Airway patency: patent  Anesthetic complications: No anesthetic complications    Cardiovascular status: acceptable  Respiratory status: acceptable  Hydration status: acceptable    Comments: /75   Pulse 73   Temp 36.5 øC (97.7 øF) (Oral)   Resp 16   Ht 162.6 cm (64\")   Wt 98.9 kg (218 lb)   LMP  (LMP Unknown)   SpO2 98%   BMI 37.42 kg/mý       "

## 2023-08-21 ENCOUNTER — PATIENT OUTREACH (OUTPATIENT)
Dept: OTHER | Facility: HOSPITAL | Age: 58
End: 2023-08-21
Payer: COMMERCIAL

## 2023-08-21 LAB
LAB AP CASE REPORT: NORMAL
LAB AP DIAGNOSIS COMMENT: NORMAL
LAB AP SPECIAL STAINS: NORMAL
LAB AP SYNOPTIC CHECKLIST: NORMAL
PATH REPORT.FINAL DX SPEC: NORMAL
PATH REPORT.GROSS SPEC: NORMAL

## 2023-08-21 NOTE — PROGRESS NOTES
Called Ms. Roblero to see how she was doing. She stated she is recovering well from surgery and is doing well. She has no needs at this time. She was thankful for the call and will reach out if any questions or needs arise.

## 2023-08-31 ENCOUNTER — OFFICE VISIT (OUTPATIENT)
Dept: ONCOLOGY | Facility: CLINIC | Age: 58
End: 2023-08-31
Payer: COMMERCIAL

## 2023-08-31 ENCOUNTER — LAB (OUTPATIENT)
Dept: LAB | Facility: HOSPITAL | Age: 58
End: 2023-08-31
Payer: COMMERCIAL

## 2023-08-31 VITALS
TEMPERATURE: 98 F | HEART RATE: 85 BPM | BODY MASS INDEX: 36.19 KG/M2 | HEIGHT: 64 IN | OXYGEN SATURATION: 100 % | DIASTOLIC BLOOD PRESSURE: 73 MMHG | RESPIRATION RATE: 16 BRPM | SYSTOLIC BLOOD PRESSURE: 109 MMHG | WEIGHT: 212 LBS

## 2023-08-31 DIAGNOSIS — Z79.899 HIGH RISK MEDICATIONS (NOT ANTICOAGULANTS) LONG-TERM USE: ICD-10-CM

## 2023-08-31 DIAGNOSIS — Z17.0 MALIGNANT NEOPLASM OF LOWER-OUTER QUADRANT OF RIGHT BREAST OF FEMALE, ESTROGEN RECEPTOR POSITIVE: ICD-10-CM

## 2023-08-31 DIAGNOSIS — Z17.0 MALIGNANT NEOPLASM OF OVERLAPPING SITES OF RIGHT BREAST IN FEMALE, ESTROGEN RECEPTOR POSITIVE: Primary | ICD-10-CM

## 2023-08-31 DIAGNOSIS — R92.8 ABNORMALITY OF RIGHT BREAST ON SCREENING MAMMOGRAM: ICD-10-CM

## 2023-08-31 DIAGNOSIS — C50.511 MALIGNANT NEOPLASM OF LOWER-OUTER QUADRANT OF RIGHT BREAST OF FEMALE, ESTROGEN RECEPTOR POSITIVE: ICD-10-CM

## 2023-08-31 DIAGNOSIS — R92.8 ABNORMAL MAMMOGRAM OF RIGHT BREAST: ICD-10-CM

## 2023-08-31 DIAGNOSIS — C50.811 MALIGNANT NEOPLASM OF OVERLAPPING SITES OF RIGHT BREAST IN FEMALE, ESTROGEN RECEPTOR POSITIVE: Primary | ICD-10-CM

## 2023-08-31 LAB
ALBUMIN SERPL-MCNC: 3.9 G/DL (ref 3.5–5.2)
ALBUMIN/GLOB SERPL: 1.3 G/DL
ALP SERPL-CCNC: 73 U/L (ref 39–117)
ALT SERPL W P-5'-P-CCNC: 28 U/L (ref 1–33)
ANION GAP SERPL CALCULATED.3IONS-SCNC: 14.8 MMOL/L (ref 5–15)
AST SERPL-CCNC: 26 U/L (ref 1–32)
BASOPHILS # BLD AUTO: 0.04 10*3/MM3 (ref 0–0.2)
BASOPHILS NFR BLD AUTO: 0.5 % (ref 0–1.5)
BILIRUB SERPL-MCNC: 0.4 MG/DL (ref 0–1.2)
BUN SERPL-MCNC: 16 MG/DL (ref 8–23)
BUN/CREAT SERPL: 18 (ref 7–25)
CALCIUM SPEC-SCNC: 9 MG/DL (ref 8.6–10.5)
CHLORIDE SERPL-SCNC: 102 MMOL/L (ref 98–107)
CO2 SERPL-SCNC: 23.2 MMOL/L (ref 22–29)
CREAT SERPL-MCNC: 0.89 MG/DL (ref 0.6–1.1)
DEPRECATED RDW RBC AUTO: 43 FL (ref 37–54)
EGFRCR SERPLBLD CKD-EPI 2021: 74.3 ML/MIN/1.73
EOSINOPHIL # BLD AUTO: 0.12 10*3/MM3 (ref 0–0.4)
EOSINOPHIL NFR BLD AUTO: 1.6 % (ref 0.3–6.2)
ERYTHROCYTE [DISTWIDTH] IN BLOOD BY AUTOMATED COUNT: 13.6 % (ref 12.3–15.4)
GLOBULIN UR ELPH-MCNC: 3 GM/DL
GLUCOSE SERPL-MCNC: 101 MG/DL (ref 65–99)
HCT VFR BLD AUTO: 41.1 % (ref 34–46.6)
HGB BLD-MCNC: 12.9 G/DL (ref 12–15.9)
IMM GRANULOCYTES # BLD AUTO: 0.02 10*3/MM3 (ref 0–0.05)
IMM GRANULOCYTES NFR BLD AUTO: 0.3 % (ref 0–0.5)
LYMPHOCYTES # BLD AUTO: 1.98 10*3/MM3 (ref 0.7–3.1)
LYMPHOCYTES NFR BLD AUTO: 26.6 % (ref 19.6–45.3)
MCH RBC QN AUTO: 27 PG (ref 26.6–33)
MCHC RBC AUTO-ENTMCNC: 31.4 G/DL (ref 31.5–35.7)
MCV RBC AUTO: 86 FL (ref 79–97)
MONOCYTES # BLD AUTO: 0.51 10*3/MM3 (ref 0.1–0.9)
MONOCYTES NFR BLD AUTO: 6.9 % (ref 5–12)
NEUTROPHILS NFR BLD AUTO: 4.77 10*3/MM3 (ref 1.7–7)
NEUTROPHILS NFR BLD AUTO: 64.1 % (ref 42.7–76)
NRBC BLD AUTO-RTO: 0 /100 WBC (ref 0–0.2)
PLATELET # BLD AUTO: 264 10*3/MM3 (ref 140–450)
PMV BLD AUTO: 11.4 FL (ref 6–12)
POTASSIUM SERPL-SCNC: 4 MMOL/L (ref 3.5–5.2)
PROT SERPL-MCNC: 6.9 G/DL (ref 6–8.5)
RBC # BLD AUTO: 4.78 10*6/MM3 (ref 3.77–5.28)
SODIUM SERPL-SCNC: 140 MMOL/L (ref 136–145)
WBC NRBC COR # BLD: 7.44 10*3/MM3 (ref 3.4–10.8)

## 2023-08-31 PROCEDURE — 80053 COMPREHEN METABOLIC PANEL: CPT

## 2023-08-31 PROCEDURE — 36415 COLL VENOUS BLD VENIPUNCTURE: CPT

## 2023-08-31 PROCEDURE — 85025 COMPLETE CBC W/AUTO DIFF WBC: CPT

## 2023-08-31 NOTE — PROGRESS NOTES
Subjective   REASON FOR FOLLOWUP:   History of stage IIIA infiltrating ductal carcinoma of the right breast with 4 positive nodes, ER positive, HER-2/julián negative; treated with lumpectomy, status post 6 cycles of TAC. Received tamoxifen between February 2007 and March 2012. Femara was started in March 2012. Patient switched to Arimidex as of 06/21/2012 because of side effects with severe ankle joint pain with Femara.    10 years of adjuvant therapy completed  March 2017.    New diagnosis of carcinoma of the remaining right breast, needle biopsy 6/8/2023.  Tumor is HER2/julián positive and estrogen receptor positive/progesterone receptor negative    HISTORY OF PRESENT ILLNESS:     History of Present Illness Ms. Roblero is a 58 y.o.  female with the above noted history of stage IIIA carcinoma of the right breast, treated with lumpectomy, chemotherapy and radiation. She received extended hormonal therapy with 5 years of tamoxifen followed by 5 years of aromatase inhibitor.  She completed her 10 years of hormonal therapy in March 2017  .     She was seeing us annually had not been seen in January of this year but when she underwent her breast imaging in May 2023 she had suspicious findings and ultimately underwent ultrasound-guided biopsy on 6/8/2023.  There were 2 separate biopsies in 1 of these areas was positive for HER2/julián overexpression by FISH.  Both tumors were ER positive and MO negative.    She was referred to Dr. Vidal who had performed her previous lumpectomy surgery in 2007 and currently the plan is for her to undergo bilateral mastectomies with reconstruction.    We had her seen by cardiac oncology due to her previous treatment with 6 cycles of TAC chemotherapy in 2007.  Thankfully her ejection fraction still seems to be normal at 61% based on her echocardiogram from 6/19/2023.    Dr. Vidal has scheduled the patient for radiographic staging with CT scan of the chest abdomen pelvis and a  bone scan and the studies did not show any evidence of distant metastatic spread.    INTERVAL HISTORY:  Palma returns today having undergone bilateral mastectomies with implant reconstruction performed on 8/16/2023.  The tumor in the right breast was 3 x 1.5 x 1.2 cm.  Margins of resection were clear.  She also had placement of a left chest wall Mediport at the time of her surgery.    She returns today for postop reevaluation and to make a decision regarding postop adjuvant therapy.  She seems to be healing very well with no signs or symptoms of infection or wound dehiscence.    We recommended weekly Taxol and Herceptin for 12 weeks followed by continued Herceptin every 3 weeks for the remainder of the year of treatment.  She was estrogen receptor positive and will also be started on hormonal therapy once her chemotherapy is completed.    She had a baseline echocardiogram performed 6/19/2023 with an ejection fraction of 61.5%.  Past Medical History, Past Surgical History, Social History, Family History have been reviewed and are without significant changes except as mentioned.    Review of Systems   Constitutional:  Negative for activity change, appetite change, fatigue, fever and unexpected weight change.   HENT:  Negative for hearing loss, nosebleeds, trouble swallowing and voice change.    Eyes:  Negative for visual disturbance.   Respiratory:  Negative for cough, shortness of breath and wheezing.    Cardiovascular:  Negative for chest pain and palpitations.   Gastrointestinal:  Negative for abdominal pain, diarrhea, nausea and vomiting.   Genitourinary:  Negative for difficulty urinating, frequency, hematuria and urgency.   Musculoskeletal:  Negative for back pain and neck pain.   Skin:  Negative for rash.   Neurological:  Negative for dizziness, seizures, syncope and headaches.   Hematological:  Negative for adenopathy. Does not bruise/bleed easily.   Psychiatric/Behavioral:  Negative for behavioral problems.  "The patient is not nervous/anxious.     A comprehensive 14 point review of systems was performed and was negative except as mentioned.    Medications:  The current medication list was reviewed in the EMR    ALLERGIES:    Allergies   Allergen Reactions    Oxycodone Nausea And Vomiting       Objective      Vitals:    08/31/23 1532   BP: 109/73   Pulse: 85   Resp: 16   Temp: 98 øF (36.7 øC)   TempSrc: Temporal   SpO2: 100%   Weight: 96.2 kg (212 lb)   Height: 162.6 cm (64.02\")   PainSc: 0-No pain  Comment: No pain/ tight feeling in surgery area         8/31/2023     3:35 PM   Current Status   ECOG score 0       Physical Exam   Constitutional: She is oriented to person, place, and time. She appears well-developed. No distress.   HENT:   Head: Normocephalic.   Eyes: Pupils are equal, round, and reactive to light. Conjunctivae are normal. No scleral icterus.   Neck: No JVD present. No thyromegaly present.   Cardiovascular: Normal rate and regular rhythm. Exam reveals no gallop and no friction rub.   No murmur heard.  Pulmonary/Chest: Effort normal and breath sounds normal. She has no wheezes. She has no rales. Right breast exhibits no mass, no nipple discharge and no skin change. Left breast exhibits no mass, no nipple discharge and no skin change.   Bilateral mastectomies with implant reconstructions.  Right chest wall Mediport   Abdominal: Soft. Bowel sounds are normal. She exhibits no distension and no mass. There is no abdominal tenderness.   Musculoskeletal: Normal range of motion. No deformity.   Lymphadenopathy:     She has no cervical adenopathy.   Neurological: She is alert and oriented to person, place, and time. She has normal reflexes. No cranial nerve deficit.   Skin: Skin is warm and dry. No rash noted. No erythema.   Psychiatric: Her behavior is normal. Judgment normal.      RECENT LABS:  Hematology WBC   Date Value Ref Range Status   08/31/2023 7.44 3.40 - 10.80 10*3/mm3 Final   11/01/2021 5.9 3.4 - 10.8 " x10E3/uL Final     RBC   Date Value Ref Range Status   08/31/2023 4.78 3.77 - 5.28 10*6/mm3 Final   11/01/2021 5.14 3.77 - 5.28 x10E6/uL Final     Hemoglobin   Date Value Ref Range Status   08/31/2023 12.9 12.0 - 15.9 g/dL Final     Hematocrit   Date Value Ref Range Status   08/31/2023 41.1 34.0 - 46.6 % Final     Platelets   Date Value Ref Range Status   08/31/2023 264 140 - 450 10*3/mm3 Final            Lab Results   Component Value Date    GLUCOSE 99 08/09/2023    BUN 13 08/09/2023    CREATININE 0.84 08/09/2023    EGFRRESULT 91.8 11/30/2022    EGFR 79.7 08/09/2023    BCR 15.5 08/09/2023    K 3.9 08/09/2023    CO2 26.8 08/09/2023    CALCIUM 9.1 08/09/2023    PROTENTOTREF 6.6 11/30/2022    ALBUMIN 4.1 07/03/2023    BILITOT 0.6 07/03/2023    AST 14 07/03/2023    ALT 18 07/03/2023     PATHOLOGY 8/16/2023  Final Diagnosis   1. Right Breast, Oriented Simple Skin Sparing Mastectomy (1,097 Grams): INVASIVE MAMMARY CARCINOMA,       NO SPECIAL TYPE (INVASIVE DUCTAL CARCINOMA).  A. Tumor site: Lower outer quadrant.               B. Histologic grade: Perez histologic score III (tubules = 3, nuclei = 2, mitosis = 3).               C. Tumor size: 30 x 15 x 12 mm.               D. Tumor focality: Single focus.               E. Ductal Carcinoma in-situ (DCIS): Not identified.               F. No lobular neoplasia identified.               G. Overlying skin and nipple are uninvolved by tumor.               H. No definitive lymphovascular space invasion identified.               I. Focal perineural invasion is present.  J. Margins: Uninvolved by invasive and in-situ carcinoma.               1. Invasive carcinoma comes to within 7 mm of the anterior margin, 40 mm from the inferior margin,      60 mm from the posterior and lateral margins, 80 mm from the medial margin and 130 mm from the       superior margin of resection.               K. Lymph Nodes: Not submitted.  L. Hormone receptor status: ER positive, MS negative, HER2  positive by FISH (right medial biopsy),       Ki-67 35-40%  (performed on prior biopsy [id number]).  M. Pathologic stage: pT2, NX.     2. Left Breast, Oriented Simple Skin Sparing Mastectomy (1,593 Grams):   A. Benign unremarkable breast tissue, skin and nipple.     PATHOLOGY 6/8/2023  Final Diagnosis   1. Breast, Right Lateral 6:30 Position, 3 cm FN, Core Biopsy:               A. Invasive mammary carcinoma, no special type (ductal), Perez histologic grade II (tubules = 3, nuclei = 2,        mitoses = 2) measuring 6 mm maximally and involving four core fragments.  B. No definitive in-situ carcinoma is identified.  C. No definitive lymphovascular space invasion identified.     2. Breast, Right Medial 6:30 Position, 3 cm FN, Core Biopsy:                A. Invasive mammary carcinoma, no special type (ductal), [place] histologic grade II (tubules = 3, nuclei = 2,        mitoses = 1) measuring 10 mm maximally and involving three core fragments.  B. No definitive in-situ carcinoma is identified.  C. No definitive lymphovascular space invasion identified.     CT CHEST, ABDOMEN, AND PELVIS WITHOUT CONTRAST. 7/21/2023  FINDINGS:  CHEST: Right breast findings discussed on breast MRI 06/17/2023. There  is a cluster of nodular densities at the right axilla in close proximity  to a cluster of surgical clips and some of these may represent vessels  or lymphatics. Characterization is limited in the absence of IV  contrast. There is no definitive pathologically enlarged node present.  There are no pathologically enlarged nodes at the left axilla and there  is no subpectoral lymphadenopathy, and there is no lymphadenopathy at  the mediastinum or jovita given constraints of noncontrasted technique.  The nodularity along the pleura at the dependent aspect of the lower  chest is likely related to dependent atelectatic change. No definite  suspicious pulmonary nodules are seen. There are no pleural or  pericardial effusions.      ABDOMEN/PELVIS: Noncontrasted liver appears unremarkable. The  gallbladder appears unremarkable and there is no biliary dilatation.  Splenic size is normal. Noncontrasted pancreas, adrenals, and kidneys  appear unremarkable. There is no acute bowel abnormality. Appendix  appears within normal limits. The uterus is slightly lobular in contour  likely secondary to small fibroids. Noncontrasted adnexa appear  unremarkable. There is no free fluid or lymphadenopathy. There are mild  abdominal aortic atherosclerotic changes throughout the abdominal aorta  without aneurysmal dilatation. No suspicious bone lesion is seen.     IMPRESSION:  Given constraints of noncontrasted technique, there is no  convincing evidence for metastatic disease within the chest, abdomen, or  pelvis.      Narrative & Impression   NUCLEAR MEDICINE WHOLE BODY BONE SCAN 7/14/2023     HISTORY: Breast cancer. Evaluate for metastatic disease.     TECHNIQUE:  19.1 mCi of 99m technetium MDP was administered intravenous  followed by 3 hour delayed phase whole body imaging with selected spot  views.     COMPARISON: None.     FINDINGS:  There is mild increased uptake at the right sternoclavicular  joint that is attributed to arthritis. Arthritic uptake is present in  both shoulders, knees, and mid feet.  There is also mild increased  lumbar spine uptake that is most likely degenerative/arthritic. Both  kidneys and the urinary bladder are visualized.     IMPRESSION:  Areas of mild uptake are typical for arthritis/degenerative  change and there is no scintigraphic evidence to suggest bony metastatic  disease.        MAMMO SCREENING DIGITAL TOMOSYNTHESIS BILATERAL W CAD- 4/14/2023   IMPRESSION:  1. There is an area of focal asymmetry in the middle third retroareolar  region of the right breast. Further evaluation with spot compression CC  and MLO mammographic and Tomosynthesis images and targeted right breast  sonography is recommended.  2. There are no  findings suspicious for malignancy in the left breast.     BI-RADS Category 0: Incomplete. Needs additional imaging evaluation.    EXAMINATIONS: UNILATERAL RIGHT DIGITAL DIAGNOSTIC MAMMOGRAPHY WITH  TOMOSYNTHESIS AND LIMITED RIGHT BREAST SONOGRAPHY 5/17/2023  IMPRESSION:  There is an irregular 1 cm hypoechoic mass with internal peripheral  vascularity that is seen in the right breast at the 6:30 position on the  order of 3 cm from the nipple. It is immediately adjacent to a 1.9 cm  oval circumscribed hypoechoic mass. Together both lesions measure on the  order 2.7 cm in greatest dimension. Ultrasound guided biopsy of both  lesions is recommended. Given the close proximity of both lesions, both  lesions may be able to be sampled and submitted as one specimen.     BI-RADS Category 4: Suspicious abnormality or suspicious finding. Biopsy  should be considered.    MRI BREAST BILATERAL W WO CONTRAST-  6/17/2023  IMPRESSION AND RECOMMENDATION:     1.  Adjacent irregular enhancing masses each measuring 1.6 cm at 6:00 to  7:00 in the middle right breast represent the 2 sites of biopsy-proven  malignancy. Adjacent suspicious enhancing foci and areas of nonmass  enhancement are identified, in addition to an irregular enhancing mass  and multiple additional enhancing foci centered at 2:00 to 3:00 in the  right breast, which are also suspicious. The 2 sites of biopsy-proven  malignancy and suspicious areas of enhancement together measure up to  approximately 8.2 cm in maximum dimension, as detailed above. Surgical  management is recommended.  2.  No MRI evidence of malignancy in the left breast.     BI-RADS Category 4: Suspicious      Assessment & Plan     Stage IIIA infiltrating ductal carcinoma of the right breast treated with a lumpectomy, radiation, 6 cycles of TAC chemotherapy and ongoing hormonal therapy. She completed 10 years of adjuvant hormonal therapy with 5 years of tamoxifen followed by 5 years of Arimidex which she  completed in 2017.  Carcinoma of the remaining right breast.  Tumor was positive for HER2/julián overexpression and positive for estrogen receptors and negative for progesterone receptors.  Bilateral mastectomies with immediate implant reconstruction 8/16/2023.  Mediport placement 8/16/2023  Echocardiogram from 6/19/2023 shows normal left ventricular ejection fraction of 61% (patient previously had 6 cycles of Adriamycin containing chemotherapy and will be needing Herceptin therapy postoperatively).  Plan for postop adjuvant treatment with weekly Taxol and Herceptin x12 weeks followed by continued single agent Herceptin every 3 weeks for completion of 12 months of therapy total.  Plan for hormonal therapy possibly with Aromasin to begin once Taxol is completed.        PLAN:     We will plan to initiate therapy with Taxol and Herceptin 2 weeks from tomorrow at the [place] office.  She will undergo chemotherapy education next week  Nurse practitioner appointment with lab and first Taxol Herceptin treatment on Friday, 9/15/2023 at [place].  Lab plus second cycle of weekly Taxol Herceptin 9/22/2023  MD follow-up with lab and third cycle of weekly Taxol and Herceptin 9/29/2023  We discussed today that we may need to stop Taxol early if she starts developing any severe neuropathy issues.  Once the 12 weeks of weekly Taxol and Herceptin are completed we would plan to initiate hormonal therapy with Aromasin and continue single agent Herceptin every 3 weeks for the remainder of the year.              8/31/2023      CC:

## 2023-09-05 ENCOUNTER — TELEPHONE (OUTPATIENT)
Dept: ONCOLOGY | Facility: CLINIC | Age: 58
End: 2023-09-05

## 2023-09-05 NOTE — TELEPHONE ENCOUNTER
Caller: Palma Roblero    Relationship: Self    Best call back number: [phone]     What is the best time to reach you: ASAP    Who are you requesting to speak with (clinical staff, provider,  specific staff member): SCHEDULING        What was the call regarding: PT HAS CHEMO ED APPT TOMORROW AT 11:00, SHE SAYS SOMEONE WAS GOING TO SEND HER A LINK FOR THIS BUT SHE HAS NOT RECEIVED IT YET, PLEASE CALL PT TO ADVISE HOW SHE WILL RECEIVE LINK AND DO THE ONLINE CHEMO ED VISIT TOMORROW MORNING.

## 2023-09-06 ENCOUNTER — TELEMEDICINE (OUTPATIENT)
Dept: ONCOLOGY | Facility: CLINIC | Age: 58
End: 2023-09-06
Payer: COMMERCIAL

## 2023-09-06 DIAGNOSIS — Z17.0 MALIGNANT NEOPLASM OF OVERLAPPING SITES OF RIGHT BREAST IN FEMALE, ESTROGEN RECEPTOR POSITIVE: Primary | ICD-10-CM

## 2023-09-06 DIAGNOSIS — C50.811 MALIGNANT NEOPLASM OF OVERLAPPING SITES OF RIGHT BREAST IN FEMALE, ESTROGEN RECEPTOR POSITIVE: Primary | ICD-10-CM

## 2023-09-06 PROBLEM — Z45.2 ENCOUNTER FOR ADJUSTMENT OR MANAGEMENT OF VASCULAR ACCESS DEVICE: Status: ACTIVE | Noted: 2023-09-06

## 2023-09-06 RX ORDER — LIDOCAINE AND PRILOCAINE 25; 25 MG/G; MG/G
CREAM TOPICAL
Qty: 30 G | Refills: 2 | Status: SHIPPED | OUTPATIENT
Start: 2023-09-06

## 2023-09-06 RX ORDER — DEXAMETHASONE 4 MG/1
8 TABLET ORAL 2 TIMES DAILY WITH MEALS
Qty: 4 TABLET | Refills: 0 | Status: SHIPPED | OUTPATIENT
Start: 2023-09-06

## 2023-09-06 NOTE — PROGRESS NOTES
[facility] Hematology/Oncology Treatment Plan Summary    Name: Palma Roblero  Acct# [id number]  MD: Dr. Solares    Diagnosis:     ICD-10-CM ICD-9-CM   1. Malignant neoplasm of overlapping sites of right breast in female, estrogen receptor positive  C50.811 174.8    Z17.0 V86.0       Goal of treatment: curative intent    Treatment Medication(s):   Paclitaxel  Trastuzumab     Frequency: weekly infusion    Number of cycles: 12 weeks/cycles    Starting on: 9/15/2023    Items for home use: Senokot-S (for constipation), Imodium AD (for diarrhea), and Thermometer    Rx written for: [x] Nausea    [x] Pre-Treatment   Dexamethasone 4mg tabs. 2 tabs twice daily on the day prior to starting paclitaxel (9/14/2023)  Ondansetron 8 mg by mouth every 8 hours as needed for nausea    Completing Provider: ZAN Cannon             Date/time: 09/06/2023      Please note: You will be seen by a provider frequently with your treatment plan. This plan may change depending on many factors, if so, this will be discussed with you by your physician.  Last update 03/2022.

## 2023-09-06 NOTE — PROGRESS NOTES
TREATMENT  PREPARATION    Palma Roblero  [id number]  1965    Chief Complaint: Treatment preparation and needs assessment    History of present illness:  Palma Roblero is a 58 y.o. year old female who is here today for treatment preparation and needs assessment.  The patient has been diagnosed with   Encounter Diagnosis   Name Primary?    Malignant neoplasm of overlapping sites of right breast in female, estrogen receptor positive Yes    and is scheduled to begin treatment with:     Oncology History:    Oncology/Hematology History Overview Note   1. Stage IIIA infiltrating ductal carcinoma of the right breast with 4 positive     nodes, ER positive, HER-2/julián negative .Right lumpectomy 2006.  2. 6 cycles of TAC.Completed 2007.  3. tamoxifen between February 2007 and March 2012.  4. Femara was started in March 2012.  5. Patient switched to Arimidex as of 06/21/2012 because of side effects with     severe ankle joint pain with Femara.    She was found to have a new breast cancer in the right breast on imaging studies in May 2023.  Tumor appeared to be multifocal with one of the tumors biopsied being positive for HER2/julián overexpression by FISH.    Patient planning to undergo bilateral mastectomy with reconstruction.     Malignant neoplasm of right female breast   4/15/2016 Initial Diagnosis    Malignant neoplasm of right female breast     Malignant neoplasm of overlapping sites of right breast in female, estrogen receptor positive   7/3/2023 Initial Diagnosis    Malignant neoplasm of overlapping sites of right breast in female, estrogen receptor positive     9/15/2023 -  Chemotherapy    OP BREAST PACLitaxel / Trastuzumab (Weekly x 12) then Trastuzumab every 21 days)            The current medication list and allergy list were reviewed and reconciled.     Past Medical History, Past Surgical History, Social History, Family History have been reviewed and are without significant changes except as  mentioned.    Physical Exam:    There were no vitals filed for this visit.  Vitals:    09/06/23 0936   PainSc: 0-No pain                      Physical Exam  HENT:      Head: Normocephalic and atraumatic.   Eyes:      Extraocular Movements: Extraocular movements intact.      Conjunctiva/sclera: Conjunctivae normal.   Pulmonary:      Effort: Pulmonary effort is normal. No respiratory distress.   Neurological:      General: No focal deficit present.      Mental Status: She is alert and oriented to person, place, and time.   Psychiatric:         Mood and Affect: Mood normal.         Behavior: Behavior normal.         NEEDS ASSESSMENTS    Genetics  The patient's new diagnosis and family history have been reviewed for genetic counseling needs. The patient will not be referred..     Psychosocial and Barriers to care  The patient has completed a PHQ-9 Depression Screening and the Distress Thermometer (DT) today.  PHQ-9 results show PHQ-2 Total Score: 0 PHQ-9 Total Score: PHQ-9 Total Score: 0     The patient scored their distress today as Distress Level: 1 on a scale of 0-10 with 0 being no distress and 10 being extreme distress. Problems marked by the patient as being an issue for them within the last week include   .      Results were reviewed along with psychosocial resources offered by our cancer center.  Our Supportive Oncology team will be flagged for a score of 4 or above, and a same day call will be made for a score of 9 or 10.  A mental health referral is offered at that time. Patients who score less than 4 have been educated on our support services and can be referred to our  upon request.  The patient will not be referred to our .       Nutrition  The patient has completed the malnutrition screening today. They scored Malnutrition Screening Tool  Have you recently lost weight without trying?  If yes, how much weight have you lost?: 0--> No  Have you been eating poorly because of a  decreased appetite?: 0--> No  MST score: 0   with a score of 0-1 meaning not at risk in a score of 2 or greater meaning at risk.  Patients with a score of 3 or higher will be referred to our oncology dietitian for support. Patients beginning at risk treatment regimens or who have dietary concerns will also be referred to our oncology dietitian. The patient will not be referred.    Functional Assessment  Persons who are age 70 or greater will be screened for qualification of a comprehensive geriatric assessment by our survivorship nurse practitioner.  Older adults with cancer face unique challenges. These may include an increased risk of drug reactions, financial burdens, and caregiver stress. The patient scored   . Patients scoring 14 or lower will referred for an older adult functional assessment with the survivorship advanced practice registered nurse to ensure all needed support is provided as patients plan for their treatments. NOT APPLICABLE    Intravenous Access Assessment  The patient and I discussed planned intravenous chemo/biotherapy as well as other IV treatments that are often needed throughout the course of treatment. These may include, but are not limited to blood transfusions, antibiotics, and IV hydration. Discussed that depending on selected treatment and vein assessment, patient may require venous access device (VAD) which could include but not limited to a Mediport or PICC line. Risks and benefits of VADs reviewed. The patient will be treated via Port.    Reproductive/Sexual Activity   People should avoid becoming pregnant and should not get a partner pregnant while undergoing chemo/biotherapy.  People of childbearing age should use effective contraception during active therapy. The best recommendation for all people is to use a barrier method for a minimum of 1 week after the last infusion of chemo/biotherapy to prevent your partner being exposed to byproducts from treatment medications in bodily  "fluids. Effective contraception should be discussed with your oncology team to make sure it is safe to take based on your diagnosis. Possible options include oral contraceptives, barrier methods. Chemo/biotherapy can change your ability to reproduce children in the future.  There are options for fertility preservation. NOT APPLICABLE    Advanced Care Planning  Advance Care Planning   The patient and I discussed advanced care planning, \"Conversations that Matter\".   This service is offered for development of advance directives with a certified ACP facilitator.  The patient does not have an up-to-date advanced directive. This document is not on file with our office. The patient is not interested in an appointment with one of our facilitators to create or update their advanced directives.    Have you reviewed your Advance Directive and is it valid for this stay?: Yes          Smoking cessation  Tobacco Use: Low Risk     Smoking Tobacco Use: Never    Smokeless Tobacco Use: Never    Passive Exposure: Not on file       Patient and I discussed their tobacco use history. Referral will not be made for smoking cessation.      Palliative Care  When appropriate, the patient and I discussed the availability palliative care services and when appropriate Hospice care. Palliative care is not the same as Hospice care which was explained to the patient.NOT APPLICABLE.    Survivorship   When appropriate, we discussed that we will refer the patient to survivorship clinic to discuss next steps following completion of planned treatment.  Reviewed this visit will include assessment of your physical, psychological, functional, and spiritual needs as a survivor and the need at attend this visit when scheduled.    TREATMENT EDUCATION    Today I met with the patient to discuss the chemo/biotherapy regimen recommended for treatment of Malignant neoplasm of overlapping sites of right breast in female, estrogen receptor positive  - Provider " communication  - lidocaine-prilocaine (EMLA) 2.5-2.5 % cream  - dexAMETHasone (DECADRON) 4 MG tablet  .  The patient was given explanation of treatment premed side effects including office policy that prohibits patients to drive if sedating medications are administered, MD explanation given regarding benefits, side effects, toxicities and goals of treatment.  The patient received a Chemotherapy/Biotherapy Plan Summary including diagnosis and explanation of specific treatment plan.    SIDE EFFECTS:  Common side effects were discussed with the patient and/or significant other.  Discussion included where applicable hair loss/discoloration, anemia/fatigue, infection/chills/fever, appetite, bleeding risk/precautions, constipation, diarrhea, mouth sores, taste alteration, loss of appetite, nausea/vomiting, peripheral neuropathy, skin/nail changes, rash, muscle aches/weakness, photosensitivity, weight gain/loss, hearing loss, dizziness, menopausal symptoms, menstrual irregularity, sterility, high blood pressure, heart damage, liver damage, lung damage, kidney damage, DVT/PE risk, fluid retention, pleural/pericardial effusion, somnolence, electrolyte/LFT imbalance, vein exercises and/or the possible need for vascular access/port placement.  The patient was advised that although uncommon, leakage of an infused medication from the vein or venous access device may lead to skin breakdown and/or other tissue damage.  The patient was advised that he/she may have pain, bleeding, and/or bruising from the insertion of a needle in their vein or venous access device (port).  The patient was further advised that, in spite of proper technique, infection with redness and irritation may rarely occur at the site where the needle was inserted.  The patient was advised that if complications occur, additional medical treatment is available.  Finally, where applicable we have reviewed rare but potential immune mediated side effects including  "shortness of breath, cough, chest pain (pneumonitis), abdominal pain, diarrhea (colitis), thyroiditis (hypothyroid or hyperthyroid), hepatitis and liver dysfunction, nephritis and renal dysfunction.    Discussion also included side effects specific to drugs in the treatment plan, specifically:    Treatment Plans       Name Type Plan Dates Plan Provider         Active    OP BREAST PACLitaxel / Trastuzumab (Weekly x 12) then Trastuzumab every 21 days)  ONCOLOGY TREATMENT  9/6/2023 - Present Weston Solares MD                      Questions answered and additional information discussed on topics including:  Anemia, Thrombocytopenia, Neutropenia, Nutrition and appetite changes, Constipation, Diarrhea, Nausea & vomiting, Mouth sores, Alopecia, Nervous system changes, Pain, Skin & nail changes, Organ toxicities, Home care, and Hand/Foot Cooling       Assessment and Plan:    Diagnoses and all orders for this visit:    1. Malignant neoplasm of overlapping sites of right breast in female, estrogen receptor positive (Primary)  -     Provider communication  -     lidocaine-prilocaine (EMLA) 2.5-2.5 % cream; Apply nickel-sized amount to port site 30 minutes before being accessed. May reapply as needed.  Dispense: 30 g; Refill: 2  -     dexAMETHasone (DECADRON) 4 MG tablet; Take 2 tablets by mouth 2 (Two) Times a Day With Meals. Take a day prior to first treatment.  Dispense: 4 tablet; Refill: 0      Orders Placed This Encounter   Procedures    Provider communication     Go to \"Add Orders\" to place order for test to assess Left Ventricular Ejection Fraction every 3 months while on trastuzumab or pertuzumab.    Associate one of the following ICD-10 codes to LVEF study:  Z01.818 - Examination prior to chemotherapy  or  Z51.11 - Patient on antineoplastic chemotherapy regimen / encounter for chemotherapy management         The patient and I have reviewed their diagnosis and scheduled treatment plan. Needs assessment was completed " where applicable including genetics, psychosocial needs, barriers to care, VAD evaluation, advanced care planning, survivorship, and palliative care services where indicated. Referrals have been ordered as appropriate based upon evaluation today and patient desires.   Chemo/biotherapy teaching was completed today and consent obtained. See separate documentation for further details.  Adequate time was given to answer questions.  Patient made aware of their care team members and contact information if they have questions or problems throughout the treatment course.  Discussion held and written information provided describing frequency of office visits and ongoing monitoring throughout the treatment plan.     Reviewed with patient any prescribed medication sent to pharmacy.  Education provided regarding proper storage, safe handling, and proper disposal of unused medication.  Proper handling of body fluids and waste discussed and written information provided.  If appropriate, patient had pretreatment labs drawn today.    Learning assessment completed at initial patient encounter. See separate flowsheet. Chemo/biotherapy education comprehension assessed at today's visit.    I spent 50 minutes caring for Palma on this date of service. This time includes time spent by me in the following activities: preparing for the visit, obtaining and/or reviewing a separately obtained history, performing a medically appropriate examination and/or evaluation, counseling and educating the patient/family/caregiver, ordering medications, tests, or procedures, referring and communicating with other health care professionals, documenting information in the medical record, and care coordination.     Carina Tiwari, APRN   09/06/23      You have chosen to receive care through a telehealth visit.  Do you consent to use a video/audio connection for your medical care today? Yes

## 2023-09-13 ENCOUNTER — TELEPHONE (OUTPATIENT)
Dept: CARDIOLOGY | Facility: CLINIC | Age: 58
End: 2023-09-13
Payer: COMMERCIAL

## 2023-09-15 ENCOUNTER — INFUSION (OUTPATIENT)
Dept: ONCOLOGY | Facility: HOSPITAL | Age: 58
End: 2023-09-15
Payer: COMMERCIAL

## 2023-09-15 ENCOUNTER — APPOINTMENT (OUTPATIENT)
Dept: ONCOLOGY | Facility: HOSPITAL | Age: 58
End: 2023-09-15
Payer: COMMERCIAL

## 2023-09-15 ENCOUNTER — OFFICE VISIT (OUTPATIENT)
Dept: ONCOLOGY | Facility: CLINIC | Age: 58
End: 2023-09-15
Payer: COMMERCIAL

## 2023-09-15 VITALS
HEART RATE: 90 BPM | HEIGHT: 64 IN | TEMPERATURE: 98.2 F | DIASTOLIC BLOOD PRESSURE: 78 MMHG | RESPIRATION RATE: 18 BRPM | SYSTOLIC BLOOD PRESSURE: 129 MMHG | WEIGHT: 214.7 LBS | BODY MASS INDEX: 36.66 KG/M2 | OXYGEN SATURATION: 100 %

## 2023-09-15 VITALS — HEART RATE: 77 BPM | SYSTOLIC BLOOD PRESSURE: 121 MMHG | DIASTOLIC BLOOD PRESSURE: 75 MMHG

## 2023-09-15 DIAGNOSIS — C50.811 MALIGNANT NEOPLASM OF OVERLAPPING SITES OF RIGHT BREAST IN FEMALE, ESTROGEN RECEPTOR POSITIVE: Primary | ICD-10-CM

## 2023-09-15 DIAGNOSIS — Z17.0 MALIGNANT NEOPLASM OF OVERLAPPING SITES OF RIGHT BREAST IN FEMALE, ESTROGEN RECEPTOR POSITIVE: Primary | ICD-10-CM

## 2023-09-15 DIAGNOSIS — Z79.899 HIGH RISK MEDICATIONS (NOT ANTICOAGULANTS) LONG-TERM USE: ICD-10-CM

## 2023-09-15 DIAGNOSIS — C50.811 MALIGNANT NEOPLASM OF OVERLAPPING SITES OF RIGHT BREAST IN FEMALE, ESTROGEN RECEPTOR POSITIVE: ICD-10-CM

## 2023-09-15 DIAGNOSIS — Z17.0 MALIGNANT NEOPLASM OF OVERLAPPING SITES OF RIGHT BREAST IN FEMALE, ESTROGEN RECEPTOR POSITIVE: ICD-10-CM

## 2023-09-15 DIAGNOSIS — Z45.2 ENCOUNTER FOR ADJUSTMENT OR MANAGEMENT OF VASCULAR ACCESS DEVICE: ICD-10-CM

## 2023-09-15 LAB
ALBUMIN SERPL-MCNC: 4.1 G/DL (ref 3.5–5.2)
ALBUMIN/GLOB SERPL: 1.2 G/DL
ALP SERPL-CCNC: 76 U/L (ref 39–117)
ALT SERPL W P-5'-P-CCNC: 17 U/L (ref 1–33)
ANION GAP SERPL CALCULATED.3IONS-SCNC: 10.3 MMOL/L (ref 5–15)
AST SERPL-CCNC: 20 U/L (ref 1–32)
BASOPHILS # BLD AUTO: 0.02 10*3/MM3 (ref 0–0.2)
BASOPHILS NFR BLD AUTO: 0.2 % (ref 0–1.5)
BILIRUB SERPL-MCNC: 0.4 MG/DL (ref 0–1.2)
BUN SERPL-MCNC: 19 MG/DL (ref 8–23)
BUN/CREAT SERPL: 23.5 (ref 7–25)
CALCIUM SPEC-SCNC: 9.3 MG/DL (ref 8.6–10.5)
CHLORIDE SERPL-SCNC: 105 MMOL/L (ref 98–107)
CO2 SERPL-SCNC: 20.7 MMOL/L (ref 22–29)
CREAT SERPL-MCNC: 0.81 MG/DL (ref 0.57–1)
DEPRECATED RDW RBC AUTO: 40.7 FL (ref 37–54)
EGFRCR SERPLBLD CKD-EPI 2021: 83.2 ML/MIN/1.73
EOSINOPHIL # BLD AUTO: 0 10*3/MM3 (ref 0–0.4)
EOSINOPHIL NFR BLD AUTO: 0 % (ref 0.3–6.2)
ERYTHROCYTE [DISTWIDTH] IN BLOOD BY AUTOMATED COUNT: 13.6 % (ref 12.3–15.4)
GLOBULIN UR ELPH-MCNC: 3.3 GM/DL
GLUCOSE SERPL-MCNC: 199 MG/DL (ref 65–99)
HCT VFR BLD AUTO: 37.8 % (ref 34–46.6)
HGB BLD-MCNC: 12.2 G/DL (ref 12–15.9)
IMM GRANULOCYTES # BLD AUTO: 0.06 10*3/MM3 (ref 0–0.05)
IMM GRANULOCYTES NFR BLD AUTO: 0.5 % (ref 0–0.5)
LYMPHOCYTES # BLD AUTO: 1.31 10*3/MM3 (ref 0.7–3.1)
LYMPHOCYTES NFR BLD AUTO: 10.4 % (ref 19.6–45.3)
MCH RBC QN AUTO: 26.7 PG (ref 26.6–33)
MCHC RBC AUTO-ENTMCNC: 32.3 G/DL (ref 31.5–35.7)
MCV RBC AUTO: 82.7 FL (ref 79–97)
MONOCYTES # BLD AUTO: 0.36 10*3/MM3 (ref 0.1–0.9)
MONOCYTES NFR BLD AUTO: 2.9 % (ref 5–12)
NEUTROPHILS NFR BLD AUTO: 10.8 10*3/MM3 (ref 1.7–7)
NEUTROPHILS NFR BLD AUTO: 86 % (ref 42.7–76)
NRBC BLD AUTO-RTO: 0 /100 WBC (ref 0–0.2)
PLATELET # BLD AUTO: 214 10*3/MM3 (ref 140–450)
PMV BLD AUTO: 12.2 FL (ref 6–12)
POTASSIUM SERPL-SCNC: 3.4 MMOL/L (ref 3.5–5.2)
PROT SERPL-MCNC: 7.4 G/DL (ref 6–8.5)
RBC # BLD AUTO: 4.57 10*6/MM3 (ref 3.77–5.28)
SODIUM SERPL-SCNC: 136 MMOL/L (ref 136–145)
WBC NRBC COR # BLD: 12.55 10*3/MM3 (ref 3.4–10.8)

## 2023-09-15 PROCEDURE — 25010000002 HEPARIN LOCK FLUSH PER 10 UNITS: Performed by: INTERNAL MEDICINE

## 2023-09-15 PROCEDURE — 25010000002 PACLITAXEL PER 1 MG: Performed by: INTERNAL MEDICINE

## 2023-09-15 PROCEDURE — 96375 TX/PRO/DX INJ NEW DRUG ADDON: CPT

## 2023-09-15 PROCEDURE — 25010000002 TRASTUZUMAB-QYYP 420 MG RECONSTITUTED SOLUTION 1 EACH VIAL: Performed by: INTERNAL MEDICINE

## 2023-09-15 PROCEDURE — 96417 CHEMO IV INFUS EACH ADDL SEQ: CPT

## 2023-09-15 PROCEDURE — 25010000002 DEXAMETHASONE SODIUM PHOSPHATE 100 MG/10ML SOLUTION: Performed by: INTERNAL MEDICINE

## 2023-09-15 PROCEDURE — 96415 CHEMO IV INFUSION ADDL HR: CPT

## 2023-09-15 PROCEDURE — 80053 COMPREHEN METABOLIC PANEL: CPT | Performed by: INTERNAL MEDICINE

## 2023-09-15 PROCEDURE — 96376 TX/PRO/DX INJ SAME DRUG ADON: CPT

## 2023-09-15 PROCEDURE — 85025 COMPLETE CBC W/AUTO DIFF WBC: CPT | Performed by: INTERNAL MEDICINE

## 2023-09-15 PROCEDURE — 25010000002 DIPHENHYDRAMINE PER 50 MG: Performed by: NURSE PRACTITIONER

## 2023-09-15 PROCEDURE — 25010000002 HYDROCORTISONE SOD SUC (PF) 100 MG RECONSTITUTED SOLUTION: Performed by: INTERNAL MEDICINE

## 2023-09-15 PROCEDURE — 96413 CHEMO IV INFUSION 1 HR: CPT

## 2023-09-15 RX ORDER — SODIUM CHLORIDE 9 MG/ML
250 INJECTION, SOLUTION INTRAVENOUS ONCE
OUTPATIENT
Start: 2023-09-29

## 2023-09-15 RX ORDER — SODIUM CHLORIDE 9 MG/ML
250 INJECTION, SOLUTION INTRAVENOUS ONCE
Status: COMPLETED | OUTPATIENT
Start: 2023-09-15 | End: 2023-09-15

## 2023-09-15 RX ORDER — MONTELUKAST SODIUM 10 MG/1
10 TABLET ORAL ONCE
OUTPATIENT
Start: 2023-09-22 | End: 2023-09-22

## 2023-09-15 RX ORDER — DIPHENHYDRAMINE HYDROCHLORIDE 50 MG/ML
50 INJECTION INTRAMUSCULAR; INTRAVENOUS AS NEEDED
OUTPATIENT
Start: 2023-09-29

## 2023-09-15 RX ORDER — HEPARIN SODIUM (PORCINE) LOCK FLUSH IV SOLN 100 UNIT/ML 100 UNIT/ML
500 SOLUTION INTRAVENOUS AS NEEDED
Status: DISCONTINUED | OUTPATIENT
Start: 2023-09-15 | End: 2023-09-15 | Stop reason: HOSPADM

## 2023-09-15 RX ORDER — FAMOTIDINE 10 MG/ML
20 INJECTION, SOLUTION INTRAVENOUS ONCE
Status: COMPLETED | OUTPATIENT
Start: 2023-09-15 | End: 2023-09-15

## 2023-09-15 RX ORDER — SODIUM CHLORIDE 0.9 % (FLUSH) 0.9 %
10 SYRINGE (ML) INJECTION AS NEEDED
OUTPATIENT
Start: 2023-09-15

## 2023-09-15 RX ORDER — MONTELUKAST SODIUM 10 MG/1
10 TABLET ORAL ONCE
Status: COMPLETED | OUTPATIENT
Start: 2023-09-15 | End: 2023-09-15

## 2023-09-15 RX ORDER — FAMOTIDINE 10 MG/ML
20 INJECTION, SOLUTION INTRAVENOUS ONCE
OUTPATIENT
Start: 2023-09-22

## 2023-09-15 RX ORDER — FAMOTIDINE 10 MG/ML
20 INJECTION, SOLUTION INTRAVENOUS ONCE
Status: CANCELLED | OUTPATIENT
Start: 2023-09-15

## 2023-09-15 RX ORDER — SODIUM CHLORIDE 9 MG/ML
250 INJECTION, SOLUTION INTRAVENOUS ONCE
Status: CANCELLED | OUTPATIENT
Start: 2023-09-15

## 2023-09-15 RX ORDER — SODIUM CHLORIDE 9 MG/ML
250 INJECTION, SOLUTION INTRAVENOUS ONCE
OUTPATIENT
Start: 2023-09-22

## 2023-09-15 RX ORDER — POTASSIUM CHLORIDE 20 MEQ/1
20 TABLET, EXTENDED RELEASE ORAL ONCE
Status: COMPLETED | OUTPATIENT
Start: 2023-09-15 | End: 2023-09-15

## 2023-09-15 RX ORDER — ACETAMINOPHEN 325 MG/1
650 TABLET ORAL ONCE
Status: COMPLETED | OUTPATIENT
Start: 2023-09-15 | End: 2023-09-15

## 2023-09-15 RX ORDER — MONTELUKAST SODIUM 10 MG/1
10 TABLET ORAL ONCE
Status: CANCELLED | OUTPATIENT
Start: 2023-09-15 | End: 2023-09-15

## 2023-09-15 RX ORDER — SODIUM CHLORIDE 0.9 % (FLUSH) 0.9 %
10 SYRINGE (ML) INJECTION AS NEEDED
Status: DISCONTINUED | OUTPATIENT
Start: 2023-09-15 | End: 2023-09-15 | Stop reason: HOSPADM

## 2023-09-15 RX ORDER — HEPARIN SODIUM (PORCINE) LOCK FLUSH IV SOLN 100 UNIT/ML 100 UNIT/ML
500 SOLUTION INTRAVENOUS AS NEEDED
OUTPATIENT
Start: 2023-09-15

## 2023-09-15 RX ORDER — DIPHENHYDRAMINE HYDROCHLORIDE 50 MG/ML
50 INJECTION INTRAMUSCULAR; INTRAVENOUS AS NEEDED
Status: CANCELLED | OUTPATIENT
Start: 2023-09-15

## 2023-09-15 RX ORDER — FAMOTIDINE 10 MG/ML
20 INJECTION, SOLUTION INTRAVENOUS AS NEEDED
OUTPATIENT
Start: 2023-09-22

## 2023-09-15 RX ORDER — DIPHENHYDRAMINE HYDROCHLORIDE 50 MG/ML
50 INJECTION INTRAMUSCULAR; INTRAVENOUS AS NEEDED
OUTPATIENT
Start: 2023-09-22

## 2023-09-15 RX ORDER — MONTELUKAST SODIUM 10 MG/1
10 TABLET ORAL ONCE
OUTPATIENT
Start: 2023-09-29 | End: 2023-09-29

## 2023-09-15 RX ORDER — FAMOTIDINE 10 MG/ML
20 INJECTION, SOLUTION INTRAVENOUS AS NEEDED
Status: CANCELLED | OUTPATIENT
Start: 2023-09-15

## 2023-09-15 RX ORDER — FAMOTIDINE 10 MG/ML
20 INJECTION, SOLUTION INTRAVENOUS AS NEEDED
OUTPATIENT
Start: 2023-09-29

## 2023-09-15 RX ORDER — FAMOTIDINE 10 MG/ML
20 INJECTION, SOLUTION INTRAVENOUS ONCE
OUTPATIENT
Start: 2023-09-29

## 2023-09-15 RX ADMIN — SODIUM CHLORIDE 380 MG: 9 INJECTION, SOLUTION INTRAVENOUS at 12:15

## 2023-09-15 RX ADMIN — POTASSIUM CHLORIDE 20 MEQ: 1500 TABLET, EXTENDED RELEASE ORAL at 11:28

## 2023-09-15 RX ADMIN — FAMOTIDINE 20 MG: 10 INJECTION INTRAVENOUS at 11:31

## 2023-09-15 RX ADMIN — SODIUM CHLORIDE 250 ML: 9 INJECTION, SOLUTION INTRAVENOUS at 11:29

## 2023-09-15 RX ADMIN — PACLITAXEL 160 MG: 6 INJECTION, SOLUTION INTRAVENOUS at 14:51

## 2023-09-15 RX ADMIN — DIPHENHYDRAMINE HYDROCHLORIDE 25 MG: 50 INJECTION, SOLUTION INTRAMUSCULAR; INTRAVENOUS at 11:31

## 2023-09-15 RX ADMIN — ACETAMINOPHEN 650 MG: 325 TABLET ORAL at 13:39

## 2023-09-15 RX ADMIN — HYDROCORTISONE SODIUM SUCCINATE 100 MG: 100 INJECTION, POWDER, FOR SOLUTION INTRAMUSCULAR; INTRAVENOUS at 15:00

## 2023-09-15 RX ADMIN — Medication 500 UNITS: at 16:51

## 2023-09-15 RX ADMIN — MONTELUKAST 10 MG: 10 TABLET, FILM COATED ORAL at 13:50

## 2023-09-15 RX ADMIN — DEXAMETHASONE SODIUM PHOSPHATE 12 MG: 10 INJECTION, SOLUTION INTRAMUSCULAR; INTRAVENOUS at 14:12

## 2023-09-15 RX ADMIN — DIPHENHYDRAMINE HYDROCHLORIDE 25 MG: 50 INJECTION, SOLUTION INTRAMUSCULAR; INTRAVENOUS at 13:55

## 2023-09-15 RX ADMIN — FAMOTIDINE 20 MG: 10 INJECTION INTRAVENOUS at 13:51

## 2023-09-15 RX ADMIN — Medication 10 ML: at 16:51

## 2023-09-15 NOTE — PROGRESS NOTES
Subjective   REASON FOR FOLLOWUP:   History of stage IIIA infiltrating ductal carcinoma of the right breast with 4 positive nodes, ER positive, HER-2/julián negative; treated with lumpectomy, status post 6 cycles of TAC. Received tamoxifen between February 2007 and March 2012. Femara was started in March 2012. Patient switched to Arimidex as of 06/21/2012 because of side effects with severe ankle joint pain with Femara.    10 years of adjuvant therapy completed  March 2017.    New diagnosis of carcinoma of the remaining right breast, needle biopsy 6/8/2023.  Tumor is HER2/julián positive and estrogen receptor positive/progesterone receptor negative    HISTORY OF PRESENT ILLNESS:     History of Present Illness Ms. Roblero is a 58 y.o.  female with the above noted history of stage IIIA carcinoma of the right breast, treated with lumpectomy, chemotherapy and radiation. She received extended hormonal therapy with 5 years of tamoxifen followed by 5 years of aromatase inhibitor.  She completed her 10 years of hormonal therapy in March 2017  .     She was seeing us annually had not been seen in January of this year but when she underwent her breast imaging in May 2023 she had suspicious findings and ultimately underwent ultrasound-guided biopsy on 6/8/2023.  There were 2 separate biopsies in 1 of these areas was positive for HER2/julián overexpression by FISH.  Both tumors were ER positive and NC negative.    She was referred to Dr. Vidal who had performed her previous lumpectomy surgery in 2007 and currently the plan is for her to undergo bilateral mastectomies with reconstruction.    We had her seen by cardiac oncology due to her previous treatment with 6 cycles of TAC chemotherapy in 2007.  Thankfully her ejection fraction still seems to be normal at 61% based on her echocardiogram from 6/19/2023.    Dr. Vidal has scheduled the patient for radiographic staging with CT scan of the chest abdomen pelvis and a  bone scan and the studies did not show any evidence of distant metastatic spread.    She had a bilateral mastectomies with implant reconstruction performed on 8/16/2023.  The tumor in the right breast was 3 x 1.5 x 1.2 cm.  Margins of resection were clear.  She also had placement of a left chest wall Mediport at the time of her surgery.    We recommended weekly Taxol and Herceptin for 12 weeks followed by continued Herceptin every 3 weeks for the remainder of the year of treatment.  She was estrogen receptor positive and will also be started on hormonal therapy once her chemotherapy is completed.    INTERVAL HISTORY:  Palma returns to the office today for initiation of cycle 1 day 1 weekly Taxol and Herceptin.  She has completed formal education.  Confirm she did take her dexamethasone yesterday as prescribed for preparation for first Taxol infusion.  She is continuing to heal and recover from recent mastectomy on 8/16/2023.  She is accompanied by her  today.  She has no other concerns at this time.      Past Medical History, Past Surgical History, Social History, Family History have been reviewed and are without significant changes except as mentioned.    Review of Systems   Constitutional:  Negative for activity change, appetite change, fatigue, fever and unexpected weight change.   HENT:  Negative for hearing loss, nosebleeds, trouble swallowing and voice change.    Eyes:  Negative for visual disturbance.   Respiratory:  Negative for cough, shortness of breath and wheezing.    Cardiovascular:  Negative for chest pain and palpitations.   Gastrointestinal:  Negative for abdominal pain, diarrhea, nausea and vomiting.   Genitourinary:  Negative for difficulty urinating, frequency, hematuria and urgency.   Musculoskeletal:  Negative for back pain and neck pain.   Skin:  Negative for rash.   Neurological:  Negative for dizziness, seizures, syncope and headaches.   Hematological:  Negative for adenopathy. Does  "not bruise/bleed easily.   Psychiatric/Behavioral:  Negative for behavioral problems. The patient is not nervous/anxious.     A comprehensive 14 point review of systems was performed and was negative except as mentioned.    Medications:  The current medication list was reviewed in the EMR    ALLERGIES:    Allergies   Allergen Reactions    Oxycodone Nausea And Vomiting       Objective      Vitals:    09/15/23 0928   BP: 129/78   Pulse: 90   Resp: 18   Temp: 98.2 °F (36.8 °C)   TempSrc: Temporal   SpO2: 100%   Weight: 97.4 kg (214 lb 11.2 oz)   Height: 162 cm (63.78\")   PainSc: 0-No pain         9/15/2023     9:29 AM   Current Status   ECOG score 0       Physical Exam   Constitutional: She is oriented to person, place, and time. She appears well-developed. No distress.   HENT:   Head: Normocephalic.   Eyes: Pupils are equal, round, and reactive to light. Conjunctivae are normal. No scleral icterus.   Neck: No JVD present. No thyromegaly present.   Cardiovascular: Normal rate and regular rhythm. Exam reveals no gallop and no friction rub.   No murmur heard.  Pulmonary/Chest: Effort normal and breath sounds normal. She has no wheezes. She has no rales. Right breast exhibits no mass, no nipple discharge and no skin change. Left breast exhibits no mass, no nipple discharge and no skin change.   Bilateral mastectomies with implant reconstructions.  Right chest wall Mediport   Abdominal: Soft. Bowel sounds are normal. She exhibits no distension and no mass. There is no abdominal tenderness.   Musculoskeletal: Normal range of motion. No deformity.   Lymphadenopathy:     She has no cervical adenopathy.   Neurological: She is alert and oriented to person, place, and time. She has normal reflexes. No cranial nerve deficit.   Skin: Skin is warm and dry. No rash noted. No erythema.   Psychiatric: Her behavior is normal. Judgment normal.      Physical exam preformed and reviewed. No changes noted from previous exam. Carina Cain " KarieZAN ; 09/15/2023      RECENT LABS:  Hematology WBC   Date Value Ref Range Status   09/15/2023 12.55 (H) 3.40 - 10.80 10*3/mm3 Final   11/01/2021 5.9 3.4 - 10.8 x10E3/uL Final     RBC   Date Value Ref Range Status   09/15/2023 4.57 3.77 - 5.28 10*6/mm3 Final   11/01/2021 5.14 3.77 - 5.28 x10E6/uL Final     Hemoglobin   Date Value Ref Range Status   09/15/2023 12.2 12.0 - 15.9 g/dL Final     Hematocrit   Date Value Ref Range Status   09/15/2023 37.8 34.0 - 46.6 % Final     Platelets   Date Value Ref Range Status   09/15/2023 214 140 - 450 10*3/mm3 Final            Lab Results   Component Value Date    GLUCOSE 199 (H) 09/15/2023    BUN 19 09/15/2023    CREATININE 0.81 09/15/2023    EGFRRESULT 91.8 11/30/2022    EGFR 83.2 09/15/2023    BCR 23.5 09/15/2023    K 3.4 (L) 09/15/2023    CO2 20.7 (L) 09/15/2023    CALCIUM 9.3 09/15/2023    PROTENTOTREF 6.6 11/30/2022    ALBUMIN 4.1 09/15/2023    BILITOT 0.4 09/15/2023    AST 20 09/15/2023    ALT 17 09/15/2023     PATHOLOGY 8/16/2023  Final Diagnosis   1. Right Breast, Oriented Simple Skin Sparing Mastectomy (1,097 Grams): INVASIVE MAMMARY CARCINOMA,       NO SPECIAL TYPE (INVASIVE DUCTAL CARCINOMA).  A. Tumor site: Lower outer quadrant.               B. Histologic grade: [place] histologic score III (tubules = 3, nuclei = 2, mitosis = 3).               C. Tumor size: 30 x 15 x 12 mm.               D. Tumor focality: Single focus.               E. Ductal Carcinoma in-situ (DCIS): Not identified.               F. No lobular neoplasia identified.               G. Overlying skin and nipple are uninvolved by tumor.               H. No definitive lymphovascular space invasion identified.               I. Focal perineural invasion is present.  J. Margins: Uninvolved by invasive and in-situ carcinoma.               1. Invasive carcinoma comes to within 7 mm of the anterior margin, 40 mm from the inferior margin,      60 mm from the posterior and lateral margins, 80 mm  from the medial margin and 130 mm from the       superior margin of resection.               K. Lymph Nodes: Not submitted.  L. Hormone receptor status: ER positive, TX negative, HER2 positive by FISH (right medial biopsy),       Ki-67 35-40%  (performed on prior biopsy [id number]).  M. Pathologic stage: pT2, NX.     2. Left Breast, Oriented Simple Skin Sparing Mastectomy (1,593 Grams):   A. Benign unremarkable breast tissue, skin and nipple.     PATHOLOGY 6/8/2023  Final Diagnosis   1. Breast, Right Lateral 6:30 Position, 3 cm FN, Core Biopsy:               A. Invasive mammary carcinoma, no special type (ductal), [place] histologic grade II (tubules = 3, nuclei = 2,        mitoses = 2) measuring 6 mm maximally and involving four core fragments.  B. No definitive in-situ carcinoma is identified.  C. No definitive lymphovascular space invasion identified.     2. Breast, Right Medial 6:30 Position, 3 cm FN, Core Biopsy:                A. Invasive mammary carcinoma, no special type (ductal), [place] histologic grade II (tubules = 3, nuclei = 2,        mitoses = 1) measuring 10 mm maximally and involving three core fragments.  B. No definitive in-situ carcinoma is identified.  C. No definitive lymphovascular space invasion identified.     CT CHEST, ABDOMEN, AND PELVIS WITHOUT CONTRAST. 7/21/2023  FINDINGS:  CHEST: Right breast findings discussed on breast MRI 06/17/2023. There  is a cluster of nodular densities at the right axilla in close proximity  to a cluster of surgical clips and some of these may represent vessels  or lymphatics. Characterization is limited in the absence of IV  contrast. There is no definitive pathologically enlarged node present.  There are no pathologically enlarged nodes at the left axilla and there  is no subpectoral lymphadenopathy, and there is no lymphadenopathy at  the mediastinum or jovita given constraints of noncontrasted technique.  The nodularity along the pleura at the dependent  aspect of the lower  chest is likely related to dependent atelectatic change. No definite  suspicious pulmonary nodules are seen. There are no pleural or  pericardial effusions.     ABDOMEN/PELVIS: Noncontrasted liver appears unremarkable. The  gallbladder appears unremarkable and there is no biliary dilatation.  Splenic size is normal. Noncontrasted pancreas, adrenals, and kidneys  appear unremarkable. There is no acute bowel abnormality. Appendix  appears within normal limits. The uterus is slightly lobular in contour  likely secondary to small fibroids. Noncontrasted adnexa appear  unremarkable. There is no free fluid or lymphadenopathy. There are mild  abdominal aortic atherosclerotic changes throughout the abdominal aorta  without aneurysmal dilatation. No suspicious bone lesion is seen.     IMPRESSION:  Given constraints of noncontrasted technique, there is no  convincing evidence for metastatic disease within the chest, abdomen, or  pelvis.      Narrative & Impression   NUCLEAR MEDICINE WHOLE BODY BONE SCAN 7/14/2023     HISTORY: Breast cancer. Evaluate for metastatic disease.     TECHNIQUE:  19.1 mCi of 99m technetium MDP was administered intravenous  followed by 3 hour delayed phase whole body imaging with selected spot  views.     COMPARISON: None.     FINDINGS:  There is mild increased uptake at the right sternoclavicular  joint that is attributed to arthritis. Arthritic uptake is present in  both shoulders, knees, and mid feet.  There is also mild increased  lumbar spine uptake that is most likely degenerative/arthritic. Both  kidneys and the urinary bladder are visualized.     IMPRESSION:  Areas of mild uptake are typical for arthritis/degenerative  change and there is no scintigraphic evidence to suggest bony metastatic  disease.        MAMMO SCREENING DIGITAL TOMOSYNTHESIS BILATERAL W CAD- 4/14/2023   IMPRESSION:  1. There is an area of focal asymmetry in the middle third retroareolar  region of the  right breast. Further evaluation with spot compression CC  and MLO mammographic and Tomosynthesis images and targeted right breast  sonography is recommended.  2. There are no findings suspicious for malignancy in the left breast.     BI-RADS Category 0: Incomplete. Needs additional imaging evaluation.    EXAMINATIONS: UNILATERAL RIGHT DIGITAL DIAGNOSTIC MAMMOGRAPHY WITH  TOMOSYNTHESIS AND LIMITED RIGHT BREAST SONOGRAPHY 5/17/2023  IMPRESSION:  There is an irregular 1 cm hypoechoic mass with internal peripheral  vascularity that is seen in the right breast at the 6:30 position on the  order of 3 cm from the nipple. It is immediately adjacent to a 1.9 cm  oval circumscribed hypoechoic mass. Together both lesions measure on the  order 2.7 cm in greatest dimension. Ultrasound guided biopsy of both  lesions is recommended. Given the close proximity of both lesions, both  lesions may be able to be sampled and submitted as one specimen.     BI-RADS Category 4: Suspicious abnormality or suspicious finding. Biopsy  should be considered.    MRI BREAST BILATERAL W WO CONTRAST-  6/17/2023  IMPRESSION AND RECOMMENDATION:     1.  Adjacent irregular enhancing masses each measuring 1.6 cm at 6:00 to  7:00 in the middle right breast represent the 2 sites of biopsy-proven  malignancy. Adjacent suspicious enhancing foci and areas of nonmass  enhancement are identified, in addition to an irregular enhancing mass  and multiple additional enhancing foci centered at 2:00 to 3:00 in the  right breast, which are also suspicious. The 2 sites of biopsy-proven  malignancy and suspicious areas of enhancement together measure up to  approximately 8.2 cm in maximum dimension, as detailed above. Surgical  management is recommended.  2.  No MRI evidence of malignancy in the left breast.     BI-RADS Category 4: Suspicious      Assessment & Plan     *Stage IIIA infiltrating ductal carcinoma of the right breast   Treated with a lumpectomy, radiation,  6 cycles of TAC chemotherapy and ongoing hormonal therapy. She completed 10 years of adjuvant hormonal therapy with 5 years of tamoxifen followed by 5 years of Arimidex which she completed in 2017.  Carcinoma of the remaining right breast.  Tumor was positive for HER2/julián overexpression and positive for estrogen receptors and negative for progesterone receptors.  Bilateral mastectomies with immediate implant reconstruction 8/16/2023.  Mediport placement 8/16/2023  Echocardiogram from 6/19/2023 shows normal left ventricular ejection fraction of 61% (patient previously had 6 cycles of Adriamycin containing chemotherapy and will be needing Herceptin therapy postoperatively).  Plan for postop adjuvant treatment with weekly Taxol and Herceptin x12 weeks followed by continued single agent Herceptin every 3 weeks for completion of 12 months of therapy total.  Plan for hormonal therapy possibly with Aromasin to begin once Taxol is completed.  9/14/2023: Patient returns for cycle 1 day 1 weekly Taxol and Herceptin.  She has completed formal education.  Confirms she did take her dexamethasone yesterday as scheduled.        PLAN:     Proceed with C1 D1 weekly Taxol and Herceptin today  Patient to receive potassium chloride 20 mEq p.o. today in clinic  Return 9/22/2023 for C1 D8 Taxol/Herceptin and labs  Return 9/29/2023 with Dr. Solares for C1 D15 Taxol/Herceptin and labs  We discussed today that we may need to stop Taxol early if she starts developing any severe neuropathy issues.  Once the 12 weeks of weekly Taxol and Herceptin are completed we would plan to initiate hormonal therapy with Aromasin and continue single agent Herceptin every 3 weeks for the remainder of the year.              9/15/2023      CC:

## 2023-09-15 NOTE — NURSING NOTE
Pt arrived for C1 Taxol auth 09/06/20203-12/05/2023.  Educated by Carina Tiwari aprn and consent obtained 9/15/23.    Ok to proceed with tx Per Carina.   Labs reviewed K+ 3.4 20 meq ordered Per Carina Tiwari once before tx and monitor after.     Premeds for Herceptin not in plan, contacted Rebecca Paula RN to have corrected.       1455 pt started coughing and states she has tickle in throat and facial flushing. Stopped taxol.   1459 Carina Tiwari aprn at bedside.   100 mg solucortef administered.   /71  pt states the flushing and tickle in throat have subsided.   1509- /67 Per Carina Tiwari ok to re challenge.  1512 Taxol restarted at 3 ML. Pt tolerated well increased to 30 ml/15 mins.  1545 Pt tolerated 30 ml/15 mins increased to max rate.

## 2023-09-15 NOTE — NURSING NOTE
Patient tolerated remainder of treatment without complaints. Discharged in stable condition.     Message sent to Rebecca (clinic RN) and Dr Solares to update them and to adjust future premedications are warranted.

## 2023-09-20 ENCOUNTER — TELEPHONE (OUTPATIENT)
Dept: ONCOLOGY | Facility: CLINIC | Age: 58
End: 2023-09-20
Payer: COMMERCIAL

## 2023-09-20 NOTE — TELEPHONE ENCOUNTER
Caller: Palma Roblero    Relationship: Self    Best call back number: [phone]     Who are you requesting to speak with (clinical staff, provider,  specific staff member): CLINICAL    What was the call regarding: PATIENT HAS A QUESTION REGARDING ALLERGIC REACTION TO CHEMO. PLEASE RETURN CALL TO DISCUSS

## 2023-09-20 NOTE — TELEPHONE ENCOUNTER
Called the patient and she wanted to make sure we made changes to her tx plan since she had a reaction to taxol. I let her know that an additional premed (Solucortef 100mg) was added for this reason and she would be getting this Friday. Patient v/u.

## 2023-09-22 ENCOUNTER — INFUSION (OUTPATIENT)
Dept: ONCOLOGY | Facility: HOSPITAL | Age: 58
End: 2023-09-22
Payer: COMMERCIAL

## 2023-09-22 VITALS
BODY MASS INDEX: 36.81 KG/M2 | TEMPERATURE: 96.7 F | HEART RATE: 85 BPM | DIASTOLIC BLOOD PRESSURE: 81 MMHG | WEIGHT: 215.6 LBS | HEIGHT: 64 IN | RESPIRATION RATE: 18 BRPM | OXYGEN SATURATION: 95 % | SYSTOLIC BLOOD PRESSURE: 123 MMHG

## 2023-09-22 DIAGNOSIS — C50.811 MALIGNANT NEOPLASM OF OVERLAPPING SITES OF RIGHT BREAST IN FEMALE, ESTROGEN RECEPTOR POSITIVE: Primary | ICD-10-CM

## 2023-09-22 DIAGNOSIS — Z17.0 MALIGNANT NEOPLASM OF OVERLAPPING SITES OF RIGHT BREAST IN FEMALE, ESTROGEN RECEPTOR POSITIVE: Primary | ICD-10-CM

## 2023-09-22 DIAGNOSIS — Z45.2 ENCOUNTER FOR ADJUSTMENT OR MANAGEMENT OF VASCULAR ACCESS DEVICE: ICD-10-CM

## 2023-09-22 LAB
ALBUMIN SERPL-MCNC: 4 G/DL (ref 3.5–5.2)
ALBUMIN/GLOB SERPL: 1.5 G/DL
ALP SERPL-CCNC: 69 U/L (ref 39–117)
ALT SERPL W P-5'-P-CCNC: 51 U/L (ref 1–33)
ANION GAP SERPL CALCULATED.3IONS-SCNC: 6.2 MMOL/L (ref 5–15)
AST SERPL-CCNC: 34 U/L (ref 1–32)
BASOPHILS # BLD AUTO: 0.04 10*3/MM3 (ref 0–0.2)
BASOPHILS NFR BLD AUTO: 1.1 % (ref 0–1.5)
BILIRUB SERPL-MCNC: 0.5 MG/DL (ref 0–1.2)
BUN SERPL-MCNC: 16 MG/DL (ref 8–23)
BUN/CREAT SERPL: 19.3 (ref 7–25)
CALCIUM SPEC-SCNC: 8.8 MG/DL (ref 8.6–10.5)
CHLORIDE SERPL-SCNC: 108 MMOL/L (ref 98–107)
CO2 SERPL-SCNC: 25.8 MMOL/L (ref 22–29)
CREAT SERPL-MCNC: 0.83 MG/DL (ref 0.57–1)
DEPRECATED RDW RBC AUTO: 40.9 FL (ref 37–54)
EGFRCR SERPLBLD CKD-EPI 2021: 80.8 ML/MIN/1.73
EOSINOPHIL # BLD AUTO: 0.14 10*3/MM3 (ref 0–0.4)
EOSINOPHIL NFR BLD AUTO: 3.7 % (ref 0.3–6.2)
ERYTHROCYTE [DISTWIDTH] IN BLOOD BY AUTOMATED COUNT: 13.4 % (ref 12.3–15.4)
GLOBULIN UR ELPH-MCNC: 2.6 GM/DL
GLUCOSE SERPL-MCNC: 82 MG/DL (ref 65–99)
HCT VFR BLD AUTO: 35.4 % (ref 34–46.6)
HGB BLD-MCNC: 11.1 G/DL (ref 12–15.9)
IMM GRANULOCYTES # BLD AUTO: 0.02 10*3/MM3 (ref 0–0.05)
IMM GRANULOCYTES NFR BLD AUTO: 0.5 % (ref 0–0.5)
LYMPHOCYTES # BLD AUTO: 1.37 10*3/MM3 (ref 0.7–3.1)
LYMPHOCYTES NFR BLD AUTO: 36.2 % (ref 19.6–45.3)
MCH RBC QN AUTO: 26.2 PG (ref 26.6–33)
MCHC RBC AUTO-ENTMCNC: 31.4 G/DL (ref 31.5–35.7)
MCV RBC AUTO: 83.5 FL (ref 79–97)
MONOCYTES # BLD AUTO: 0.24 10*3/MM3 (ref 0.1–0.9)
MONOCYTES NFR BLD AUTO: 6.3 % (ref 5–12)
NEUTROPHILS NFR BLD AUTO: 1.97 10*3/MM3 (ref 1.7–7)
NEUTROPHILS NFR BLD AUTO: 52.2 % (ref 42.7–76)
NRBC BLD AUTO-RTO: 0 /100 WBC (ref 0–0.2)
PLATELET # BLD AUTO: 197 10*3/MM3 (ref 140–450)
PMV BLD AUTO: 11.1 FL (ref 6–12)
POTASSIUM SERPL-SCNC: 3.8 MMOL/L (ref 3.5–5.2)
PROT SERPL-MCNC: 6.6 G/DL (ref 6–8.5)
RBC # BLD AUTO: 4.24 10*6/MM3 (ref 3.77–5.28)
SODIUM SERPL-SCNC: 140 MMOL/L (ref 136–145)
WBC NRBC COR # BLD: 3.78 10*3/MM3 (ref 3.4–10.8)

## 2023-09-22 PROCEDURE — 25010000002 HEPARIN LOCK FLUSH PER 10 UNITS: Performed by: INTERNAL MEDICINE

## 2023-09-22 PROCEDURE — 85025 COMPLETE CBC W/AUTO DIFF WBC: CPT | Performed by: INTERNAL MEDICINE

## 2023-09-22 PROCEDURE — 96413 CHEMO IV INFUSION 1 HR: CPT

## 2023-09-22 PROCEDURE — 25010000002 DIPHENHYDRAMINE PER 50 MG: Performed by: INTERNAL MEDICINE

## 2023-09-22 PROCEDURE — 96375 TX/PRO/DX INJ NEW DRUG ADDON: CPT

## 2023-09-22 PROCEDURE — 25010000002 DEXAMETHASONE SODIUM PHOSPHATE 100 MG/10ML SOLUTION: Performed by: INTERNAL MEDICINE

## 2023-09-22 PROCEDURE — 25010000002 TRASTUZUMAB-QYYP 420 MG RECONSTITUTED SOLUTION 1 EACH VIAL: Performed by: INTERNAL MEDICINE

## 2023-09-22 PROCEDURE — 96417 CHEMO IV INFUS EACH ADDL SEQ: CPT

## 2023-09-22 PROCEDURE — 25010000002 HYDROCORTISONE SOD SUC (PF) 100 MG RECONSTITUTED SOLUTION: Performed by: INTERNAL MEDICINE

## 2023-09-22 PROCEDURE — 25010000002 PACLITAXEL PER 1 MG: Performed by: INTERNAL MEDICINE

## 2023-09-22 PROCEDURE — 80053 COMPREHEN METABOLIC PANEL: CPT | Performed by: INTERNAL MEDICINE

## 2023-09-22 RX ORDER — SODIUM CHLORIDE 9 MG/ML
250 INJECTION, SOLUTION INTRAVENOUS ONCE
Status: COMPLETED | OUTPATIENT
Start: 2023-09-22 | End: 2023-09-22

## 2023-09-22 RX ORDER — FAMOTIDINE 10 MG/ML
20 INJECTION, SOLUTION INTRAVENOUS ONCE
Status: COMPLETED | OUTPATIENT
Start: 2023-09-22 | End: 2023-09-22

## 2023-09-22 RX ORDER — HEPARIN SODIUM (PORCINE) LOCK FLUSH IV SOLN 100 UNIT/ML 100 UNIT/ML
500 SOLUTION INTRAVENOUS AS NEEDED
OUTPATIENT
Start: 2023-09-22

## 2023-09-22 RX ORDER — SODIUM CHLORIDE 0.9 % (FLUSH) 0.9 %
10 SYRINGE (ML) INJECTION AS NEEDED
Status: DISCONTINUED | OUTPATIENT
Start: 2023-09-22 | End: 2023-09-22 | Stop reason: HOSPADM

## 2023-09-22 RX ORDER — HEPARIN SODIUM (PORCINE) LOCK FLUSH IV SOLN 100 UNIT/ML 100 UNIT/ML
500 SOLUTION INTRAVENOUS AS NEEDED
Status: DISCONTINUED | OUTPATIENT
Start: 2023-09-22 | End: 2023-09-22 | Stop reason: HOSPADM

## 2023-09-22 RX ORDER — DIPHENHYDRAMINE HYDROCHLORIDE 50 MG/ML
50 INJECTION INTRAMUSCULAR; INTRAVENOUS AS NEEDED
Status: DISCONTINUED | OUTPATIENT
Start: 2023-09-22 | End: 2023-09-22 | Stop reason: HOSPADM

## 2023-09-22 RX ORDER — MONTELUKAST SODIUM 10 MG/1
10 TABLET ORAL ONCE
Status: COMPLETED | OUTPATIENT
Start: 2023-09-22 | End: 2023-09-22

## 2023-09-22 RX ORDER — SODIUM CHLORIDE 0.9 % (FLUSH) 0.9 %
10 SYRINGE (ML) INJECTION AS NEEDED
OUTPATIENT
Start: 2023-09-22

## 2023-09-22 RX ORDER — FAMOTIDINE 10 MG/ML
20 INJECTION, SOLUTION INTRAVENOUS AS NEEDED
Status: DISCONTINUED | OUTPATIENT
Start: 2023-09-22 | End: 2023-09-22 | Stop reason: HOSPADM

## 2023-09-22 RX ADMIN — FAMOTIDINE 20 MG: 10 INJECTION INTRAVENOUS at 11:29

## 2023-09-22 RX ADMIN — MONTELUKAST 10 MG: 10 TABLET, FILM COATED ORAL at 11:08

## 2023-09-22 RX ADMIN — Medication 10 ML: at 14:17

## 2023-09-22 RX ADMIN — HYDROCORTISONE SODIUM SUCCINATE 100 MG: 100 INJECTION, POWDER, FOR SOLUTION INTRAMUSCULAR; INTRAVENOUS at 12:27

## 2023-09-22 RX ADMIN — Medication 500 UNITS: at 14:18

## 2023-09-22 RX ADMIN — PACLITAXEL 160 MG: 6 INJECTION, SOLUTION INTRAVENOUS at 12:36

## 2023-09-22 RX ADMIN — DEXAMETHASONE SODIUM PHOSPHATE 12 MG: 10 INJECTION, SOLUTION INTRAMUSCULAR; INTRAVENOUS at 11:09

## 2023-09-22 RX ADMIN — SODIUM CHLORIDE 190 MG: 9 INJECTION, SOLUTION INTRAVENOUS at 11:50

## 2023-09-22 RX ADMIN — SODIUM CHLORIDE 250 ML: 9 INJECTION, SOLUTION INTRAVENOUS at 10:39

## 2023-09-22 NOTE — NURSING NOTE
Echocardiogram 1 week past 3 month alida; Dr. Solares approved to proceed with treatment and states that an appointment will be made for her.  Noted that the premed of benadryl was increased to the 50mg with 2nd dose due to reaction with the first dose. Noted that premeds will be given prior to herceptin biosimilar as the infusion time of biosimilar will serve as the 30 minute wait time prior to Taxol. Solucortef administered prior to Taxol.

## 2023-09-28 ENCOUNTER — HOSPITAL ENCOUNTER (OUTPATIENT)
Dept: PHYSICAL THERAPY | Facility: HOSPITAL | Age: 58
Discharge: HOME | End: 2023-09-28
Payer: COMMERCIAL

## 2023-09-28 DIAGNOSIS — Z91.89 AT RISK FOR LYMPHEDEMA: ICD-10-CM

## 2023-09-28 DIAGNOSIS — C50.511 MALIGNANT NEOPLASM OF LOWER-OUTER QUADRANT OF RIGHT BREAST OF FEMALE, ESTROGEN RECEPTOR POSITIVE: Primary | ICD-10-CM

## 2023-09-28 DIAGNOSIS — Z17.0 MALIGNANT NEOPLASM OF LOWER-OUTER QUADRANT OF RIGHT BREAST OF FEMALE, ESTROGEN RECEPTOR POSITIVE: Primary | ICD-10-CM

## 2023-09-28 PROCEDURE — 97110 THERAPEUTIC EXERCISES: CPT

## 2023-09-28 PROCEDURE — 97535 SELF CARE MNGMENT TRAINING: CPT

## 2023-09-28 PROCEDURE — 97164 PT RE-EVAL EST PLAN CARE: CPT

## 2023-09-28 PROCEDURE — 93702 BIS XTRACELL FLUID ANALYSIS: CPT

## 2023-09-29 ENCOUNTER — TELEPHONE (OUTPATIENT)
Dept: ONCOLOGY | Facility: CLINIC | Age: 58
End: 2023-09-29

## 2023-09-29 ENCOUNTER — INFUSION (OUTPATIENT)
Dept: ONCOLOGY | Facility: HOSPITAL | Age: 58
End: 2023-09-29
Payer: COMMERCIAL

## 2023-09-29 ENCOUNTER — OFFICE VISIT (OUTPATIENT)
Dept: ONCOLOGY | Facility: CLINIC | Age: 58
End: 2023-09-29
Payer: COMMERCIAL

## 2023-09-29 ENCOUNTER — PATIENT OUTREACH (OUTPATIENT)
Dept: OTHER | Facility: HOSPITAL | Age: 58
End: 2023-09-29
Payer: COMMERCIAL

## 2023-09-29 VITALS
BODY MASS INDEX: 36.64 KG/M2 | TEMPERATURE: 98 F | OXYGEN SATURATION: 100 % | DIASTOLIC BLOOD PRESSURE: 67 MMHG | WEIGHT: 214.6 LBS | SYSTOLIC BLOOD PRESSURE: 109 MMHG | HEIGHT: 64 IN | RESPIRATION RATE: 18 BRPM | HEART RATE: 63 BPM

## 2023-09-29 DIAGNOSIS — C50.811 MALIGNANT NEOPLASM OF OVERLAPPING SITES OF RIGHT BREAST IN FEMALE, ESTROGEN RECEPTOR POSITIVE: Primary | ICD-10-CM

## 2023-09-29 DIAGNOSIS — Z17.0 MALIGNANT NEOPLASM OF OVERLAPPING SITES OF RIGHT BREAST IN FEMALE, ESTROGEN RECEPTOR POSITIVE: ICD-10-CM

## 2023-09-29 DIAGNOSIS — Z17.0 MALIGNANT NEOPLASM OF OVERLAPPING SITES OF RIGHT BREAST IN FEMALE, ESTROGEN RECEPTOR POSITIVE: Primary | ICD-10-CM

## 2023-09-29 DIAGNOSIS — Z45.2 ENCOUNTER FOR ADJUSTMENT OR MANAGEMENT OF VASCULAR ACCESS DEVICE: ICD-10-CM

## 2023-09-29 DIAGNOSIS — C50.811 MALIGNANT NEOPLASM OF OVERLAPPING SITES OF RIGHT BREAST IN FEMALE, ESTROGEN RECEPTOR POSITIVE: ICD-10-CM

## 2023-09-29 LAB
ALBUMIN SERPL-MCNC: 3.8 G/DL (ref 3.5–5.2)
ALBUMIN/GLOB SERPL: 1.4 G/DL
ALP SERPL-CCNC: 62 U/L (ref 39–117)
ALT SERPL W P-5'-P-CCNC: 34 U/L (ref 1–33)
ANION GAP SERPL CALCULATED.3IONS-SCNC: 8.3 MMOL/L (ref 5–15)
AST SERPL-CCNC: 24 U/L (ref 1–32)
BASOPHILS # BLD AUTO: 0.04 10*3/MM3 (ref 0–0.2)
BASOPHILS NFR BLD AUTO: 1.6 % (ref 0–1.5)
BILIRUB SERPL-MCNC: 0.5 MG/DL (ref 0–1.2)
BUN SERPL-MCNC: 13 MG/DL (ref 8–23)
BUN/CREAT SERPL: 15.5 (ref 7–25)
CALCIUM SPEC-SCNC: 9 MG/DL (ref 8.6–10.5)
CHLORIDE SERPL-SCNC: 107 MMOL/L (ref 98–107)
CO2 SERPL-SCNC: 25.7 MMOL/L (ref 22–29)
CREAT SERPL-MCNC: 0.84 MG/DL (ref 0.57–1)
DEPRECATED RDW RBC AUTO: 42.4 FL (ref 37–54)
EGFRCR SERPLBLD CKD-EPI 2021: 79.7 ML/MIN/1.73
EOSINOPHIL # BLD AUTO: 0.08 10*3/MM3 (ref 0–0.4)
EOSINOPHIL NFR BLD AUTO: 3.2 % (ref 0.3–6.2)
ERYTHROCYTE [DISTWIDTH] IN BLOOD BY AUTOMATED COUNT: 13.9 % (ref 12.3–15.4)
GLOBULIN UR ELPH-MCNC: 2.8 GM/DL
GLUCOSE SERPL-MCNC: 92 MG/DL (ref 65–99)
HCT VFR BLD AUTO: 36.1 % (ref 34–46.6)
HGB BLD-MCNC: 11.4 G/DL (ref 12–15.9)
IMM GRANULOCYTES # BLD AUTO: 0.01 10*3/MM3 (ref 0–0.05)
IMM GRANULOCYTES NFR BLD AUTO: 0.4 % (ref 0–0.5)
LYMPHOCYTES # BLD AUTO: 1.29 10*3/MM3 (ref 0.7–3.1)
LYMPHOCYTES NFR BLD AUTO: 51.2 % (ref 19.6–45.3)
MCH RBC QN AUTO: 26.8 PG (ref 26.6–33)
MCHC RBC AUTO-ENTMCNC: 31.6 G/DL (ref 31.5–35.7)
MCV RBC AUTO: 84.7 FL (ref 79–97)
MONOCYTES # BLD AUTO: 0.3 10*3/MM3 (ref 0.1–0.9)
MONOCYTES NFR BLD AUTO: 11.9 % (ref 5–12)
NEUTROPHILS NFR BLD AUTO: 0.8 10*3/MM3 (ref 1.7–7)
NEUTROPHILS NFR BLD AUTO: 31.7 % (ref 42.7–76)
NRBC BLD AUTO-RTO: 0 /100 WBC (ref 0–0.2)
PLATELET # BLD AUTO: 239 10*3/MM3 (ref 140–450)
PMV BLD AUTO: 11.6 FL (ref 6–12)
POTASSIUM SERPL-SCNC: 3.9 MMOL/L (ref 3.5–5.2)
PROT SERPL-MCNC: 6.6 G/DL (ref 6–8.5)
RBC # BLD AUTO: 4.26 10*6/MM3 (ref 3.77–5.28)
SODIUM SERPL-SCNC: 141 MMOL/L (ref 136–145)
WBC NRBC COR # BLD: 2.52 10*3/MM3 (ref 3.4–10.8)

## 2023-09-29 PROCEDURE — 80053 COMPREHEN METABOLIC PANEL: CPT | Performed by: INTERNAL MEDICINE

## 2023-09-29 PROCEDURE — 96413 CHEMO IV INFUSION 1 HR: CPT

## 2023-09-29 PROCEDURE — 85025 COMPLETE CBC W/AUTO DIFF WBC: CPT | Performed by: INTERNAL MEDICINE

## 2023-09-29 PROCEDURE — 25010000002 HEPARIN LOCK FLUSH PER 10 UNITS: Performed by: INTERNAL MEDICINE

## 2023-09-29 PROCEDURE — 25010000002 TRASTUZUMAB-QYYP 420 MG RECONSTITUTED SOLUTION 1 EACH VIAL: Performed by: INTERNAL MEDICINE

## 2023-09-29 RX ORDER — HEPARIN SODIUM (PORCINE) LOCK FLUSH IV SOLN 100 UNIT/ML 100 UNIT/ML
500 SOLUTION INTRAVENOUS AS NEEDED
Status: DISCONTINUED | OUTPATIENT
Start: 2023-09-29 | End: 2023-09-29 | Stop reason: HOSPADM

## 2023-09-29 RX ORDER — DIPHENHYDRAMINE HYDROCHLORIDE 50 MG/ML
50 INJECTION INTRAMUSCULAR; INTRAVENOUS AS NEEDED
OUTPATIENT
Start: 2023-10-13

## 2023-09-29 RX ORDER — FAMOTIDINE 10 MG/ML
20 INJECTION, SOLUTION INTRAVENOUS AS NEEDED
OUTPATIENT
Start: 2023-10-13

## 2023-09-29 RX ORDER — FAMOTIDINE 10 MG/ML
20 INJECTION, SOLUTION INTRAVENOUS ONCE
OUTPATIENT
Start: 2023-10-27

## 2023-09-29 RX ORDER — FAMOTIDINE 10 MG/ML
20 INJECTION, SOLUTION INTRAVENOUS AS NEEDED
OUTPATIENT
Start: 2023-10-27

## 2023-09-29 RX ORDER — DIPHENHYDRAMINE HYDROCHLORIDE 50 MG/ML
50 INJECTION INTRAMUSCULAR; INTRAVENOUS AS NEEDED
OUTPATIENT
Start: 2023-11-03

## 2023-09-29 RX ORDER — FAMOTIDINE 10 MG/ML
20 INJECTION, SOLUTION INTRAVENOUS ONCE
OUTPATIENT
Start: 2023-11-10

## 2023-09-29 RX ORDER — FAMOTIDINE 10 MG/ML
20 INJECTION, SOLUTION INTRAVENOUS ONCE
OUTPATIENT
Start: 2023-10-13

## 2023-09-29 RX ORDER — MONTELUKAST SODIUM 10 MG/1
10 TABLET ORAL ONCE
OUTPATIENT
Start: 2023-10-13 | End: 2023-10-13

## 2023-09-29 RX ORDER — FAMOTIDINE 10 MG/ML
20 INJECTION, SOLUTION INTRAVENOUS ONCE
OUTPATIENT
Start: 2023-11-03

## 2023-09-29 RX ORDER — SODIUM CHLORIDE 0.9 % (FLUSH) 0.9 %
10 SYRINGE (ML) INJECTION AS NEEDED
OUTPATIENT
Start: 2023-09-29

## 2023-09-29 RX ORDER — SODIUM CHLORIDE 0.9 % (FLUSH) 0.9 %
10 SYRINGE (ML) INJECTION AS NEEDED
Status: DISCONTINUED | OUTPATIENT
Start: 2023-09-29 | End: 2023-09-29 | Stop reason: HOSPADM

## 2023-09-29 RX ORDER — FAMOTIDINE 10 MG/ML
20 INJECTION, SOLUTION INTRAVENOUS ONCE
OUTPATIENT
Start: 2023-10-06

## 2023-09-29 RX ORDER — FAMOTIDINE 10 MG/ML
20 INJECTION, SOLUTION INTRAVENOUS AS NEEDED
OUTPATIENT
Start: 2023-10-20

## 2023-09-29 RX ORDER — SODIUM CHLORIDE 9 MG/ML
250 INJECTION, SOLUTION INTRAVENOUS ONCE
OUTPATIENT
Start: 2023-10-27

## 2023-09-29 RX ORDER — MONTELUKAST SODIUM 10 MG/1
10 TABLET ORAL ONCE
OUTPATIENT
Start: 2023-10-20 | End: 2023-10-20

## 2023-09-29 RX ORDER — SODIUM CHLORIDE 9 MG/ML
250 INJECTION, SOLUTION INTRAVENOUS ONCE
OUTPATIENT
Start: 2023-11-03

## 2023-09-29 RX ORDER — DIPHENHYDRAMINE HYDROCHLORIDE 50 MG/ML
50 INJECTION INTRAMUSCULAR; INTRAVENOUS AS NEEDED
OUTPATIENT
Start: 2023-10-20

## 2023-09-29 RX ORDER — FAMOTIDINE 10 MG/ML
20 INJECTION, SOLUTION INTRAVENOUS AS NEEDED
OUTPATIENT
Start: 2023-11-10

## 2023-09-29 RX ORDER — MONTELUKAST SODIUM 10 MG/1
10 TABLET ORAL ONCE
OUTPATIENT
Start: 2023-11-03 | End: 2023-11-03

## 2023-09-29 RX ORDER — MONTELUKAST SODIUM 10 MG/1
10 TABLET ORAL ONCE
OUTPATIENT
Start: 2023-10-27 | End: 2023-10-27

## 2023-09-29 RX ORDER — SODIUM CHLORIDE 9 MG/ML
250 INJECTION, SOLUTION INTRAVENOUS ONCE
OUTPATIENT
Start: 2023-10-06

## 2023-09-29 RX ORDER — SODIUM CHLORIDE 9 MG/ML
250 INJECTION, SOLUTION INTRAVENOUS ONCE
OUTPATIENT
Start: 2023-11-10

## 2023-09-29 RX ORDER — HEPARIN SODIUM (PORCINE) LOCK FLUSH IV SOLN 100 UNIT/ML 100 UNIT/ML
500 SOLUTION INTRAVENOUS AS NEEDED
OUTPATIENT
Start: 2023-09-29

## 2023-09-29 RX ORDER — FAMOTIDINE 10 MG/ML
20 INJECTION, SOLUTION INTRAVENOUS AS NEEDED
OUTPATIENT
Start: 2023-10-06

## 2023-09-29 RX ORDER — SODIUM CHLORIDE 9 MG/ML
250 INJECTION, SOLUTION INTRAVENOUS ONCE
OUTPATIENT
Start: 2023-10-13

## 2023-09-29 RX ORDER — DIPHENHYDRAMINE HYDROCHLORIDE 50 MG/ML
50 INJECTION INTRAMUSCULAR; INTRAVENOUS AS NEEDED
OUTPATIENT
Start: 2023-10-06

## 2023-09-29 RX ORDER — SODIUM CHLORIDE 9 MG/ML
250 INJECTION, SOLUTION INTRAVENOUS ONCE
Status: COMPLETED | OUTPATIENT
Start: 2023-09-29 | End: 2023-09-29

## 2023-09-29 RX ORDER — DIPHENHYDRAMINE HYDROCHLORIDE 50 MG/ML
50 INJECTION INTRAMUSCULAR; INTRAVENOUS AS NEEDED
OUTPATIENT
Start: 2023-10-27

## 2023-09-29 RX ORDER — SODIUM CHLORIDE 9 MG/ML
250 INJECTION, SOLUTION INTRAVENOUS ONCE
OUTPATIENT
Start: 2023-10-20

## 2023-09-29 RX ORDER — FAMOTIDINE 10 MG/ML
20 INJECTION, SOLUTION INTRAVENOUS ONCE
OUTPATIENT
Start: 2023-10-20

## 2023-09-29 RX ORDER — FAMOTIDINE 10 MG/ML
20 INJECTION, SOLUTION INTRAVENOUS AS NEEDED
OUTPATIENT
Start: 2023-11-03

## 2023-09-29 RX ORDER — MONTELUKAST SODIUM 10 MG/1
10 TABLET ORAL ONCE
OUTPATIENT
Start: 2023-11-10 | End: 2023-11-10

## 2023-09-29 RX ORDER — MONTELUKAST SODIUM 10 MG/1
10 TABLET ORAL ONCE
OUTPATIENT
Start: 2023-10-06 | End: 2023-10-06

## 2023-09-29 RX ORDER — DIPHENHYDRAMINE HYDROCHLORIDE 50 MG/ML
50 INJECTION INTRAMUSCULAR; INTRAVENOUS AS NEEDED
OUTPATIENT
Start: 2023-11-10

## 2023-09-29 RX ADMIN — SODIUM CHLORIDE 190 MG: 9 INJECTION, SOLUTION INTRAVENOUS at 11:17

## 2023-09-29 RX ADMIN — SODIUM CHLORIDE 250 ML: 9 INJECTION, SOLUTION INTRAVENOUS at 11:17

## 2023-09-29 RX ADMIN — Medication 10 ML: at 11:50

## 2023-09-29 RX ADMIN — Medication 500 UNITS: at 11:51

## 2023-09-29 NOTE — NURSING NOTE
Pt arrived for Taxol/herceptin. Per Dr Solares hold taxol d/t ANC. Proceeded with Herceptin.    Lab Results   Component Value Date    WBC 2.52 (L) 09/29/2023    HGB 11.4 (L) 09/29/2023    HCT 36.1 09/29/2023    MCV 84.7 09/29/2023     09/29/2023      Lab Results   Component Value Date    NEUTROABS 0.80 (L) 09/29/2023

## 2023-09-29 NOTE — TELEPHONE ENCOUNTER
Caller: Palma Roblero    Relationship to patient: Self    Best call back number: [phone]    Type of visit: PORT FLUSH, FOLLOW UP, AND INFUSION    Requested date: SAME DAY BUT 10:30 OR LATER.     If rescheduling, when is the original appointment: 10/06     Additional notes:PLEASE CALL TO RESCHEDULE.

## 2023-09-29 NOTE — PROGRESS NOTES
Called Ms. Roblero to see how she was doing. She stated her white count was down and she was unable to get chemo today. She stated her symptoms are mild and is doing well. Otherwise, she has no needs at this time. She was thankful for the call and will reach out if any questions or needs arise.

## 2023-09-29 NOTE — NURSING NOTE
Patient to infusion room for treatment after MD visit. Order from Dr Nava to proceed with trazimera and hold taxol. Patient educated on neutropenic precautions and calling with temp 100.4 or greater. Patient verbalizes understanding.

## 2023-09-29 NOTE — PROGRESS NOTES
Subjective   REASON FOR FOLLOWUP:  History of stage IIIA infiltrating ductal carcinoma of the right breast with 4 positive nodes, ER positive, HER-2/julián negative; treated with lumpectomy, status post 6 cycles of TAC. Received tamoxifen between February 2007 and March 2012. Femara was started in March 2012. Patient switched to Arimidex as of 06/21/2012 because of side effects with severe ankle joint pain with Femara.    2.   10 years of adjuvant therapy completed  March 2017.    3.   New diagnosis of carcinoma of the remaining right breast, needle biopsy 6/8/2023.  Tumor is HER2/julián positive and estrogen receptor positive/progesterone receptor negative.    4.   Bilateral mastectomies with implant reconstruction 8/16/2023    5.   Initiation of postop adjuvant chemotherapy with weekly Taxol and Herceptin.  First dose delivered 9/15/2023      HISTORY OF PRESENT ILLNESS:     History of Present Illness Ms. Roblero is a 58 y.o.  female with the above noted history of stage IIIA carcinoma of the right breast, treated with lumpectomy, chemotherapy and radiation. She received extended hormonal therapy with 5 years of tamoxifen followed by 5 years of aromatase inhibitor.  She completed her 10 years of hormonal therapy in March 2017  .     She was seeing us annually had not been seen in January of this year but when she underwent her breast imaging in May 2023 she had suspicious findings and ultimately underwent ultrasound-guided biopsy on 6/8/2023.  There were 2 separate biopsies in 1 of these areas was positive for HER2/julián overexpression by FISH.  Both tumors were ER positive and [place] negative.    She was referred to Dr. Vidal who had performed her previous lumpectomy surgery in 2007 and currently the plan is for her to undergo bilateral mastectomies with reconstruction.    We had her seen by cardiac oncology due to her previous treatment with 6 cycles of TAC chemotherapy in 2007.  Thankfully her ejection  fraction still seems to be normal at 61% based on her echocardiogram from 6/19/2023.    Dr. Vidal has scheduled the patient for radiographic staging with CT scan of the chest abdomen pelvis and a bone scan and the studies did not show any evidence of distant metastatic spread.    She had a bilateral mastectomies with implant reconstruction performed on 8/16/2023.  The tumor in the right breast was 3 x 1.5 x 1.2 cm.  Margins of resection were clear.  She also had placement of a left chest wall Mediport at the time of her surgery.    We recommended weekly Taxol and Herceptin for 12 weeks followed by continued Herceptin every 3 weeks for the remainder of the year of treatment.  She was estrogen receptor positive and will also be started on hormonal therapy once her chemotherapy is completed.    INTERVAL HISTORY:  Palma returns to the office today for initiation of cycle 1 day 15 weekly Taxol and Herceptin.  Unfortunately, her neutrophil count today is too low for treatment with a total white count of 2500 and an absolute neutrophil count of 800.  She will receive her dose of Herceptin but will not receive Taxol today.    She will be scheduled for nurse practitioner assessment with labs and possible further Taxol and Herceptin treatment next week.    She is scheduled for cardiac oncology appointment with Dr. Reynolds on 10/20/2023.      Past Medical History, Past Surgical History, Social History, Family History have been reviewed and are without significant changes except as mentioned.    Review of Systems   Constitutional:  Negative for activity change, appetite change, fatigue, fever and unexpected weight change.   HENT:  Negative for hearing loss, nosebleeds, trouble swallowing and voice change.    Eyes:  Negative for visual disturbance.   Respiratory:  Negative for cough, shortness of breath and wheezing.    Cardiovascular:  Negative for chest pain and palpitations.   Gastrointestinal:  Negative for abdominal pain,  "diarrhea, nausea and vomiting.   Genitourinary:  Negative for difficulty urinating, frequency, hematuria and urgency.   Musculoskeletal:  Negative for back pain and neck pain.   Skin:  Negative for rash.   Neurological:  Negative for dizziness, seizures, syncope and headaches.   Hematological:  Negative for adenopathy. Does not bruise/bleed easily.   Psychiatric/Behavioral:  Negative for behavioral problems. The patient is not nervous/anxious.     A comprehensive 14 point review of systems was performed and was negative except as mentioned.    Medications:  The current medication list was reviewed in the EMR    ALLERGIES:    Allergies   Allergen Reactions    Oxycodone Nausea And Vomiting       Objective      Vitals:    09/29/23 1023   BP: 109/67   Pulse: 63   Resp: 18   Temp: 98 °F (36.7 °C)   TempSrc: Temporal   SpO2: 100%   Weight: 97.3 kg (214 lb 9.6 oz)   Height: 162.6 cm (64.02\")   PainSc: 0-No pain         9/29/2023    10:23 AM   Current Status   ECOG score 0       Physical Exam   Constitutional: She is oriented to person, place, and time. She appears well-developed. No distress.   HENT:   Head: Normocephalic.   Eyes: Pupils are equal, round, and reactive to light. Conjunctivae are normal. No scleral icterus.   Neck: No JVD present. No thyromegaly present.   Cardiovascular: Normal rate and regular rhythm. Exam reveals no gallop and no friction rub.   No murmur heard.  Pulmonary/Chest: Effort normal and breath sounds normal. She has no wheezes. She has no rales. Right breast exhibits no mass, no nipple discharge and no skin change. Left breast exhibits no mass, no nipple discharge and no skin change.   Bilateral mastectomies with implant reconstructions.  Right chest wall Mediport   Abdominal: Soft. Bowel sounds are normal. She exhibits no distension and no mass. There is no abdominal tenderness.   Musculoskeletal: Normal range of motion. No deformity.   Lymphadenopathy:     She has no cervical adenopathy. "   Neurological: She is alert and oriented to person, place, and time. She has normal reflexes. No cranial nerve deficit.   Skin: Skin is warm and dry. No rash noted. No erythema.   Psychiatric: Her behavior is normal. Judgment normal.      Physical exam preformed and reviewed. No changes noted from previous exam. Weston Solares MD ; 09/15/2023      RECENT LABS:  Hematology WBC   Date Value Ref Range Status   09/22/2023 3.78 3.40 - 10.80 10*3/mm3 Final   11/01/2021 5.9 3.4 - 10.8 x10E3/uL Final     RBC   Date Value Ref Range Status   09/22/2023 4.24 3.77 - 5.28 10*6/mm3 Final   11/01/2021 5.14 3.77 - 5.28 x10E6/uL Final     Hemoglobin   Date Value Ref Range Status   09/22/2023 11.1 (L) 12.0 - 15.9 g/dL Final     Hematocrit   Date Value Ref Range Status   09/22/2023 35.4 34.0 - 46.6 % Final     Platelets   Date Value Ref Range Status   09/22/2023 197 140 - 450 10*3/mm3 Final            Lab Results   Component Value Date    NEUTROABS 0.80 (L) 09/29/2023       Lab Results   Component Value Date    GLUCOSE 82 09/22/2023    BUN 16 09/22/2023    CREATININE 0.83 09/22/2023    EGFRRESULT 91.8 11/30/2022    EGFR 80.8 09/22/2023    BCR 19.3 09/22/2023    K 3.8 09/22/2023    CO2 25.8 09/22/2023    CALCIUM 8.8 09/22/2023    PROTENTOTREF 6.6 11/30/2022    ALBUMIN 4.0 09/22/2023    BILITOT 0.5 09/22/2023    AST 34 (H) 09/22/2023    ALT 51 (H) 09/22/2023     PATHOLOGY 8/16/2023  Final Diagnosis   1. Right Breast, Oriented Simple Skin Sparing Mastectomy (1,097 Grams): INVASIVE MAMMARY CARCINOMA,       NO SPECIAL TYPE (INVASIVE DUCTAL CARCINOMA).  A. Tumor site: Lower outer quadrant.               B. Histologic grade: Richardson histologic score III (tubules = 3, nuclei = 2, mitosis = 3).               C. Tumor size: 30 x 15 x 12 mm.               D. Tumor focality: Single focus.               E. Ductal Carcinoma in-situ (DCIS): Not identified.               F. No lobular neoplasia identified.               G. Overlying skin and  nipple are uninvolved by tumor.               H. No definitive lymphovascular space invasion identified.               I. Focal perineural invasion is present.  J. Margins: Uninvolved by invasive and in-situ carcinoma.               1. Invasive carcinoma comes to within 7 mm of the anterior margin, 40 mm from the inferior margin,      60 mm from the posterior and lateral margins, 80 mm from the medial margin and 130 mm from the       superior margin of resection.               K. Lymph Nodes: Not submitted.  L. Hormone receptor status: ER positive, MS negative, HER2 positive by FISH (right medial biopsy),       Ki-67 35-40%  (performed on prior biopsy [id number]).  M. Pathologic stage: pT2, NX.     2. Left Breast, Oriented Simple Skin Sparing Mastectomy (1,593 Grams):   A. Benign unremarkable breast tissue, skin and nipple.     PATHOLOGY 6/8/2023  Final Diagnosis   1. Breast, Right Lateral 6:30 Position, 3 cm FN, Core Biopsy:               A. Invasive mammary carcinoma, no special type (ductal), Avalon histologic grade II (tubules = 3, nuclei = 2,        mitoses = 2) measuring 6 mm maximally and involving four core fragments.  B. No definitive in-situ carcinoma is identified.  C. No definitive lymphovascular space invasion identified.     2. Breast, Right Medial 6:30 Position, 3 cm FN, Core Biopsy:                A. Invasive mammary carcinoma, no special type (ductal), Perez histologic grade II (tubules = 3, nuclei = 2,        mitoses = 1) measuring 10 mm maximally and involving three core fragments.  B. No definitive in-situ carcinoma is identified.  C. No definitive lymphovascular space invasion identified.     CT CHEST, ABDOMEN, AND PELVIS WITHOUT CONTRAST. 7/21/2023  FINDINGS:  CHEST: Right breast findings discussed on breast MRI 06/17/2023. There  is a cluster of nodular densities at the right axilla in close proximity  to a cluster of surgical clips and some of these may represent vessels  or  lymphatics. Characterization is limited in the absence of IV  contrast. There is no definitive pathologically enlarged node present.  There are no pathologically enlarged nodes at the left axilla and there  is no subpectoral lymphadenopathy, and there is no lymphadenopathy at  the mediastinum or jovita given constraints of noncontrasted technique.  The nodularity along the pleura at the dependent aspect of the lower  chest is likely related to dependent atelectatic change. No definite  suspicious pulmonary nodules are seen. There are no pleural or  pericardial effusions.     ABDOMEN/PELVIS: Noncontrasted liver appears unremarkable. The  gallbladder appears unremarkable and there is no biliary dilatation.  Splenic size is normal. Noncontrasted pancreas, adrenals, and kidneys  appear unremarkable. There is no acute bowel abnormality. Appendix  appears within normal limits. The uterus is slightly lobular in contour  likely secondary to small fibroids. Noncontrasted adnexa appear  unremarkable. There is no free fluid or lymphadenopathy. There are mild  abdominal aortic atherosclerotic changes throughout the abdominal aorta  without aneurysmal dilatation. No suspicious bone lesion is seen.     IMPRESSION:  Given constraints of noncontrasted technique, there is no  convincing evidence for metastatic disease within the chest, abdomen, or  pelvis.      Narrative & Impression   NUCLEAR MEDICINE WHOLE BODY BONE SCAN 7/14/2023     HISTORY: Breast cancer. Evaluate for metastatic disease.     TECHNIQUE:  19.1 mCi of 99m technetium MDP was administered intravenous  followed by 3 hour delayed phase whole body imaging with selected spot  views.     COMPARISON: None.     FINDINGS:  There is mild increased uptake at the right sternoclavicular  joint that is attributed to arthritis. Arthritic uptake is present in  both shoulders, knees, and mid feet.  There is also mild increased  lumbar spine uptake that is most likely  degenerative/arthritic. Both  kidneys and the urinary bladder are visualized.     IMPRESSION:  Areas of mild uptake are typical for arthritis/degenerative  change and there is no scintigraphic evidence to suggest bony metastatic  disease.        MAMMO SCREENING DIGITAL TOMOSYNTHESIS BILATERAL W CAD- 4/14/2023   IMPRESSION:  1. There is an area of focal asymmetry in the middle third retroareolar  region of the right breast. Further evaluation with spot compression CC  and MLO mammographic and Tomosynthesis images and targeted right breast  sonography is recommended.  2. There are no findings suspicious for malignancy in the left breast.     BI-RADS Category 0: Incomplete. Needs additional imaging evaluation.    EXAMINATIONS: UNILATERAL RIGHT DIGITAL DIAGNOSTIC MAMMOGRAPHY WITH  TOMOSYNTHESIS AND LIMITED RIGHT BREAST SONOGRAPHY 5/17/2023  IMPRESSION:  There is an irregular 1 cm hypoechoic mass with internal peripheral  vascularity that is seen in the right breast at the 6:30 position on the  order of 3 cm from the nipple. It is immediately adjacent to a 1.9 cm  oval circumscribed hypoechoic mass. Together both lesions measure on the  order 2.7 cm in greatest dimension. Ultrasound guided biopsy of both  lesions is recommended. Given the close proximity of both lesions, both  lesions may be able to be sampled and submitted as one specimen.     BI-RADS Category 4: Suspicious abnormality or suspicious finding. Biopsy  should be considered.    MRI BREAST BILATERAL W WO CONTRAST-  6/17/2023  IMPRESSION AND RECOMMENDATION:     1.  Adjacent irregular enhancing masses each measuring 1.6 cm at 6:00 to  7:00 in the middle right breast represent the 2 sites of biopsy-proven  malignancy. Adjacent suspicious enhancing foci and areas of nonmass  enhancement are identified, in addition to an irregular enhancing mass  and multiple additional enhancing foci centered at 2:00 to 3:00 in the  right breast, which are also suspicious. The 2  sites of biopsy-proven  malignancy and suspicious areas of enhancement together measure up to  approximately 8.2 cm in maximum dimension, as detailed above. Surgical  management is recommended.  2.  No MRI evidence of malignancy in the left breast.     BI-RADS Category 4: Suspicious      Assessment & Plan     *Stage IIIA infiltrating ductal carcinoma of the right breast   Treated with a lumpectomy, radiation, 6 cycles of TAC chemotherapy and ongoing hormonal therapy. She completed 10 years of adjuvant hormonal therapy with 5 years of tamoxifen followed by 5 years of Arimidex which she completed in 2017.  Carcinoma of the remaining right breast.  Tumor was positive for HER2/julián overexpression and positive for estrogen receptors and negative for progesterone receptors.  Bilateral mastectomies with immediate implant reconstruction 8/16/2023.  Mediport placement 8/16/2023  Echocardiogram from 6/19/2023 shows normal left ventricular ejection fraction of 61% (patient previously had 6 cycles of Adriamycin containing chemotherapy and will be needing Herceptin therapy postoperatively).  Plan for postop adjuvant treatment with weekly Taxol and Herceptin x12 weeks followed by continued single agent Herceptin every 3 weeks for completion of 12 months of therapy total.  Plan for hormonal therapy possibly with Aromasin to begin once Taxol is completed.  9/14/2023: Patient returns for cycle 1 day 15 weekly Taxol and Herceptin.  Fortunately she is neutropenic with an ANC of 800 and will not receive Taxol today but will receive Herceptin.  Her future Taxol doses will be modified to an 85% dose level      PLAN:     Palma will receive her Herceptin treatment in the office today but will not receive Taxol due to her neutropenia.  When her white cells have recovered she will resume Taxol 80 and 85% dose level.  She was instructed to call us immediately if she has any fevers chills or signs of infection.  Nurse practitioner assessment  next week to review her counts prior to proceeding with treatment.  She will be scheduled for an echocardiogram in 2 weeks and will be seen by Dr. Kayla Reynolds of cardiology on 10/20/2023.  MD follow-up in 4 weeks.  Once the 12 weeks of weekly Taxol and Herceptin are completed we would plan to initiate hormonal therapy with Aromasin and continue single agent Herceptin every 3 weeks for the remainder of the year.              9/29/2023      CC:

## 2023-10-02 NOTE — TELEPHONE ENCOUNTER
PT WANTED AFTER 10:30 BUT THERE WAS NOTHING ON SCHEDULED WHERE WE COULD ADD PATIENT - PT IN AGREEMENT WITH TIME SHE HAS

## 2023-10-04 NOTE — PROGRESS NOTES
Subjective   REASON FOR FOLLOWUP:  History of stage IIIA infiltrating ductal carcinoma of the right breast with 4 positive nodes, ER positive, HER-2/julián negative; treated with lumpectomy, status post 6 cycles of TAC. Received tamoxifen between February 2007 and March 2012. Femara was started in March 2012. Patient switched to Arimidex as of 06/21/2012 because of side effects with severe ankle joint pain with Femara.    2.   10 years of adjuvant therapy completed  March 2017.    3.   New diagnosis of carcinoma of the remaining right breast, needle biopsy 6/8/2023.  Tumor is HER2/julián positive and estrogen receptor positive/progesterone receptor negative.    4.   Bilateral mastectomies with implant reconstruction 8/16/2023    5.   Initiation of postop adjuvant chemotherapy with weekly Taxol and Herceptin.  First dose delivered 9/15/2023      HISTORY OF PRESENT ILLNESS:     History of Present Illness Ms. Roblero is a 58 y.o.  female with the above noted history of stage IIIA carcinoma of the right breast, treated with lumpectomy, chemotherapy and radiation. She received extended hormonal therapy with 5 years of tamoxifen followed by 5 years of aromatase inhibitor.  She completed her 10 years of hormonal therapy in March 2017  .     She was seeing us annually had not been seen in January of this year but when she underwent her breast imaging in May 2023 she had suspicious findings and ultimately underwent ultrasound-guided biopsy on 6/8/2023.  There were 2 separate biopsies in 1 of these areas was positive for HER2/julián overexpression by FISH.  Both tumors were ER positive and [place] negative.    She was referred to Dr. Vidal who had performed her previous lumpectomy surgery in 2007 and currently the plan is for her to undergo bilateral mastectomies with reconstruction.    We had her seen by cardiac oncology due to her previous treatment with 6 cycles of TAC chemotherapy in 2007.  Thankfully her ejection  fraction still seems to be normal at 61% based on her echocardiogram from 6/19/2023.    Dr. Vidal has scheduled the patient for radiographic staging with CT scan of the chest abdomen pelvis and a bone scan and the studies did not show any evidence of distant metastatic spread.    She had a bilateral mastectomies with implant reconstruction performed on 8/16/2023.  The tumor in the right breast was 3 x 1.5 x 1.2 cm.  Margins of resection were clear.  She also had placement of a left chest wall Mediport at the time of her surgery.    We recommended weekly Taxol and Herceptin for 12 weeks followed by continued Herceptin every 3 weeks for the remainder of the year of treatment.  She was estrogen receptor positive and will also be started on hormonal therapy once her chemotherapy is completed.    INTERVAL HISTORY:  Palma returns to the office today for follow up and anticipated cycle 2 day 1 weekly Taxol and Herceptin. Last week patient only received Herceptin and paclitaxel was held secondary to neutropenia.    Patient is feeling well today and denies any fevers or other infectious symptoms. She is continuing to eat and drink well and has not had any significant GI symptoms to note. No other concerns noted today.    She is scheduled for cardiac oncology appointment with Dr. Reynolds on 10/20/2023.      Past Medical History, Past Surgical History, Social History, Family History have been reviewed and are without significant changes except as mentioned.    Review of Systems   Constitutional:  Negative for activity change, appetite change, fatigue, fever and unexpected weight change.   HENT:  Negative for hearing loss, nosebleeds, trouble swallowing and voice change.    Eyes:  Negative for visual disturbance.   Respiratory:  Negative for cough, shortness of breath and wheezing.    Cardiovascular:  Negative for chest pain and palpitations.   Gastrointestinal:  Negative for abdominal pain, diarrhea, nausea and vomiting.  "  Genitourinary:  Negative for difficulty urinating, frequency, hematuria and urgency.   Musculoskeletal:  Negative for back pain and neck pain.   Skin:  Negative for rash.   Neurological:  Negative for dizziness, seizures, syncope and headaches.   Hematological:  Negative for adenopathy. Does not bruise/bleed easily.   Psychiatric/Behavioral:  Negative for behavioral problems. The patient is not nervous/anxious.       A comprehensive 14 point review of systems was performed and was negative except as mentioned.    Medications:  The current medication list was reviewed in the EMR    ALLERGIES:    Allergies   Allergen Reactions    Oxycodone Nausea And Vomiting       Objective      Vitals:    10/06/23 1044   BP: 122/80   Pulse: 85   Resp: 16   Temp: 97.3 øF (36.3 øC)   TempSrc: Temporal   SpO2: 98%   Weight: 99.2 kg (218 lb 12.8 oz)   Height: 162 cm (63.78\")   PainSc: 0-No pain         10/6/2023    10:44 AM   Current Status   ECOG score 0       Physical Exam   Constitutional: She is oriented to person, place, and time. She appears well-developed. No distress.   HENT:   Head: Normocephalic.   Eyes: Pupils are equal, round, and reactive to light. Conjunctivae are normal. No scleral icterus.   Neck: No JVD present. No thyromegaly present.   Cardiovascular: Normal rate and regular rhythm. Exam reveals no gallop and no friction rub.   No murmur heard.  Pulmonary/Chest: Effort normal and breath sounds normal. She has no wheezes. She has no rales. Right breast exhibits no mass, no nipple discharge and no skin change. Left breast exhibits no mass, no nipple discharge and no skin change.   Bilateral mastectomies with implant reconstructions.  Right chest wall Mediport   Abdominal: Soft. Bowel sounds are normal. She exhibits no distension and no mass. There is no abdominal tenderness.   Musculoskeletal: Normal range of motion. No deformity.   Lymphadenopathy:     She has no cervical adenopathy.   Neurological: She is alert and " oriented to person, place, and time. She has normal reflexes. No cranial nerve deficit.   Skin: Skin is warm and dry. No rash noted. No erythema.   Psychiatric: Her behavior is normal. Judgment normal.        Physical exam preformed and reviewed. No changes noted from previous exam. Carina Tiwari, APRN ; 10/06/2023        RECENT LABS:  Hematology WBC   Date Value Ref Range Status   10/06/2023 5.17 3.40 - 10.80 10*3/mm3 Final   11/01/2021 5.9 3.4 - 10.8 x10E3/uL Final     RBC   Date Value Ref Range Status   10/06/2023 4.65 3.77 - 5.28 10*6/mm3 Final   11/01/2021 5.14 3.77 - 5.28 x10E6/uL Final     Hemoglobin   Date Value Ref Range Status   10/06/2023 12.3 12.0 - 15.9 g/dL Final     Hematocrit   Date Value Ref Range Status   10/06/2023 38.7 34.0 - 46.6 % Final     Platelets   Date Value Ref Range Status   10/06/2023 228 140 - 450 10*3/mm3 Final            Lab Results   Component Value Date    NEUTROABS 2.89 10/06/2023       Lab Results   Component Value Date    GLUCOSE 95 10/06/2023    BUN 14 10/06/2023    CREATININE 0.81 10/06/2023    EGFRRESULT 91.8 11/30/2022    EGFR 83.2 10/06/2023    BCR 17.3 10/06/2023    K 3.9 10/06/2023    CO2 25.2 10/06/2023    CALCIUM 9.2 10/06/2023    PROTENTOTREF 6.6 11/30/2022    ALBUMIN 4.2 10/06/2023    BILITOT 0.4 10/06/2023    AST 21 10/06/2023    ALT 26 10/06/2023     PATHOLOGY 8/16/2023  Final Diagnosis   1. Right Breast, Oriented Simple Skin Sparing Mastectomy (1,097 Grams): INVASIVE MAMMARY CARCINOMA,       NO SPECIAL TYPE (INVASIVE DUCTAL CARCINOMA).  A. Tumor site: Lower outer quadrant.               B. Histologic grade: Perez histologic score III (tubules = 3, nuclei = 2, mitosis = 3).               C. Tumor size: 30 x 15 x 12 mm.               D. Tumor focality: Single focus.               E. Ductal Carcinoma in-situ (DCIS): Not identified.               F. No lobular neoplasia identified.               G. Overlying skin and nipple are uninvolved by tumor.                H. No definitive lymphovascular space invasion identified.               I. Focal perineural invasion is present.  J. Margins: Uninvolved by invasive and in-situ carcinoma.               1. Invasive carcinoma comes to within 7 mm of the anterior margin, 40 mm from the inferior margin,      60 mm from the posterior and lateral margins, 80 mm from the medial margin and 130 mm from the       superior margin of resection.               K. Lymph Nodes: Not submitted.  L. Hormone receptor status: ER positive, [place] negative, HER2 positive by FISH (right medial biopsy),       Ki-67 35-40%  (performed on prior biopsy [id number]).  M. Pathologic stage: pT2, NX.     2. Left Breast, Oriented Simple Skin Sparing Mastectomy (1,593 Grams):   A. Benign unremarkable breast tissue, skin and nipple.     PATHOLOGY 6/8/2023  Final Diagnosis   1. Breast, Right Lateral 6:30 Position, 3 cm FN, Core Biopsy:               A. Invasive mammary carcinoma, no special type (ductal), Rush histologic grade II (tubules = 3, nuclei = 2,        mitoses = 2) measuring 6 mm maximally and involving four core fragments.  B. No definitive in-situ carcinoma is identified.  C. No definitive lymphovascular space invasion identified.     2. Breast, Right Medial 6:30 Position, 3 cm FN, Core Biopsy:                A. Invasive mammary carcinoma, no special type (ductal), Perez histologic grade II (tubules = 3, nuclei = 2,        mitoses = 1) measuring 10 mm maximally and involving three core fragments.  B. No definitive in-situ carcinoma is identified.  C. No definitive lymphovascular space invasion identified.     CT CHEST, ABDOMEN, AND PELVIS WITHOUT CONTRAST. 7/21/2023  FINDINGS:  CHEST: Right breast findings discussed on breast MRI 06/17/2023. There  is a cluster of nodular densities at the right axilla in close proximity  to a cluster of surgical clips and some of these may represent vessels  or lymphatics. Characterization is limited in the  absence of IV  contrast. There is no definitive pathologically enlarged node present.  There are no pathologically enlarged nodes at the left axilla and there  is no subpectoral lymphadenopathy, and there is no lymphadenopathy at  the mediastinum or jovita given constraints of noncontrasted technique.  The nodularity along the pleura at the dependent aspect of the lower  chest is likely related to dependent atelectatic change. No definite  suspicious pulmonary nodules are seen. There are no pleural or  pericardial effusions.     ABDOMEN/PELVIS: Noncontrasted liver appears unremarkable. The  gallbladder appears unremarkable and there is no biliary dilatation.  Splenic size is normal. Noncontrasted pancreas, adrenals, and kidneys  appear unremarkable. There is no acute bowel abnormality. Appendix  appears within normal limits. The uterus is slightly lobular in contour  likely secondary to small fibroids. Noncontrasted adnexa appear  unremarkable. There is no free fluid or lymphadenopathy. There are mild  abdominal aortic atherosclerotic changes throughout the abdominal aorta  without aneurysmal dilatation. No suspicious bone lesion is seen.     IMPRESSION:  Given constraints of noncontrasted technique, there is no  convincing evidence for metastatic disease within the chest, abdomen, or  pelvis.      Narrative & Impression   NUCLEAR MEDICINE WHOLE BODY BONE SCAN 7/14/2023     HISTORY: Breast cancer. Evaluate for metastatic disease.     TECHNIQUE:  19.1 mCi of 99m technetium MDP was administered intravenous  followed by 3 hour delayed phase whole body imaging with selected spot  views.     COMPARISON: None.     FINDINGS:  There is mild increased uptake at the right sternoclavicular  joint that is attributed to arthritis. Arthritic uptake is present in  both shoulders, knees, and mid feet.  There is also mild increased  lumbar spine uptake that is most likely degenerative/arthritic. Both  kidneys and the urinary bladder  are visualized.     IMPRESSION:  Areas of mild uptake are typical for arthritis/degenerative  change and there is no scintigraphic evidence to suggest bony metastatic  disease.        MAMMO SCREENING DIGITAL TOMOSYNTHESIS BILATERAL W CAD- 4/14/2023   IMPRESSION:  1. There is an area of focal asymmetry in the middle third retroareolar  region of the right breast. Further evaluation with spot compression CC  and MLO mammographic and Tomosynthesis images and targeted right breast  sonography is recommended.  2. There are no findings suspicious for malignancy in the left breast.     BI-RADS Category 0: Incomplete. Needs additional imaging evaluation.    EXAMINATIONS: UNILATERAL RIGHT DIGITAL DIAGNOSTIC MAMMOGRAPHY WITH  TOMOSYNTHESIS AND LIMITED RIGHT BREAST SONOGRAPHY 5/17/2023  IMPRESSION:  There is an irregular 1 cm hypoechoic mass with internal peripheral  vascularity that is seen in the right breast at the 6:30 position on the  order of 3 cm from the nipple. It is immediately adjacent to a 1.9 cm  oval circumscribed hypoechoic mass. Together both lesions measure on the  order 2.7 cm in greatest dimension. Ultrasound guided biopsy of both  lesions is recommended. Given the close proximity of both lesions, both  lesions may be able to be sampled and submitted as one specimen.     BI-RADS Category 4: Suspicious abnormality or suspicious finding. Biopsy  should be considered.    MRI BREAST BILATERAL W WO CONTRAST-  6/17/2023  IMPRESSION AND RECOMMENDATION:     1.  Adjacent irregular enhancing masses each measuring 1.6 cm at 6:00 to  7:00 in the middle right breast represent the 2 sites of biopsy-proven  malignancy. Adjacent suspicious enhancing foci and areas of nonmass  enhancement are identified, in addition to an irregular enhancing mass  and multiple additional enhancing foci centered at 2:00 to 3:00 in the  right breast, which are also suspicious. The 2 sites of biopsy-proven  malignancy and suspicious areas of  enhancement together measure up to  approximately 8.2 cm in maximum dimension, as detailed above. Surgical  management is recommended.  2.  No MRI evidence of malignancy in the left breast.     BI-RADS Category 4: Suspicious      Assessment & Plan     *Stage IIIA infiltrating ductal carcinoma of the right breast   Treated with a lumpectomy, radiation, 6 cycles of TAC chemotherapy and ongoing hormonal therapy. She completed 10 years of adjuvant hormonal therapy with 5 years of tamoxifen followed by 5 years of Arimidex which she completed in 2017.  Carcinoma of the remaining right breast.  Tumor was positive for HER2/julián overexpression and positive for estrogen receptors and negative for progesterone receptors.  Bilateral mastectomies with immediate implant reconstruction 8/16/2023.  Mediport placement 8/16/2023  Echocardiogram from 6/19/2023 shows normal left ventricular ejection fraction of 61% (patient previously had 6 cycles of Adriamycin containing chemotherapy and will be needing Herceptin therapy postoperatively).  Plan for postop adjuvant treatment with weekly Taxol and Herceptin x12 weeks followed by continued single agent Herceptin every 3 weeks for completion of 12 months of therapy total.  Plan for hormonal therapy possibly with Aromasin to begin once Taxol is completed.  9/14/2023: Patient returns for cycle 1 day 15 weekly Taxol and Herceptin.  Unfortunately she is neutropenic with an ANC of 800 and will not receive Taxol today but will receive Herceptin.  Her future Taxol doses will be modified to an 85% dose level.  10/6/2023: Proceed with cycle 2 day 1 taxol and Herceptin. We will continue taxol at 85% reduction of original dose.       PLAN:     Palma will receive her Herceptin treatment in the office today but will not receive Taxol due to her neutropenia.  When her white cells have recovered she will resume Taxol at 85% dose level from original dose.  Return in 1 week for labs and C2D8  Taxol/Herceptin  She will be scheduled for an echocardiogram in 2 weeks and will be seen by Dr. Kayla Reynolds of cardiology on 10/20/2023.  MD follow-up in 2 weeks for labs and C3D15 Taxol/Herceptin  Once the 12 weeks of weekly Taxol and Herceptin are completed we would plan to initiate hormonal therapy with Aromasin and continue single agent Herceptin every 3 weeks for the remainder of the year.      Patient remains on high risk medication and requires close monitoring for toxicity.          10/11/2023      CC:

## 2023-10-06 ENCOUNTER — INFUSION (OUTPATIENT)
Dept: ONCOLOGY | Facility: HOSPITAL | Age: 58
End: 2023-10-06
Payer: COMMERCIAL

## 2023-10-06 ENCOUNTER — OFFICE VISIT (OUTPATIENT)
Dept: ONCOLOGY | Facility: CLINIC | Age: 58
End: 2023-10-06
Payer: COMMERCIAL

## 2023-10-06 VITALS
OXYGEN SATURATION: 98 % | WEIGHT: 218.8 LBS | HEART RATE: 85 BPM | TEMPERATURE: 97.3 F | SYSTOLIC BLOOD PRESSURE: 122 MMHG | BODY MASS INDEX: 37.36 KG/M2 | DIASTOLIC BLOOD PRESSURE: 80 MMHG | HEIGHT: 64 IN | RESPIRATION RATE: 16 BRPM

## 2023-10-06 VITALS — HEART RATE: 86 BPM | DIASTOLIC BLOOD PRESSURE: 75 MMHG | SYSTOLIC BLOOD PRESSURE: 120 MMHG

## 2023-10-06 DIAGNOSIS — C50.811 MALIGNANT NEOPLASM OF OVERLAPPING SITES OF RIGHT BREAST IN FEMALE, ESTROGEN RECEPTOR POSITIVE: ICD-10-CM

## 2023-10-06 DIAGNOSIS — Z45.2 ENCOUNTER FOR ADJUSTMENT OR MANAGEMENT OF VASCULAR ACCESS DEVICE: Primary | ICD-10-CM

## 2023-10-06 DIAGNOSIS — Z17.0 MALIGNANT NEOPLASM OF OVERLAPPING SITES OF RIGHT BREAST IN FEMALE, ESTROGEN RECEPTOR POSITIVE: ICD-10-CM

## 2023-10-06 DIAGNOSIS — Z17.0 MALIGNANT NEOPLASM OF OVERLAPPING SITES OF RIGHT BREAST IN FEMALE, ESTROGEN RECEPTOR POSITIVE: Primary | ICD-10-CM

## 2023-10-06 DIAGNOSIS — C50.811 MALIGNANT NEOPLASM OF OVERLAPPING SITES OF RIGHT BREAST IN FEMALE, ESTROGEN RECEPTOR POSITIVE: Primary | ICD-10-CM

## 2023-10-06 DIAGNOSIS — Z79.899 HIGH RISK MEDICATIONS (NOT ANTICOAGULANTS) LONG-TERM USE: ICD-10-CM

## 2023-10-06 LAB
ALBUMIN SERPL-MCNC: 4.2 G/DL (ref 3.5–5.2)
ALBUMIN/GLOB SERPL: 1.6 G/DL
ALP SERPL-CCNC: 65 U/L (ref 39–117)
ALT SERPL W P-5'-P-CCNC: 26 U/L (ref 1–33)
ANION GAP SERPL CALCULATED.3IONS-SCNC: 8.8 MMOL/L (ref 5–15)
AST SERPL-CCNC: 21 U/L (ref 1–32)
BASOPHILS # BLD AUTO: 0.06 10*3/MM3 (ref 0–0.2)
BASOPHILS NFR BLD AUTO: 1.2 % (ref 0–1.5)
BILIRUB SERPL-MCNC: 0.4 MG/DL (ref 0–1.2)
BUN SERPL-MCNC: 14 MG/DL (ref 8–23)
BUN/CREAT SERPL: 17.3 (ref 7–25)
CALCIUM SPEC-SCNC: 9.2 MG/DL (ref 8.6–10.5)
CHLORIDE SERPL-SCNC: 107 MMOL/L (ref 98–107)
CO2 SERPL-SCNC: 25.2 MMOL/L (ref 22–29)
CREAT SERPL-MCNC: 0.81 MG/DL (ref 0.57–1)
DEPRECATED RDW RBC AUTO: 43.8 FL (ref 37–54)
EGFRCR SERPLBLD CKD-EPI 2021: 83.2 ML/MIN/1.73
EOSINOPHIL # BLD AUTO: 0.08 10*3/MM3 (ref 0–0.4)
EOSINOPHIL NFR BLD AUTO: 1.5 % (ref 0.3–6.2)
ERYTHROCYTE [DISTWIDTH] IN BLOOD BY AUTOMATED COUNT: 14.5 % (ref 12.3–15.4)
GLOBULIN UR ELPH-MCNC: 2.7 GM/DL
GLUCOSE SERPL-MCNC: 95 MG/DL (ref 65–99)
HCT VFR BLD AUTO: 38.7 % (ref 34–46.6)
HGB BLD-MCNC: 12.3 G/DL (ref 12–15.9)
IMM GRANULOCYTES # BLD AUTO: 0.04 10*3/MM3 (ref 0–0.05)
IMM GRANULOCYTES NFR BLD AUTO: 0.8 % (ref 0–0.5)
LYMPHOCYTES # BLD AUTO: 1.5 10*3/MM3 (ref 0.7–3.1)
LYMPHOCYTES NFR BLD AUTO: 29 % (ref 19.6–45.3)
MCH RBC QN AUTO: 26.5 PG (ref 26.6–33)
MCHC RBC AUTO-ENTMCNC: 31.8 G/DL (ref 31.5–35.7)
MCV RBC AUTO: 83.2 FL (ref 79–97)
MONOCYTES # BLD AUTO: 0.6 10*3/MM3 (ref 0.1–0.9)
MONOCYTES NFR BLD AUTO: 11.6 % (ref 5–12)
NEUTROPHILS NFR BLD AUTO: 2.89 10*3/MM3 (ref 1.7–7)
NEUTROPHILS NFR BLD AUTO: 55.9 % (ref 42.7–76)
NRBC BLD AUTO-RTO: 0 /100 WBC (ref 0–0.2)
PLATELET # BLD AUTO: 228 10*3/MM3 (ref 140–450)
PMV BLD AUTO: 11.8 FL (ref 6–12)
POTASSIUM SERPL-SCNC: 3.9 MMOL/L (ref 3.5–5.2)
PROT SERPL-MCNC: 6.9 G/DL (ref 6–8.5)
RBC # BLD AUTO: 4.65 10*6/MM3 (ref 3.77–5.28)
SODIUM SERPL-SCNC: 141 MMOL/L (ref 136–145)
WBC NRBC COR # BLD: 5.17 10*3/MM3 (ref 3.4–10.8)

## 2023-10-06 PROCEDURE — 96413 CHEMO IV INFUSION 1 HR: CPT

## 2023-10-06 PROCEDURE — 25810000003 SODIUM CHLORIDE 0.9 % SOLUTION 250 ML FLEX CONT: Performed by: NURSE PRACTITIONER

## 2023-10-06 PROCEDURE — 25810000003 SODIUM CHLORIDE 0.9 % SOLUTION 250 ML FLEX CONT: Performed by: INTERNAL MEDICINE

## 2023-10-06 PROCEDURE — 25010000002 DEXAMETHASONE SODIUM PHOSPHATE 100 MG/10ML SOLUTION: Performed by: INTERNAL MEDICINE

## 2023-10-06 PROCEDURE — 85025 COMPLETE CBC W/AUTO DIFF WBC: CPT | Performed by: INTERNAL MEDICINE

## 2023-10-06 PROCEDURE — 96417 CHEMO IV INFUS EACH ADDL SEQ: CPT

## 2023-10-06 PROCEDURE — 25010000002 DIPHENHYDRAMINE PER 50 MG: Performed by: INTERNAL MEDICINE

## 2023-10-06 PROCEDURE — 25010000002 PACLITAXEL PER 1 MG: Performed by: NURSE PRACTITIONER

## 2023-10-06 PROCEDURE — 25010000002 HYDROCORTISONE SOD SUC (PF) 100 MG RECONSTITUTED SOLUTION: Performed by: INTERNAL MEDICINE

## 2023-10-06 PROCEDURE — 96375 TX/PRO/DX INJ NEW DRUG ADDON: CPT

## 2023-10-06 PROCEDURE — 25010000002 TRASTUZUMAB-QYYP 420 MG RECONSTITUTED SOLUTION 1 EACH VIAL: Performed by: INTERNAL MEDICINE

## 2023-10-06 PROCEDURE — 80053 COMPREHEN METABOLIC PANEL: CPT | Performed by: INTERNAL MEDICINE

## 2023-10-06 PROCEDURE — 25010000002 HEPARIN LOCK FLUSH PER 10 UNITS: Performed by: INTERNAL MEDICINE

## 2023-10-06 PROCEDURE — 25810000003 SODIUM CHLORIDE 0.9 % SOLUTION: Performed by: INTERNAL MEDICINE

## 2023-10-06 RX ORDER — HEPARIN SODIUM (PORCINE) LOCK FLUSH IV SOLN 100 UNIT/ML 100 UNIT/ML
500 SOLUTION INTRAVENOUS AS NEEDED
Status: DISCONTINUED | OUTPATIENT
Start: 2023-10-06 | End: 2023-10-06 | Stop reason: HOSPADM

## 2023-10-06 RX ORDER — SODIUM CHLORIDE 0.9 % (FLUSH) 0.9 %
10 SYRINGE (ML) INJECTION AS NEEDED
OUTPATIENT
Start: 2023-10-06

## 2023-10-06 RX ORDER — MONTELUKAST SODIUM 10 MG/1
10 TABLET ORAL ONCE
Status: COMPLETED | OUTPATIENT
Start: 2023-10-06 | End: 2023-10-06

## 2023-10-06 RX ORDER — HEPARIN SODIUM (PORCINE) LOCK FLUSH IV SOLN 100 UNIT/ML 100 UNIT/ML
500 SOLUTION INTRAVENOUS AS NEEDED
OUTPATIENT
Start: 2023-10-06

## 2023-10-06 RX ORDER — SODIUM CHLORIDE 0.9 % (FLUSH) 0.9 %
10 SYRINGE (ML) INJECTION AS NEEDED
Status: DISCONTINUED | OUTPATIENT
Start: 2023-10-06 | End: 2023-10-06 | Stop reason: HOSPADM

## 2023-10-06 RX ORDER — SODIUM CHLORIDE 9 MG/ML
250 INJECTION, SOLUTION INTRAVENOUS ONCE
Status: COMPLETED | OUTPATIENT
Start: 2023-10-06 | End: 2023-10-06

## 2023-10-06 RX ORDER — FAMOTIDINE 10 MG/ML
20 INJECTION, SOLUTION INTRAVENOUS ONCE
Status: COMPLETED | OUTPATIENT
Start: 2023-10-06 | End: 2023-10-06

## 2023-10-06 RX ADMIN — PACLITAXEL 135 MG: 6 INJECTION, SOLUTION INTRAVENOUS at 12:45

## 2023-10-06 RX ADMIN — SODIUM CHLORIDE 190 MG: 9 INJECTION, SOLUTION INTRAVENOUS at 12:02

## 2023-10-06 RX ADMIN — DIPHENHYDRAMINE HYDROCHLORIDE 50 MG: 50 INJECTION, SOLUTION INTRAMUSCULAR; INTRAVENOUS at 11:43

## 2023-10-06 RX ADMIN — MONTELUKAST 10 MG: 10 TABLET, FILM COATED ORAL at 11:23

## 2023-10-06 RX ADMIN — FAMOTIDINE 20 MG: 10 INJECTION INTRAVENOUS at 11:24

## 2023-10-06 RX ADMIN — DEXAMETHASONE SODIUM PHOSPHATE 12 MG: 10 INJECTION, SOLUTION INTRAMUSCULAR; INTRAVENOUS at 11:26

## 2023-10-06 RX ADMIN — SODIUM CHLORIDE 250 ML: 9 INJECTION, SOLUTION INTRAVENOUS at 11:20

## 2023-10-06 RX ADMIN — Medication 500 UNITS: at 13:48

## 2023-10-06 RX ADMIN — HYDROCORTISONE SODIUM SUCCINATE 100 MG: 100 INJECTION, POWDER, FOR SOLUTION INTRAMUSCULAR; INTRAVENOUS at 12:39

## 2023-10-06 RX ADMIN — Medication 10 ML: at 13:47

## 2023-10-09 ENCOUNTER — TELEPHONE (OUTPATIENT)
Dept: ONCOLOGY | Facility: CLINIC | Age: 58
End: 2023-10-09
Payer: COMMERCIAL

## 2023-10-13 ENCOUNTER — INFUSION (OUTPATIENT)
Dept: ONCOLOGY | Facility: HOSPITAL | Age: 58
End: 2023-10-13
Payer: COMMERCIAL

## 2023-10-13 VITALS
SYSTOLIC BLOOD PRESSURE: 122 MMHG | TEMPERATURE: 97.5 F | HEIGHT: 64 IN | BODY MASS INDEX: 37.18 KG/M2 | HEART RATE: 90 BPM | WEIGHT: 217.8 LBS | OXYGEN SATURATION: 100 % | RESPIRATION RATE: 15 BRPM | DIASTOLIC BLOOD PRESSURE: 81 MMHG

## 2023-10-13 DIAGNOSIS — C50.811 MALIGNANT NEOPLASM OF OVERLAPPING SITES OF RIGHT BREAST IN FEMALE, ESTROGEN RECEPTOR POSITIVE: ICD-10-CM

## 2023-10-13 DIAGNOSIS — Z17.0 MALIGNANT NEOPLASM OF OVERLAPPING SITES OF RIGHT BREAST IN FEMALE, ESTROGEN RECEPTOR POSITIVE: ICD-10-CM

## 2023-10-13 DIAGNOSIS — Z45.2 ENCOUNTER FOR ADJUSTMENT OR MANAGEMENT OF VASCULAR ACCESS DEVICE: Primary | ICD-10-CM

## 2023-10-13 LAB
ALBUMIN SERPL-MCNC: 3.8 G/DL (ref 3.5–5.2)
ALBUMIN/GLOB SERPL: 1.3 G/DL
ALP SERPL-CCNC: 62 U/L (ref 39–117)
ALT SERPL W P-5'-P-CCNC: 24 U/L (ref 1–33)
ANION GAP SERPL CALCULATED.3IONS-SCNC: 7.5 MMOL/L (ref 5–15)
AST SERPL-CCNC: 20 U/L (ref 1–32)
BASOPHILS # BLD AUTO: 0.05 10*3/MM3 (ref 0–0.2)
BASOPHILS NFR BLD AUTO: 0.9 % (ref 0–1.5)
BILIRUB SERPL-MCNC: 0.5 MG/DL (ref 0–1.2)
BUN SERPL-MCNC: 14 MG/DL (ref 8–23)
BUN/CREAT SERPL: 18.2 (ref 7–25)
CALCIUM SPEC-SCNC: 9.1 MG/DL (ref 8.6–10.5)
CHLORIDE SERPL-SCNC: 107 MMOL/L (ref 98–107)
CO2 SERPL-SCNC: 26.5 MMOL/L (ref 22–29)
CREAT SERPL-MCNC: 0.77 MG/DL (ref 0.57–1)
DEPRECATED RDW RBC AUTO: 41.8 FL (ref 37–54)
EGFRCR SERPLBLD CKD-EPI 2021: 88.4 ML/MIN/1.73
EOSINOPHIL # BLD AUTO: 0.11 10*3/MM3 (ref 0–0.4)
EOSINOPHIL NFR BLD AUTO: 2.1 % (ref 0.3–6.2)
ERYTHROCYTE [DISTWIDTH] IN BLOOD BY AUTOMATED COUNT: 13.9 % (ref 12.3–15.4)
GLOBULIN UR ELPH-MCNC: 2.9 GM/DL
GLUCOSE SERPL-MCNC: 90 MG/DL (ref 65–99)
HCT VFR BLD AUTO: 36.7 % (ref 34–46.6)
HGB BLD-MCNC: 11.8 G/DL (ref 12–15.9)
IMM GRANULOCYTES # BLD AUTO: 0.08 10*3/MM3 (ref 0–0.05)
IMM GRANULOCYTES NFR BLD AUTO: 1.5 % (ref 0–0.5)
LYMPHOCYTES # BLD AUTO: 1.49 10*3/MM3 (ref 0.7–3.1)
LYMPHOCYTES NFR BLD AUTO: 28.2 % (ref 19.6–45.3)
MCH RBC QN AUTO: 26.6 PG (ref 26.6–33)
MCHC RBC AUTO-ENTMCNC: 32.2 G/DL (ref 31.5–35.7)
MCV RBC AUTO: 82.8 FL (ref 79–97)
MONOCYTES # BLD AUTO: 0.4 10*3/MM3 (ref 0.1–0.9)
MONOCYTES NFR BLD AUTO: 7.6 % (ref 5–12)
NEUTROPHILS NFR BLD AUTO: 3.15 10*3/MM3 (ref 1.7–7)
NEUTROPHILS NFR BLD AUTO: 59.7 % (ref 42.7–76)
NRBC BLD AUTO-RTO: 0 /100 WBC (ref 0–0.2)
PLATELET # BLD AUTO: 213 10*3/MM3 (ref 140–450)
PMV BLD AUTO: 11.9 FL (ref 6–12)
POTASSIUM SERPL-SCNC: 3.9 MMOL/L (ref 3.5–5.2)
PROT SERPL-MCNC: 6.7 G/DL (ref 6–8.5)
RBC # BLD AUTO: 4.43 10*6/MM3 (ref 3.77–5.28)
SODIUM SERPL-SCNC: 141 MMOL/L (ref 136–145)
WBC NRBC COR # BLD: 5.28 10*3/MM3 (ref 3.4–10.8)

## 2023-10-13 PROCEDURE — 96417 CHEMO IV INFUS EACH ADDL SEQ: CPT

## 2023-10-13 PROCEDURE — 25010000002 DEXAMETHASONE SODIUM PHOSPHATE 100 MG/10ML SOLUTION: Performed by: INTERNAL MEDICINE

## 2023-10-13 PROCEDURE — 85025 COMPLETE CBC W/AUTO DIFF WBC: CPT | Performed by: INTERNAL MEDICINE

## 2023-10-13 PROCEDURE — 80053 COMPREHEN METABOLIC PANEL: CPT | Performed by: INTERNAL MEDICINE

## 2023-10-13 PROCEDURE — 25010000002 PACLITAXEL PER 1 MG: Performed by: NURSE PRACTITIONER

## 2023-10-13 PROCEDURE — 96413 CHEMO IV INFUSION 1 HR: CPT

## 2023-10-13 PROCEDURE — 25010000002 HYDROCORTISONE SOD SUC (PF) 100 MG RECONSTITUTED SOLUTION: Performed by: INTERNAL MEDICINE

## 2023-10-13 PROCEDURE — 25810000003 SODIUM CHLORIDE 0.9 % SOLUTION 250 ML FLEX CONT: Performed by: NURSE PRACTITIONER

## 2023-10-13 PROCEDURE — 25010000002 TRASTUZUMAB-QYYP 420 MG RECONSTITUTED SOLUTION 1 EACH VIAL: Performed by: INTERNAL MEDICINE

## 2023-10-13 PROCEDURE — 25010000002 HEPARIN LOCK FLUSH PER 10 UNITS: Performed by: INTERNAL MEDICINE

## 2023-10-13 PROCEDURE — 96375 TX/PRO/DX INJ NEW DRUG ADDON: CPT

## 2023-10-13 PROCEDURE — 25810000003 SODIUM CHLORIDE 0.9 % SOLUTION: Performed by: INTERNAL MEDICINE

## 2023-10-13 PROCEDURE — 25810000003 SODIUM CHLORIDE 0.9 % SOLUTION 250 ML FLEX CONT: Performed by: INTERNAL MEDICINE

## 2023-10-13 RX ORDER — MONTELUKAST SODIUM 10 MG/1
10 TABLET ORAL ONCE
Status: COMPLETED | OUTPATIENT
Start: 2023-10-13 | End: 2023-10-13

## 2023-10-13 RX ORDER — SODIUM CHLORIDE 0.9 % (FLUSH) 0.9 %
10 SYRINGE (ML) INJECTION AS NEEDED
Status: DISCONTINUED | OUTPATIENT
Start: 2023-10-13 | End: 2023-10-13 | Stop reason: HOSPADM

## 2023-10-13 RX ORDER — FAMOTIDINE 10 MG/ML
20 INJECTION, SOLUTION INTRAVENOUS ONCE
Status: COMPLETED | OUTPATIENT
Start: 2023-10-13 | End: 2023-10-13

## 2023-10-13 RX ORDER — HEPARIN SODIUM (PORCINE) LOCK FLUSH IV SOLN 100 UNIT/ML 100 UNIT/ML
500 SOLUTION INTRAVENOUS AS NEEDED
Status: DISCONTINUED | OUTPATIENT
Start: 2023-10-13 | End: 2023-10-13 | Stop reason: HOSPADM

## 2023-10-13 RX ORDER — HEPARIN SODIUM (PORCINE) LOCK FLUSH IV SOLN 100 UNIT/ML 100 UNIT/ML
500 SOLUTION INTRAVENOUS AS NEEDED
OUTPATIENT
Start: 2023-10-13

## 2023-10-13 RX ORDER — SODIUM CHLORIDE 0.9 % (FLUSH) 0.9 %
10 SYRINGE (ML) INJECTION AS NEEDED
OUTPATIENT
Start: 2023-10-13

## 2023-10-13 RX ORDER — SODIUM CHLORIDE 9 MG/ML
250 INJECTION, SOLUTION INTRAVENOUS ONCE
Status: COMPLETED | OUTPATIENT
Start: 2023-10-13 | End: 2023-10-13

## 2023-10-13 RX ADMIN — Medication 500 UNITS: at 14:29

## 2023-10-13 RX ADMIN — HYDROCORTISONE SODIUM SUCCINATE 100 MG: 100 INJECTION, POWDER, FOR SOLUTION INTRAMUSCULAR; INTRAVENOUS at 13:20

## 2023-10-13 RX ADMIN — SODIUM CHLORIDE 190 MG: 9 INJECTION, SOLUTION INTRAVENOUS at 11:40

## 2023-10-13 RX ADMIN — MONTELUKAST 10 MG: 10 TABLET, FILM COATED ORAL at 11:38

## 2023-10-13 RX ADMIN — PACLITAXEL 135 MG: 6 INJECTION, SOLUTION, CONCENTRATE INTRAVENOUS at 13:28

## 2023-10-13 RX ADMIN — SODIUM CHLORIDE 250 ML: 9 INJECTION, SOLUTION INTRAVENOUS at 11:40

## 2023-10-13 RX ADMIN — FAMOTIDINE 20 MG: 10 INJECTION INTRAVENOUS at 12:14

## 2023-10-13 RX ADMIN — DEXAMETHASONE SODIUM PHOSPHATE 12 MG: 10 INJECTION, SOLUTION INTRAMUSCULAR; INTRAVENOUS at 12:16

## 2023-10-13 RX ADMIN — Medication 10 ML: at 14:29

## 2023-10-16 ENCOUNTER — HOSPITAL ENCOUNTER (OUTPATIENT)
Dept: CARDIOLOGY | Facility: HOSPITAL | Age: 58
Discharge: HOME | End: 2023-10-16
Admitting: INTERNAL MEDICINE
Payer: COMMERCIAL

## 2023-10-16 VITALS — WEIGHT: 217 LBS | BODY MASS INDEX: 37.05 KG/M2 | HEIGHT: 64 IN

## 2023-10-16 DIAGNOSIS — Z17.0 MALIGNANT NEOPLASM OF OVERLAPPING SITES OF RIGHT BREAST IN FEMALE, ESTROGEN RECEPTOR POSITIVE: ICD-10-CM

## 2023-10-16 DIAGNOSIS — C50.811 MALIGNANT NEOPLASM OF OVERLAPPING SITES OF RIGHT BREAST IN FEMALE, ESTROGEN RECEPTOR POSITIVE: ICD-10-CM

## 2023-10-16 LAB
AORTIC DIMENSIONLESS INDEX: 0.8 (DI)
ASCENDING AORTA: 2.5 CM
BH CV ECHO LEFT VENTRICLE GLOBAL LONGITUDINAL STRAIN: -23.9 %
BH CV ECHO MEAS - ACS: 2.03 CM
BH CV ECHO MEAS - AO MAX PG: 7 MMHG
BH CV ECHO MEAS - AO MEAN PG: 3.9 MMHG
BH CV ECHO MEAS - AO ROOT DIAM: 2.9 CM
BH CV ECHO MEAS - AO V2 MAX: 131.9 CM/SEC
BH CV ECHO MEAS - AO V2 VTI: 24.5 CM
BH CV ECHO MEAS - AVA(I,D): 2.39 CM2
BH CV ECHO MEAS - EDV(CUBED): 91.7 ML
BH CV ECHO MEAS - EDV(MOD-SP2): 82 ML
BH CV ECHO MEAS - EDV(MOD-SP4): 78 ML
BH CV ECHO MEAS - EF(MOD-BP): 61.4 %
BH CV ECHO MEAS - EF(MOD-SP2): 58.5 %
BH CV ECHO MEAS - EF(MOD-SP4): 61.5 %
BH CV ECHO MEAS - ESV(CUBED): 21.9 ML
BH CV ECHO MEAS - ESV(MOD-SP2): 34 ML
BH CV ECHO MEAS - ESV(MOD-SP4): 30 ML
BH CV ECHO MEAS - FS: 37.9 %
BH CV ECHO MEAS - IVS/LVPW: 1 CM
BH CV ECHO MEAS - IVSD: 0.99 CM
BH CV ECHO MEAS - LAT PEAK E' VEL: 11.1 CM/SEC
BH CV ECHO MEAS - LV MASS(C)D: 150.4 GRAMS
BH CV ECHO MEAS - LV MAX PG: 3.6 MMHG
BH CV ECHO MEAS - LV MEAN PG: 1.99 MMHG
BH CV ECHO MEAS - LV V1 MAX: 95.2 CM/SEC
BH CV ECHO MEAS - LV V1 VTI: 19.3 CM
BH CV ECHO MEAS - LVIDD: 4.5 CM
BH CV ECHO MEAS - LVIDS: 2.8 CM
BH CV ECHO MEAS - LVOT AREA: 3 CM2
BH CV ECHO MEAS - LVOT DIAM: 1.97 CM
BH CV ECHO MEAS - LVPWD: 0.98 CM
BH CV ECHO MEAS - MED PEAK E' VEL: 10 CM/SEC
BH CV ECHO MEAS - MV A DUR: 0.1 SEC
BH CV ECHO MEAS - MV A MAX VEL: 86.8 CM/SEC
BH CV ECHO MEAS - MV DEC SLOPE: 502.9 CM/SEC2
BH CV ECHO MEAS - MV DEC TIME: 0.16 SEC
BH CV ECHO MEAS - MV E MAX VEL: 75.2 CM/SEC
BH CV ECHO MEAS - MV E/A: 0.87
BH CV ECHO MEAS - MV MAX PG: 3.7 MMHG
BH CV ECHO MEAS - MV MEAN PG: 1.92 MMHG
BH CV ECHO MEAS - MV P1/2T: 55.1 MSEC
BH CV ECHO MEAS - MV V2 VTI: 25.5 CM
BH CV ECHO MEAS - MVA(P1/2T): 4 CM2
BH CV ECHO MEAS - MVA(VTI): 2.31 CM2
BH CV ECHO MEAS - PA ACC TIME: 0.13 SEC
BH CV ECHO MEAS - PA V2 MAX: 75.6 CM/SEC
BH CV ECHO MEAS - PULM A REVS DUR: 0.1 SEC
BH CV ECHO MEAS - PULM A REVS VEL: 23 CM/SEC
BH CV ECHO MEAS - PULM DIAS VEL: 42.2 CM/SEC
BH CV ECHO MEAS - PULM S/D: 1.07
BH CV ECHO MEAS - PULM SYS VEL: 45 CM/SEC
BH CV ECHO MEAS - RAP SYSTOLE: 3 MMHG
BH CV ECHO MEAS - RV MAX PG: 2.12 MMHG
BH CV ECHO MEAS - RV V1 MAX: 72.8 CM/SEC
BH CV ECHO MEAS - RV V1 VTI: 12.3 CM
BH CV ECHO MEAS - RVSP: 22.9 MMHG
BH CV ECHO MEAS - SV(LVOT): 58.7 ML
BH CV ECHO MEAS - SV(MOD-SP2): 48 ML
BH CV ECHO MEAS - SV(MOD-SP4): 48 ML
BH CV ECHO MEAS - TAPSE (>1.6): 2 CM
BH CV ECHO MEAS - TR MAX PG: 19.9 MMHG
BH CV ECHO MEAS - TR MAX VEL: 223.2 CM/SEC
BH CV ECHO MEASUREMENTS AVERAGE E/E' RATIO: 7.13
BH CV XLRA - TDI S': 12.6 CM/SEC
LEFT ATRIUM VOLUME INDEX: 16.8 ML/M2
SINUS: 2.5 CM
STJ: 2.34 CM

## 2023-10-16 PROCEDURE — 93306 TTE W/DOPPLER COMPLETE: CPT | Performed by: INTERNAL MEDICINE

## 2023-10-16 PROCEDURE — 25510000001 PERFLUTREN (DEFINITY) 8.476 MG IN SODIUM CHLORIDE (PF) 0.9 % 10 ML INJECTION: Performed by: INTERNAL MEDICINE

## 2023-10-16 PROCEDURE — 93306 TTE W/DOPPLER COMPLETE: CPT

## 2023-10-16 PROCEDURE — 93356 MYOCRD STRAIN IMG SPCKL TRCK: CPT | Performed by: INTERNAL MEDICINE

## 2023-10-16 PROCEDURE — 93356 MYOCRD STRAIN IMG SPCKL TRCK: CPT

## 2023-10-16 RX ADMIN — SODIUM CHLORIDE 3 ML: 9 INJECTION INTRAMUSCULAR; INTRAVENOUS; SUBCUTANEOUS at 13:15

## 2023-10-20 ENCOUNTER — OFFICE VISIT (OUTPATIENT)
Dept: CARDIOLOGY | Facility: CLINIC | Age: 58
End: 2023-10-20
Payer: COMMERCIAL

## 2023-10-20 ENCOUNTER — INFUSION (OUTPATIENT)
Dept: ONCOLOGY | Facility: HOSPITAL | Age: 58
End: 2023-10-20
Payer: COMMERCIAL

## 2023-10-20 VITALS
WEIGHT: 217.4 LBS | OXYGEN SATURATION: 100 % | SYSTOLIC BLOOD PRESSURE: 129 MMHG | TEMPERATURE: 98.9 F | RESPIRATION RATE: 15 BRPM | HEIGHT: 64 IN | HEART RATE: 99 BPM | BODY MASS INDEX: 37.11 KG/M2 | DIASTOLIC BLOOD PRESSURE: 81 MMHG

## 2023-10-20 VITALS
HEART RATE: 83 BPM | WEIGHT: 217 LBS | DIASTOLIC BLOOD PRESSURE: 66 MMHG | SYSTOLIC BLOOD PRESSURE: 112 MMHG | HEIGHT: 64 IN | BODY MASS INDEX: 37.05 KG/M2

## 2023-10-20 DIAGNOSIS — C50.811 MALIGNANT NEOPLASM OF OVERLAPPING SITES OF RIGHT BREAST IN FEMALE, ESTROGEN RECEPTOR POSITIVE: Primary | ICD-10-CM

## 2023-10-20 DIAGNOSIS — Z17.0 MALIGNANT NEOPLASM OF OVERLAPPING SITES OF RIGHT BREAST IN FEMALE, ESTROGEN RECEPTOR POSITIVE: Primary | ICD-10-CM

## 2023-10-20 DIAGNOSIS — Z51.81 ENCOUNTER FOR MONITORING CARDIOTOXIC DRUG THERAPY: Primary | ICD-10-CM

## 2023-10-20 DIAGNOSIS — Z45.2 ENCOUNTER FOR ADJUSTMENT OR MANAGEMENT OF VASCULAR ACCESS DEVICE: ICD-10-CM

## 2023-10-20 DIAGNOSIS — Z79.899 ENCOUNTER FOR MONITORING CARDIOTOXIC DRUG THERAPY: Primary | ICD-10-CM

## 2023-10-20 LAB
ALBUMIN SERPL-MCNC: 4.1 G/DL (ref 3.5–5.2)
ALBUMIN/GLOB SERPL: 1.5 G/DL
ALP SERPL-CCNC: 61 U/L (ref 39–117)
ALT SERPL W P-5'-P-CCNC: 22 U/L (ref 1–33)
ANION GAP SERPL CALCULATED.3IONS-SCNC: 9.5 MMOL/L (ref 5–15)
AST SERPL-CCNC: 20 U/L (ref 1–32)
BASOPHILS # BLD AUTO: 0.04 10*3/MM3 (ref 0–0.2)
BASOPHILS NFR BLD AUTO: 0.8 % (ref 0–1.5)
BILIRUB SERPL-MCNC: 0.6 MG/DL (ref 0–1.2)
BUN SERPL-MCNC: 15 MG/DL (ref 8–23)
BUN/CREAT SERPL: 18.5 (ref 7–25)
CALCIUM SPEC-SCNC: 9.2 MG/DL (ref 8.6–10.5)
CHLORIDE SERPL-SCNC: 106 MMOL/L (ref 98–107)
CO2 SERPL-SCNC: 25.5 MMOL/L (ref 22–29)
CREAT SERPL-MCNC: 0.81 MG/DL (ref 0.57–1)
DEPRECATED RDW RBC AUTO: 42.2 FL (ref 37–54)
EGFRCR SERPLBLD CKD-EPI 2021: 83.2 ML/MIN/1.73
EOSINOPHIL # BLD AUTO: 0.13 10*3/MM3 (ref 0–0.4)
EOSINOPHIL NFR BLD AUTO: 2.5 % (ref 0.3–6.2)
ERYTHROCYTE [DISTWIDTH] IN BLOOD BY AUTOMATED COUNT: 14.4 % (ref 12.3–15.4)
GLOBULIN UR ELPH-MCNC: 2.7 GM/DL
GLUCOSE SERPL-MCNC: 113 MG/DL (ref 65–99)
HCT VFR BLD AUTO: 37.3 % (ref 34–46.6)
HGB BLD-MCNC: 11.7 G/DL (ref 12–15.9)
IMM GRANULOCYTES # BLD AUTO: 0.04 10*3/MM3 (ref 0–0.05)
IMM GRANULOCYTES NFR BLD AUTO: 0.8 % (ref 0–0.5)
LYMPHOCYTES # BLD AUTO: 1.57 10*3/MM3 (ref 0.7–3.1)
LYMPHOCYTES NFR BLD AUTO: 29.9 % (ref 19.6–45.3)
MCH RBC QN AUTO: 25.9 PG (ref 26.6–33)
MCHC RBC AUTO-ENTMCNC: 31.4 G/DL (ref 31.5–35.7)
MCV RBC AUTO: 82.7 FL (ref 79–97)
MONOCYTES # BLD AUTO: 0.45 10*3/MM3 (ref 0.1–0.9)
MONOCYTES NFR BLD AUTO: 8.6 % (ref 5–12)
NEUTROPHILS NFR BLD AUTO: 3.02 10*3/MM3 (ref 1.7–7)
NEUTROPHILS NFR BLD AUTO: 57.4 % (ref 42.7–76)
NRBC BLD AUTO-RTO: 0 /100 WBC (ref 0–0.2)
PLATELET # BLD AUTO: 212 10*3/MM3 (ref 140–450)
PMV BLD AUTO: 12.1 FL (ref 6–12)
POTASSIUM SERPL-SCNC: 3.7 MMOL/L (ref 3.5–5.2)
PROT SERPL-MCNC: 6.8 G/DL (ref 6–8.5)
RBC # BLD AUTO: 4.51 10*6/MM3 (ref 3.77–5.28)
SODIUM SERPL-SCNC: 141 MMOL/L (ref 136–145)
WBC NRBC COR # BLD: 5.25 10*3/MM3 (ref 3.4–10.8)

## 2023-10-20 PROCEDURE — 25810000003 SODIUM CHLORIDE 0.9 % SOLUTION 250 ML FLEX CONT: Performed by: NURSE PRACTITIONER

## 2023-10-20 PROCEDURE — 96375 TX/PRO/DX INJ NEW DRUG ADDON: CPT

## 2023-10-20 PROCEDURE — 25010000002 PACLITAXEL PER 1 MG: Performed by: NURSE PRACTITIONER

## 2023-10-20 PROCEDURE — 25010000002 HEPARIN LOCK FLUSH PER 10 UNITS: Performed by: INTERNAL MEDICINE

## 2023-10-20 PROCEDURE — 25010000002 DIPHENHYDRAMINE PER 50 MG: Performed by: INTERNAL MEDICINE

## 2023-10-20 PROCEDURE — 85025 COMPLETE CBC W/AUTO DIFF WBC: CPT | Performed by: INTERNAL MEDICINE

## 2023-10-20 PROCEDURE — 25010000002 DEXAMETHASONE SODIUM PHOSPHATE 100 MG/10ML SOLUTION: Performed by: INTERNAL MEDICINE

## 2023-10-20 PROCEDURE — 96413 CHEMO IV INFUSION 1 HR: CPT

## 2023-10-20 PROCEDURE — 80053 COMPREHEN METABOLIC PANEL: CPT | Performed by: INTERNAL MEDICINE

## 2023-10-20 PROCEDURE — 96417 CHEMO IV INFUS EACH ADDL SEQ: CPT

## 2023-10-20 PROCEDURE — 25010000002 TRASTUZUMAB-QYYP 420 MG RECONSTITUTED SOLUTION 1 EACH VIAL: Performed by: INTERNAL MEDICINE

## 2023-10-20 PROCEDURE — 25810000003 SODIUM CHLORIDE 0.9 % SOLUTION: Performed by: INTERNAL MEDICINE

## 2023-10-20 PROCEDURE — 25010000002 HYDROCORTISONE SOD SUC (PF) 100 MG RECONSTITUTED SOLUTION: Performed by: INTERNAL MEDICINE

## 2023-10-20 PROCEDURE — 25810000003 SODIUM CHLORIDE 0.9 % SOLUTION 250 ML FLEX CONT: Performed by: INTERNAL MEDICINE

## 2023-10-20 RX ORDER — HEPARIN SODIUM (PORCINE) LOCK FLUSH IV SOLN 100 UNIT/ML 100 UNIT/ML
500 SOLUTION INTRAVENOUS AS NEEDED
Status: DISCONTINUED | OUTPATIENT
Start: 2023-10-20 | End: 2023-10-20 | Stop reason: HOSPADM

## 2023-10-20 RX ORDER — FAMOTIDINE 10 MG/ML
20 INJECTION, SOLUTION INTRAVENOUS ONCE
Status: COMPLETED | OUTPATIENT
Start: 2023-10-20 | End: 2023-10-20

## 2023-10-20 RX ORDER — HEPARIN SODIUM (PORCINE) LOCK FLUSH IV SOLN 100 UNIT/ML 100 UNIT/ML
500 SOLUTION INTRAVENOUS AS NEEDED
OUTPATIENT
Start: 2023-10-20

## 2023-10-20 RX ORDER — SODIUM CHLORIDE 0.9 % (FLUSH) 0.9 %
10 SYRINGE (ML) INJECTION AS NEEDED
OUTPATIENT
Start: 2023-10-20

## 2023-10-20 RX ORDER — SODIUM CHLORIDE 9 MG/ML
250 INJECTION, SOLUTION INTRAVENOUS ONCE
Status: COMPLETED | OUTPATIENT
Start: 2023-10-20 | End: 2023-10-20

## 2023-10-20 RX ORDER — SODIUM CHLORIDE 0.9 % (FLUSH) 0.9 %
10 SYRINGE (ML) INJECTION AS NEEDED
Status: DISCONTINUED | OUTPATIENT
Start: 2023-10-20 | End: 2023-10-20 | Stop reason: HOSPADM

## 2023-10-20 RX ORDER — MONTELUKAST SODIUM 10 MG/1
10 TABLET ORAL ONCE
Status: COMPLETED | OUTPATIENT
Start: 2023-10-20 | End: 2023-10-20

## 2023-10-20 RX ADMIN — SODIUM CHLORIDE 190 MG: 9 INJECTION, SOLUTION INTRAVENOUS at 12:05

## 2023-10-20 RX ADMIN — Medication 10 ML: at 13:56

## 2023-10-20 RX ADMIN — HYDROCORTISONE SODIUM SUCCINATE 100 MG: 100 INJECTION, POWDER, FOR SOLUTION INTRAMUSCULAR; INTRAVENOUS at 11:26

## 2023-10-20 RX ADMIN — PACLITAXEL 135 MG: 6 INJECTION, SOLUTION INTRAVENOUS at 12:50

## 2023-10-20 RX ADMIN — Medication 500 UNITS: at 13:56

## 2023-10-20 RX ADMIN — MONTELUKAST 10 MG: 10 TABLET, FILM COATED ORAL at 11:15

## 2023-10-20 RX ADMIN — DEXAMETHASONE SODIUM PHOSPHATE 12 MG: 10 INJECTION, SOLUTION INTRAMUSCULAR; INTRAVENOUS at 11:31

## 2023-10-20 RX ADMIN — SODIUM CHLORIDE 250 ML: 9 INJECTION, SOLUTION INTRAVENOUS at 11:15

## 2023-10-20 RX ADMIN — FAMOTIDINE 20 MG: 10 INJECTION INTRAVENOUS at 11:29

## 2023-10-20 NOTE — PROGRESS NOTES
Date of Office Visit: 10/20/2023  Encounter Provider: Zuly Reynolds MD  Place of Service: [facility] CARDIOLOGY  Patient Name: Palma Roblero  :1965    Chief complaint  Cardio oncology care    History of Present Illness  Patient is a 58-year-old female with history of hyperlipidemia hypothyroidism with history of right-sided breast cancer treated since  with lumpectomy chemoradiation (received Taxol AC therapy) and 5 years of tamoxifen and then aromatase inhibitor therapy.  On 2023 she was found to have recurrent breast cancer right breast.  She underwent bilateral mastectomy 2023.  She initiated adjuvant chemotherapy with Taxol and Herceptin first dose 9/15/2023 with plan for Taxol Herceptin weekly for 12 weeks Herceptin every 3 weeks for a year.  Taxol dose was reduced due to neutropenia.  Plans are to also add hormonal therapy after completion of Taxol.      Baseline echo performed on 2023 shows ejection fraction of 61.5% with GLS -21.9%, normal diastolic function no significant valvular heart disease.  Baseline troponin and proBNP were normal.  Echo 10/16/2023 showed ejection fraction 61% with GLS -23.9%.  Diastolic function was normal, nose no significant pulm hypertension or valvular dysfunction.    She feels well denies any chest pain, shortness of breath, palpitations, syncope near syncope.  She is walking 1-1/2 miles in 30 minutes almost daily.    Past Medical History:   Diagnosis Date    Breast cancer     Right infiltrating ductal carcinoma, grade I, T2N1, ER/DE positive, 90% HER-2/julián negative    Cervical polyp     History of benign cervical polyp    Colon polyps     Drug therapy     Hx of radiation therapy     Hypercholesteremia     Hyperthyroidism     NO LONGER PROBLEM    PONV (postoperative nausea and vomiting)     Radiation     Sebaceous cyst     BACK     Past Surgical History:   Procedure Laterality Date    BREAST BIOPSY Right 2006     stereotactic biopsy    BREAST LUMPECTOMY Right 08/18/2006    Right axillary sentinel node biopsy (positive), right axillay dissection, right needle lumpectomy-Dr. Quyen Vidal    BREAST RECONSTRUCTION Bilateral 8/16/2023    Procedure: BILATERAL PREPECTORAL PLACEMENT  TISSUE EXPANDER AND ALLODERM, ADJACENT LEFT BREAST TISSUE REARRANGEMENT;  Surgeon: Favian Brooke MD;  Location: [facility] OR;  Service: Plastics;  Laterality: Bilateral;    CERVICAL CONE BIOPSY  2005    [facility]    COLONOSCOPY N/A 04/07/2014    3 mm cecal polyp, 6 mm ascending colon polyp, 8 mm transvese colon polyp-Dr. Quyen Vidal    COLONOSCOPY N/A 03/16/2009    Tortuous colon-Dr. Quyen Vidal    COLONOSCOPY N/A 06/11/2018    Procedure: COLONOSCOPY TO CECUM TO TERMINAL ILEUM WITH HOT SNARE POLYPECTOMY;  Surgeon: Quyen Vidal MD;  Location: [facility] ENDOSCOPY;  Service: Gastroenterology    COLPOSCOPY      DILATATION AND CURETTAGE  03/23/2005    EXCISION MASS TRUNK N/A 09/05/2017    Procedure: EXCISION OF SEBACEOUS CYST FROM BACK ;  Surgeon: Rosendo Crum MD;  Location: [facility] MAIN OR;  Service:     MASTECTOMY W/ SENTINEL NODE BIOPSY Bilateral 8/16/2023    Procedure: BREAST MASTECTOMY BILATERAL;  Surgeon: Quyen Vidal MD;  Location: [facility] OR;  Service: General;  Laterality: Bilateral;    SHOULDER ROTATOR CUFF REPAIR Right 07/2022    VENOUS ACCESS DEVICE (PORT) INSERTION Left 10/06/2006    Left subclavian [id number] placement-Dr. Quyen Vidal    VENOUS ACCESS DEVICE (PORT) INSERTION N/A 8/16/2023    Procedure: with port placement using fluoroscopy and ultrasound;  Surgeon: Quyen Vidal MD;  Location: [facility] OR;  Service: General;  Laterality: N/A;    VENOUS ACCESS DEVICE (PORT) REMOVAL Left 05/29/2007    Left subclavian Epqpyp-i-[id number] removal-Dr. Quyen Vidal     Outpatient Medications Prior to Visit   Medication Sig Dispense Refill    acetaminophen (TYLENOL) 325 MG tablet Take 2 tablets by mouth Every 4  (Four) Hours As Needed for Mild Pain. 60 tablet 0    atorvastatin (LIPITOR) 10 MG tablet Take 1 tablet by mouth Daily. 90 tablet 3    dexAMETHasone (DECADRON) 4 MG tablet Take 2 tablets by mouth 2 (Two) Times a Day With Meals. Take a day prior to first treatment. 4 tablet 0    docusate sodium (COLACE) 250 MG capsule Take 1 capsule by mouth 2 (Two) Times a Day As Needed for Constipation. 60 capsule 1    gabapentin (Neurontin) 100 MG capsule Take 1 capsule by mouth Every 8 (Eight) Hours. (Patient taking differently: Take 1 capsule by mouth Every 8 (Eight) Hours As Needed.) 60 capsule 2    HYDROcodone-acetaminophen (NORCO) 5-325 MG per tablet Take 1-2 tablets by mouth Every 4 (Four) Hours As Needed (Pain). 20 tablet 0    lidocaine-prilocaine (EMLA) 2.5-2.5 % cream Apply nickel-sized amount to port site 30 minutes before being accessed. May reapply as needed. 30 g 2    mupirocin (BACTROBAN) 2 % ointment APPLY SMALL AMOUNT TOPICALLY TO THE AFFECTED AREA THREE TIMES DAILY      ondansetron ODT (ZOFRAN-ODT) 8 MG disintegrating tablet Place 1 tablet on the tongue Every 8 (Eight) Hours As Needed for Nausea or Vomiting. 10 tablet 1    Patanol 0.1 % ophthalmic solution Administer 1 drop to both eyes 2 (Two) Times a Day. (Patient taking differently: Administer 1 drop to both eyes Daily As Needed.) 5 mL 5    sennosides-docusate (PERICOLACE) 8.6-50 MG per tablet Take 2 tablets by mouth Daily As Needed for Constipation. 30 tablet 1    Triamcinolone Acetonide (NASACORT) 55 MCG/ACT nasal inhaler 2 sprays into the nostril(s) as directed by provider Daily As Needed (nasal congestion). 16.5 g 5     No facility-administered medications prior to visit.       Allergies as of 10/20/2023 - Reviewed 10/20/2023   Allergen Reaction Noted    Oxycodone Nausea And Vomiting 08/09/2023     Social History     Socioeconomic History    Marital status:      Spouse name: Philip   Tobacco Use    Smoking status: Never    Smokeless tobacco: Never     "Tobacco comments:          No Caffeine    Vaping Use    Vaping Use: Never used   Substance and Sexual Activity    Alcohol use: No    Drug use: No    Sexual activity: Defer     Partners: Male     Birth control/protection: None     Family History   Problem Relation Age of Onset    Alzheimer's disease Mother     No Known Problems Father     Hyperlipidemia Sister     Hypertension Brother     No Known Problems Maternal Aunt     No Known Problems Paternal Aunt     Heart attack Paternal Uncle     Heart disease Paternal Uncle     Heart disease Paternal Uncle     Heart attack Paternal Uncle     No Known Problems Maternal Grandmother     No Known Problems Paternal Grandmother     Miscarriages / Stillbirths Daughter     ADD / ADHD Son     BRCA 1/2 Neg Hx     Breast cancer Neg Hx     Colon cancer Neg Hx     Endometrial cancer Neg Hx     Ovarian cancer Neg Hx     Malig Hyperthermia Neg Hx      Review of Systems   Constitutional: Negative for chills, fever, weight gain and weight loss.   Cardiovascular:  Negative for leg swelling.   Respiratory:  Negative for cough, snoring and wheezing.    Hematologic/Lymphatic: Negative for bleeding problem. Does not bruise/bleed easily.   Skin:  Negative for color change.   Musculoskeletal:  Negative for falls, joint pain and myalgias.   Gastrointestinal:  Negative for melena.   Genitourinary:  Negative for hematuria.   Neurological:  Negative for excessive daytime sleepiness.   Psychiatric/Behavioral:  Negative for depression. The patient is not nervous/anxious.         Objective:     Vitals:    10/20/23 0703   BP: 112/66   BP Location: Left arm   Patient Position: Sitting   Pulse: 83   Weight: 98.4 kg (217 lb)   Height: 162.6 cm (64\")     Body mass index is 37.25 kg/m².    Vitals reviewed.   Constitutional:       Appearance: Well-developed. Obese.   Eyes:      General: No scleral icterus.        Right eye: No discharge.      Conjunctiva/sclera: Conjunctivae normal.      Pupils: Pupils are " equal, round, and reactive to light.   HENT:      Head: Normocephalic.      Nose: Nose normal.   Neck:      Thyroid: No thyromegaly.      Vascular: No JVD.   Pulmonary:      Effort: Pulmonary effort is normal. No respiratory distress.      Breath sounds: Normal breath sounds. No wheezing. No rales.   Cardiovascular:      Normal rate. Regular rhythm. Normal S1. Normal S2.       Murmurs: There is no murmur.      No gallop.    Pulses:     Intact distal pulses.      Carotid: 2+ bilaterally.     Radial: 2+ bilaterally.     Femoral: 2+ bilaterally.     Popliteal: 2+ bilaterally.     Dorsalis pedis: 2+ bilaterally.     Posterior tibial: 2+ bilaterally.  Edema:     Peripheral edema absent.   Abdominal:      General: Bowel sounds are normal. There is no distension.      Palpations: Abdomen is soft.      Tenderness: There is no abdominal tenderness. There is no rebound.   Musculoskeletal: Normal range of motion.         General: No tenderness.      Cervical back: Normal range of motion and neck supple. Skin:     General: Skin is warm and dry.      Findings: No erythema or rash.   Neurological:      Mental Status: Alert and oriented to person, place, and time.   Psychiatric:         Behavior: Behavior normal.         Thought Content: Thought content normal.         Judgment: Judgment normal.       Lab Review:   Lab Results - Last 18 Months   Lab Units 11/03/23  1024 10/27/23  0844   WBC 10*3/mm3 4.45 4.59   RBC 10*6/mm3 4.27 4.40   HEMOGLOBIN g/dL 11.4* 11.6*   HEMATOCRIT % 36.1 36.7   MCV fL 84.5 83.4   MCH pg 26.7 26.4*   MCHC g/dL 31.6 31.6   RDW % 15.1 14.6   PLATELETS 10*3/mm3 241 242   NEUTROPHIL % % 58.1 56.3   LYMPHOCYTE % % 30.1 32.7   MONOCYTES % % 8.8 7.0   EOSINOPHIL % % 1.6 2.2   BASOPHIL % % 0.7 0.9   NEUTROS ABS 10*3/mm3 2.59 2.59   LYMPHS ABS 10*3/mm3 1.34 1.50   MONOS ABS 10*3/mm3 0.39 0.32   EOS ABS 10*3/mm3 0.07 0.10   BASOS ABS 10*3/mm3 0.03 0.04   RDW-SD fl 45.5 43.0   MPV fL 11.8 12.0       Lab Results -  Last 18 Months   Lab Units 11/03/23  1024 10/27/23  0844   GLUCOSE mg/dL 92 98   BUN mg/dL 14 14   CREATININE mg/dL 0.79 0.79   SODIUM mmol/L 140 143   POTASSIUM mmol/L 3.9 3.9   CHLORIDE mmol/L 106 109*   CO2 mmol/L 26.4 25.7   CALCIUM mg/dL 9.2 9.2   TOTAL PROTEIN g/dL 6.7 6.8   ALBUMIN g/dL 4.1 4.0   ALT (SGPT) U/L 37* 27   AST (SGOT) U/L 27 22   ALK PHOS U/L 55 59   BILIRUBIN mg/dL 0.5 0.5   GLOBULIN gm/dL 2.6 2.8   A/G RATIO g/dL 1.6 1.4   BUN / CREAT RATIO  17.7 17.7   ANION GAP mmol/L 7.6 8.3   EGFR mL/min/1.73 85.8 85.8     Lab Results - Last 18 Months   Lab Units 11/30/22  1348 05/24/22  1334   TRIGLYCERIDES mg/dL 62 57   HDL CHOL mg/dL 62* 64   LDL CHOL mg/dL 123* 122*   VLDL CHOLESTEROL LISSETH mg/dL 11 11     Lab Results - Last 18 Months   Lab Units 07/03/23  1106   PROBNP pg/mL 21.9     Lab Results - Last 18 Months   Lab Units 07/03/23  1106   HSTROP T ng/L 7     Lab Results - Last 18 Months   Lab Units 11/30/22  1348   TSH uIU/mL 1.360         ECG 12 Lead    Date/Time: 10/20/2023 7:33 AM  Performed by: Zuly Reynolds MD    Authorized by: Zuly Reynolds MD  Comparison: compared with previous ECG   Similar to previous ECG  Rhythm: sinus rhythm  Other findings: early transition    Clinical impression: abnormal EKG        Assessment:       Diagnosis Plan   1. Encounter for monitoring cardiotoxic drug therapy  ECG 12 Lead    Adult Transthoracic Echo Limited W/ Cont if Necessary Per Protocol    Adult Transthoracic Echo Limited W/ Cont if Necessary Per Protocol        Plan:       1.  Cardio oncology care.  She has recurrent right-sided breast cancer and has a history of lumpectomy with TAC therapy as well as right-sided radiation therapy in 2007.  S/P bilateral mastectomy and receiving Taxol Herceptin therapy with plans for future use of Aromasin.  To complete Herceptin therapy on 11/7/2024.  Baseline echo EF 62%, GLS -21.9%.  Repeat echo 10/2023 with EF -61% and GLS -23.9%, stable.  Continue the current regimen.  2.   Recurrent right-sided breast cancer  3.  Hyperlipidemia.  LDL still elevated.  She will try to work on a low-cholesterol diet.  She will follow-up with Dr. Crawford regarding this.  4.  Probable sleep apnea.  She has a STOP-BANG score 4.  She snores at night has daytime somnolence and no family history of sleep apnea.  I reviewed with her sleep apnea can cause cardiomyopathy on its own as well as other cardiovascular events.  I strongly again recommended she consider evaluation but she declines.      Time Spent: I spent 35 minutes caring for Palma on this date of service. This time includes time spent by me in the following activities: preparing for the visit, reviewing tests, obtaining and/or reviewing a separately obtained history, performing a medically appropriate examination and/or evaluation, counseling and educating the patient/family/caregiver, ordering medications, tests, or procedures, documenting information in the medical record, and independently interpreting results and communicating that information with the patient/family/caregiver.   I spent 1 minutes on the separately reported service of ECG. This time is not included in the time used to support the E/M service also reported today.        Your medication list            Accurate as of October 20, 2023 11:59 PM. If you have any questions, ask your nurse or doctor.                CHANGE how you take these medications        Instructions Last Dose Given Next Dose Due   gabapentin 100 MG capsule  Commonly known as: Neurontin  What changed:   when to take this  reasons to take this      Take 1 capsule by mouth Every 8 (Eight) Hours.       Patanol 0.1 % ophthalmic solution  Generic drug: olopatadine  What changed:   when to take this  reasons to take this      Administer 1 drop to both eyes 2 (Two) Times a Day.              CONTINUE taking these medications        Instructions Last Dose Given Next Dose Due   acetaminophen 325 MG tablet  Commonly known as:  TYLENOL      Take 2 tablets by mouth Every 4 (Four) Hours As Needed for Mild Pain.       atorvastatin 10 MG tablet  Commonly known as: LIPITOR      Take 1 tablet by mouth Daily.       dexAMETHasone 4 MG tablet  Commonly known as: DECADRON      Take 2 tablets by mouth 2 (Two) Times a Day With Meals. Take a day prior to first treatment.       docusate sodium 250 MG capsule  Commonly known as: COLACE      Take 1 capsule by mouth 2 (Two) Times a Day As Needed for Constipation.       HYDROcodone-acetaminophen 5-325 MG per tablet  Commonly known as: NORCO      Take 1-2 tablets by mouth Every 4 (Four) Hours As Needed (Pain).       lidocaine-prilocaine 2.5-2.5 % cream  Commonly known as: EMLA      Apply nickel-sized amount to port site 30 minutes before being accessed. May reapply as needed.       mupirocin 2 % ointment  Commonly known as: BACTROBAN      APPLY SMALL AMOUNT TOPICALLY TO THE AFFECTED AREA THREE TIMES DAILY       ondansetron ODT 8 MG disintegrating tablet  Commonly known as: ZOFRAN-ODT      Place 1 tablet on the tongue Every 8 (Eight) Hours As Needed for Nausea or Vomiting.       Senexon-S 8.6-50 MG per tablet  Generic drug: sennosides-docusate      Take 2 tablets by mouth Daily As Needed for Constipation.       Triamcinolone Acetonide 55 MCG/ACT nasal inhaler  Commonly known as: NASACORT      2 sprays into the nostril(s) as directed by provider Daily As Needed (nasal congestion).                Patient is no longer taking -.  I corrected the med list to reflect this.  I did not stop these medications.      Dictated utilizing Dragon dictation

## 2023-10-27 ENCOUNTER — INFUSION (OUTPATIENT)
Dept: ONCOLOGY | Facility: HOSPITAL | Age: 58
End: 2023-10-27
Payer: COMMERCIAL

## 2023-10-27 ENCOUNTER — OFFICE VISIT (OUTPATIENT)
Dept: ONCOLOGY | Facility: CLINIC | Age: 58
End: 2023-10-27
Payer: COMMERCIAL

## 2023-10-27 VITALS
HEART RATE: 100 BPM | HEIGHT: 64 IN | DIASTOLIC BLOOD PRESSURE: 86 MMHG | BODY MASS INDEX: 37.39 KG/M2 | SYSTOLIC BLOOD PRESSURE: 126 MMHG | OXYGEN SATURATION: 98 % | TEMPERATURE: 97.3 F | RESPIRATION RATE: 18 BRPM | WEIGHT: 219 LBS

## 2023-10-27 DIAGNOSIS — Z17.0 MALIGNANT NEOPLASM OF OVERLAPPING SITES OF RIGHT BREAST IN FEMALE, ESTROGEN RECEPTOR POSITIVE: ICD-10-CM

## 2023-10-27 DIAGNOSIS — C50.811 MALIGNANT NEOPLASM OF OVERLAPPING SITES OF RIGHT BREAST IN FEMALE, ESTROGEN RECEPTOR POSITIVE: Primary | ICD-10-CM

## 2023-10-27 DIAGNOSIS — C50.811 MALIGNANT NEOPLASM OF OVERLAPPING SITES OF RIGHT BREAST IN FEMALE, ESTROGEN RECEPTOR POSITIVE: ICD-10-CM

## 2023-10-27 DIAGNOSIS — C50.511 MALIGNANT NEOPLASM OF LOWER-OUTER QUADRANT OF RIGHT BREAST OF FEMALE, ESTROGEN RECEPTOR POSITIVE: ICD-10-CM

## 2023-10-27 DIAGNOSIS — Z17.0 MALIGNANT NEOPLASM OF OVERLAPPING SITES OF RIGHT BREAST IN FEMALE, ESTROGEN RECEPTOR POSITIVE: Primary | ICD-10-CM

## 2023-10-27 DIAGNOSIS — C50.911 MALIGNANT NEOPLASM OF RIGHT BREAST IN FEMALE, ESTROGEN RECEPTOR POSITIVE, UNSPECIFIED SITE OF BREAST: Primary | ICD-10-CM

## 2023-10-27 DIAGNOSIS — Z17.0 MALIGNANT NEOPLASM OF LOWER-OUTER QUADRANT OF RIGHT BREAST OF FEMALE, ESTROGEN RECEPTOR POSITIVE: ICD-10-CM

## 2023-10-27 DIAGNOSIS — Z17.0 MALIGNANT NEOPLASM OF RIGHT BREAST IN FEMALE, ESTROGEN RECEPTOR POSITIVE, UNSPECIFIED SITE OF BREAST: Primary | ICD-10-CM

## 2023-10-27 DIAGNOSIS — Z45.2 ENCOUNTER FOR ADJUSTMENT OR MANAGEMENT OF VASCULAR ACCESS DEVICE: ICD-10-CM

## 2023-10-27 LAB
ALBUMIN SERPL-MCNC: 4 G/DL (ref 3.5–5.2)
ALBUMIN/GLOB SERPL: 1.4 G/DL
ALP SERPL-CCNC: 59 U/L (ref 39–117)
ALT SERPL W P-5'-P-CCNC: 27 U/L (ref 1–33)
ANION GAP SERPL CALCULATED.3IONS-SCNC: 8.3 MMOL/L (ref 5–15)
AST SERPL-CCNC: 22 U/L (ref 1–32)
BASOPHILS # BLD AUTO: 0.04 10*3/MM3 (ref 0–0.2)
BASOPHILS NFR BLD AUTO: 0.9 % (ref 0–1.5)
BILIRUB SERPL-MCNC: 0.5 MG/DL (ref 0–1.2)
BUN SERPL-MCNC: 14 MG/DL (ref 8–23)
BUN/CREAT SERPL: 17.7 (ref 7–25)
CALCIUM SPEC-SCNC: 9.2 MG/DL (ref 8.6–10.5)
CHLORIDE SERPL-SCNC: 109 MMOL/L (ref 98–107)
CO2 SERPL-SCNC: 25.7 MMOL/L (ref 22–29)
CREAT SERPL-MCNC: 0.79 MG/DL (ref 0.57–1)
DEPRECATED RDW RBC AUTO: 43 FL (ref 37–54)
EGFRCR SERPLBLD CKD-EPI 2021: 85.8 ML/MIN/1.73
EOSINOPHIL # BLD AUTO: 0.1 10*3/MM3 (ref 0–0.4)
EOSINOPHIL NFR BLD AUTO: 2.2 % (ref 0.3–6.2)
ERYTHROCYTE [DISTWIDTH] IN BLOOD BY AUTOMATED COUNT: 14.6 % (ref 12.3–15.4)
GLOBULIN UR ELPH-MCNC: 2.8 GM/DL
GLUCOSE SERPL-MCNC: 98 MG/DL (ref 65–99)
HCT VFR BLD AUTO: 36.7 % (ref 34–46.6)
HGB BLD-MCNC: 11.6 G/DL (ref 12–15.9)
IMM GRANULOCYTES # BLD AUTO: 0.04 10*3/MM3 (ref 0–0.05)
IMM GRANULOCYTES NFR BLD AUTO: 0.9 % (ref 0–0.5)
LYMPHOCYTES # BLD AUTO: 1.5 10*3/MM3 (ref 0.7–3.1)
LYMPHOCYTES NFR BLD AUTO: 32.7 % (ref 19.6–45.3)
MCH RBC QN AUTO: 26.4 PG (ref 26.6–33)
MCHC RBC AUTO-ENTMCNC: 31.6 G/DL (ref 31.5–35.7)
MCV RBC AUTO: 83.4 FL (ref 79–97)
MONOCYTES # BLD AUTO: 0.32 10*3/MM3 (ref 0.1–0.9)
MONOCYTES NFR BLD AUTO: 7 % (ref 5–12)
NEUTROPHILS NFR BLD AUTO: 2.59 10*3/MM3 (ref 1.7–7)
NEUTROPHILS NFR BLD AUTO: 56.3 % (ref 42.7–76)
NRBC BLD AUTO-RTO: 0 /100 WBC (ref 0–0.2)
PLATELET # BLD AUTO: 242 10*3/MM3 (ref 140–450)
PMV BLD AUTO: 12 FL (ref 6–12)
POTASSIUM SERPL-SCNC: 3.9 MMOL/L (ref 3.5–5.2)
PROT SERPL-MCNC: 6.8 G/DL (ref 6–8.5)
RBC # BLD AUTO: 4.4 10*6/MM3 (ref 3.77–5.28)
SODIUM SERPL-SCNC: 143 MMOL/L (ref 136–145)
WBC NRBC COR # BLD: 4.59 10*3/MM3 (ref 3.4–10.8)

## 2023-10-27 PROCEDURE — 25810000003 SODIUM CHLORIDE 0.9 % SOLUTION: Performed by: INTERNAL MEDICINE

## 2023-10-27 PROCEDURE — 85025 COMPLETE CBC W/AUTO DIFF WBC: CPT | Performed by: INTERNAL MEDICINE

## 2023-10-27 PROCEDURE — 96417 CHEMO IV INFUS EACH ADDL SEQ: CPT

## 2023-10-27 PROCEDURE — 80053 COMPREHEN METABOLIC PANEL: CPT | Performed by: INTERNAL MEDICINE

## 2023-10-27 PROCEDURE — 96375 TX/PRO/DX INJ NEW DRUG ADDON: CPT

## 2023-10-27 PROCEDURE — 25010000002 HYDROCORTISONE SOD SUC (PF) 100 MG RECONSTITUTED SOLUTION: Performed by: INTERNAL MEDICINE

## 2023-10-27 PROCEDURE — 25010000002 DIPHENHYDRAMINE PER 50 MG: Performed by: INTERNAL MEDICINE

## 2023-10-27 PROCEDURE — 25010000002 DEXAMETHASONE SODIUM PHOSPHATE 100 MG/10ML SOLUTION: Performed by: INTERNAL MEDICINE

## 2023-10-27 PROCEDURE — 96413 CHEMO IV INFUSION 1 HR: CPT

## 2023-10-27 PROCEDURE — 25810000003 SODIUM CHLORIDE 0.9 % SOLUTION 250 ML FLEX CONT: Performed by: INTERNAL MEDICINE

## 2023-10-27 PROCEDURE — 25010000002 HEPARIN LOCK FLUSH PER 10 UNITS: Performed by: INTERNAL MEDICINE

## 2023-10-27 PROCEDURE — 25010000002 TRASTUZUMAB-QYYP 420 MG RECONSTITUTED SOLUTION 1 EACH VIAL: Performed by: INTERNAL MEDICINE

## 2023-10-27 PROCEDURE — 25010000002 PACLITAXEL PER 1 MG: Performed by: INTERNAL MEDICINE

## 2023-10-27 RX ORDER — MONTELUKAST SODIUM 10 MG/1
10 TABLET ORAL ONCE
OUTPATIENT
Start: 2023-11-17 | End: 2023-11-17

## 2023-10-27 RX ORDER — FAMOTIDINE 10 MG/ML
20 INJECTION, SOLUTION INTRAVENOUS ONCE
OUTPATIENT
Start: 2023-12-01

## 2023-10-27 RX ORDER — DIPHENHYDRAMINE HYDROCHLORIDE 50 MG/ML
50 INJECTION INTRAMUSCULAR; INTRAVENOUS AS NEEDED
OUTPATIENT
Start: 2023-11-17

## 2023-10-27 RX ORDER — MONTELUKAST SODIUM 10 MG/1
10 TABLET ORAL ONCE
Status: COMPLETED | OUTPATIENT
Start: 2023-10-27 | End: 2023-10-27

## 2023-10-27 RX ORDER — SODIUM CHLORIDE 9 MG/ML
250 INJECTION, SOLUTION INTRAVENOUS ONCE
OUTPATIENT
Start: 2023-11-24

## 2023-10-27 RX ORDER — FAMOTIDINE 10 MG/ML
20 INJECTION, SOLUTION INTRAVENOUS ONCE
Status: COMPLETED | OUTPATIENT
Start: 2023-10-27 | End: 2023-10-27

## 2023-10-27 RX ORDER — MONTELUKAST SODIUM 10 MG/1
10 TABLET ORAL ONCE
OUTPATIENT
Start: 2023-11-24 | End: 2023-11-24

## 2023-10-27 RX ORDER — FAMOTIDINE 10 MG/ML
20 INJECTION, SOLUTION INTRAVENOUS AS NEEDED
OUTPATIENT
Start: 2023-11-17

## 2023-10-27 RX ORDER — FAMOTIDINE 10 MG/ML
20 INJECTION, SOLUTION INTRAVENOUS ONCE
OUTPATIENT
Start: 2023-11-24

## 2023-10-27 RX ORDER — MONTELUKAST SODIUM 10 MG/1
10 TABLET ORAL ONCE
OUTPATIENT
Start: 2023-12-01 | End: 2023-12-01

## 2023-10-27 RX ORDER — SODIUM CHLORIDE 0.9 % (FLUSH) 0.9 %
10 SYRINGE (ML) INJECTION AS NEEDED
OUTPATIENT
Start: 2023-10-27

## 2023-10-27 RX ORDER — FAMOTIDINE 10 MG/ML
20 INJECTION, SOLUTION INTRAVENOUS ONCE
OUTPATIENT
Start: 2023-11-17

## 2023-10-27 RX ORDER — FAMOTIDINE 10 MG/ML
20 INJECTION, SOLUTION INTRAVENOUS AS NEEDED
OUTPATIENT
Start: 2023-11-24

## 2023-10-27 RX ORDER — HEPARIN SODIUM (PORCINE) LOCK FLUSH IV SOLN 100 UNIT/ML 100 UNIT/ML
500 SOLUTION INTRAVENOUS AS NEEDED
Status: DISCONTINUED | OUTPATIENT
Start: 2023-10-27 | End: 2023-10-27 | Stop reason: HOSPADM

## 2023-10-27 RX ORDER — SODIUM CHLORIDE 9 MG/ML
250 INJECTION, SOLUTION INTRAVENOUS ONCE
Status: COMPLETED | OUTPATIENT
Start: 2023-10-27 | End: 2023-10-27

## 2023-10-27 RX ORDER — DIPHENHYDRAMINE HYDROCHLORIDE 50 MG/ML
50 INJECTION INTRAMUSCULAR; INTRAVENOUS AS NEEDED
OUTPATIENT
Start: 2023-12-01

## 2023-10-27 RX ORDER — DIPHENHYDRAMINE HYDROCHLORIDE 50 MG/ML
50 INJECTION INTRAMUSCULAR; INTRAVENOUS AS NEEDED
OUTPATIENT
Start: 2023-11-24

## 2023-10-27 RX ORDER — FAMOTIDINE 10 MG/ML
20 INJECTION, SOLUTION INTRAVENOUS AS NEEDED
OUTPATIENT
Start: 2023-12-01

## 2023-10-27 RX ORDER — SODIUM CHLORIDE 9 MG/ML
250 INJECTION, SOLUTION INTRAVENOUS ONCE
OUTPATIENT
Start: 2023-11-17

## 2023-10-27 RX ORDER — SODIUM CHLORIDE 0.9 % (FLUSH) 0.9 %
10 SYRINGE (ML) INJECTION AS NEEDED
Status: DISCONTINUED | OUTPATIENT
Start: 2023-10-27 | End: 2023-10-27 | Stop reason: HOSPADM

## 2023-10-27 RX ORDER — HEPARIN SODIUM (PORCINE) LOCK FLUSH IV SOLN 100 UNIT/ML 100 UNIT/ML
500 SOLUTION INTRAVENOUS AS NEEDED
OUTPATIENT
Start: 2023-10-27

## 2023-10-27 RX ORDER — SODIUM CHLORIDE 9 MG/ML
250 INJECTION, SOLUTION INTRAVENOUS ONCE
OUTPATIENT
Start: 2023-12-01

## 2023-10-27 RX ADMIN — DIPHENHYDRAMINE HYDROCHLORIDE 50 MG: 50 INJECTION INTRAMUSCULAR; INTRAVENOUS at 10:24

## 2023-10-27 RX ADMIN — PACLITAXEL 135 MG: 6 INJECTION, SOLUTION INTRAVENOUS at 11:18

## 2023-10-27 RX ADMIN — FAMOTIDINE 20 MG: 10 INJECTION INTRAVENOUS at 10:41

## 2023-10-27 RX ADMIN — HYDROCORTISONE SODIUM SUCCINATE 100 MG: 100 INJECTION, POWDER, FOR SOLUTION INTRAMUSCULAR; INTRAVENOUS at 10:42

## 2023-10-27 RX ADMIN — DEXAMETHASONE SODIUM PHOSPHATE 12 MG: 10 INJECTION, SOLUTION INTRAMUSCULAR; INTRAVENOUS at 10:07

## 2023-10-27 RX ADMIN — SODIUM CHLORIDE 250 ML: 9 INJECTION, SOLUTION INTRAVENOUS at 09:14

## 2023-10-27 RX ADMIN — MONTELUKAST 10 MG: 10 TABLET, FILM COATED ORAL at 10:06

## 2023-10-27 RX ADMIN — Medication 500 UNITS: at 12:20

## 2023-10-27 RX ADMIN — SODIUM CHLORIDE 190 MG: 9 INJECTION, SOLUTION INTRAVENOUS at 09:33

## 2023-10-27 RX ADMIN — Medication 10 ML: at 12:20

## 2023-10-27 NOTE — PROGRESS NOTES
Subjective   REASON FOR FOLLOWUP:  History of stage IIIA infiltrating ductal carcinoma of the right breast with 4 positive nodes, ER positive, HER-2/julián negative; treated with lumpectomy, status post 6 cycles of TAC. Received tamoxifen between February 2007 and March 2012. Femara was started in March 2012. Patient switched to Arimidex as of 06/21/2012 because of side effects with severe ankle joint pain with Femara.    2.   10 years of adjuvant therapy completed  March 2017.    3.   New diagnosis of carcinoma of the remaining right breast, needle biopsy 6/8/2023.  Tumor is HER2/julián positive and estrogen receptor positive/progesterone receptor negative.    4.   Bilateral mastectomies with implant reconstruction 8/16/2023    5.   Initiation of postop adjuvant chemotherapy with weekly Taxol and Herceptin.  First dose delivered 9/15/2023      HISTORY OF PRESENT ILLNESS:     History of Present Illness Ms. Roblero is a 58 y.o.  female with the above noted history of stage IIIA carcinoma of the right breast, treated with lumpectomy, chemotherapy and radiation. She received extended hormonal therapy with 5 years of tamoxifen followed by 5 years of aromatase inhibitor.  She completed her 10 years of hormonal therapy in March 2017  .     She was seeing us annually had not been seen in January of this year but when she underwent her breast imaging in May 2023 she had suspicious findings and ultimately underwent ultrasound-guided biopsy on 6/8/2023.  There were 2 separate biopsies in 1 of these areas was positive for HER2/julián overexpression by FISH.  Both tumors were ER positive and RI negative.    She was referred to Dr. Vidal who had performed her previous lumpectomy surgery in 2007 and currently the plan is for her to undergo bilateral mastectomies with reconstruction.    We had her seen by cardiac oncology due to her previous treatment with 6 cycles of TAC chemotherapy in 2007.  Thankfully her ejection  fraction still seems to be normal at 61% based on her echocardiogram from 6/19/2023.    Dr. Vidal has scheduled the patient for radiographic staging with CT scan of the chest abdomen pelvis and a bone scan and the studies did not show any evidence of distant metastatic spread.    She had a bilateral mastectomies with implant reconstruction performed on 8/16/2023.  The tumor in the right breast was 3 x 1.5 x 1.2 cm.  Margins of resection were clear.  She also had placement of a left chest wall Mediport at the time of her surgery.    We recommended weekly Taxol and Herceptin for 12 weeks followed by continued Herceptin every 3 weeks for the remainder of the year of treatment.  She was estrogen receptor positive and will also be started on hormonal therapy once her chemotherapy is completed.    INTERVAL HISTORY:  Palma returns to the office today for follow up and anticipated 6th weekly Taxol and 7th weekly Herceptin.  She required Taxol dose delays and dose reduction due to neutropenia on week 3.    She had a cardiac oncology appointment with Dr. Reynolds on 10/21/2023.  Echocardiogram from 10/16/2023 was still showing a normal ejection fraction.    Palma has been wearing cold mittens and socks with her treatments but she reports that she is starting to feel slight neuropathy issues in the feet.      Past Medical History, Past Surgical History, Social History, Family History have been reviewed and are without significant changes except as mentioned.    Review of Systems   Constitutional:  Negative for activity change, appetite change, fatigue, fever and unexpected weight change.   HENT:  Negative for hearing loss, nosebleeds, trouble swallowing and voice change.    Eyes:  Negative for visual disturbance.   Respiratory:  Negative for cough, shortness of breath and wheezing.    Cardiovascular:  Negative for chest pain and palpitations.   Gastrointestinal:  Negative for abdominal pain, diarrhea, nausea and vomiting.  "  Genitourinary:  Negative for difficulty urinating, frequency, hematuria and urgency.   Musculoskeletal:  Negative for back pain and neck pain.   Skin:  Negative for rash.   Neurological:  Negative for dizziness, seizures, syncope and headaches.   Hematological:  Negative for adenopathy. Does not bruise/bleed easily.   Psychiatric/Behavioral:  Negative for behavioral problems. The patient is not nervous/anxious.       A comprehensive 14 point review of systems was performed and was negative except as mentioned.    Medications:  The current medication list was reviewed in the EMR    ALLERGIES:    Allergies   Allergen Reactions    Oxycodone Nausea And Vomiting       Objective      Vitals:    10/27/23 0830   BP: 126/86   Pulse: 100   Resp: 18   Temp: 97.3 °F (36.3 °C)   TempSrc: Temporal   SpO2: 98%   Weight: 99.3 kg (219 lb)   Height: 162 cm (63.78\")   PainSc: 0-No pain           10/27/2023     8:31 AM   Current Status   ECOG score 0       Physical Exam   Constitutional: She is oriented to person, place, and time. She appears well-developed. No distress.   HENT:   Head: Normocephalic.   Eyes: Pupils are equal, round, and reactive to light. Conjunctivae are normal. No scleral icterus.   Neck: No JVD present. No thyromegaly present.   Cardiovascular: Normal rate and regular rhythm. Exam reveals no gallop and no friction rub.   No murmur heard.  Pulmonary/Chest: Effort normal and breath sounds normal. She has no wheezes. She has no rales. Right breast exhibits no mass, no nipple discharge and no skin change. Left breast exhibits no mass, no nipple discharge and no skin change.   Bilateral mastectomies with implant reconstructions.  Right chest wall Mediport   Abdominal: Soft. Bowel sounds are normal. She exhibits no distension and no mass. There is no abdominal tenderness.   Musculoskeletal: Normal range of motion. No deformity.   Lymphadenopathy:     She has no cervical adenopathy.   Neurological: She is alert and " oriented to person, place, and time. She has normal reflexes. No cranial nerve deficit.   Skin: Skin is warm and dry. No rash noted. No erythema.   Psychiatric: Her behavior is normal. Judgment normal.        I have reexamined the patient and the results are consistent with the previously documented exam. Weston Solares MD           RECENT LABS:  Hematology WBC   Date Value Ref Range Status   10/27/2023 4.59 3.40 - 10.80 10*3/mm3 Final   11/01/2021 5.9 3.4 - 10.8 x10E3/uL Final     RBC   Date Value Ref Range Status   10/27/2023 4.40 3.77 - 5.28 10*6/mm3 Final   11/01/2021 5.14 3.77 - 5.28 x10E6/uL Final     Hemoglobin   Date Value Ref Range Status   10/27/2023 11.6 (L) 12.0 - 15.9 g/dL Final     Hematocrit   Date Value Ref Range Status   10/27/2023 36.7 34.0 - 46.6 % Final     Platelets   Date Value Ref Range Status   10/27/2023 242 140 - 450 10*3/mm3 Final            Lab Results   Component Value Date    NEUTROABS 2.59 10/27/2023       Lab Results   Component Value Date    GLUCOSE 113 (H) 10/20/2023    BUN 15 10/20/2023    CREATININE 0.81 10/20/2023    EGFRRESULT 91.8 11/30/2022    EGFR 83.2 10/20/2023    BCR 18.5 10/20/2023    K 3.7 10/20/2023    CO2 25.5 10/20/2023    CALCIUM 9.2 10/20/2023    PROTENTOTREF 6.6 11/30/2022    ALBUMIN 4.1 10/20/2023    BILITOT 0.6 10/20/2023    AST 20 10/20/2023    ALT 22 10/20/2023     PATHOLOGY 8/16/2023  Final Diagnosis   1. Right Breast, Oriented Simple Skin Sparing Mastectomy (1,097 Grams): INVASIVE MAMMARY CARCINOMA,       NO SPECIAL TYPE (INVASIVE DUCTAL CARCINOMA).  A. Tumor site: Lower outer quadrant.               B. Histologic grade: [place] histologic score III (tubules = 3, nuclei = 2, mitosis = 3).               C. Tumor size: 30 x 15 x 12 mm.               D. Tumor focality: Single focus.               E. Ductal Carcinoma in-situ (DCIS): Not identified.               F. No lobular neoplasia identified.               G. Overlying skin and nipple are uninvolved by  tumor.               H. No definitive lymphovascular space invasion identified.               I. Focal perineural invasion is present.  J. Margins: Uninvolved by invasive and in-situ carcinoma.               1. Invasive carcinoma comes to within 7 mm of the anterior margin, 40 mm from the inferior margin,      60 mm from the posterior and lateral margins, 80 mm from the medial margin and 130 mm from the       superior margin of resection.               K. Lymph Nodes: Not submitted.  L. Hormone receptor status: ER positive, NV negative, HER2 positive by FISH (right medial biopsy),       Ki-67 35-40%  (performed on prior biopsy [id number]).  M. Pathologic stage: pT2, NX.     2. Left Breast, Oriented Simple Skin Sparing Mastectomy (1,593 Grams):   A. Benign unremarkable breast tissue, skin and nipple.     PATHOLOGY 6/8/2023  Final Diagnosis   1. Breast, Right Lateral 6:30 Position, 3 cm FN, Core Biopsy:               A. Invasive mammary carcinoma, no special type (ductal), [place] histologic grade II (tubules = 3, nuclei = 2,        mitoses = 2) measuring 6 mm maximally and involving four core fragments.  B. No definitive in-situ carcinoma is identified.  C. No definitive lymphovascular space invasion identified.     2. Breast, Right Medial 6:30 Position, 3 cm FN, Core Biopsy:                A. Invasive mammary carcinoma, no special type (ductal), Perez histologic grade II (tubules = 3, nuclei = 2,        mitoses = 1) measuring 10 mm maximally and involving three core fragments.  B. No definitive in-situ carcinoma is identified.  C. No definitive lymphovascular space invasion identified.     Echocardiogram 10/16/2023  Interpretation Summary         Left ventricular systolic function is normal. Calculated left ventricular EF = 61.4% Normal global longitudinal LV strain (GLS) = -23.9%. Left ventricle strain data was reviewed by the physician and found to be accurate. Normal left ventricular cavity size and wall  thickness noted. All left ventricular wall segments contract normally. Left ventricular diastolic function was normal    Trace tricuspid valve regurgitation is present. Estimated right ventricular systolic pressure from tricuspid regurgitation is normal (<35 mmHg). Calculated right ventricular systolic pressure from tricuspid regurgitation is 22.9 mmHg.      CT CHEST, ABDOMEN, AND PELVIS WITHOUT CONTRAST. 7/21/2023  FINDINGS:  CHEST: Right breast findings discussed on breast MRI 06/17/2023. There  is a cluster of nodular densities at the right axilla in close proximity  to a cluster of surgical clips and some of these may represent vessels  or lymphatics. Characterization is limited in the absence of IV  contrast. There is no definitive pathologically enlarged node present.  There are no pathologically enlarged nodes at the left axilla and there  is no subpectoral lymphadenopathy, and there is no lymphadenopathy at  the mediastinum or jovita given constraints of noncontrasted technique.  The nodularity along the pleura at the dependent aspect of the lower  chest is likely related to dependent atelectatic change. No definite  suspicious pulmonary nodules are seen. There are no pleural or  pericardial effusions.     ABDOMEN/PELVIS: Noncontrasted liver appears unremarkable. The  gallbladder appears unremarkable and there is no biliary dilatation.  Splenic size is normal. Noncontrasted pancreas, adrenals, and kidneys  appear unremarkable. There is no acute bowel abnormality. Appendix  appears within normal limits. The uterus is slightly lobular in contour  likely secondary to small fibroids. Noncontrasted adnexa appear  unremarkable. There is no free fluid or lymphadenopathy. There are mild  abdominal aortic atherosclerotic changes throughout the abdominal aorta  without aneurysmal dilatation. No suspicious bone lesion is seen.     IMPRESSION:  Given constraints of noncontrasted technique, there is no  convincing evidence  for metastatic disease within the chest, abdomen, or  pelvis.      Narrative & Impression   NUCLEAR MEDICINE WHOLE BODY BONE SCAN 7/14/2023     HISTORY: Breast cancer. Evaluate for metastatic disease.     TECHNIQUE:  19.1 mCi of 99m technetium MDP was administered intravenous  followed by 3 hour delayed phase whole body imaging with selected spot  views.     COMPARISON: None.     FINDINGS:  There is mild increased uptake at the right sternoclavicular  joint that is attributed to arthritis. Arthritic uptake is present in  both shoulders, knees, and mid feet.  There is also mild increased  lumbar spine uptake that is most likely degenerative/arthritic. Both  kidneys and the urinary bladder are visualized.     IMPRESSION:  Areas of mild uptake are typical for arthritis/degenerative  change and there is no scintigraphic evidence to suggest bony metastatic  disease.        MAMMO SCREENING DIGITAL TOMOSYNTHESIS BILATERAL W CAD- 4/14/2023   IMPRESSION:  1. There is an area of focal asymmetry in the middle third retroareolar  region of the right breast. Further evaluation with spot compression CC  and MLO mammographic and Tomosynthesis images and targeted right breast  sonography is recommended.  2. There are no findings suspicious for malignancy in the left breast.     BI-RADS Category 0: Incomplete. Needs additional imaging evaluation.    EXAMINATIONS: UNILATERAL RIGHT DIGITAL DIAGNOSTIC MAMMOGRAPHY WITH  TOMOSYNTHESIS AND LIMITED RIGHT BREAST SONOGRAPHY 5/17/2023  IMPRESSION:  There is an irregular 1 cm hypoechoic mass with internal peripheral  vascularity that is seen in the right breast at the 6:30 position on the  order of 3 cm from the nipple. It is immediately adjacent to a 1.9 cm  oval circumscribed hypoechoic mass. Together both lesions measure on the  order 2.7 cm in greatest dimension. Ultrasound guided biopsy of both  lesions is recommended. Given the close proximity of both lesions, both  lesions may be able  to be sampled and submitted as one specimen.     BI-RADS Category 4: Suspicious abnormality or suspicious finding. Biopsy  should be considered.    MRI BREAST BILATERAL W WO CONTRAST-  6/17/2023  IMPRESSION AND RECOMMENDATION:     1.  Adjacent irregular enhancing masses each measuring 1.6 cm at 6:00 to  7:00 in the middle right breast represent the 2 sites of biopsy-proven  malignancy. Adjacent suspicious enhancing foci and areas of nonmass  enhancement are identified, in addition to an irregular enhancing mass  and multiple additional enhancing foci centered at 2:00 to 3:00 in the  right breast, which are also suspicious. The 2 sites of biopsy-proven  malignancy and suspicious areas of enhancement together measure up to  approximately 8.2 cm in maximum dimension, as detailed above. Surgical  management is recommended.  2.  No MRI evidence of malignancy in the left breast.     BI-RADS Category 4: Suspicious      Assessment & Plan     *Stage IIIA infiltrating ductal carcinoma of the right breast   Treated with a lumpectomy, radiation, 6 cycles of TAC chemotherapy and ongoing hormonal therapy. She completed 10 years of adjuvant hormonal therapy with 5 years of tamoxifen followed by 5 years of Arimidex which she completed in 2017.  Carcinoma of the remaining right breast.  Tumor was positive for HER2/julián overexpression and positive for estrogen receptors and negative for progesterone receptors.  Bilateral mastectomies with immediate implant reconstruction 8/16/2023.  Mediport placement 8/16/2023  Echocardiogram from 6/19/2023 shows normal left ventricular ejection fraction of 61% (patient previously had 6 cycles of Adriamycin containing chemotherapy and will be needing Herceptin therapy postoperatively).  Plan for postop adjuvant treatment with weekly Taxol and Herceptin x12 weeks followed by continued single agent Herceptin every 3 weeks for completion of 12 months of therapy total.  Plan for hormonal therapy possibly  with Aromasin to begin once Taxol is completed.  9/14/2023: Patient returns for cycle 1 day 15 weekly Taxol and Herceptin.  Unfortunately she is neutropenic with an ANC of 800 and will not receive Taxol today but will receive Herceptin.  Her future Taxol doses will be modified to an 85% dose level.  10/6/2023: Proceed with cycle 2 day 1 taxol and Herceptin. We will continue taxol at 85% reduction of original dose.       PLAN:     Palma will receive her 6th weekly dose of Taxol and her seventh weekly dose of Herceptin in the office today..  Taxol dose will remain at 85% dose level from the original treatment due to development of neutropenia..  Nurse practitioner assessment in 3 weeks.  MD follow-up in 5 weeks.  Once the 12 weeks of weekly Taxol and Herceptin are completed we would plan to initiate hormonal therapy with Aromasin and continue single agent Herceptin every 3 weeks for the remainder of the year.      Patient remains on high risk medication and requires close monitoring for toxicity.          10/27/2023      CC:

## 2023-10-31 ENCOUNTER — TELEPHONE (OUTPATIENT)
Dept: ONCOLOGY | Facility: CLINIC | Age: 58
End: 2023-10-31
Payer: COMMERCIAL

## 2023-11-03 ENCOUNTER — INFUSION (OUTPATIENT)
Dept: ONCOLOGY | Facility: HOSPITAL | Age: 58
End: 2023-11-03
Payer: COMMERCIAL

## 2023-11-03 VITALS
OXYGEN SATURATION: 99 % | TEMPERATURE: 97.6 F | DIASTOLIC BLOOD PRESSURE: 80 MMHG | HEART RATE: 107 BPM | WEIGHT: 220.2 LBS | RESPIRATION RATE: 16 BRPM | BODY MASS INDEX: 38.06 KG/M2 | SYSTOLIC BLOOD PRESSURE: 128 MMHG

## 2023-11-03 DIAGNOSIS — Z17.0 MALIGNANT NEOPLASM OF OVERLAPPING SITES OF RIGHT BREAST IN FEMALE, ESTROGEN RECEPTOR POSITIVE: Primary | ICD-10-CM

## 2023-11-03 DIAGNOSIS — Z45.2 ENCOUNTER FOR ADJUSTMENT OR MANAGEMENT OF VASCULAR ACCESS DEVICE: ICD-10-CM

## 2023-11-03 DIAGNOSIS — C50.811 MALIGNANT NEOPLASM OF OVERLAPPING SITES OF RIGHT BREAST IN FEMALE, ESTROGEN RECEPTOR POSITIVE: Primary | ICD-10-CM

## 2023-11-03 LAB
ALBUMIN SERPL-MCNC: 4.1 G/DL (ref 3.5–5.2)
ALBUMIN/GLOB SERPL: 1.6 G/DL
ALP SERPL-CCNC: 55 U/L (ref 39–117)
ALT SERPL W P-5'-P-CCNC: 37 U/L (ref 1–33)
ANION GAP SERPL CALCULATED.3IONS-SCNC: 7.6 MMOL/L (ref 5–15)
AST SERPL-CCNC: 27 U/L (ref 1–32)
BASOPHILS # BLD AUTO: 0.03 10*3/MM3 (ref 0–0.2)
BASOPHILS NFR BLD AUTO: 0.7 % (ref 0–1.5)
BILIRUB SERPL-MCNC: 0.5 MG/DL (ref 0–1.2)
BUN SERPL-MCNC: 14 MG/DL (ref 8–23)
BUN/CREAT SERPL: 17.7 (ref 7–25)
CALCIUM SPEC-SCNC: 9.2 MG/DL (ref 8.6–10.5)
CHLORIDE SERPL-SCNC: 106 MMOL/L (ref 98–107)
CO2 SERPL-SCNC: 26.4 MMOL/L (ref 22–29)
CREAT SERPL-MCNC: 0.79 MG/DL (ref 0.57–1)
DEPRECATED RDW RBC AUTO: 45.5 FL (ref 37–54)
EGFRCR SERPLBLD CKD-EPI 2021: 85.8 ML/MIN/1.73
EOSINOPHIL # BLD AUTO: 0.07 10*3/MM3 (ref 0–0.4)
EOSINOPHIL NFR BLD AUTO: 1.6 % (ref 0.3–6.2)
ERYTHROCYTE [DISTWIDTH] IN BLOOD BY AUTOMATED COUNT: 15.1 % (ref 12.3–15.4)
GLOBULIN UR ELPH-MCNC: 2.6 GM/DL
GLUCOSE SERPL-MCNC: 92 MG/DL (ref 65–99)
HCT VFR BLD AUTO: 36.1 % (ref 34–46.6)
HGB BLD-MCNC: 11.4 G/DL (ref 12–15.9)
IMM GRANULOCYTES # BLD AUTO: 0.03 10*3/MM3 (ref 0–0.05)
IMM GRANULOCYTES NFR BLD AUTO: 0.7 % (ref 0–0.5)
LYMPHOCYTES # BLD AUTO: 1.34 10*3/MM3 (ref 0.7–3.1)
LYMPHOCYTES NFR BLD AUTO: 30.1 % (ref 19.6–45.3)
MCH RBC QN AUTO: 26.7 PG (ref 26.6–33)
MCHC RBC AUTO-ENTMCNC: 31.6 G/DL (ref 31.5–35.7)
MCV RBC AUTO: 84.5 FL (ref 79–97)
MONOCYTES # BLD AUTO: 0.39 10*3/MM3 (ref 0.1–0.9)
MONOCYTES NFR BLD AUTO: 8.8 % (ref 5–12)
NEUTROPHILS NFR BLD AUTO: 2.59 10*3/MM3 (ref 1.7–7)
NEUTROPHILS NFR BLD AUTO: 58.1 % (ref 42.7–76)
NRBC BLD AUTO-RTO: 0 /100 WBC (ref 0–0.2)
PLATELET # BLD AUTO: 241 10*3/MM3 (ref 140–450)
PMV BLD AUTO: 11.8 FL (ref 6–12)
POTASSIUM SERPL-SCNC: 3.9 MMOL/L (ref 3.5–5.2)
PROT SERPL-MCNC: 6.7 G/DL (ref 6–8.5)
RBC # BLD AUTO: 4.27 10*6/MM3 (ref 3.77–5.28)
SODIUM SERPL-SCNC: 140 MMOL/L (ref 136–145)
WBC NRBC COR # BLD: 4.45 10*3/MM3 (ref 3.4–10.8)

## 2023-11-03 PROCEDURE — 85025 COMPLETE CBC W/AUTO DIFF WBC: CPT | Performed by: INTERNAL MEDICINE

## 2023-11-03 PROCEDURE — 25010000002 HYDROCORTISONE SOD SUC (PF) 100 MG RECONSTITUTED SOLUTION: Performed by: INTERNAL MEDICINE

## 2023-11-03 PROCEDURE — 25010000002 HEPARIN LOCK FLUSH PER 10 UNITS: Performed by: INTERNAL MEDICINE

## 2023-11-03 PROCEDURE — 96413 CHEMO IV INFUSION 1 HR: CPT

## 2023-11-03 PROCEDURE — 25010000002 DEXAMETHASONE SODIUM PHOSPHATE 100 MG/10ML SOLUTION: Performed by: INTERNAL MEDICINE

## 2023-11-03 PROCEDURE — 96417 CHEMO IV INFUS EACH ADDL SEQ: CPT

## 2023-11-03 PROCEDURE — 25010000002 TRASTUZUMAB-QYYP 420 MG RECONSTITUTED SOLUTION 1 EACH VIAL: Performed by: INTERNAL MEDICINE

## 2023-11-03 PROCEDURE — 25810000003 SODIUM CHLORIDE 0.9 % SOLUTION 250 ML FLEX CONT: Performed by: INTERNAL MEDICINE

## 2023-11-03 PROCEDURE — 25010000002 DIPHENHYDRAMINE PER 50 MG: Performed by: INTERNAL MEDICINE

## 2023-11-03 PROCEDURE — 80053 COMPREHEN METABOLIC PANEL: CPT | Performed by: INTERNAL MEDICINE

## 2023-11-03 PROCEDURE — 25010000002 PACLITAXEL PER 1 MG: Performed by: INTERNAL MEDICINE

## 2023-11-03 PROCEDURE — 25810000003 SODIUM CHLORIDE 0.9 % SOLUTION: Performed by: INTERNAL MEDICINE

## 2023-11-03 PROCEDURE — 96375 TX/PRO/DX INJ NEW DRUG ADDON: CPT

## 2023-11-03 RX ORDER — SODIUM CHLORIDE 0.9 % (FLUSH) 0.9 %
10 SYRINGE (ML) INJECTION AS NEEDED
Status: DISCONTINUED | OUTPATIENT
Start: 2023-11-03 | End: 2023-11-03 | Stop reason: HOSPADM

## 2023-11-03 RX ORDER — HEPARIN SODIUM (PORCINE) LOCK FLUSH IV SOLN 100 UNIT/ML 100 UNIT/ML
500 SOLUTION INTRAVENOUS AS NEEDED
OUTPATIENT
Start: 2023-11-03

## 2023-11-03 RX ORDER — MONTELUKAST SODIUM 10 MG/1
10 TABLET ORAL ONCE
Status: COMPLETED | OUTPATIENT
Start: 2023-11-03 | End: 2023-11-03

## 2023-11-03 RX ORDER — SODIUM CHLORIDE 9 MG/ML
250 INJECTION, SOLUTION INTRAVENOUS ONCE
Status: COMPLETED | OUTPATIENT
Start: 2023-11-03 | End: 2023-11-03

## 2023-11-03 RX ORDER — FAMOTIDINE 10 MG/ML
20 INJECTION, SOLUTION INTRAVENOUS ONCE
Status: COMPLETED | OUTPATIENT
Start: 2023-11-03 | End: 2023-11-03

## 2023-11-03 RX ORDER — SODIUM CHLORIDE 0.9 % (FLUSH) 0.9 %
10 SYRINGE (ML) INJECTION AS NEEDED
OUTPATIENT
Start: 2023-11-03

## 2023-11-03 RX ORDER — HEPARIN SODIUM (PORCINE) LOCK FLUSH IV SOLN 100 UNIT/ML 100 UNIT/ML
500 SOLUTION INTRAVENOUS AS NEEDED
Status: DISCONTINUED | OUTPATIENT
Start: 2023-11-03 | End: 2023-11-03 | Stop reason: HOSPADM

## 2023-11-03 RX ADMIN — Medication 500 UNITS: at 13:59

## 2023-11-03 RX ADMIN — HYDROCORTISONE SODIUM SUCCINATE 100 MG: 100 INJECTION, POWDER, FOR SOLUTION INTRAMUSCULAR; INTRAVENOUS at 12:15

## 2023-11-03 RX ADMIN — Medication 10 ML: at 13:59

## 2023-11-03 RX ADMIN — SODIUM CHLORIDE 190 MG: 9 INJECTION, SOLUTION INTRAVENOUS at 11:08

## 2023-11-03 RX ADMIN — FAMOTIDINE 20 MG: 10 INJECTION INTRAVENOUS at 12:13

## 2023-11-03 RX ADMIN — PACLITAXEL 135 MG: 6 INJECTION, SOLUTION INTRAVENOUS at 12:52

## 2023-11-03 RX ADMIN — DEXAMETHASONE SODIUM PHOSPHATE 12 MG: 10 INJECTION, SOLUTION INTRAMUSCULAR; INTRAVENOUS at 11:41

## 2023-11-03 RX ADMIN — MONTELUKAST 10 MG: 10 TABLET, FILM COATED ORAL at 11:07

## 2023-11-03 RX ADMIN — SODIUM CHLORIDE 250 ML: 9 INJECTION, SOLUTION INTRAVENOUS at 11:07

## 2023-11-10 ENCOUNTER — INFUSION (OUTPATIENT)
Dept: ONCOLOGY | Facility: HOSPITAL | Age: 58
End: 2023-11-10
Payer: COMMERCIAL

## 2023-11-10 VITALS
OXYGEN SATURATION: 99 % | BODY MASS INDEX: 37.92 KG/M2 | DIASTOLIC BLOOD PRESSURE: 80 MMHG | SYSTOLIC BLOOD PRESSURE: 125 MMHG | RESPIRATION RATE: 16 BRPM | HEART RATE: 97 BPM | WEIGHT: 219.4 LBS | TEMPERATURE: 98.2 F

## 2023-11-10 DIAGNOSIS — Z45.2 ENCOUNTER FOR ADJUSTMENT OR MANAGEMENT OF VASCULAR ACCESS DEVICE: ICD-10-CM

## 2023-11-10 DIAGNOSIS — C50.811 MALIGNANT NEOPLASM OF OVERLAPPING SITES OF RIGHT BREAST IN FEMALE, ESTROGEN RECEPTOR POSITIVE: Primary | ICD-10-CM

## 2023-11-10 DIAGNOSIS — Z17.0 MALIGNANT NEOPLASM OF OVERLAPPING SITES OF RIGHT BREAST IN FEMALE, ESTROGEN RECEPTOR POSITIVE: Primary | ICD-10-CM

## 2023-11-10 LAB
ALBUMIN SERPL-MCNC: 3.8 G/DL (ref 3.5–5.2)
ALBUMIN/GLOB SERPL: 1.4 G/DL
ALP SERPL-CCNC: 49 U/L (ref 39–117)
ALT SERPL W P-5'-P-CCNC: 27 U/L (ref 1–33)
ANION GAP SERPL CALCULATED.3IONS-SCNC: 8.8 MMOL/L (ref 5–15)
AST SERPL-CCNC: 24 U/L (ref 1–32)
BASOPHILS # BLD AUTO: 0.03 10*3/MM3 (ref 0–0.2)
BASOPHILS NFR BLD AUTO: 0.7 % (ref 0–1.5)
BILIRUB SERPL-MCNC: 0.4 MG/DL (ref 0–1.2)
BUN SERPL-MCNC: 14 MG/DL (ref 8–23)
BUN/CREAT SERPL: 18.9 (ref 7–25)
CALCIUM SPEC-SCNC: 9.1 MG/DL (ref 8.6–10.5)
CHLORIDE SERPL-SCNC: 103 MMOL/L (ref 98–107)
CO2 SERPL-SCNC: 26.2 MMOL/L (ref 22–29)
CREAT SERPL-MCNC: 0.74 MG/DL (ref 0.57–1)
DEPRECATED RDW RBC AUTO: 46.2 FL (ref 37–54)
EGFRCR SERPLBLD CKD-EPI 2021: 92.8 ML/MIN/1.73
EOSINOPHIL # BLD AUTO: 0.07 10*3/MM3 (ref 0–0.4)
EOSINOPHIL NFR BLD AUTO: 1.7 % (ref 0.3–6.2)
ERYTHROCYTE [DISTWIDTH] IN BLOOD BY AUTOMATED COUNT: 15.4 % (ref 12.3–15.4)
GLOBULIN UR ELPH-MCNC: 2.8 GM/DL
GLUCOSE SERPL-MCNC: 100 MG/DL (ref 65–99)
HCT VFR BLD AUTO: 34.9 % (ref 34–46.6)
HGB BLD-MCNC: 11 G/DL (ref 12–15.9)
IMM GRANULOCYTES # BLD AUTO: 0.03 10*3/MM3 (ref 0–0.05)
IMM GRANULOCYTES NFR BLD AUTO: 0.7 % (ref 0–0.5)
LYMPHOCYTES # BLD AUTO: 1.39 10*3/MM3 (ref 0.7–3.1)
LYMPHOCYTES NFR BLD AUTO: 33.9 % (ref 19.6–45.3)
MCH RBC QN AUTO: 26.6 PG (ref 26.6–33)
MCHC RBC AUTO-ENTMCNC: 31.5 G/DL (ref 31.5–35.7)
MCV RBC AUTO: 84.3 FL (ref 79–97)
MONOCYTES # BLD AUTO: 0.46 10*3/MM3 (ref 0.1–0.9)
MONOCYTES NFR BLD AUTO: 11.2 % (ref 5–12)
NEUTROPHILS NFR BLD AUTO: 2.12 10*3/MM3 (ref 1.7–7)
NEUTROPHILS NFR BLD AUTO: 51.8 % (ref 42.7–76)
NRBC BLD AUTO-RTO: 0 /100 WBC (ref 0–0.2)
PLATELET # BLD AUTO: 214 10*3/MM3 (ref 140–450)
PMV BLD AUTO: 11.6 FL (ref 6–12)
POTASSIUM SERPL-SCNC: 3.8 MMOL/L (ref 3.5–5.2)
PROT SERPL-MCNC: 6.6 G/DL (ref 6–8.5)
RBC # BLD AUTO: 4.14 10*6/MM3 (ref 3.77–5.28)
SODIUM SERPL-SCNC: 138 MMOL/L (ref 136–145)
WBC NRBC COR # BLD: 4.1 10*3/MM3 (ref 3.4–10.8)

## 2023-11-10 PROCEDURE — 25010000002 DEXAMETHASONE SODIUM PHOSPHATE 100 MG/10ML SOLUTION: Performed by: INTERNAL MEDICINE

## 2023-11-10 PROCEDURE — 96417 CHEMO IV INFUS EACH ADDL SEQ: CPT

## 2023-11-10 PROCEDURE — 85025 COMPLETE CBC W/AUTO DIFF WBC: CPT | Performed by: INTERNAL MEDICINE

## 2023-11-10 PROCEDURE — 80053 COMPREHEN METABOLIC PANEL: CPT | Performed by: INTERNAL MEDICINE

## 2023-11-10 PROCEDURE — 96415 CHEMO IV INFUSION ADDL HR: CPT

## 2023-11-10 PROCEDURE — 25810000003 SODIUM CHLORIDE 0.9 % SOLUTION 250 ML FLEX CONT: Performed by: INTERNAL MEDICINE

## 2023-11-10 PROCEDURE — 25010000002 HYDROCORTISONE SOD SUC (PF) 100 MG RECONSTITUTED SOLUTION: Performed by: INTERNAL MEDICINE

## 2023-11-10 PROCEDURE — 25010000002 PACLITAXEL PER 1 MG: Performed by: INTERNAL MEDICINE

## 2023-11-10 PROCEDURE — 25010000002 TRASTUZUMAB-QYYP 420 MG RECONSTITUTED SOLUTION 1 EACH VIAL: Performed by: INTERNAL MEDICINE

## 2023-11-10 PROCEDURE — 25010000002 HEPARIN LOCK FLUSH PER 10 UNITS: Performed by: INTERNAL MEDICINE

## 2023-11-10 PROCEDURE — 25810000003 SODIUM CHLORIDE 0.9 % SOLUTION: Performed by: INTERNAL MEDICINE

## 2023-11-10 PROCEDURE — 96375 TX/PRO/DX INJ NEW DRUG ADDON: CPT

## 2023-11-10 PROCEDURE — 96413 CHEMO IV INFUSION 1 HR: CPT

## 2023-11-10 RX ORDER — SODIUM CHLORIDE 0.9 % (FLUSH) 0.9 %
10 SYRINGE (ML) INJECTION AS NEEDED
Status: DISCONTINUED | OUTPATIENT
Start: 2023-11-10 | End: 2023-11-10 | Stop reason: HOSPADM

## 2023-11-10 RX ORDER — SODIUM CHLORIDE 0.9 % (FLUSH) 0.9 %
10 SYRINGE (ML) INJECTION AS NEEDED
OUTPATIENT
Start: 2023-11-10

## 2023-11-10 RX ORDER — HEPARIN SODIUM (PORCINE) LOCK FLUSH IV SOLN 100 UNIT/ML 100 UNIT/ML
500 SOLUTION INTRAVENOUS AS NEEDED
OUTPATIENT
Start: 2023-11-10

## 2023-11-10 RX ORDER — FAMOTIDINE 10 MG/ML
20 INJECTION, SOLUTION INTRAVENOUS ONCE
Status: COMPLETED | OUTPATIENT
Start: 2023-11-10 | End: 2023-11-10

## 2023-11-10 RX ORDER — HEPARIN SODIUM (PORCINE) LOCK FLUSH IV SOLN 100 UNIT/ML 100 UNIT/ML
500 SOLUTION INTRAVENOUS AS NEEDED
Status: DISCONTINUED | OUTPATIENT
Start: 2023-11-10 | End: 2023-11-10 | Stop reason: HOSPADM

## 2023-11-10 RX ORDER — MONTELUKAST SODIUM 10 MG/1
10 TABLET ORAL ONCE
Status: COMPLETED | OUTPATIENT
Start: 2023-11-10 | End: 2023-11-10

## 2023-11-10 RX ORDER — SODIUM CHLORIDE 9 MG/ML
250 INJECTION, SOLUTION INTRAVENOUS ONCE
Status: COMPLETED | OUTPATIENT
Start: 2023-11-10 | End: 2023-11-10

## 2023-11-10 RX ADMIN — FAMOTIDINE 20 MG: 10 INJECTION INTRAVENOUS at 11:40

## 2023-11-10 RX ADMIN — MONTELUKAST 10 MG: 10 TABLET, FILM COATED ORAL at 11:40

## 2023-11-10 RX ADMIN — Medication 500 UNITS: at 14:05

## 2023-11-10 RX ADMIN — Medication 10 ML: at 14:05

## 2023-11-10 RX ADMIN — SODIUM CHLORIDE 190 MG: 9 INJECTION, SOLUTION INTRAVENOUS at 12:19

## 2023-11-10 RX ADMIN — HYDROCORTISONE SODIUM SUCCINATE 100 MG: 100 INJECTION, POWDER, FOR SOLUTION INTRAMUSCULAR; INTRAVENOUS at 12:53

## 2023-11-10 RX ADMIN — SODIUM CHLORIDE 250 ML: 9 INJECTION, SOLUTION INTRAVENOUS at 11:00

## 2023-11-10 RX ADMIN — DEXAMETHASONE SODIUM PHOSPHATE 12 MG: 10 INJECTION, SOLUTION INTRAMUSCULAR; INTRAVENOUS at 11:45

## 2023-11-10 RX ADMIN — PACLITAXEL 135 MG: 6 INJECTION, SOLUTION INTRAVENOUS at 12:58

## 2023-11-10 NOTE — NURSING NOTE
Pt arrived for Taxol/Herceptin. Labs reviewed with Dr Solares. Pt reports bilateral neuropathy in feet. Per Dr Solares continue to treat with same dose and monitor neuropathy. Pt to see Carina washington  on 11/17, will address neuropathy again at that time. Pt v/u and knows to call in symptoms increase.   Pt tolerated tx well next appts reviewed.

## 2023-11-16 ENCOUNTER — OFFICE VISIT (OUTPATIENT)
Dept: FAMILY MEDICINE CLINIC | Facility: CLINIC | Age: 58
End: 2023-11-16
Payer: COMMERCIAL

## 2023-11-16 ENCOUNTER — PATIENT OUTREACH (OUTPATIENT)
Dept: OTHER | Facility: HOSPITAL | Age: 58
End: 2023-11-16
Payer: COMMERCIAL

## 2023-11-16 VITALS
SYSTOLIC BLOOD PRESSURE: 120 MMHG | WEIGHT: 220.2 LBS | HEIGHT: 64 IN | DIASTOLIC BLOOD PRESSURE: 68 MMHG | TEMPERATURE: 97.8 F | OXYGEN SATURATION: 95 % | HEART RATE: 76 BPM | BODY MASS INDEX: 37.59 KG/M2

## 2023-11-16 DIAGNOSIS — Z17.0 MALIGNANT NEOPLASM OF RIGHT BREAST IN FEMALE, ESTROGEN RECEPTOR POSITIVE, UNSPECIFIED SITE OF BREAST: ICD-10-CM

## 2023-11-16 DIAGNOSIS — C50.911 MALIGNANT NEOPLASM OF RIGHT BREAST IN FEMALE, ESTROGEN RECEPTOR POSITIVE, UNSPECIFIED SITE OF BREAST: ICD-10-CM

## 2023-11-16 DIAGNOSIS — E78.5 HYPERLIPIDEMIA, UNSPECIFIED HYPERLIPIDEMIA TYPE: Primary | Chronic | ICD-10-CM

## 2023-11-16 PROCEDURE — 99213 OFFICE O/P EST LOW 20 MIN: CPT | Performed by: FAMILY MEDICINE

## 2023-11-16 RX ORDER — ATORVASTATIN CALCIUM 10 MG/1
10 TABLET, FILM COATED ORAL DAILY
Qty: 90 TABLET | Refills: 3 | Status: SHIPPED | OUTPATIENT
Start: 2023-11-16

## 2023-11-16 NOTE — PROGRESS NOTES
"    Chief Complaint  labs wanted and Hyperlipidemia    Subjective    History of Present Illness {CC  Problem List  Visit  Diagnosis   Encounters  Notes  Medications  Labs  Result Review Imaging  Media :23}     Palma Roblero presents to [facility] for   History of Present Illness     57 yo female present for follow up visit. She has been through a lot over the last year, since last visit. She was diagnosed with breast cancer.  She currently has three more chemo treatments left.   S/p bilateral masectomy.      States has seen cardiology who states she needs to have cholesterol checked. She is taking atorvastatin daily.     Has been trying to monitor monitor diet.     Social History     Socioeconomic History    Marital status:      Spouse name: Philip   Tobacco Use    Smoking status: Never    Smokeless tobacco: Never    Tobacco comments:          No Caffeine    Vaping Use    Vaping Use: Never used   Substance and Sexual Activity    Alcohol use: No    Drug use: No    Sexual activity: Defer     Partners: Male     Birth control/protection: None      Objective     Vital Signs:   /68   Pulse 76   Temp 97.8 °F (36.6 °C)   Ht 162.6 cm (64\")   Wt 99.9 kg (220 lb 3.2 oz)   SpO2 95%   BMI 37.80 kg/m²   Physical Exam  Constitutional:       General: She is not in acute distress.     Appearance: She is not ill-appearing.   Cardiovascular:      Rate and Rhythm: Regular rhythm.      Heart sounds: Normal heart sounds.   Pulmonary:      Breath sounds: Normal breath sounds. No wheezing.   Musculoskeletal:      Right lower leg: No edema.      Left lower leg: No edema.   Neurological:      Mental Status: She is alert.        Result Review  Data Reviewed:{ Labs  Result Review  Imaging  Med Tab  Media :23}   The following data was reviewed by: Alivia Lubin MD on 11/16/2023  Lab Results - Last 18 Months   Lab Units 11/10/23  1032 11/03/23  1024 10/27/23  0844 " 01/12/23  1612 11/30/22  1348 07/15/22  1208 05/24/22  1334   BUN mg/dL 14 14 14   < > 13   < > 15   CREATININE mg/dL 0.74 0.79 0.79   < > 0.75   < > 0.88   SODIUM mmol/L 138 140 143   < > 141   < > 139   POTASSIUM mmol/L 3.8 3.9 3.9   < > 3.7   < > 4.1   CHLORIDE mmol/L 103 106 109*   < > 105   < > 100   CALCIUM mg/dL 9.1 9.2 9.2   < > 9.2   < > 9.5   ALBUMIN g/dL 3.8 4.1 4.0   < > 4.20  --  4.2   BILIRUBIN mg/dL 0.4 0.5 0.5   < > 0.9  --  0.9   ALK PHOS U/L 49 55 59   < > 59  --  64   AST (SGOT) U/L 24 27 22   < > 15  --  17   ALT (SGPT) U/L 27 37* 27   < > 18  --  16   CHOLESTEROL mg/dL  --   --   --   --  196  --  197   TRIGLYCERIDES mg/dL  --   --   --   --  62  --  57   HDL CHOL mg/dL  --   --   --   --  62*  --  64   VLDL CHOLESTEROL LISSETH mg/dL  --   --   --   --  11  --  11   LDL CHOL mg/dL  --   --   --   --  123*  --  122*   WBC 10*3/mm3 4.10 4.45 4.59   < >  --    < >  --    RBC 10*6/mm3 4.14 4.27 4.40   < >  --    < >  --    HEMATOCRIT % 34.9 36.1 36.7   < >  --    < >  --    MCV fL 84.3 84.5 83.4   < >  --    < >  --    MCH pg 26.6 26.7 26.4*   < >  --    < >  --    TSH uIU/mL  --   --   --   --  1.360  --   --     < > = values in this interval not displayed.              Current Outpatient Medications:     acetaminophen (TYLENOL) 325 MG tablet, Take 2 tablets by mouth Every 4 (Four) Hours As Needed for Mild Pain., Disp: 60 tablet, Rfl: 0    atorvastatin (LIPITOR) 10 MG tablet, Take 1 tablet by mouth Daily., Disp: 90 tablet, Rfl: 3    lidocaine-prilocaine (EMLA) 2.5-2.5 % cream, Apply nickel-sized amount to port site 30 minutes before being accessed. May reapply as needed., Disp: 30 g, Rfl: 2    ondansetron ODT (ZOFRAN-ODT) 8 MG disintegrating tablet, Place 1 tablet on the tongue Every 8 (Eight) Hours As Needed for Nausea or Vomiting., Disp: 10 tablet, Rfl: 1    Patanol 0.1 % ophthalmic solution, Administer 1 drop to both eyes 2 (Two) Times a Day. (Patient taking differently: Administer 1 drop to both  eyes Daily As Needed.), Disp: 5 mL, Rfl: 5    Triamcinolone Acetonide (NASACORT) 55 MCG/ACT nasal inhaler, 2 sprays into the nostril(s) as directed by provider Daily As Needed (nasal congestion)., Disp: 16.5 g, Rfl: 5    dexAMETHasone (DECADRON) 4 MG tablet, Take 2 tablets by mouth 2 (Two) Times a Day With Meals. Take a day prior to first treatment. (Patient not taking: Reported on 11/16/2023), Disp: 4 tablet, Rfl: 0    docusate sodium (COLACE) 250 MG capsule, Take 1 capsule by mouth 2 (Two) Times a Day As Needed for Constipation. (Patient not taking: Reported on 11/16/2023), Disp: 60 capsule, Rfl: 1    gabapentin (Neurontin) 100 MG capsule, Take 1 capsule by mouth Every 8 (Eight) Hours. (Patient taking differently: Take 1 capsule by mouth Every 8 (Eight) Hours As Needed.), Disp: 60 capsule, Rfl: 2    HYDROcodone-acetaminophen (NORCO) 5-325 MG per tablet, Take 1-2 tablets by mouth Every 4 (Four) Hours As Needed (Pain). (Patient not taking: Reported on 11/16/2023), Disp: 20 tablet, Rfl: 0    mupirocin (BACTROBAN) 2 % ointment, APPLY SMALL AMOUNT TOPICALLY TO THE AFFECTED AREA THREE TIMES DAILY (Patient not taking: Reported on 11/16/2023), Disp: , Rfl:     sennosides-docusate (PERICOLACE) 8.6-50 MG per tablet, Take 2 tablets by mouth Daily As Needed for Constipation. (Patient not taking: Reported on 11/16/2023), Disp: 30 tablet, Rfl: 1      Assessment and Plan {CC Problem List  Visit Diagnosis  ROS  Review (Popup)  Health Maintenance  Quality  BestPractice  Medications  SmartSets  SnapShot Encounters  Media :23}   Problem List Items Addressed This Visit       Hyperlipidemia - Primary (Chronic)    Current Assessment & Plan     Lipid abnormalities are  chronic, on medication .  Low fat high fiber diet, exercise at least 30 min daily 5/7 days a week.   Monitor lipid panel, fasting today, check today  Continue current medication adjust if indicated  Lipids will be reassessed in 3 months.         Relevant  Medications    atorvastatin (LIPITOR) 10 MG tablet    Other Relevant Orders    Lipid panel    Breast cancer     Continue follow with radiologist and oncologist.       Follow Up   Return in about 4 months (around 3/16/2024) for will need to schedule with new pcp, woodrow tong.  Patient was given instructions and counseling regarding her condition or for health maintenance advice. Please see specific information pulled into the AVS if appropriate.    EpicAct:[email or web address]_ORDERS,RunParams:STARTUPTYPE=FOLLOW    [email or web address]_DISCHARGE

## 2023-11-16 NOTE — PROGRESS NOTES
Subjective   REASON FOR FOLLOWUP:  History of stage IIIA infiltrating ductal carcinoma of the right breast with 4 positive nodes, ER positive, HER-2/julián negative; treated with lumpectomy, status post 6 cycles of TAC. Received tamoxifen between February 2007 and March 2012. Femara was started in March 2012. Patient switched to Arimidex as of 06/21/2012 because of side effects with severe ankle joint pain with Femara.    2.   10 years of adjuvant therapy completed  March 2017.    3.   New diagnosis of carcinoma of the remaining right breast, needle biopsy 6/8/2023.  Tumor is HER2/julián positive and estrogen receptor positive/progesterone receptor negative.    4.   Bilateral mastectomies with implant reconstruction 8/16/2023    5.   Initiation of postop adjuvant chemotherapy with weekly Taxol and Herceptin.  First dose delivered 9/15/2023      HISTORY OF PRESENT ILLNESS:     History of Present Illness Ms. Roblero is a 58 y.o.  female with the above noted history of stage IIIA carcinoma of the right breast, treated with lumpectomy, chemotherapy and radiation. She received extended hormonal therapy with 5 years of tamoxifen followed by 5 years of aromatase inhibitor.  She completed her 10 years of hormonal therapy in March 2017  .     She was seeing us annually had not been seen in January of this year but when she underwent her breast imaging in May 2023 she had suspicious findings and ultimately underwent ultrasound-guided biopsy on 6/8/2023.  There were 2 separate biopsies in 1 of these areas was positive for HER2/julián overexpression by FISH.  Both tumors were ER positive and MS negative.    She was referred to Dr. Vidal who had performed her previous lumpectomy surgery in 2007 and currently the plan is for her to undergo bilateral mastectomies with reconstruction.    We had her seen by cardiac oncology due to her previous treatment with 6 cycles of TAC chemotherapy in 2007.  Thankfully her ejection  "fraction still seems to be normal at 61% based on her echocardiogram from 6/19/2023.    Dr. Vidal has scheduled the patient for radiographic staging with CT scan of the chest abdomen pelvis and a bone scan and the studies did not show any evidence of distant metastatic spread.    She had a bilateral mastectomies with implant reconstruction performed on 8/16/2023.  The tumor in the right breast was 3 x 1.5 x 1.2 cm.  Margins of resection were clear.  She also had placement of a left chest wall Mediport at the time of her surgery.    We recommended weekly Taxol and Herceptin for 12 weeks followed by continued Herceptin every 3 weeks for the remainder of the year of treatment.  She was estrogen receptor positive and will also be started on hormonal therapy once her chemotherapy is completed.    INTERVAL HISTORY:  Palma returns to the office today for follow up and anticipated 9th weekly Taxol and 10th weekly Herceptin.  She required Taxol dose delays and dose reduction due to neutropenia on week 3.    She had a cardiac oncology appointment with Dr. Reynolds on 10/21/2023.  Echocardiogram from 10/16/2023 was still showing a normal ejection fraction.    Palma has been wearing cold mittens and socks with her treatments but she reports that she is starting to feel slight neuropathy issues in the feet.  The neuropathy is localized in her toes and is most troublesome at nighttime when she is trying to sleep.    Only concern today is that her lymphedema in her right upper extremity has slightly worsened.  She is wearing a compression garment today but stopped that her wrist she does feel that her hand is more \"puffy.\"  She denies any pain or localized swelling.      Past Medical History, Past Surgical History, Social History, Family History have been reviewed and are without significant changes except as mentioned.    Review of Systems   Constitutional:  Negative for activity change, appetite change, fatigue, fever and " "unexpected weight change.   HENT:  Negative for hearing loss, nosebleeds, trouble swallowing and voice change.    Eyes:  Negative for visual disturbance.   Respiratory:  Negative for cough, shortness of breath and wheezing.    Cardiovascular:  Negative for chest pain and palpitations.   Gastrointestinal:  Negative for abdominal pain, diarrhea, nausea and vomiting.   Genitourinary:  Negative for difficulty urinating, frequency, hematuria and urgency.   Musculoskeletal:  Negative for back pain and neck pain.   Skin:  Negative for rash.   Neurological:  Negative for dizziness, seizures, syncope and headaches.   Hematological:  Negative for adenopathy. Does not bruise/bleed easily.   Psychiatric/Behavioral:  Negative for behavioral problems. The patient is not nervous/anxious.       A comprehensive 14 point review of systems was performed and was negative except as mentioned.    Medications:  The current medication list was reviewed in the EMR    ALLERGIES:    Allergies   Allergen Reactions    Oxycodone Nausea And Vomiting       Objective      Vitals:    11/17/23 1016   BP: 122/75   Pulse: 100   Resp: 18   Temp: 98.2 °F (36.8 °C)   TempSrc: Temporal   SpO2: 98%   Weight: 99.8 kg (220 lb)   Height: 162 cm (63.78\")   PainSc: 0-No pain             11/17/2023    10:18 AM   Current Status   ECOG score 0       Physical Exam   Constitutional: She is oriented to person, place, and time. She appears well-developed. No distress.   HENT:   Head: Normocephalic.   Eyes: Pupils are equal, round, and reactive to light. Conjunctivae are normal. No scleral icterus.   Neck: No JVD present. No thyromegaly present.   Cardiovascular: Normal rate and regular rhythm. Exam reveals no gallop and no friction rub.   No murmur heard.  Pulmonary/Chest: Effort normal and breath sounds normal. She has no wheezes. She has no rales. Right breast exhibits no mass, no nipple discharge and no skin change. Left breast exhibits no mass, no nipple discharge " and no skin change.   Bilateral mastectomies with implant reconstructions.  Right chest wall Mediport   Abdominal: Soft. Bowel sounds are normal. She exhibits no distension and no mass. There is no abdominal tenderness.   Musculoskeletal: Normal range of motion. Swelling (Mild lymphedema in right upper extremity.  No pain, tenderness, or cording noted on exam.) present. No tenderness or deformity.   Lymphadenopathy:     She has no cervical adenopathy.   Neurological: She is alert and oriented to person, place, and time. She has normal reflexes. No cranial nerve deficit.   Skin: Skin is warm and dry. No rash noted. No erythema.   Psychiatric: Her behavior is normal. Judgment normal.        I have reexamined the patient and the results are consistent with the previously documented exam. Carina Tiwari, APRN           RECENT LABS:  Hematology WBC   Date Value Ref Range Status   11/17/2023 4.82 3.40 - 10.80 10*3/mm3 Final   11/01/2021 5.9 3.4 - 10.8 x10E3/uL Final     RBC   Date Value Ref Range Status   11/17/2023 4.37 3.77 - 5.28 10*6/mm3 Final   11/01/2021 5.14 3.77 - 5.28 x10E6/uL Final     Hemoglobin   Date Value Ref Range Status   11/17/2023 11.7 (L) 12.0 - 15.9 g/dL Final     Hematocrit   Date Value Ref Range Status   11/17/2023 36.5 34.0 - 46.6 % Final     Platelets   Date Value Ref Range Status   11/17/2023 281 140 - 450 10*3/mm3 Final            Lab Results   Component Value Date    NEUTROABS 2.77 11/17/2023       Lab Results   Component Value Date    GLUCOSE 99 11/17/2023    BUN 13 11/17/2023    CREATININE 0.85 11/17/2023    EGFRRESULT 91.8 11/30/2022    EGFR 78.5 11/17/2023    BCR 15.3 11/17/2023    K 4.0 11/17/2023    CO2 24.9 11/17/2023    CALCIUM 8.9 11/17/2023    PROTENTOTREF 6.6 11/30/2022    ALBUMIN 3.9 11/17/2023    BILITOT 0.5 11/17/2023    AST 29 11/17/2023    ALT 30 11/17/2023     PATHOLOGY 8/16/2023  Final Diagnosis   1. Right Breast, Oriented Simple Skin Sparing Mastectomy (1,097 Grams):  INVASIVE MAMMARY CARCINOMA,       NO SPECIAL TYPE (INVASIVE DUCTAL CARCINOMA).  A. Tumor site: Lower outer quadrant.               B. Histologic grade: [place] histologic score III (tubules = 3, nuclei = 2, mitosis = 3).               C. Tumor size: 30 x 15 x 12 mm.               D. Tumor focality: Single focus.               E. Ductal Carcinoma in-situ (DCIS): Not identified.               F. No lobular neoplasia identified.               G. Overlying skin and nipple are uninvolved by tumor.               H. No definitive lymphovascular space invasion identified.               I. Focal perineural invasion is present.  J. Margins: Uninvolved by invasive and in-situ carcinoma.               1. Invasive carcinoma comes to within 7 mm of the anterior margin, 40 mm from the inferior margin,      60 mm from the posterior and lateral margins, 80 mm from the medial margin and 130 mm from the       superior margin of resection.               K. Lymph Nodes: Not submitted.  L. Hormone receptor status: ER positive, NM negative, HER2 positive by FISH (right medial biopsy),       Ki-67 35-40%  (performed on prior biopsy [id number]).  M. Pathologic stage: pT2, NX.     2. Left Breast, Oriented Simple Skin Sparing Mastectomy (1,593 Grams):   A. Benign unremarkable breast tissue, skin and nipple.     PATHOLOGY 6/8/2023  Final Diagnosis   1. Breast, Right Lateral 6:30 Position, 3 cm FN, Core Biopsy:               A. Invasive mammary carcinoma, no special type (ductal), [place] histologic grade II (tubules = 3, nuclei = 2,        mitoses = 2) measuring 6 mm maximally and involving four core fragments.  B. No definitive in-situ carcinoma is identified.  C. No definitive lymphovascular space invasion identified.     2. Breast, Right Medial 6:30 Position, 3 cm FN, Core Biopsy:                A. Invasive mammary carcinoma, no special type (ductal), [place] histologic grade II (tubules = 3, nuclei = 2,        mitoses = 1)  measuring 10 mm maximally and involving three core fragments.  B. No definitive in-situ carcinoma is identified.  C. No definitive lymphovascular space invasion identified.     Echocardiogram 10/16/2023  Interpretation Summary         Left ventricular systolic function is normal. Calculated left ventricular EF = 61.4% Normal global longitudinal LV strain (GLS) = -23.9%. Left ventricle strain data was reviewed by the physician and found to be accurate. Normal left ventricular cavity size and wall thickness noted. All left ventricular wall segments contract normally. Left ventricular diastolic function was normal    Trace tricuspid valve regurgitation is present. Estimated right ventricular systolic pressure from tricuspid regurgitation is normal (<35 mmHg). Calculated right ventricular systolic pressure from tricuspid regurgitation is 22.9 mmHg.      CT CHEST, ABDOMEN, AND PELVIS WITHOUT CONTRAST. 7/21/2023  FINDINGS:  CHEST: Right breast findings discussed on breast MRI 06/17/2023. There  is a cluster of nodular densities at the right axilla in close proximity  to a cluster of surgical clips and some of these may represent vessels  or lymphatics. Characterization is limited in the absence of IV  contrast. There is no definitive pathologically enlarged node present.  There are no pathologically enlarged nodes at the left axilla and there  is no subpectoral lymphadenopathy, and there is no lymphadenopathy at  the mediastinum or jovita given constraints of noncontrasted technique.  The nodularity along the pleura at the dependent aspect of the lower  chest is likely related to dependent atelectatic change. No definite  suspicious pulmonary nodules are seen. There are no pleural or  pericardial effusions.     ABDOMEN/PELVIS: Noncontrasted liver appears unremarkable. The  gallbladder appears unremarkable and there is no biliary dilatation.  Splenic size is normal. Noncontrasted pancreas, adrenals, and kidneys  appear  unremarkable. There is no acute bowel abnormality. Appendix  appears within normal limits. The uterus is slightly lobular in contour  likely secondary to small fibroids. Noncontrasted adnexa appear  unremarkable. There is no free fluid or lymphadenopathy. There are mild  abdominal aortic atherosclerotic changes throughout the abdominal aorta  without aneurysmal dilatation. No suspicious bone lesion is seen.     IMPRESSION:  Given constraints of noncontrasted technique, there is no  convincing evidence for metastatic disease within the chest, abdomen, or  pelvis.      Narrative & Impression   NUCLEAR MEDICINE WHOLE BODY BONE SCAN 7/14/2023     HISTORY: Breast cancer. Evaluate for metastatic disease.     TECHNIQUE:  19.1 mCi of 99m technetium MDP was administered intravenous  followed by 3 hour delayed phase whole body imaging with selected spot  views.     COMPARISON: None.     FINDINGS:  There is mild increased uptake at the right sternoclavicular  joint that is attributed to arthritis. Arthritic uptake is present in  both shoulders, knees, and mid feet.  There is also mild increased  lumbar spine uptake that is most likely degenerative/arthritic. Both  kidneys and the urinary bladder are visualized.     IMPRESSION:  Areas of mild uptake are typical for arthritis/degenerative  change and there is no scintigraphic evidence to suggest bony metastatic  disease.        MAMMO SCREENING DIGITAL TOMOSYNTHESIS BILATERAL W CAD- 4/14/2023   IMPRESSION:  1. There is an area of focal asymmetry in the middle third retroareolar  region of the right breast. Further evaluation with spot compression CC  and MLO mammographic and Tomosynthesis images and targeted right breast  sonography is recommended.  2. There are no findings suspicious for malignancy in the left breast.     BI-RADS Category 0: Incomplete. Needs additional imaging evaluation.    EXAMINATIONS: UNILATERAL RIGHT DIGITAL DIAGNOSTIC MAMMOGRAPHY WITH  TOMOSYNTHESIS AND  LIMITED RIGHT BREAST SONOGRAPHY 5/17/2023  IMPRESSION:  There is an irregular 1 cm hypoechoic mass with internal peripheral  vascularity that is seen in the right breast at the 6:30 position on the  order of 3 cm from the nipple. It is immediately adjacent to a 1.9 cm  oval circumscribed hypoechoic mass. Together both lesions measure on the  order 2.7 cm in greatest dimension. Ultrasound guided biopsy of both  lesions is recommended. Given the close proximity of both lesions, both  lesions may be able to be sampled and submitted as one specimen.     BI-RADS Category 4: Suspicious abnormality or suspicious finding. Biopsy  should be considered.    MRI BREAST BILATERAL W WO CONTRAST-  6/17/2023  IMPRESSION AND RECOMMENDATION:     1.  Adjacent irregular enhancing masses each measuring 1.6 cm at 6:00 to  7:00 in the middle right breast represent the 2 sites of biopsy-proven  malignancy. Adjacent suspicious enhancing foci and areas of nonmass  enhancement are identified, in addition to an irregular enhancing mass  and multiple additional enhancing foci centered at 2:00 to 3:00 in the  right breast, which are also suspicious. The 2 sites of biopsy-proven  malignancy and suspicious areas of enhancement together measure up to  approximately 8.2 cm in maximum dimension, as detailed above. Surgical  management is recommended.  2.  No MRI evidence of malignancy in the left breast.     BI-RADS Category 4: Suspicious      Assessment & Plan     *Stage IIIA infiltrating ductal carcinoma of the right breast   Treated with a lumpectomy, radiation, 6 cycles of TAC chemotherapy and ongoing hormonal therapy. She completed 10 years of adjuvant hormonal therapy with 5 years of tamoxifen followed by 5 years of Arimidex which she completed in 2017.  Carcinoma of the remaining right breast.  Tumor was positive for HER2/julián overexpression and positive for estrogen receptors and negative for progesterone receptors.  Bilateral mastectomies  with immediate implant reconstruction 8/16/2023.  Mediport placement 8/16/2023  Echocardiogram from 6/19/2023 shows normal left ventricular ejection fraction of 61% (patient previously had 6 cycles of Adriamycin containing chemotherapy and will be needing Herceptin therapy postoperatively).  Plan for postop adjuvant treatment with weekly Taxol and Herceptin x12 weeks followed by continued single agent Herceptin every 3 weeks for completion of 12 months of therapy total.  Plan for hormonal therapy possibly with Aromasin to begin once Taxol is completed.  9/14/2023: Patient returns for cycle 1 day 15 weekly Taxol and Herceptin.  Unfortunately she is neutropenic with an ANC of 800 and will not receive Taxol today but will receive Herceptin.  Her future Taxol doses will be modified to an 85% dose level.  10/6/2023: Proceed with cycle 2 day 1 taxol and Herceptin. We will continue taxol at 85% reduction of original dose.   10/27/2023: Palma will receive her 6th weekly dose of Taxol and her seventh weekly dose of Herceptin in the office today.Taxol dose will remain at 85% dose level from the original treatment due to development of neutropenia.  11/17/2023: We will proceed with ninth weekly Taxol and 10th weekly Herceptin in office today.  She is tolerating treatment well.     *Chemotherapy-induced neuropathy  11/17/2023: Patient continues to use cooling mitts and gloves with treatment.  She has been noticing some mild neuropathy developing in her toes of her bilateral feet.  She feels that this discomfort is more noticeable in the evening when she is trying to sleep.  We will start her on gabapentin 100 mg p.o. at bedtime.  Prescription has been sent to Dr. Solares for signature.    *Right upper extremity lymphedema  11/17/2023: Patient has had history of lymphedema in her right upper extremity.  She does feel that in the past week or so her edema has been more noticeable to her.  She has been wearing her compression  garments intermittently.  She denies any pain.  On exam there is no tenderness, pain, or cording noted.  She has been encouraged to wear her full compression garment for her right upper extremity, keep right upper extremity elevated when possible, and use a stress ball to help keep swelling out of her hand.  We will continue to monitor this.  She does not feel that she needs to go to the lymphedema clinic at this time as symptoms are still mild.  She has been encouraged to let us know if she has any worsening symptoms or pain.      PLAN:     Proceed with ninth weekly Taxol and 10th weekly Herceptin today.  (Taxol dose will remain at 85% dose level from the original treatment due to development of neutropenia)  Encouraged use of compression garments to right upper extremity and compression.  Encouraged to call the office if right upper extremity symptoms worsen in any way.  Start Neurontin 100 mg p.o. nightly.  Prescription sent to MD for signature  Return in 1 week for Taxol/Herceptin as scheduled  Return in 2 weeks for MD follow-up and Taxol/Herceptin.  Once the 12 weeks of weekly Taxol and Herceptin are completed we would plan to initiate hormonal therapy with Aromasin and continue single agent Herceptin every 3 weeks for the remainder of the year.      Patient remains on high risk medication and requires close monitoring for toxicity.      Patient reviewed with Dr. Solares today who is in agreement with plan.    11/17/2023      CC:

## 2023-11-16 NOTE — ASSESSMENT & PLAN NOTE
Lipid abnormalities are  chronic, on medication .  Low fat high fiber diet, exercise at least 30 min daily 5/7 days a week.   Monitor lipid panel, fasting today, check today  Continue current medication adjust if indicated  Lipids will be reassessed in 3 months.

## 2023-11-17 ENCOUNTER — OFFICE VISIT (OUTPATIENT)
Dept: ONCOLOGY | Facility: CLINIC | Age: 58
End: 2023-11-17
Payer: COMMERCIAL

## 2023-11-17 ENCOUNTER — INFUSION (OUTPATIENT)
Dept: ONCOLOGY | Facility: HOSPITAL | Age: 58
End: 2023-11-17
Payer: COMMERCIAL

## 2023-11-17 VITALS
SYSTOLIC BLOOD PRESSURE: 122 MMHG | TEMPERATURE: 98.2 F | HEIGHT: 64 IN | HEART RATE: 100 BPM | DIASTOLIC BLOOD PRESSURE: 75 MMHG | BODY MASS INDEX: 37.56 KG/M2 | WEIGHT: 220 LBS | OXYGEN SATURATION: 98 % | RESPIRATION RATE: 18 BRPM

## 2023-11-17 DIAGNOSIS — Z79.899 HIGH RISK MEDICATIONS (NOT ANTICOAGULANTS) LONG-TERM USE: ICD-10-CM

## 2023-11-17 DIAGNOSIS — C50.811 MALIGNANT NEOPLASM OF OVERLAPPING SITES OF RIGHT BREAST IN FEMALE, ESTROGEN RECEPTOR POSITIVE: ICD-10-CM

## 2023-11-17 DIAGNOSIS — C50.911 MALIGNANT NEOPLASM OF RIGHT BREAST IN FEMALE, ESTROGEN RECEPTOR POSITIVE, UNSPECIFIED SITE OF BREAST: ICD-10-CM

## 2023-11-17 DIAGNOSIS — C50.811 MALIGNANT NEOPLASM OF OVERLAPPING SITES OF RIGHT BREAST IN FEMALE, ESTROGEN RECEPTOR POSITIVE: Primary | ICD-10-CM

## 2023-11-17 DIAGNOSIS — Z17.0 MALIGNANT NEOPLASM OF RIGHT BREAST IN FEMALE, ESTROGEN RECEPTOR POSITIVE, UNSPECIFIED SITE OF BREAST: ICD-10-CM

## 2023-11-17 DIAGNOSIS — Z45.2 ENCOUNTER FOR ADJUSTMENT OR MANAGEMENT OF VASCULAR ACCESS DEVICE: ICD-10-CM

## 2023-11-17 DIAGNOSIS — G62.0 CHEMOTHERAPY-INDUCED NEUROPATHY: ICD-10-CM

## 2023-11-17 DIAGNOSIS — T45.1X5A CHEMOTHERAPY-INDUCED NEUROPATHY: ICD-10-CM

## 2023-11-17 DIAGNOSIS — Z17.0 MALIGNANT NEOPLASM OF OVERLAPPING SITES OF RIGHT BREAST IN FEMALE, ESTROGEN RECEPTOR POSITIVE: Primary | ICD-10-CM

## 2023-11-17 DIAGNOSIS — I89.0 LYMPHEDEMA OF RIGHT ARM: ICD-10-CM

## 2023-11-17 DIAGNOSIS — Z17.0 MALIGNANT NEOPLASM OF OVERLAPPING SITES OF RIGHT BREAST IN FEMALE, ESTROGEN RECEPTOR POSITIVE: ICD-10-CM

## 2023-11-17 LAB
ALBUMIN SERPL-MCNC: 3.9 G/DL (ref 3.5–5.2)
ALBUMIN/GLOB SERPL: 1.3 G/DL
ALP SERPL-CCNC: 57 U/L (ref 39–117)
ALT SERPL W P-5'-P-CCNC: 30 U/L (ref 1–33)
ANION GAP SERPL CALCULATED.3IONS-SCNC: 10.1 MMOL/L (ref 5–15)
AST SERPL-CCNC: 29 U/L (ref 1–32)
BASOPHILS # BLD AUTO: 0.02 10*3/MM3 (ref 0–0.2)
BASOPHILS NFR BLD AUTO: 0.4 % (ref 0–1.5)
BILIRUB SERPL-MCNC: 0.5 MG/DL (ref 0–1.2)
BUN SERPL-MCNC: 13 MG/DL (ref 8–23)
BUN/CREAT SERPL: 15.3 (ref 7–25)
CALCIUM SPEC-SCNC: 8.9 MG/DL (ref 8.6–10.5)
CHLORIDE SERPL-SCNC: 105 MMOL/L (ref 98–107)
CHOLEST SERPL-MCNC: 179 MG/DL (ref 100–199)
CO2 SERPL-SCNC: 24.9 MMOL/L (ref 22–29)
CREAT SERPL-MCNC: 0.85 MG/DL (ref 0.57–1)
DEPRECATED RDW RBC AUTO: 46 FL (ref 37–54)
EGFRCR SERPLBLD CKD-EPI 2021: 78.5 ML/MIN/1.73
EOSINOPHIL # BLD AUTO: 0.1 10*3/MM3 (ref 0–0.4)
EOSINOPHIL NFR BLD AUTO: 2.1 % (ref 0.3–6.2)
ERYTHROCYTE [DISTWIDTH] IN BLOOD BY AUTOMATED COUNT: 15.4 % (ref 12.3–15.4)
GLOBULIN UR ELPH-MCNC: 3 GM/DL
GLUCOSE SERPL-MCNC: 99 MG/DL (ref 65–99)
HCT VFR BLD AUTO: 36.5 % (ref 34–46.6)
HDLC SERPL-MCNC: 45 MG/DL
HGB BLD-MCNC: 11.7 G/DL (ref 12–15.9)
IMM GRANULOCYTES # BLD AUTO: 0.07 10*3/MM3 (ref 0–0.05)
IMM GRANULOCYTES NFR BLD AUTO: 1.5 % (ref 0–0.5)
LDLC SERPL CALC-MCNC: 120 MG/DL (ref 0–99)
LYMPHOCYTES # BLD AUTO: 1.48 10*3/MM3 (ref 0.7–3.1)
LYMPHOCYTES NFR BLD AUTO: 30.7 % (ref 19.6–45.3)
MCH RBC QN AUTO: 26.8 PG (ref 26.6–33)
MCHC RBC AUTO-ENTMCNC: 32.1 G/DL (ref 31.5–35.7)
MCV RBC AUTO: 83.5 FL (ref 79–97)
MONOCYTES # BLD AUTO: 0.38 10*3/MM3 (ref 0.1–0.9)
MONOCYTES NFR BLD AUTO: 7.9 % (ref 5–12)
NEUTROPHILS NFR BLD AUTO: 2.77 10*3/MM3 (ref 1.7–7)
NEUTROPHILS NFR BLD AUTO: 57.4 % (ref 42.7–76)
NRBC BLD AUTO-RTO: 0 /100 WBC (ref 0–0.2)
PLATELET # BLD AUTO: 281 10*3/MM3 (ref 140–450)
PMV BLD AUTO: 11.8 FL (ref 6–12)
POTASSIUM SERPL-SCNC: 4 MMOL/L (ref 3.5–5.2)
PROT SERPL-MCNC: 6.9 G/DL (ref 6–8.5)
RBC # BLD AUTO: 4.37 10*6/MM3 (ref 3.77–5.28)
SODIUM SERPL-SCNC: 140 MMOL/L (ref 136–145)
TRIGL SERPL-MCNC: 77 MG/DL (ref 0–149)
VLDLC SERPL CALC-MCNC: 14 MG/DL (ref 5–40)
WBC NRBC COR # BLD AUTO: 4.82 10*3/MM3 (ref 3.4–10.8)

## 2023-11-17 PROCEDURE — 25010000002 TRASTUZUMAB-QYYP 420 MG RECONSTITUTED SOLUTION 1 EACH VIAL: Performed by: INTERNAL MEDICINE

## 2023-11-17 PROCEDURE — 25810000003 SODIUM CHLORIDE 0.9 % SOLUTION 250 ML FLEX CONT: Performed by: INTERNAL MEDICINE

## 2023-11-17 PROCEDURE — 96413 CHEMO IV INFUSION 1 HR: CPT

## 2023-11-17 PROCEDURE — 25010000002 HEPARIN LOCK FLUSH PER 10 UNITS: Performed by: INTERNAL MEDICINE

## 2023-11-17 PROCEDURE — 85025 COMPLETE CBC W/AUTO DIFF WBC: CPT | Performed by: INTERNAL MEDICINE

## 2023-11-17 PROCEDURE — 25010000002 DEXAMETHASONE SODIUM PHOSPHATE 100 MG/10ML SOLUTION: Performed by: INTERNAL MEDICINE

## 2023-11-17 PROCEDURE — 25810000003 SODIUM CHLORIDE 0.9 % SOLUTION: Performed by: INTERNAL MEDICINE

## 2023-11-17 PROCEDURE — 25010000002 HYDROCORTISONE SOD SUC (PF) 100 MG RECONSTITUTED SOLUTION: Performed by: INTERNAL MEDICINE

## 2023-11-17 PROCEDURE — 96375 TX/PRO/DX INJ NEW DRUG ADDON: CPT

## 2023-11-17 PROCEDURE — 25010000002 DIPHENHYDRAMINE PER 50 MG: Performed by: INTERNAL MEDICINE

## 2023-11-17 PROCEDURE — 96417 CHEMO IV INFUS EACH ADDL SEQ: CPT

## 2023-11-17 PROCEDURE — 80053 COMPREHEN METABOLIC PANEL: CPT | Performed by: INTERNAL MEDICINE

## 2023-11-17 PROCEDURE — 25010000002 PACLITAXEL PER 1 MG: Performed by: INTERNAL MEDICINE

## 2023-11-17 RX ORDER — FAMOTIDINE 10 MG/ML
20 INJECTION, SOLUTION INTRAVENOUS ONCE
Status: COMPLETED | OUTPATIENT
Start: 2023-11-17 | End: 2023-11-17

## 2023-11-17 RX ORDER — SODIUM CHLORIDE 9 MG/ML
250 INJECTION, SOLUTION INTRAVENOUS ONCE
Status: COMPLETED | OUTPATIENT
Start: 2023-11-17 | End: 2023-11-17

## 2023-11-17 RX ORDER — HEPARIN SODIUM (PORCINE) LOCK FLUSH IV SOLN 100 UNIT/ML 100 UNIT/ML
500 SOLUTION INTRAVENOUS AS NEEDED
Status: DISCONTINUED | OUTPATIENT
Start: 2023-11-17 | End: 2023-11-17 | Stop reason: HOSPADM

## 2023-11-17 RX ORDER — SODIUM CHLORIDE 0.9 % (FLUSH) 0.9 %
10 SYRINGE (ML) INJECTION AS NEEDED
Status: DISCONTINUED | OUTPATIENT
Start: 2023-11-17 | End: 2023-11-17 | Stop reason: HOSPADM

## 2023-11-17 RX ORDER — HEPARIN SODIUM (PORCINE) LOCK FLUSH IV SOLN 100 UNIT/ML 100 UNIT/ML
500 SOLUTION INTRAVENOUS AS NEEDED
OUTPATIENT
Start: 2023-11-17

## 2023-11-17 RX ORDER — SODIUM CHLORIDE 0.9 % (FLUSH) 0.9 %
10 SYRINGE (ML) INJECTION AS NEEDED
OUTPATIENT
Start: 2023-11-17

## 2023-11-17 RX ORDER — GABAPENTIN 100 MG/1
100 CAPSULE ORAL
Qty: 30 CAPSULE | Refills: 0 | Status: SHIPPED | OUTPATIENT
Start: 2023-11-17

## 2023-11-17 RX ORDER — MONTELUKAST SODIUM 10 MG/1
10 TABLET ORAL ONCE
Status: COMPLETED | OUTPATIENT
Start: 2023-11-17 | End: 2023-11-17

## 2023-11-17 RX ADMIN — SODIUM CHLORIDE 190 MG: 9 INJECTION, SOLUTION INTRAVENOUS at 11:58

## 2023-11-17 RX ADMIN — Medication 10 ML: at 13:30

## 2023-11-17 RX ADMIN — DEXAMETHASONE SODIUM PHOSPHATE 12 MG: 10 INJECTION, SOLUTION INTRAMUSCULAR; INTRAVENOUS at 11:26

## 2023-11-17 RX ADMIN — FAMOTIDINE 20 MG: 10 INJECTION INTRAVENOUS at 11:24

## 2023-11-17 RX ADMIN — MONTELUKAST 10 MG: 10 TABLET, FILM COATED ORAL at 11:23

## 2023-11-17 RX ADMIN — Medication 500 UNITS: at 13:30

## 2023-11-17 RX ADMIN — HYDROCORTISONE SODIUM SUCCINATE 100 MG: 100 INJECTION, POWDER, FOR SOLUTION INTRAMUSCULAR; INTRAVENOUS at 11:24

## 2023-11-17 RX ADMIN — SODIUM CHLORIDE 250 ML: 9 INJECTION, SOLUTION INTRAVENOUS at 11:23

## 2023-11-17 RX ADMIN — PACLITAXEL 135 MG: 6 INJECTION, SOLUTION INTRAVENOUS at 12:30

## 2023-11-22 ENCOUNTER — INFUSION (OUTPATIENT)
Dept: ONCOLOGY | Facility: HOSPITAL | Age: 58
End: 2023-11-22
Payer: COMMERCIAL

## 2023-11-22 VITALS
HEART RATE: 94 BPM | BODY MASS INDEX: 38 KG/M2 | WEIGHT: 222.6 LBS | HEIGHT: 64 IN | RESPIRATION RATE: 16 BRPM | DIASTOLIC BLOOD PRESSURE: 75 MMHG | OXYGEN SATURATION: 99 % | SYSTOLIC BLOOD PRESSURE: 117 MMHG | TEMPERATURE: 97.6 F

## 2023-11-22 DIAGNOSIS — Z17.0 MALIGNANT NEOPLASM OF RIGHT BREAST IN FEMALE, ESTROGEN RECEPTOR POSITIVE, UNSPECIFIED SITE OF BREAST: Primary | ICD-10-CM

## 2023-11-22 DIAGNOSIS — Z17.0 MALIGNANT NEOPLASM OF LOWER-OUTER QUADRANT OF RIGHT BREAST OF FEMALE, ESTROGEN RECEPTOR POSITIVE: ICD-10-CM

## 2023-11-22 DIAGNOSIS — Z17.0 MALIGNANT NEOPLASM OF OVERLAPPING SITES OF RIGHT BREAST IN FEMALE, ESTROGEN RECEPTOR POSITIVE: ICD-10-CM

## 2023-11-22 DIAGNOSIS — C50.511 MALIGNANT NEOPLASM OF LOWER-OUTER QUADRANT OF RIGHT BREAST OF FEMALE, ESTROGEN RECEPTOR POSITIVE: ICD-10-CM

## 2023-11-22 DIAGNOSIS — Z45.2 ENCOUNTER FOR ADJUSTMENT OR MANAGEMENT OF VASCULAR ACCESS DEVICE: ICD-10-CM

## 2023-11-22 DIAGNOSIS — C50.811 MALIGNANT NEOPLASM OF OVERLAPPING SITES OF RIGHT BREAST IN FEMALE, ESTROGEN RECEPTOR POSITIVE: ICD-10-CM

## 2023-11-22 DIAGNOSIS — C50.911 MALIGNANT NEOPLASM OF RIGHT BREAST IN FEMALE, ESTROGEN RECEPTOR POSITIVE, UNSPECIFIED SITE OF BREAST: Primary | ICD-10-CM

## 2023-11-22 LAB
ALBUMIN SERPL-MCNC: 3.9 G/DL (ref 3.5–5.2)
ALBUMIN/GLOB SERPL: 1.3 G/DL
ALP SERPL-CCNC: 55 U/L (ref 39–117)
ALT SERPL W P-5'-P-CCNC: 28 U/L (ref 1–33)
ANION GAP SERPL CALCULATED.3IONS-SCNC: 8.9 MMOL/L (ref 5–15)
AST SERPL-CCNC: 21 U/L (ref 1–32)
BASOPHILS # BLD AUTO: 0.03 10*3/MM3 (ref 0–0.2)
BASOPHILS NFR BLD AUTO: 0.6 % (ref 0–1.5)
BILIRUB SERPL-MCNC: 0.9 MG/DL (ref 0–1.2)
BUN SERPL-MCNC: 14 MG/DL (ref 8–23)
BUN/CREAT SERPL: 18.7 (ref 7–25)
CALCIUM SPEC-SCNC: 9 MG/DL (ref 8.6–10.5)
CHLORIDE SERPL-SCNC: 104 MMOL/L (ref 98–107)
CO2 SERPL-SCNC: 26.1 MMOL/L (ref 22–29)
CREAT SERPL-MCNC: 0.75 MG/DL (ref 0.57–1)
DEPRECATED RDW RBC AUTO: 47.2 FL (ref 37–54)
EGFRCR SERPLBLD CKD-EPI 2021: 91.3 ML/MIN/1.73
EOSINOPHIL # BLD AUTO: 0.06 10*3/MM3 (ref 0–0.4)
EOSINOPHIL NFR BLD AUTO: 1.2 % (ref 0.3–6.2)
ERYTHROCYTE [DISTWIDTH] IN BLOOD BY AUTOMATED COUNT: 15.9 % (ref 12.3–15.4)
GLOBULIN UR ELPH-MCNC: 2.9 GM/DL
GLUCOSE SERPL-MCNC: 84 MG/DL (ref 65–99)
HCT VFR BLD AUTO: 36.2 % (ref 34–46.6)
HGB BLD-MCNC: 11.5 G/DL (ref 12–15.9)
IMM GRANULOCYTES # BLD AUTO: 0.02 10*3/MM3 (ref 0–0.05)
IMM GRANULOCYTES NFR BLD AUTO: 0.4 % (ref 0–0.5)
LYMPHOCYTES # BLD AUTO: 1.4 10*3/MM3 (ref 0.7–3.1)
LYMPHOCYTES NFR BLD AUTO: 27.6 % (ref 19.6–45.3)
MCH RBC QN AUTO: 26.6 PG (ref 26.6–33)
MCHC RBC AUTO-ENTMCNC: 31.8 G/DL (ref 31.5–35.7)
MCV RBC AUTO: 83.6 FL (ref 79–97)
MONOCYTES # BLD AUTO: 0.29 10*3/MM3 (ref 0.1–0.9)
MONOCYTES NFR BLD AUTO: 5.7 % (ref 5–12)
NEUTROPHILS NFR BLD AUTO: 3.28 10*3/MM3 (ref 1.7–7)
NEUTROPHILS NFR BLD AUTO: 64.5 % (ref 42.7–76)
NRBC BLD AUTO-RTO: 0 /100 WBC (ref 0–0.2)
PLATELET # BLD AUTO: 279 10*3/MM3 (ref 140–450)
PMV BLD AUTO: 12.2 FL (ref 6–12)
POTASSIUM SERPL-SCNC: 3.7 MMOL/L (ref 3.5–5.2)
PROT SERPL-MCNC: 6.8 G/DL (ref 6–8.5)
RBC # BLD AUTO: 4.33 10*6/MM3 (ref 3.77–5.28)
SODIUM SERPL-SCNC: 139 MMOL/L (ref 136–145)
WBC NRBC COR # BLD AUTO: 5.08 10*3/MM3 (ref 3.4–10.8)

## 2023-11-22 PROCEDURE — 25810000003 SODIUM CHLORIDE 0.9 % SOLUTION: Performed by: INTERNAL MEDICINE

## 2023-11-22 PROCEDURE — 96417 CHEMO IV INFUS EACH ADDL SEQ: CPT

## 2023-11-22 PROCEDURE — 25010000002 HYDROCORTISONE SOD SUC (PF) 100 MG RECONSTITUTED SOLUTION: Performed by: INTERNAL MEDICINE

## 2023-11-22 PROCEDURE — 85025 COMPLETE CBC W/AUTO DIFF WBC: CPT | Performed by: INTERNAL MEDICINE

## 2023-11-22 PROCEDURE — 96375 TX/PRO/DX INJ NEW DRUG ADDON: CPT

## 2023-11-22 PROCEDURE — 96413 CHEMO IV INFUSION 1 HR: CPT

## 2023-11-22 PROCEDURE — 25010000002 DIPHENHYDRAMINE PER 50 MG: Performed by: INTERNAL MEDICINE

## 2023-11-22 PROCEDURE — 25810000003 SODIUM CHLORIDE 0.9 % SOLUTION 250 ML FLEX CONT: Performed by: INTERNAL MEDICINE

## 2023-11-22 PROCEDURE — 25010000002 HEPARIN LOCK FLUSH PER 10 UNITS: Performed by: INTERNAL MEDICINE

## 2023-11-22 PROCEDURE — 25010000002 PACLITAXEL PER 1 MG: Performed by: INTERNAL MEDICINE

## 2023-11-22 PROCEDURE — 25010000002 TRASTUZUMAB-QYYP 420 MG RECONSTITUTED SOLUTION 1 EACH VIAL: Performed by: INTERNAL MEDICINE

## 2023-11-22 PROCEDURE — 80053 COMPREHEN METABOLIC PANEL: CPT | Performed by: INTERNAL MEDICINE

## 2023-11-22 PROCEDURE — 25010000002 DEXAMETHASONE SODIUM PHOSPHATE 100 MG/10ML SOLUTION: Performed by: INTERNAL MEDICINE

## 2023-11-22 RX ORDER — HEPARIN SODIUM (PORCINE) LOCK FLUSH IV SOLN 100 UNIT/ML 100 UNIT/ML
500 SOLUTION INTRAVENOUS AS NEEDED
Status: DISCONTINUED | OUTPATIENT
Start: 2023-11-22 | End: 2023-11-22 | Stop reason: HOSPADM

## 2023-11-22 RX ORDER — SODIUM CHLORIDE 9 MG/ML
250 INJECTION, SOLUTION INTRAVENOUS ONCE
Status: COMPLETED | OUTPATIENT
Start: 2023-11-22 | End: 2023-11-22

## 2023-11-22 RX ORDER — SODIUM CHLORIDE 0.9 % (FLUSH) 0.9 %
10 SYRINGE (ML) INJECTION AS NEEDED
OUTPATIENT
Start: 2023-11-22

## 2023-11-22 RX ORDER — MONTELUKAST SODIUM 10 MG/1
10 TABLET ORAL ONCE
Status: COMPLETED | OUTPATIENT
Start: 2023-11-22 | End: 2023-11-22

## 2023-11-22 RX ORDER — FAMOTIDINE 10 MG/ML
20 INJECTION, SOLUTION INTRAVENOUS ONCE
Status: COMPLETED | OUTPATIENT
Start: 2023-11-22 | End: 2023-11-22

## 2023-11-22 RX ORDER — HEPARIN SODIUM (PORCINE) LOCK FLUSH IV SOLN 100 UNIT/ML 100 UNIT/ML
500 SOLUTION INTRAVENOUS AS NEEDED
OUTPATIENT
Start: 2023-11-22

## 2023-11-22 RX ORDER — SODIUM CHLORIDE 0.9 % (FLUSH) 0.9 %
10 SYRINGE (ML) INJECTION AS NEEDED
Status: DISCONTINUED | OUTPATIENT
Start: 2023-11-22 | End: 2023-11-22 | Stop reason: HOSPADM

## 2023-11-22 RX ADMIN — SODIUM CHLORIDE 250 ML: 9 INJECTION, SOLUTION INTRAVENOUS at 10:30

## 2023-11-22 RX ADMIN — MONTELUKAST 10 MG: 10 TABLET, FILM COATED ORAL at 11:27

## 2023-11-22 RX ADMIN — PACLITAXEL 135 MG: 6 INJECTION, SOLUTION INTRAVENOUS at 12:45

## 2023-11-22 RX ADMIN — SODIUM CHLORIDE 190 MG: 9 INJECTION, SOLUTION INTRAVENOUS at 12:07

## 2023-11-22 RX ADMIN — Medication 10 ML: at 13:49

## 2023-11-22 RX ADMIN — DEXAMETHASONE SODIUM PHOSPHATE 12 MG: 10 INJECTION, SOLUTION INTRAMUSCULAR; INTRAVENOUS at 11:45

## 2023-11-22 RX ADMIN — Medication 500 UNITS: at 13:49

## 2023-11-22 RX ADMIN — FAMOTIDINE 20 MG: 10 INJECTION INTRAVENOUS at 11:27

## 2023-11-22 RX ADMIN — HYDROCORTISONE SODIUM SUCCINATE 100 MG: 100 INJECTION, POWDER, FOR SOLUTION INTRAMUSCULAR; INTRAVENOUS at 12:04

## 2023-11-22 NOTE — NURSING NOTE
Patient still voicing edema in right hand and now agreeable to have lymphedema consult. Discussed with ZAN Kent and mik to place order. Patient notified and verbalizes understanding.     Patient tolerated treatment without complaints. Discharged in stable condition.

## 2023-12-01 ENCOUNTER — INFUSION (OUTPATIENT)
Dept: ONCOLOGY | Facility: HOSPITAL | Age: 58
End: 2023-12-01
Payer: COMMERCIAL

## 2023-12-01 ENCOUNTER — OFFICE VISIT (OUTPATIENT)
Dept: ONCOLOGY | Facility: CLINIC | Age: 58
End: 2023-12-01
Payer: COMMERCIAL

## 2023-12-01 VITALS
TEMPERATURE: 98.4 F | HEART RATE: 103 BPM | BODY MASS INDEX: 38.41 KG/M2 | RESPIRATION RATE: 18 BRPM | HEIGHT: 64 IN | SYSTOLIC BLOOD PRESSURE: 128 MMHG | OXYGEN SATURATION: 96 % | DIASTOLIC BLOOD PRESSURE: 77 MMHG | WEIGHT: 225 LBS

## 2023-12-01 VITALS — DIASTOLIC BLOOD PRESSURE: 76 MMHG | HEART RATE: 89 BPM | SYSTOLIC BLOOD PRESSURE: 120 MMHG

## 2023-12-01 DIAGNOSIS — Z17.0 MALIGNANT NEOPLASM OF OVERLAPPING SITES OF RIGHT BREAST IN FEMALE, ESTROGEN RECEPTOR POSITIVE: Primary | ICD-10-CM

## 2023-12-01 DIAGNOSIS — C50.811 MALIGNANT NEOPLASM OF OVERLAPPING SITES OF RIGHT BREAST IN FEMALE, ESTROGEN RECEPTOR POSITIVE: Primary | ICD-10-CM

## 2023-12-01 DIAGNOSIS — C50.911 MALIGNANT NEOPLASM OF RIGHT BREAST IN FEMALE, ESTROGEN RECEPTOR POSITIVE, UNSPECIFIED SITE OF BREAST: ICD-10-CM

## 2023-12-01 DIAGNOSIS — Z17.0 MALIGNANT NEOPLASM OF OVERLAPPING SITES OF RIGHT BREAST IN FEMALE, ESTROGEN RECEPTOR POSITIVE: ICD-10-CM

## 2023-12-01 DIAGNOSIS — Z17.0 MALIGNANT NEOPLASM OF LOWER-OUTER QUADRANT OF RIGHT BREAST OF FEMALE, ESTROGEN RECEPTOR POSITIVE: ICD-10-CM

## 2023-12-01 DIAGNOSIS — Z45.2 ENCOUNTER FOR ADJUSTMENT OR MANAGEMENT OF VASCULAR ACCESS DEVICE: ICD-10-CM

## 2023-12-01 DIAGNOSIS — C50.511 MALIGNANT NEOPLASM OF LOWER-OUTER QUADRANT OF RIGHT BREAST OF FEMALE, ESTROGEN RECEPTOR POSITIVE: ICD-10-CM

## 2023-12-01 DIAGNOSIS — C50.811 MALIGNANT NEOPLASM OF OVERLAPPING SITES OF RIGHT BREAST IN FEMALE, ESTROGEN RECEPTOR POSITIVE: ICD-10-CM

## 2023-12-01 DIAGNOSIS — Z17.0 MALIGNANT NEOPLASM OF RIGHT BREAST IN FEMALE, ESTROGEN RECEPTOR POSITIVE, UNSPECIFIED SITE OF BREAST: ICD-10-CM

## 2023-12-01 LAB
ALBUMIN SERPL-MCNC: 3.9 G/DL (ref 3.5–5.2)
ALBUMIN/GLOB SERPL: 1.3 G/DL
ALP SERPL-CCNC: 58 U/L (ref 39–117)
ALT SERPL W P-5'-P-CCNC: 35 U/L (ref 1–33)
ANION GAP SERPL CALCULATED.3IONS-SCNC: 8.3 MMOL/L (ref 5–15)
AST SERPL-CCNC: 31 U/L (ref 1–32)
BASOPHILS # BLD AUTO: 0.04 10*3/MM3 (ref 0–0.2)
BASOPHILS NFR BLD AUTO: 0.7 % (ref 0–1.5)
BILIRUB SERPL-MCNC: 0.4 MG/DL (ref 0–1.2)
BUN SERPL-MCNC: 15 MG/DL (ref 8–23)
BUN/CREAT SERPL: 18.3 (ref 7–25)
CALCIUM SPEC-SCNC: 8.9 MG/DL (ref 8.6–10.5)
CHLORIDE SERPL-SCNC: 107 MMOL/L (ref 98–107)
CO2 SERPL-SCNC: 26.7 MMOL/L (ref 22–29)
CREAT SERPL-MCNC: 0.82 MG/DL (ref 0.57–1)
DEPRECATED RDW RBC AUTO: 51.4 FL (ref 37–54)
EGFRCR SERPLBLD CKD-EPI 2021: 82 ML/MIN/1.73
EOSINOPHIL # BLD AUTO: 0.06 10*3/MM3 (ref 0–0.4)
EOSINOPHIL NFR BLD AUTO: 1.1 % (ref 0.3–6.2)
ERYTHROCYTE [DISTWIDTH] IN BLOOD BY AUTOMATED COUNT: 17 % (ref 12.3–15.4)
GLOBULIN UR ELPH-MCNC: 2.9 GM/DL
GLUCOSE SERPL-MCNC: 96 MG/DL (ref 65–99)
HCT VFR BLD AUTO: 35.1 % (ref 34–46.6)
HGB BLD-MCNC: 11.3 G/DL (ref 12–15.9)
IMM GRANULOCYTES # BLD AUTO: 0.15 10*3/MM3 (ref 0–0.05)
IMM GRANULOCYTES NFR BLD AUTO: 2.8 % (ref 0–0.5)
LYMPHOCYTES # BLD AUTO: 1.66 10*3/MM3 (ref 0.7–3.1)
LYMPHOCYTES NFR BLD AUTO: 31 % (ref 19.6–45.3)
MCH RBC QN AUTO: 27.1 PG (ref 26.6–33)
MCHC RBC AUTO-ENTMCNC: 32.2 G/DL (ref 31.5–35.7)
MCV RBC AUTO: 84.2 FL (ref 79–97)
MONOCYTES # BLD AUTO: 0.62 10*3/MM3 (ref 0.1–0.9)
MONOCYTES NFR BLD AUTO: 11.6 % (ref 5–12)
NEUTROPHILS NFR BLD AUTO: 2.83 10*3/MM3 (ref 1.7–7)
NEUTROPHILS NFR BLD AUTO: 52.8 % (ref 42.7–76)
NRBC BLD AUTO-RTO: 0 /100 WBC (ref 0–0.2)
PLATELET # BLD AUTO: 238 10*3/MM3 (ref 140–450)
PMV BLD AUTO: 11.5 FL (ref 6–12)
POTASSIUM SERPL-SCNC: 3.7 MMOL/L (ref 3.5–5.2)
PROT SERPL-MCNC: 6.8 G/DL (ref 6–8.5)
RBC # BLD AUTO: 4.17 10*6/MM3 (ref 3.77–5.28)
SODIUM SERPL-SCNC: 142 MMOL/L (ref 136–145)
WBC NRBC COR # BLD AUTO: 5.36 10*3/MM3 (ref 3.4–10.8)

## 2023-12-01 PROCEDURE — 25010000002 PACLITAXEL PER 1 MG: Performed by: INTERNAL MEDICINE

## 2023-12-01 PROCEDURE — 25010000002 TRASTUZUMAB-QYYP 420 MG RECONSTITUTED SOLUTION 1 EACH VIAL: Performed by: INTERNAL MEDICINE

## 2023-12-01 PROCEDURE — 96413 CHEMO IV INFUSION 1 HR: CPT

## 2023-12-01 PROCEDURE — 25010000002 DEXAMETHASONE SODIUM PHOSPHATE 100 MG/10ML SOLUTION: Performed by: INTERNAL MEDICINE

## 2023-12-01 PROCEDURE — 80053 COMPREHEN METABOLIC PANEL: CPT | Performed by: INTERNAL MEDICINE

## 2023-12-01 PROCEDURE — 85025 COMPLETE CBC W/AUTO DIFF WBC: CPT | Performed by: INTERNAL MEDICINE

## 2023-12-01 PROCEDURE — 25010000002 HYDROCORTISONE SOD SUC (PF) 100 MG RECONSTITUTED SOLUTION: Performed by: INTERNAL MEDICINE

## 2023-12-01 PROCEDURE — 25810000003 SODIUM CHLORIDE 0.9 % SOLUTION: Performed by: INTERNAL MEDICINE

## 2023-12-01 PROCEDURE — 25810000003 SODIUM CHLORIDE 0.9 % SOLUTION 250 ML FLEX CONT: Performed by: INTERNAL MEDICINE

## 2023-12-01 PROCEDURE — 25010000002 HEPARIN LOCK FLUSH PER 10 UNITS: Performed by: INTERNAL MEDICINE

## 2023-12-01 PROCEDURE — 96375 TX/PRO/DX INJ NEW DRUG ADDON: CPT

## 2023-12-01 PROCEDURE — 96417 CHEMO IV INFUS EACH ADDL SEQ: CPT

## 2023-12-01 RX ORDER — MONTELUKAST SODIUM 10 MG/1
10 TABLET ORAL ONCE
Status: COMPLETED | OUTPATIENT
Start: 2023-12-01 | End: 2023-12-01

## 2023-12-01 RX ORDER — SODIUM CHLORIDE 9 MG/ML
250 INJECTION, SOLUTION INTRAVENOUS ONCE
Status: COMPLETED | OUTPATIENT
Start: 2023-12-01 | End: 2023-12-01

## 2023-12-01 RX ORDER — HEPARIN SODIUM (PORCINE) LOCK FLUSH IV SOLN 100 UNIT/ML 100 UNIT/ML
500 SOLUTION INTRAVENOUS AS NEEDED
OUTPATIENT
Start: 2023-12-01

## 2023-12-01 RX ORDER — HEPARIN SODIUM (PORCINE) LOCK FLUSH IV SOLN 100 UNIT/ML 100 UNIT/ML
500 SOLUTION INTRAVENOUS AS NEEDED
Status: DISCONTINUED | OUTPATIENT
Start: 2023-12-01 | End: 2023-12-01 | Stop reason: HOSPADM

## 2023-12-01 RX ORDER — SODIUM CHLORIDE 0.9 % (FLUSH) 0.9 %
10 SYRINGE (ML) INJECTION AS NEEDED
Status: DISCONTINUED | OUTPATIENT
Start: 2023-12-01 | End: 2023-12-01 | Stop reason: HOSPADM

## 2023-12-01 RX ORDER — SODIUM CHLORIDE 9 MG/ML
250 INJECTION, SOLUTION INTRAVENOUS ONCE
OUTPATIENT
Start: 2023-12-08

## 2023-12-01 RX ORDER — SODIUM CHLORIDE 0.9 % (FLUSH) 0.9 %
10 SYRINGE (ML) INJECTION AS NEEDED
OUTPATIENT
Start: 2023-12-01

## 2023-12-01 RX ORDER — FAMOTIDINE 10 MG/ML
20 INJECTION, SOLUTION INTRAVENOUS ONCE
Status: COMPLETED | OUTPATIENT
Start: 2023-12-01 | End: 2023-12-01

## 2023-12-01 RX ORDER — SODIUM CHLORIDE 9 MG/ML
250 INJECTION, SOLUTION INTRAVENOUS ONCE
OUTPATIENT
Start: 2023-12-29

## 2023-12-01 RX ADMIN — Medication 10 ML: at 13:16

## 2023-12-01 RX ADMIN — SODIUM CHLORIDE 190 MG: 9 INJECTION, SOLUTION INTRAVENOUS at 11:27

## 2023-12-01 RX ADMIN — PACLITAXEL 135 MG: 6 INJECTION, SOLUTION INTRAVENOUS at 12:04

## 2023-12-01 RX ADMIN — SODIUM CHLORIDE 250 ML: 9 INJECTION, SOLUTION INTRAVENOUS at 10:41

## 2023-12-01 RX ADMIN — FAMOTIDINE 20 MG: 10 INJECTION INTRAVENOUS at 10:41

## 2023-12-01 RX ADMIN — DEXAMETHASONE SODIUM PHOSPHATE 12 MG: 10 INJECTION, SOLUTION INTRAMUSCULAR; INTRAVENOUS at 10:47

## 2023-12-01 RX ADMIN — Medication 500 UNITS: at 13:16

## 2023-12-01 RX ADMIN — HYDROCORTISONE SODIUM SUCCINATE 100 MG: 100 INJECTION, POWDER, FOR SOLUTION INTRAMUSCULAR; INTRAVENOUS at 10:44

## 2023-12-01 RX ADMIN — MONTELUKAST 10 MG: 10 TABLET, FILM COATED ORAL at 10:41

## 2023-12-01 NOTE — PROGRESS NOTES
Subjective   REASON FOR FOLLOWUP:  History of stage IIIA infiltrating ductal carcinoma of the right breast with 4 positive nodes, ER positive, HER-2/julián negative; treated with lumpectomy, status post 6 cycles of TAC. Received tamoxifen between February 2007 and March 2012. Femara was started in March 2012. Patient switched to Arimidex as of 06/21/2012 because of side effects with severe ankle joint pain with Femara.    2.   10 years of adjuvant therapy completed  March 2017.    3.   New diagnosis of carcinoma of the remaining right breast, needle biopsy 6/8/2023.  Tumor is HER2/julián positive and estrogen receptor positive/progesterone receptor negative.    4.   Bilateral mastectomies with implant reconstruction 8/16/2023    5.   Initiation of postop adjuvant chemotherapy with weekly Taxol and Herceptin.  First dose delivered 9/15/2023.      HISTORY OF PRESENT ILLNESS:     History of Present Illness Ms. Roblero is a 58 y.o.  female with the above noted history of stage IIIA carcinoma of the right breast, treated with lumpectomy, chemotherapy and radiation.  Following initial treatment with 6 cycles of TAC chemotherapy, she received extended hormonal therapy with 5 years of tamoxifen followed by 5 years of aromatase inhibitor.  She completed her 10 years of hormonal therapy in March 2017  .     She was seeing us annually had not been seen in January of this year but when she underwent her breast imaging in May 2023 she had suspicious findings and ultimately underwent ultrasound-guided biopsy on 6/8/2023.  There were 2 separate biopsies in 1 of these areas was positive for HER2/julián overexpression by FISH.  Both tumors were ER positive and HI negative.    She was referred to Dr. Vidal who had performed her previous lumpectomy surgery in 2007 and currently the plan is for her to undergo bilateral mastectomies with reconstruction.    We had her seen by cardiac oncology due to her previous treatment with 6  cycles of TAC chemotherapy in 2007.  Thankfully her ejection fraction still seems to be normal at 61% based on her echocardiogram from 6/19/2023.    Dr. Vidal has scheduled the patient for radiographic staging with CT scan of the chest abdomen pelvis and a bone scan and the studies did not show any evidence of distant metastatic spread.    She had a bilateral mastectomies with implant reconstruction performed on 8/16/2023.  The tumor in the right breast was 3 x 1.5 x 1.2 cm.  Margins of resection were clear.  She also had placement of a left chest wall Mediport at the time of her surgery.    We recommended weekly Taxol and Herceptin for 12 weeks followed by continued Herceptin every 3 weeks for the remainder of the year of treatment.  She was estrogen receptor positive and will also be started on hormonal therapy once her chemotherapy is completed.    INTERVAL HISTORY:  Palma returns to the office today for follow up and anticipated 12th and final weekly Taxol and weekly Herceptin.  She required Taxol dose delays and dose reduction due to neutropenia on week 3.    Palma has been wearing cold mittens and socks with her treatments but she reports that she is starting to feel slight neuropathy issues in the feet.  The neuropathy is localized in her toes and is most troublesome at nighttime when she is trying to sleep.    Her blood counts today are good for treatment.  She reports having some mild lymphedema in the right upper extremity.  She is wearing a sleeve and has an appointment with lymphedema clinic next week.        Past Medical History, Past Surgical History, Social History, Family History have been reviewed and are without significant changes except as mentioned.    Review of Systems   Constitutional:  Negative for activity change, appetite change, fatigue, fever and unexpected weight change.   HENT:  Negative for hearing loss, nosebleeds, trouble swallowing and voice change.    Eyes:  Negative for visual  "disturbance.   Respiratory:  Negative for cough, shortness of breath and wheezing.    Cardiovascular:  Negative for chest pain and palpitations.   Gastrointestinal:  Negative for abdominal pain, diarrhea, nausea and vomiting.   Genitourinary:  Negative for difficulty urinating, frequency, hematuria and urgency.   Musculoskeletal:  Negative for back pain and neck pain.        Mild lymphedema right upper extremity.  Patient is wearing a sleeve and glove   Skin:  Negative for rash.   Neurological:  Positive for numbness. Negative for dizziness, seizures, syncope and headaches.   Hematological:  Negative for adenopathy. Does not bruise/bleed easily.   Psychiatric/Behavioral:  Negative for behavioral problems. The patient is not nervous/anxious.       A comprehensive 14 point review of systems was performed and was negative except as mentioned.    Medications:  The current medication list was reviewed in the EMR    ALLERGIES:    Allergies   Allergen Reactions    Oxycodone Nausea And Vomiting       Objective      Vitals:    12/01/23 0947   BP: 128/77   Pulse: 103   Resp: 18   Temp: 98.4 °F (36.9 °C)   TempSrc: Temporal   SpO2: 96%   Weight: 102 kg (225 lb)   Height: 162.6 cm (64.02\")   PainSc: 0-No pain               12/1/2023     9:35 AM   Current Status   ECOG score 0       Physical Exam   Constitutional: She is oriented to person, place, and time. She appears well-developed. No distress.   HENT:   Head: Normocephalic.   Eyes: Pupils are equal, round, and reactive to light. Conjunctivae are normal. No scleral icterus.   Neck: No JVD present. No thyromegaly present.   Cardiovascular: Normal rate and regular rhythm. Exam reveals no gallop and no friction rub.   No murmur heard.  Pulmonary/Chest: Effort normal and breath sounds normal. She has no wheezes. She has no rales. Right breast exhibits no mass, no nipple discharge and no skin change. Left breast exhibits no mass, no nipple discharge and no skin change.   Bilateral " mastectomies with implant reconstructions.  Right chest wall Mediport   Abdominal: Soft. Bowel sounds are normal. She exhibits no distension and no mass. There is no abdominal tenderness.   Musculoskeletal: Normal range of motion. Swelling (Mild lymphedema in right upper extremity.  No pain, tenderness, or cording noted on exam.) present. No tenderness or deformity.   Lymphadenopathy:     She has no cervical adenopathy.   Neurological: She is alert and oriented to person, place, and time. She has normal reflexes. No cranial nerve deficit.   Skin: Skin is warm and dry. No rash noted. No erythema.   Psychiatric: Her behavior is normal. Judgment normal.        I have reexamined the patient and the results are consistent with the previously documented exam. Weston Solares MD           RECENT LABS:  Hematology WBC   Date Value Ref Range Status   12/01/2023 5.36 3.40 - 10.80 10*3/mm3 Final   11/01/2021 5.9 3.4 - 10.8 x10E3/uL Final     RBC   Date Value Ref Range Status   12/01/2023 4.17 3.77 - 5.28 10*6/mm3 Final   11/01/2021 5.14 3.77 - 5.28 x10E6/uL Final     Hemoglobin   Date Value Ref Range Status   12/01/2023 11.3 (L) 12.0 - 15.9 g/dL Final     Hematocrit   Date Value Ref Range Status   12/01/2023 35.1 34.0 - 46.6 % Final     Platelets   Date Value Ref Range Status   12/01/2023 238 140 - 450 10*3/mm3 Final            Lab Results   Component Value Date    NEUTROABS 2.83 12/01/2023       Lab Results   Component Value Date    GLUCOSE 96 12/01/2023    BUN 15 12/01/2023    CREATININE 0.82 12/01/2023    EGFRRESULT 91.8 11/30/2022    EGFR 82.0 12/01/2023    BCR 18.3 12/01/2023    K 3.7 12/01/2023    CO2 26.7 12/01/2023    CALCIUM 8.9 12/01/2023    PROTENTOTREF 6.6 11/30/2022    ALBUMIN 3.9 12/01/2023    BILITOT 0.4 12/01/2023    AST 31 12/01/2023    ALT 35 (H) 12/01/2023     PATHOLOGY 8/16/2023  Final Diagnosis   1. Right Breast, Oriented Simple Skin Sparing Mastectomy (1,097 Grams): INVASIVE MAMMARY CARCINOMA,       NO  SPECIAL TYPE (INVASIVE DUCTAL CARCINOMA).  A. Tumor site: Lower outer quadrant.               B. Histologic grade: Boaz histologic score III (tubules = 3, nuclei = 2, mitosis = 3).               C. Tumor size: 30 x 15 x 12 mm.               D. Tumor focality: Single focus.               E. Ductal Carcinoma in-situ (DCIS): Not identified.               F. No lobular neoplasia identified.               G. Overlying skin and nipple are uninvolved by tumor.               H. No definitive lymphovascular space invasion identified.               I. Focal perineural invasion is present.  J. Margins: Uninvolved by invasive and in-situ carcinoma.               1. Invasive carcinoma comes to within 7 mm of the anterior margin, 40 mm from the inferior margin,      60 mm from the posterior and lateral margins, 80 mm from the medial margin and 130 mm from the       superior margin of resection.               K. Lymph Nodes: Not submitted.  L. Hormone receptor status: ER positive, MA negative, HER2 positive by FISH (right medial biopsy),       Ki-67 35-40%  (performed on prior biopsy [id number]).  M. Pathologic stage: pT2, NX.     2. Left Breast, Oriented Simple Skin Sparing Mastectomy (1,593 Grams):   A. Benign unremarkable breast tissue, skin and nipple.     PATHOLOGY 6/8/2023  Final Diagnosis   1. Breast, Right Lateral 6:30 Position, 3 cm FN, Core Biopsy:               A. Invasive mammary carcinoma, no special type (ductal), Perez histologic grade II (tubules = 3, nuclei = 2,        mitoses = 2) measuring 6 mm maximally and involving four core fragments.  B. No definitive in-situ carcinoma is identified.  C. No definitive lymphovascular space invasion identified.     2. Breast, Right Medial 6:30 Position, 3 cm FN, Core Biopsy:                A. Invasive mammary carcinoma, no special type (ductal), Perez histologic grade II (tubules = 3, nuclei = 2,        mitoses = 1) measuring 10 mm maximally and involving  three core fragments.  B. No definitive in-situ carcinoma is identified.  C. No definitive lymphovascular space invasion identified.     Echocardiogram 10/16/2023  Interpretation Summary         Left ventricular systolic function is normal. Calculated left ventricular EF = 61.4% Normal global longitudinal LV strain (GLS) = -23.9%. Left ventricle strain data was reviewed by the physician and found to be accurate. Normal left ventricular cavity size and wall thickness noted. All left ventricular wall segments contract normally. Left ventricular diastolic function was normal    Trace tricuspid valve regurgitation is present. Estimated right ventricular systolic pressure from tricuspid regurgitation is normal (<35 mmHg). Calculated right ventricular systolic pressure from tricuspid regurgitation is 22.9 mmHg.      CT CHEST, ABDOMEN, AND PELVIS WITHOUT CONTRAST. 7/21/2023  FINDINGS:  CHEST: Right breast findings discussed on breast MRI 06/17/2023. There  is a cluster of nodular densities at the right axilla in close proximity  to a cluster of surgical clips and some of these may represent vessels  or lymphatics. Characterization is limited in the absence of IV  contrast. There is no definitive pathologically enlarged node present.  There are no pathologically enlarged nodes at the left axilla and there  is no subpectoral lymphadenopathy, and there is no lymphadenopathy at  the mediastinum or jovita given constraints of noncontrasted technique.  The nodularity along the pleura at the dependent aspect of the lower  chest is likely related to dependent atelectatic change. No definite  suspicious pulmonary nodules are seen. There are no pleural or  pericardial effusions.     ABDOMEN/PELVIS: Noncontrasted liver appears unremarkable. The  gallbladder appears unremarkable and there is no biliary dilatation.  Splenic size is normal. Noncontrasted pancreas, adrenals, and kidneys  appear unremarkable. There is no acute bowel  abnormality. Appendix  appears within normal limits. The uterus is slightly lobular in contour  likely secondary to small fibroids. Noncontrasted adnexa appear  unremarkable. There is no free fluid or lymphadenopathy. There are mild  abdominal aortic atherosclerotic changes throughout the abdominal aorta  without aneurysmal dilatation. No suspicious bone lesion is seen.     IMPRESSION:  Given constraints of noncontrasted technique, there is no  convincing evidence for metastatic disease within the chest, abdomen, or  pelvis.      Narrative & Impression   NUCLEAR MEDICINE WHOLE BODY BONE SCAN 7/14/2023     HISTORY: Breast cancer. Evaluate for metastatic disease.     TECHNIQUE:  19.1 mCi of 99m technetium MDP was administered intravenous  followed by 3 hour delayed phase whole body imaging with selected spot  views.     COMPARISON: None.     FINDINGS:  There is mild increased uptake at the right sternoclavicular  joint that is attributed to arthritis. Arthritic uptake is present in  both shoulders, knees, and mid feet.  There is also mild increased  lumbar spine uptake that is most likely degenerative/arthritic. Both  kidneys and the urinary bladder are visualized.     IMPRESSION:  Areas of mild uptake are typical for arthritis/degenerative  change and there is no scintigraphic evidence to suggest bony metastatic  disease.        MAMMO SCREENING DIGITAL TOMOSYNTHESIS BILATERAL W CAD- 4/14/2023   IMPRESSION:  1. There is an area of focal asymmetry in the middle third retroareolar  region of the right breast. Further evaluation with spot compression CC  and MLO mammographic and Tomosynthesis images and targeted right breast  sonography is recommended.  2. There are no findings suspicious for malignancy in the left breast.     BI-RADS Category 0: Incomplete. Needs additional imaging evaluation.    EXAMINATIONS: UNILATERAL RIGHT DIGITAL DIAGNOSTIC MAMMOGRAPHY WITH  TOMOSYNTHESIS AND LIMITED RIGHT BREAST SONOGRAPHY  5/17/2023  IMPRESSION:  There is an irregular 1 cm hypoechoic mass with internal peripheral  vascularity that is seen in the right breast at the 6:30 position on the  order of 3 cm from the nipple. It is immediately adjacent to a 1.9 cm  oval circumscribed hypoechoic mass. Together both lesions measure on the  order 2.7 cm in greatest dimension. Ultrasound guided biopsy of both  lesions is recommended. Given the close proximity of both lesions, both  lesions may be able to be sampled and submitted as one specimen.     BI-RADS Category 4: Suspicious abnormality or suspicious finding. Biopsy  should be considered.    MRI BREAST BILATERAL W WO CONTRAST-  6/17/2023  IMPRESSION AND RECOMMENDATION:     1.  Adjacent irregular enhancing masses each measuring 1.6 cm at 6:00 to  7:00 in the middle right breast represent the 2 sites of biopsy-proven  malignancy. Adjacent suspicious enhancing foci and areas of nonmass  enhancement are identified, in addition to an irregular enhancing mass  and multiple additional enhancing foci centered at 2:00 to 3:00 in the  right breast, which are also suspicious. The 2 sites of biopsy-proven  malignancy and suspicious areas of enhancement together measure up to  approximately 8.2 cm in maximum dimension, as detailed above. Surgical  management is recommended.  2.  No MRI evidence of malignancy in the left breast.     BI-RADS Category 4: Suspicious      Assessment & Plan     *Stage IIIA infiltrating ductal carcinoma of the right breast   Treated with a lumpectomy, radiation, 6 cycles of TAC chemotherapy and ongoing hormonal therapy. She completed 10 years of adjuvant hormonal therapy with 5 years of tamoxifen followed by 5 years of Arimidex which she completed in 2017.  Carcinoma of the remaining right breast.  Tumor was positive for HER2/julián overexpression and positive for estrogen receptors and negative for progesterone receptors.  Bilateral mastectomies with immediate implant  reconstruction 8/16/2023.  Mediport placement 8/16/2023  Echocardiogram from 6/19/2023 shows normal left ventricular ejection fraction of 61% (patient previously had 6 cycles of Adriamycin containing chemotherapy and will be needing Herceptin therapy postoperatively).  Plan for postop adjuvant treatment with weekly Taxol and Herceptin x12 weeks followed by continued single agent Herceptin every 3 weeks for completion of 12 months of therapy total.  Plan for hormonal therapy possibly with Aromasin to begin once Taxol is completed.  9/14/2023: Patient returns for cycle 1 day 15 weekly Taxol and Herceptin.  Unfortunately she is neutropenic with an ANC of 800 and will not receive Taxol today but will receive Herceptin.  Her future Taxol doses will be modified to an 85% dose level.  10/6/2023: Proceed with cycle 2 day 1 taxol and Herceptin. We will continue taxol at 85% reduction of original dose.   10/27/2023: Palma will receive her 6th weekly dose of Taxol and her seventh weekly dose of Herceptin in the office today.Taxol dose will remain at 85% dose level from the original treatment due to development of neutropenia.  12/1/2023: We will proceed with 12th and final weekly Taxol and  Herceptin in office today.  She is tolerating treatment well.     *Chemotherapy-induced neuropathy  11/17/2023: Patient continues to use cooling mitts and gloves with treatment.  She has been noticing some mild neuropathy developing in her toes of her bilateral feet.  She feels that this discomfort is more noticeable in the evening when she is trying to sleep.  We will start her on gabapentin 100 mg p.o. at bedtime.     *Right upper extremity lymphedema  11/17/2023: Patient has had history of lymphedema in her right upper extremity.  She does feel that in the past week or so her edema has been more noticeable to her.  She has been wearing her compression garments.  She has an appointment with lymphedema clinic next week on  12/8/2023.      PLAN:     Proceed with final weekly Taxol and  Herceptin today.  Encouraged use of compression garments to right upper extremity and compression.  Encouraged to call the office if right upper extremity symptoms worsen in any way.  Start Neurontin 100 mg p.o. nightly.  Prescription sent to MD for signature  Return in 1 week for first dose of the every 3-week Herceptin as a single agent.  Nurse practitioner appointment with lab and Herceptin treatment in 4 weeks.  2 unit MD appointment with Herceptin treatment January 19, 2024.  On that visit we will also plan to initiate hormonal therapy with Aromasin.  She will be undergoing an echocardiogram for monitoring of her ejection fraction 1 week prior to that visit as well.  Also has a scheduled follow-up with Dr Zuly Reynolds of cardiology with another ECHO in April      Patient remains on high risk medication and requires close monitoring for toxicity.        12/1/2023      CC:

## 2023-12-08 ENCOUNTER — INFUSION (OUTPATIENT)
Dept: ONCOLOGY | Facility: HOSPITAL | Age: 58
End: 2023-12-08
Payer: COMMERCIAL

## 2023-12-08 VITALS
BODY MASS INDEX: 38.07 KG/M2 | HEIGHT: 64 IN | SYSTOLIC BLOOD PRESSURE: 127 MMHG | TEMPERATURE: 98 F | HEART RATE: 102 BPM | OXYGEN SATURATION: 97 % | WEIGHT: 223 LBS | DIASTOLIC BLOOD PRESSURE: 72 MMHG | RESPIRATION RATE: 15 BRPM

## 2023-12-08 DIAGNOSIS — Z17.0 MALIGNANT NEOPLASM OF OVERLAPPING SITES OF RIGHT BREAST IN FEMALE, ESTROGEN RECEPTOR POSITIVE: ICD-10-CM

## 2023-12-08 DIAGNOSIS — Z45.2 ENCOUNTER FOR ADJUSTMENT OR MANAGEMENT OF VASCULAR ACCESS DEVICE: Primary | ICD-10-CM

## 2023-12-08 DIAGNOSIS — C50.811 MALIGNANT NEOPLASM OF OVERLAPPING SITES OF RIGHT BREAST IN FEMALE, ESTROGEN RECEPTOR POSITIVE: ICD-10-CM

## 2023-12-08 LAB
BASOPHILS # BLD AUTO: 0.05 10*3/MM3 (ref 0–0.2)
BASOPHILS NFR BLD AUTO: 1 % (ref 0–1.5)
DEPRECATED RDW RBC AUTO: 50.9 FL (ref 37–54)
EOSINOPHIL # BLD AUTO: 0.08 10*3/MM3 (ref 0–0.4)
EOSINOPHIL NFR BLD AUTO: 1.7 % (ref 0.3–6.2)
ERYTHROCYTE [DISTWIDTH] IN BLOOD BY AUTOMATED COUNT: 16.7 % (ref 12.3–15.4)
HCT VFR BLD AUTO: 34.8 % (ref 34–46.6)
HGB BLD-MCNC: 11 G/DL (ref 12–15.9)
IMM GRANULOCYTES # BLD AUTO: 0.09 10*3/MM3 (ref 0–0.05)
IMM GRANULOCYTES NFR BLD AUTO: 1.9 % (ref 0–0.5)
LYMPHOCYTES # BLD AUTO: 1.58 10*3/MM3 (ref 0.7–3.1)
LYMPHOCYTES NFR BLD AUTO: 32.8 % (ref 19.6–45.3)
MCH RBC QN AUTO: 26.6 PG (ref 26.6–33)
MCHC RBC AUTO-ENTMCNC: 31.6 G/DL (ref 31.5–35.7)
MCV RBC AUTO: 84.1 FL (ref 79–97)
MONOCYTES # BLD AUTO: 0.53 10*3/MM3 (ref 0.1–0.9)
MONOCYTES NFR BLD AUTO: 11 % (ref 5–12)
NEUTROPHILS NFR BLD AUTO: 2.49 10*3/MM3 (ref 1.7–7)
NEUTROPHILS NFR BLD AUTO: 51.6 % (ref 42.7–76)
NRBC BLD AUTO-RTO: 0 /100 WBC (ref 0–0.2)
PLATELET # BLD AUTO: 223 10*3/MM3 (ref 140–450)
PMV BLD AUTO: 11.8 FL (ref 6–12)
RBC # BLD AUTO: 4.14 10*6/MM3 (ref 3.77–5.28)
WBC NRBC COR # BLD AUTO: 4.82 10*3/MM3 (ref 3.4–10.8)

## 2023-12-08 PROCEDURE — 96413 CHEMO IV INFUSION 1 HR: CPT

## 2023-12-08 PROCEDURE — 85025 COMPLETE CBC W/AUTO DIFF WBC: CPT | Performed by: INTERNAL MEDICINE

## 2023-12-08 PROCEDURE — 25010000002 TRASTUZUMAB-QYYP 420 MG RECONSTITUTED SOLUTION 1 EACH VIAL: Performed by: INTERNAL MEDICINE

## 2023-12-08 PROCEDURE — 25810000003 SODIUM CHLORIDE 0.9 % SOLUTION: Performed by: INTERNAL MEDICINE

## 2023-12-08 PROCEDURE — 25010000002 HEPARIN LOCK FLUSH PER 10 UNITS: Performed by: INTERNAL MEDICINE

## 2023-12-08 PROCEDURE — 25810000003 SODIUM CHLORIDE 0.9 % SOLUTION 250 ML FLEX CONT: Performed by: INTERNAL MEDICINE

## 2023-12-08 RX ORDER — SODIUM CHLORIDE 9 MG/ML
250 INJECTION, SOLUTION INTRAVENOUS ONCE
Status: COMPLETED | OUTPATIENT
Start: 2023-12-08 | End: 2023-12-08

## 2023-12-08 RX ORDER — HEPARIN SODIUM (PORCINE) LOCK FLUSH IV SOLN 100 UNIT/ML 100 UNIT/ML
500 SOLUTION INTRAVENOUS AS NEEDED
OUTPATIENT
Start: 2023-12-08

## 2023-12-08 RX ORDER — HEPARIN SODIUM (PORCINE) LOCK FLUSH IV SOLN 100 UNIT/ML 100 UNIT/ML
500 SOLUTION INTRAVENOUS AS NEEDED
Status: DISCONTINUED | OUTPATIENT
Start: 2023-12-08 | End: 2023-12-08 | Stop reason: HOSPADM

## 2023-12-08 RX ORDER — SODIUM CHLORIDE 0.9 % (FLUSH) 0.9 %
10 SYRINGE (ML) INJECTION AS NEEDED
OUTPATIENT
Start: 2023-12-08

## 2023-12-08 RX ORDER — SODIUM CHLORIDE 0.9 % (FLUSH) 0.9 %
10 SYRINGE (ML) INJECTION AS NEEDED
Status: DISCONTINUED | OUTPATIENT
Start: 2023-12-08 | End: 2023-12-08 | Stop reason: HOSPADM

## 2023-12-08 RX ADMIN — SODIUM CHLORIDE 580 MG: 9 INJECTION, SOLUTION INTRAVENOUS at 12:29

## 2023-12-08 RX ADMIN — Medication 500 UNITS: at 13:06

## 2023-12-08 RX ADMIN — Medication 10 ML: at 13:05

## 2023-12-08 RX ADMIN — SODIUM CHLORIDE 250 ML: 9 INJECTION, SOLUTION INTRAVENOUS at 12:29

## 2023-12-14 ENCOUNTER — HOSPITAL ENCOUNTER (OUTPATIENT)
Dept: PHYSICAL THERAPY | Facility: HOSPITAL | Age: 58
Setting detail: THERAPIES SERIES
Discharge: HOME | End: 2023-12-14
Payer: COMMERCIAL

## 2023-12-14 DIAGNOSIS — Z91.89 AT RISK FOR LYMPHEDEMA: ICD-10-CM

## 2023-12-14 DIAGNOSIS — C50.511 MALIGNANT NEOPLASM OF LOWER-OUTER QUADRANT OF RIGHT BREAST OF FEMALE, ESTROGEN RECEPTOR POSITIVE: Primary | ICD-10-CM

## 2023-12-14 DIAGNOSIS — Z17.0 MALIGNANT NEOPLASM OF LOWER-OUTER QUADRANT OF RIGHT BREAST OF FEMALE, ESTROGEN RECEPTOR POSITIVE: Primary | ICD-10-CM

## 2023-12-14 PROCEDURE — 97535 SELF CARE MNGMENT TRAINING: CPT

## 2023-12-14 NOTE — THERAPY RE-EVALUATION
Physical Therapy Lymphedema Re-Evaluation  [facility]     Patient Name: Palma Roblero  : 1965  MRN: [id number]  Today's Date: 2023      Visit Date: 2023    Visit Dx:    ICD-10-CM ICD-9-CM   1. Malignant neoplasm of lower-outer quadrant of right breast of female, estrogen receptor positive  C50.511 174.5    Z17.0 V86.0   2. At risk for lymphedema  Z91.89 V49.89       Patient Active Problem List   Diagnosis    Malignant neoplasm of right female breast    Hyperlipidemia    Sebaceous cyst    Colon cancer screening    History of breast cancer    History of colon polyps    Malignant neoplasm of overlapping sites of right breast in female, estrogen receptor positive    Breast cancer    Encounter for adjustment or management of vascular access device        Past Medical History:   Diagnosis Date    Breast cancer     Right infiltrating ductal carcinoma, grade I, T2N1, ER/WV positive, 90% HER-2/julián negative    Cervical polyp     History of benign cervical polyp    Colon polyps     Drug therapy     Hx of radiation therapy     Hypercholesteremia     Hyperthyroidism     NO LONGER PROBLEM    PONV (postoperative nausea and vomiting)     Radiation 2007    Sebaceous cyst     BACK        Past Surgical History:   Procedure Laterality Date    BREAST BIOPSY Right 2006    stereotactic biopsy    BREAST LUMPECTOMY Right 2006    Right axillary sentinel node biopsy (positive), right axillay dissection, right needle lumpectomy-Dr. Quyen Vidal    BREAST RECONSTRUCTION Bilateral 2023    Procedure: BILATERAL PREPECTORAL PLACEMENT  TISSUE EXPANDER AND ALLODERM, ADJACENT LEFT BREAST TISSUE REARRANGEMENT;  Surgeon: Favian Brooke MD;  Location: [facility];  Service: Plastics;  Laterality: Bilateral;    CERVICAL CONE BIOPSY      [facility]    COLONOSCOPY N/A 2014    3 mm cecal polyp, 6 mm ascending colon polyp, 8 mm transvese colon polyp-Dr. Quyen Vidal    COLONOSCOPY N/A  03/16/2009    Tortuous colon-Dr. Quyen Vidal    COLONOSCOPY N/A 06/11/2018    Procedure: COLONOSCOPY TO CECUM TO TERMINAL ILEUM WITH HOT SNARE POLYPECTOMY;  Surgeon: Quyen Vidal MD;  Location: [facility] ENDOSCOPY;  Service: Gastroenterology    COLPOSCOPY      DILATATION AND CURETTAGE  03/23/2005    EXCISION MASS TRUNK N/A 09/05/2017    Procedure: EXCISION OF SEBACEOUS CYST FROM BACK ;  Surgeon: Rosendo Crum MD;  Location: [facility] MAIN OR;  Service:     MASTECTOMY W/ SENTINEL NODE BIOPSY Bilateral 8/16/2023    Procedure: BREAST MASTECTOMY BILATERAL;  Surgeon: Quyen Vidal MD;  Location: [facility] MAIN OR;  Service: General;  Laterality: Bilateral;    SHOULDER ROTATOR CUFF REPAIR Right 07/2022    VENOUS ACCESS DEVICE (PORT) INSERTION Left 10/06/2006    Left subclavian [id number] placement-Dr. Quyen Vidal    VENOUS ACCESS DEVICE (PORT) INSERTION N/A 8/16/2023    Procedure: with port placement using fluoroscopy and ultrasound;  Surgeon: Quyen Vidal MD;  Location: [facility] MAIN OR;  Service: General;  Laterality: N/A;    VENOUS ACCESS DEVICE (PORT) REMOVAL Left 05/29/2007    Left subclavian Fzupei-m-Qozs removal-Dr. Quyen Vidal       Visit Dx:    ICD-10-CM ICD-9-CM   1. Malignant neoplasm of lower-outer quadrant of right breast of female, estrogen receptor positive  C50.511 174.5    Z17.0 V86.0   2. At risk for lymphedema  Z91.89 V49.89            Lymphedema       Row Name 12/14/23 1300             Subjective Pain    Able to rate subjective pain? yes  -LB      Pre-Treatment Pain Level 0  -LB         Subjective    Subjective Comments I am doing well. My hand is swollen though. I have been wearing the compression intermittently. The glove affects my typing and the sleeve is tight.  -LB         Lymphedema Assessment    Lymphedema Classification RUE:;at risk/stage 0  -LB      Lymphedema Cancer Related Sx right;lumpectomy;axillary dissection  -LB      Lymph Nodes Removed # 16  -LB      Positive Lymph Nodes # 2   -LB      Chemo Received yes  -LB      Radiation Therapy Received yes  -LB      Infections or Cellulitis? no  -LB         Posture/Observations    Posture/Observations Comments WNL  -LB         Lymphedema Edema Assessment    Edema Assessment Comment R hand with lymphedema noted, arm appears negative for edema  -LB         Skin Changes/Observations    Skin Observations Comment normal  -LB         LUE Quick Girth (cm)    Axilla 36.4 cm  -LB      Mid upper arm 33.2 cm  -LB      Elbow 27.6 cm  -LB      Mid forearm 25.2 cm  -LB      Wrist crease 16.8 cm  -LB      Web space 21.5 cm  -LB      Met-heads 18.7 cm  -LB      LUE Quick Girth Total 179.4  -LB         RUE Quick Girth (cm)    Axilla 35.6 cm  -LB      Mid upper arm 34.7 cm  -LB      Elbow 27.7 cm  -LB      Mid forearm 26.9 cm  -LB      Wrist crease 16.2 cm  -LB      Web space 19.7 cm  -LB      Met-heads 18.5 cm  -LB      RUE Quick Girth Total 179.3  -LB         Manual Lymphatic Drainage    Manual Therapy discussed self MLD  -LB         Compression/Skin Care    Compression/Skin Care Comments reviewed compression garment use, fit  -LB         L-Dex Bioimpedence Screening    L-Dex Measurement Extremity --  -LB      L-Dex Patient Position --  -LB      L-Dex UE Dominate Side --  -LB      L-Dex UE At Risk Side --  -LB      L-Dex UE Pre Surgical Value --  -LB      L-Dex UE Baseline Score --  -LB                User Key  (r) = Recorded By, (t) = Taken By, (c) = Cosigned By      Initials Name Provider Type    Nina Resendez, PT Physical Therapist                                    Therapy Education  Education Details: reviewed self MLD, use of compression, exercises, CDT vs. compression wear  Given: Symptoms/condition management, Edema management, Mobility training, HEP  Program: Reinforced  How Provided: Demonstration, Written, Verbal  Provided to: Patient  Level of Understanding: Teach back education performed, Verbalized, Demonstrated  09808 - PT Self Care/Mgmt Minutes:  30       OP Exercises       Row Name 12/14/23 1300             Subjective    Subjective Comments I am doing well. My hand is swollen though. I have been wearing the compression intermittently. The glove affects my typing and the sleeve is tight.  -LB         Subjective Pain    Able to rate subjective pain? yes  -LB      Pre-Treatment Pain Level 0  -LB                User Key  (r) = Recorded By, (t) = Taken By, (c) = Cosigned By      Initials Name Provider Type    Nina Resendez, PT Physical Therapist                                 PT OP Goals       Row Name 12/14/23 1400          Long Term Goals    LTG Date to Achieve 10/03/23  -LB     LTG 1 Pt will maintain LDex bioimpedance score WNL.  -LB     LTG 1 Progress Ongoing  -LB     LTG 2 Pt will demonstrate full AROM BUE post-operatively once cleared for mobility/ROM.  -LB     LTG 2 Progress Met  -LB     LTG 3 Pt will verbalize s/s of lymphedema and steps to prevention.  -LB     LTG 3 Progress Ongoing  -LB     LTG 3 Progress Comments reviewed today  -LB               User Key  (r) = Recorded By, (t) = Taken By, (c) = Cosigned By      Initials Name Provider Type    Nina Resendez, PT Physical Therapist                     PT Assessment/Plan       Row Name 12/14/23 1442          PT Assessment    Functional Limitations Other (comment);Limitations in functional capacity and performance;Limitation in home management  -LB     Impairments Impaired lymphatic circulation;Impaired flexibility;Edema;Muscle strength;Sensation  -LB     Assessment Comments Pt returns after 2 months wearing appropriately fitted RUE compression garment. Total RUE circumferential measurements are stable and equal B but R hand with noted edema compared to L as well as compared to last visit. R hand glove slightly loose today and encouraged washing garments regularly to restore initial compression. Encouraged daily MLD, elevation, and exercises. We discussed option of CDT for R hand. However, pt types  for work and states the glove affects her ability to type so she takes glove off intermitently. Unsure of trigger for R hand lymphedema. We reviewed management options and pt wishes to proceed with continued R hand compression, MLD, and elevation and reassess in 2 months.  -LB     Rehab Potential Good  -LB     Patient/caregiver participated in establishment of treatment plan and goals Yes  -LB     Patient would benefit from skilled therapy intervention Yes  -LB        PT Plan    PT Frequency Other (comment)  -LB     Predicted Duration of Therapy Intervention (PT) return in 2 months for reassessment  -LB     Planned CPT's? PT EVAL LOW COMPLEXITY: 61282;PT THER PROC EA 15 MIN: 30740;PT RE-EVAL: 20545;PT NEUROMUSC RE-EDUCATION EA 15 MIN: 06163;PT THER ACT EA 15 MIN: 49147;PT MANUAL THERAPY EA 15 MIN: 73678;PT SELF CARE/HOME MGMT/TRAIN EA 15: 06220;PT BIS XTRACELL FLUID ANALYSIS: 02725  -LB     PT Plan Comments return in 2 months for reassessment  -LB               User Key  (r) = Recorded By, (t) = Taken By, (c) = Cosigned By      Initials Name Provider Type    Nina Resendez, PT Physical Therapist                                 Time Calculation:   Start Time: 1315  Stop Time: 1345  Time Calculation (min): 30 min  Total Timed Code Minutes- PT: 30 minute(s)  Timed Charges  68419 - PT Self Care/Mgmt Minutes: 30  Total Minutes  Timed Charges Total Minutes: 30   Total Minutes: 30   Therapy Charges for Today       Code Description Service Date Service Provider Modifiers Qty    84735631851 HC PT SELF CARE/MGMT/TRAIN EA 15 MIN 12/14/2023 Nina Olea, PT GP 2                      Nina Olea PT  12/14/2023

## 2023-12-29 ENCOUNTER — OFFICE VISIT (OUTPATIENT)
Dept: INTERNAL MEDICINE | Facility: CLINIC | Age: 58
End: 2023-12-29
Payer: COMMERCIAL

## 2023-12-29 ENCOUNTER — OFFICE VISIT (OUTPATIENT)
Dept: ONCOLOGY | Facility: CLINIC | Age: 58
End: 2023-12-29
Payer: COMMERCIAL

## 2023-12-29 ENCOUNTER — INFUSION (OUTPATIENT)
Dept: ONCOLOGY | Facility: HOSPITAL | Age: 58
End: 2023-12-29
Payer: COMMERCIAL

## 2023-12-29 VITALS
SYSTOLIC BLOOD PRESSURE: 135 MMHG | HEART RATE: 112 BPM | BODY MASS INDEX: 38.24 KG/M2 | TEMPERATURE: 97.7 F | DIASTOLIC BLOOD PRESSURE: 84 MMHG | RESPIRATION RATE: 18 BRPM | OXYGEN SATURATION: 98 % | HEIGHT: 64 IN | WEIGHT: 224 LBS

## 2023-12-29 VITALS
SYSTOLIC BLOOD PRESSURE: 130 MMHG | HEART RATE: 100 BPM | TEMPERATURE: 98 F | OXYGEN SATURATION: 98 % | BODY MASS INDEX: 44.29 KG/M2 | HEIGHT: 60 IN | DIASTOLIC BLOOD PRESSURE: 88 MMHG | WEIGHT: 225.6 LBS

## 2023-12-29 DIAGNOSIS — C50.811 MALIGNANT NEOPLASM OF OVERLAPPING SITES OF RIGHT BREAST IN FEMALE, ESTROGEN RECEPTOR POSITIVE: Primary | ICD-10-CM

## 2023-12-29 DIAGNOSIS — Z79.899 HIGH RISK MEDICATIONS (NOT ANTICOAGULANTS) LONG-TERM USE: ICD-10-CM

## 2023-12-29 DIAGNOSIS — Z76.89 ENCOUNTER TO ESTABLISH CARE: Primary | ICD-10-CM

## 2023-12-29 DIAGNOSIS — Z17.0 MALIGNANT NEOPLASM OF OVERLAPPING SITES OF RIGHT BREAST IN FEMALE, ESTROGEN RECEPTOR POSITIVE: Primary | ICD-10-CM

## 2023-12-29 DIAGNOSIS — I89.0 LYMPHEDEMA OF RIGHT ARM: ICD-10-CM

## 2023-12-29 DIAGNOSIS — C50.811 MALIGNANT NEOPLASM OF OVERLAPPING SITES OF RIGHT BREAST IN FEMALE, ESTROGEN RECEPTOR POSITIVE: ICD-10-CM

## 2023-12-29 DIAGNOSIS — Z17.0 MALIGNANT NEOPLASM OF LOWER-OUTER QUADRANT OF RIGHT BREAST OF FEMALE, ESTROGEN RECEPTOR POSITIVE: ICD-10-CM

## 2023-12-29 DIAGNOSIS — Z17.0 MALIGNANT NEOPLASM OF OVERLAPPING SITES OF RIGHT BREAST IN FEMALE, ESTROGEN RECEPTOR POSITIVE: ICD-10-CM

## 2023-12-29 DIAGNOSIS — C50.511 MALIGNANT NEOPLASM OF LOWER-OUTER QUADRANT OF RIGHT BREAST OF FEMALE, ESTROGEN RECEPTOR POSITIVE: ICD-10-CM

## 2023-12-29 DIAGNOSIS — Z45.2 ENCOUNTER FOR ADJUSTMENT OR MANAGEMENT OF VASCULAR ACCESS DEVICE: ICD-10-CM

## 2023-12-29 DIAGNOSIS — J06.9 URI, ACUTE: ICD-10-CM

## 2023-12-29 PROBLEM — Z90.13 HISTORY OF BILATERAL MASTECTOMY: Status: ACTIVE | Noted: 2023-12-29

## 2023-12-29 PROBLEM — C80.1 LYMPHEDEMA DUE TO MALIGNANT NEOPLASM: Status: ACTIVE | Noted: 2023-12-29

## 2023-12-29 LAB
BASOPHILS # BLD AUTO: 0.07 10*3/MM3 (ref 0–0.2)
BASOPHILS NFR BLD AUTO: 0.8 % (ref 0–1.5)
DEPRECATED RDW RBC AUTO: 48 FL (ref 37–54)
EOSINOPHIL # BLD AUTO: 0.25 10*3/MM3 (ref 0–0.4)
EOSINOPHIL NFR BLD AUTO: 2.7 % (ref 0.3–6.2)
ERYTHROCYTE [DISTWIDTH] IN BLOOD BY AUTOMATED COUNT: 15.5 % (ref 12.3–15.4)
HCT VFR BLD AUTO: 38.1 % (ref 34–46.6)
HGB BLD-MCNC: 12 G/DL (ref 12–15.9)
IMM GRANULOCYTES # BLD AUTO: 0.05 10*3/MM3 (ref 0–0.05)
IMM GRANULOCYTES NFR BLD AUTO: 0.5 % (ref 0–0.5)
LYMPHOCYTES # BLD AUTO: 2.28 10*3/MM3 (ref 0.7–3.1)
LYMPHOCYTES NFR BLD AUTO: 24.7 % (ref 19.6–45.3)
MCH RBC QN AUTO: 26.7 PG (ref 26.6–33)
MCHC RBC AUTO-ENTMCNC: 31.5 G/DL (ref 31.5–35.7)
MCV RBC AUTO: 84.7 FL (ref 79–97)
MONOCYTES # BLD AUTO: 0.82 10*3/MM3 (ref 0.1–0.9)
MONOCYTES NFR BLD AUTO: 8.9 % (ref 5–12)
NEUTROPHILS NFR BLD AUTO: 5.77 10*3/MM3 (ref 1.7–7)
NEUTROPHILS NFR BLD AUTO: 62.4 % (ref 42.7–76)
NRBC BLD AUTO-RTO: 0 /100 WBC (ref 0–0.2)
PLATELET # BLD AUTO: 211 10*3/MM3 (ref 140–450)
PMV BLD AUTO: 12 FL (ref 6–12)
RBC # BLD AUTO: 4.5 10*6/MM3 (ref 3.77–5.28)
WBC NRBC COR # BLD AUTO: 9.24 10*3/MM3 (ref 3.4–10.8)

## 2023-12-29 PROCEDURE — 25010000002 TRASTUZUMAB-QYYP 420 MG RECONSTITUTED SOLUTION 1 EACH VIAL: Performed by: INTERNAL MEDICINE

## 2023-12-29 PROCEDURE — 96413 CHEMO IV INFUSION 1 HR: CPT

## 2023-12-29 PROCEDURE — 85025 COMPLETE CBC W/AUTO DIFF WBC: CPT | Performed by: INTERNAL MEDICINE

## 2023-12-29 PROCEDURE — 25810000003 SODIUM CHLORIDE 0.9 % SOLUTION 250 ML FLEX CONT: Performed by: INTERNAL MEDICINE

## 2023-12-29 PROCEDURE — 25010000002 HEPARIN LOCK FLUSH PER 10 UNITS: Performed by: INTERNAL MEDICINE

## 2023-12-29 PROCEDURE — 25810000003 SODIUM CHLORIDE 0.9 % SOLUTION: Performed by: INTERNAL MEDICINE

## 2023-12-29 RX ORDER — AZITHROMYCIN 250 MG/1
TABLET, FILM COATED ORAL
Qty: 6 TABLET | Refills: 0 | Status: SHIPPED | OUTPATIENT
Start: 2023-12-29

## 2023-12-29 RX ORDER — HEPARIN SODIUM (PORCINE) LOCK FLUSH IV SOLN 100 UNIT/ML 100 UNIT/ML
500 SOLUTION INTRAVENOUS AS NEEDED
Status: DISCONTINUED | OUTPATIENT
Start: 2023-12-29 | End: 2023-12-29 | Stop reason: HOSPADM

## 2023-12-29 RX ORDER — SODIUM CHLORIDE 0.9 % (FLUSH) 0.9 %
10 SYRINGE (ML) INJECTION AS NEEDED
OUTPATIENT
Start: 2023-12-29

## 2023-12-29 RX ORDER — GUAIFENESIN 600 MG/1
1200 TABLET, EXTENDED RELEASE ORAL 2 TIMES DAILY
Qty: 14 TABLET | Refills: 0 | Status: SHIPPED | OUTPATIENT
Start: 2023-12-29

## 2023-12-29 RX ORDER — HEPARIN SODIUM (PORCINE) LOCK FLUSH IV SOLN 100 UNIT/ML 100 UNIT/ML
500 SOLUTION INTRAVENOUS AS NEEDED
OUTPATIENT
Start: 2023-12-29

## 2023-12-29 RX ORDER — SODIUM CHLORIDE 9 MG/ML
250 INJECTION, SOLUTION INTRAVENOUS ONCE
Status: COMPLETED | OUTPATIENT
Start: 2023-12-29 | End: 2023-12-29

## 2023-12-29 RX ORDER — SODIUM CHLORIDE 0.9 % (FLUSH) 0.9 %
10 SYRINGE (ML) INJECTION AS NEEDED
Status: DISCONTINUED | OUTPATIENT
Start: 2023-12-29 | End: 2023-12-29 | Stop reason: HOSPADM

## 2023-12-29 RX ADMIN — SODIUM CHLORIDE 580 MG: 9 INJECTION, SOLUTION INTRAVENOUS at 12:51

## 2023-12-29 RX ADMIN — SODIUM CHLORIDE 250 ML: 9 INJECTION, SOLUTION INTRAVENOUS at 12:12

## 2023-12-29 RX ADMIN — Medication 500 UNITS: at 13:27

## 2023-12-29 RX ADMIN — Medication 10 ML: at 13:27

## 2023-12-29 NOTE — PROGRESS NOTES
"Chief Complaint  Establish Care and Hoarse    Subjective        Palma Roblero presents to [facility]  History of Present Illness  Patient presents today to establish care:    She is still undergoing treatment for breast cancer with expanders in place at the time of visit. Coordination of care with Dr. Vidal (general surgery), Dr. Solares (hematology oncology), and Dr. Reynolds (cardiology).  Patient is extremely positive, overall demeanor is well and she has a very positive outlook regarding treatment and future plans for breast implants in May 2024.      Hoarseness:   Started a couple weeks ago, comes and goes.  Patient completed home test for COVID with negative results.  Patient has noticed with the extreme weather changes hoarseness waxes and wanes.  per the patient She took over-the-counter cough medication containing pseudoephedrine, which can be related to her elevated blood pressure during visit.  Over the course of the past 2 days she has noted cough is productive with increased of sputum color from clear to yellow/brown.    Hoarse  This is a new problem. The current episode started in the past 7 days. The problem occurs intermittently. The problem has been waxing and waning. Associated symptoms include congestion, coughing and a sore throat. Pertinent negatives include no anorexia, chills, fatigue, nausea, neck pain, swollen glands, vomiting or weakness. Exacerbated by: Seasonal allergies. She has tried acetaminophen (Over-the-counter decongestants containing pseudoephedrine n) for the symptoms. The treatment provided mild relief.       Objective   Vital Signs:  /88 (BP Location: Left arm, Patient Position: Sitting, Cuff Size: Large Adult)   Pulse 100   Temp 98 °F (36.7 °C) (Oral)   Ht 152.4 cm (60\")   Wt 102 kg (225 lb 9.6 oz)   SpO2 98%   BMI 44.06 kg/m²   Estimated body mass index is 44.06 kg/m² as calculated from the following:    Height as of this encounter: " "152.4 cm (60\").    Weight as of this encounter: 102 kg (225 lb 9.6 oz).               Physical Exam  Vitals reviewed.   Constitutional:       Appearance: Normal appearance.   HENT:      Head: Normocephalic.      Right Ear: There is no impacted cerumen. Tympanic membrane is bulging. Tympanic membrane has decreased mobility.      Left Ear: There is no impacted cerumen. Tympanic membrane is bulging. Tympanic membrane has decreased mobility.      Nose: Rhinorrhea present. Rhinorrhea is clear.      Right Sinus: Maxillary sinus tenderness present.      Left Sinus: Maxillary sinus tenderness present.      Mouth/Throat:      Lips: Pink.      Mouth: Mucous membranes are moist.      Tongue: No lesions. Tongue does not deviate from midline.      Pharynx: Oropharynx is clear. No pharyngeal swelling, oropharyngeal exudate, posterior oropharyngeal erythema or uvula swelling.      Tonsils: No tonsillar exudate.   Eyes:      General: Allergic shiner present.   Neck:      Thyroid: No thyroid mass.   Cardiovascular:      Rate and Rhythm: Normal rate and regular rhythm.      Pulses: Normal pulses.      Heart sounds: Normal heart sounds.   Pulmonary:      Effort: Pulmonary effort is normal. No tachypnea or respiratory distress.      Breath sounds: Examination of the right-lower field reveals decreased breath sounds. Examination of the left-lower field reveals decreased breath sounds. Decreased breath sounds present.   Chest:      Chest wall: Tenderness present.      Comments: Recent bilateral mastectomy, history of recurrent breast cancer, tissues expanders in place at time of visit.  Abdominal:      General: Abdomen is flat. Bowel sounds are normal.      Palpations: Abdomen is soft.   Musculoskeletal:      Right hand: Swelling present.      Cervical back: Full passive range of motion without pain.      Right lower leg: No edema.      Left lower leg: No edema.      Comments: Lymphedema to right hand noted, patient currently undergoing " treatment at lymphedema clinic   Lymphadenopathy:      Head:      Right side of head: No submental, submandibular, tonsillar or preauricular adenopathy.      Left side of head: No submental, submandibular, tonsillar or preauricular adenopathy.      Cervical: No cervical adenopathy.      Right cervical: No superficial or deep cervical adenopathy.     Left cervical: No superficial or deep cervical adenopathy.   Skin:     General: Skin is warm and dry.      Capillary Refill: Capillary refill takes less than 2 seconds.   Neurological:      General: No focal deficit present.      Mental Status: She is alert.   Psychiatric:         Attention and Perception: Attention normal.         Mood and Affect: Mood normal.         Behavior: Behavior normal.         Thought Content: Thought content normal.         Cognition and Memory: Cognition normal.         Judgment: Judgment normal.        Result Review :                   Assessment and Plan   Diagnoses and all orders for this visit:    1. Encounter to establish care (Primary)    2. URI, acute  -     azithromycin (ZITHROMAX) 250 MG tablet; Take 2 tablets the first day, then 1 tablet daily for 4 days.  Dispense: 6 tablet; Refill: 0  -     guaiFENesin (Mucinex) 600 MG 12 hr tablet; Take 2 tablets by mouth 2 (Two) Times a Day.  Dispense: 14 tablet; Refill: 0             Follow Up   No follow-ups on file.  Patient was given instructions and counseling regarding her condition or for health maintenance advice. Please see specific information pulled into the AVS if appropriate.

## 2023-12-29 NOTE — PROGRESS NOTES
Subjective   REASON FOR FOLLOWUP:  History of stage IIIA infiltrating ductal carcinoma of the right breast with 4 positive nodes, ER positive, HER-2/julián negative; treated with lumpectomy, status post 6 cycles of TAC. Received tamoxifen between February 2007 and March 2012. Femara was started in March 2012. Patient switched to Arimidex as of 06/21/2012 because of side effects with severe ankle joint pain with Femara.    2.   10 years of adjuvant therapy completed  March 2017.    3.   New diagnosis of carcinoma of the remaining right breast, needle biopsy 6/8/2023.  Tumor is HER2/julián positive and estrogen receptor positive/progesterone receptor negative.    4.   Bilateral mastectomies with implant reconstruction 8/16/2023    5.   Initiation of postop adjuvant chemotherapy with weekly Taxol and Herceptin.  First dose delivered 9/15/2023.      HISTORY OF PRESENT ILLNESS:     History of Present Illness Ms. Roblero is a 58 y.o.  female with the above noted history of stage IIIA carcinoma of the right breast, treated with lumpectomy, chemotherapy and radiation.  Following initial treatment with 6 cycles of TAC chemotherapy, she received extended hormonal therapy with 5 years of tamoxifen followed by 5 years of aromatase inhibitor.  She completed her 10 years of hormonal therapy in March 2017  .     She was seeing us annually had not been seen in January of this year but when she underwent her breast imaging in May 2023 she had suspicious findings and ultimately underwent ultrasound-guided biopsy on 6/8/2023.  There were 2 separate biopsies in 1 of these areas was positive for HER2/julián overexpression by FISH.  Both tumors were ER positive and AR negative.    She was referred to Dr. Vidal who had performed her previous lumpectomy surgery in 2007 and currently the plan is for her to undergo bilateral mastectomies with reconstruction.    We had her seen by cardiac oncology due to her previous treatment with 6  cycles of TAC chemotherapy in 2007.  Thankfully her ejection fraction still seems to be normal at 61% based on her echocardiogram from 6/19/2023.    Dr. Vidal has scheduled the patient for radiographic staging with CT scan of the chest abdomen pelvis and a bone scan and the studies did not show any evidence of distant metastatic spread.    She had a bilateral mastectomies with implant reconstruction performed on 8/16/2023.  The tumor in the right breast was 3 x 1.5 x 1.2 cm.  Margins of resection were clear.  She also had placement of a left chest wall Mediport at the time of her surgery.    We recommended weekly Taxol and Herceptin for 12 weeks followed by continued Herceptin every 3 weeks for the remainder of the year of treatment.  She was estrogen receptor positive and will also be started on hormonal therapy once her chemotherapy is completed.    INTERVAL HISTORY:  Palma returns to the office today for follow up and Herceptin.    Palma had developed some neuropathy related to Taxol regardless of wearing cold mittens and socks with her treatments.  Her neuropathy remains minimal and localized in her toes.  Continues to be most troublesome at nighttime when she is trying to sleep when she does have gabapentin she can take at bedtime if needed.    Her blood counts today are good for treatment.  She reports having some mild lymphedema in the right upper extremity.  She is wearing her compression sleeve and continues to follow with the lymphedema clinic.          Past Medical History, Past Surgical History, Social History, Family History have been reviewed and are without significant changes except as mentioned.    Review of Systems   Constitutional:  Negative for activity change, appetite change, fatigue, fever and unexpected weight change.   HENT:  Negative for hearing loss, nosebleeds, trouble swallowing and voice change.    Eyes:  Negative for visual disturbance.   Respiratory:  Negative for cough, shortness  "of breath and wheezing.    Cardiovascular:  Negative for chest pain and palpitations.   Gastrointestinal:  Negative for abdominal pain, diarrhea, nausea and vomiting.   Genitourinary:  Negative for difficulty urinating, frequency, hematuria and urgency.   Musculoskeletal:  Negative for back pain and neck pain.        Mild lymphedema right upper extremity.  Patient is wearing a sleeve and glove   Skin:  Negative for rash.   Neurological:  Positive for numbness. Negative for dizziness, seizures, syncope and headaches.   Hematological:  Negative for adenopathy. Does not bruise/bleed easily.   Psychiatric/Behavioral:  Negative for behavioral problems. The patient is not nervous/anxious.       A comprehensive 14 point review of systems was performed and was negative except as mentioned.    Medications:  The current medication list was reviewed in the EMR    ALLERGIES:    Allergies   Allergen Reactions    Oxycodone Nausea And Vomiting       Objective      Vitals:    12/29/23 1127   BP: 135/84   Pulse: 112   Resp: 18   Temp: 97.7 °F (36.5 °C)   TempSrc: Temporal   SpO2: 98%   Weight: 102 kg (224 lb)   Height: 162 cm (63.78\")   PainSc: 0-No pain               12/29/2023    11:28 AM   Current Status   ECOG score 0       Physical Exam   Constitutional: She is oriented to person, place, and time. She appears well-developed. No distress.   HENT:   Head: Normocephalic.   Eyes: Pupils are equal, round, and reactive to light. Conjunctivae are normal. No scleral icterus.   Neck: No JVD present. No thyromegaly present.   Cardiovascular: Normal rate and regular rhythm. Exam reveals no gallop and no friction rub.   No murmur heard.  Pulmonary/Chest: Effort normal and breath sounds normal. She has no wheezes. She has no rales. Right breast exhibits no mass, no nipple discharge and no skin change. Left breast exhibits no mass, no nipple discharge and no skin change.   Bilateral mastectomies with implant reconstructions.  Right chest " wall Mediport   Abdominal: Soft. Bowel sounds are normal. She exhibits no distension and no mass. There is no abdominal tenderness.   Musculoskeletal: Normal range of motion. Swelling (Mild lymphedema in right upper extremity.  No pain, tenderness, or cording noted on exam.) present. No tenderness or deformity.   Lymphadenopathy:     She has no cervical adenopathy.   Neurological: She is alert and oriented to person, place, and time. She has normal reflexes. No cranial nerve deficit.   Skin: Skin is warm and dry. No rash noted. No erythema.   Psychiatric: Her behavior is normal. Judgment normal.        I have reexamined the patient and the results are consistent with the previously documented exam. ZAN Cannon           RECENT LABS:  Hematology WBC   Date Value Ref Range Status   12/29/2023 9.24 3.40 - 10.80 10*3/mm3 Final   11/01/2021 5.9 3.4 - 10.8 x10E3/uL Final     RBC   Date Value Ref Range Status   12/29/2023 4.50 3.77 - 5.28 10*6/mm3 Final   11/01/2021 5.14 3.77 - 5.28 x10E6/uL Final     Hemoglobin   Date Value Ref Range Status   12/29/2023 12.0 12.0 - 15.9 g/dL Final     Hematocrit   Date Value Ref Range Status   12/29/2023 38.1 34.0 - 46.6 % Final     Platelets   Date Value Ref Range Status   12/29/2023 211 140 - 450 10*3/mm3 Final            Lab Results   Component Value Date    NEUTROABS 5.77 12/29/2023       Lab Results   Component Value Date    GLUCOSE 96 12/01/2023    BUN 15 12/01/2023    CREATININE 0.82 12/01/2023    EGFRRESULT 91.8 11/30/2022    EGFR 82.0 12/01/2023    BCR 18.3 12/01/2023    K 3.7 12/01/2023    CO2 26.7 12/01/2023    CALCIUM 8.9 12/01/2023    PROTENTOTREF 6.6 11/30/2022    ALBUMIN 3.9 12/01/2023    BILITOT 0.4 12/01/2023    AST 31 12/01/2023    ALT 35 (H) 12/01/2023     PATHOLOGY 8/16/2023  Final Diagnosis   1. Right Breast, Oriented Simple Skin Sparing Mastectomy (1,097 Grams): INVASIVE MAMMARY CARCINOMA,       NO SPECIAL TYPE (INVASIVE DUCTAL CARCINOMA).  A. Tumor  site: Lower outer quadrant.               B. Histologic grade: Gilliam histologic score III (tubules = 3, nuclei = 2, mitosis = 3).               C. Tumor size: 30 x 15 x 12 mm.               D. Tumor focality: Single focus.               E. Ductal Carcinoma in-situ (DCIS): Not identified.               F. No lobular neoplasia identified.               G. Overlying skin and nipple are uninvolved by tumor.               H. No definitive lymphovascular space invasion identified.               I. Focal perineural invasion is present.  J. Margins: Uninvolved by invasive and in-situ carcinoma.               1. Invasive carcinoma comes to within 7 mm of the anterior margin, 40 mm from the inferior margin,      60 mm from the posterior and lateral margins, 80 mm from the medial margin and 130 mm from the       superior margin of resection.               K. Lymph Nodes: Not submitted.  L. Hormone receptor status: ER positive, AK negative, HER2 positive by FISH (right medial biopsy),       Ki-67 35-40%  (performed on prior biopsy [id number]).  M. Pathologic stage: pT2, NX.     2. Left Breast, Oriented Simple Skin Sparing Mastectomy (1,593 Grams):   A. Benign unremarkable breast tissue, skin and nipple.     PATHOLOGY 6/8/2023  Final Diagnosis   1. Breast, Right Lateral 6:30 Position, 3 cm FN, Core Biopsy:               A. Invasive mammary carcinoma, no special type (ductal), Perez histologic grade II (tubules = 3, nuclei = 2,        mitoses = 2) measuring 6 mm maximally and involving four core fragments.  B. No definitive in-situ carcinoma is identified.  C. No definitive lymphovascular space invasion identified.     2. Breast, Right Medial 6:30 Position, 3 cm FN, Core Biopsy:                A. Invasive mammary carcinoma, no special type (ductal), Gilliam histologic grade II (tubules = 3, nuclei = 2,        mitoses = 1) measuring 10 mm maximally and involving three core fragments.  B. No definitive in-situ  carcinoma is identified.  C. No definitive lymphovascular space invasion identified.     Echocardiogram 10/16/2023  Interpretation Summary         Left ventricular systolic function is normal. Calculated left ventricular EF = 61.4% Normal global longitudinal LV strain (GLS) = -23.9%. Left ventricle strain data was reviewed by the physician and found to be accurate. Normal left ventricular cavity size and wall thickness noted. All left ventricular wall segments contract normally. Left ventricular diastolic function was normal    Trace tricuspid valve regurgitation is present. Estimated right ventricular systolic pressure from tricuspid regurgitation is normal (<35 mmHg). Calculated right ventricular systolic pressure from tricuspid regurgitation is 22.9 mmHg.      CT CHEST, ABDOMEN, AND PELVIS WITHOUT CONTRAST. 7/21/2023  FINDINGS:  CHEST: Right breast findings discussed on breast MRI 06/17/2023. There  is a cluster of nodular densities at the right axilla in close proximity  to a cluster of surgical clips and some of these may represent vessels  or lymphatics. Characterization is limited in the absence of IV  contrast. There is no definitive pathologically enlarged node present.  There are no pathologically enlarged nodes at the left axilla and there  is no subpectoral lymphadenopathy, and there is no lymphadenopathy at  the mediastinum or jovita given constraints of noncontrasted technique.  The nodularity along the pleura at the dependent aspect of the lower  chest is likely related to dependent atelectatic change. No definite  suspicious pulmonary nodules are seen. There are no pleural or  pericardial effusions.     ABDOMEN/PELVIS: Noncontrasted liver appears unremarkable. The  gallbladder appears unremarkable and there is no biliary dilatation.  Splenic size is normal. Noncontrasted pancreas, adrenals, and kidneys  appear unremarkable. There is no acute bowel abnormality. Appendix  appears within normal limits.  The uterus is slightly lobular in contour  likely secondary to small fibroids. Noncontrasted adnexa appear  unremarkable. There is no free fluid or lymphadenopathy. There are mild  abdominal aortic atherosclerotic changes throughout the abdominal aorta  without aneurysmal dilatation. No suspicious bone lesion is seen.     IMPRESSION:  Given constraints of noncontrasted technique, there is no  convincing evidence for metastatic disease within the chest, abdomen, or  pelvis.      Narrative & Impression   NUCLEAR MEDICINE WHOLE BODY BONE SCAN 7/14/2023     HISTORY: Breast cancer. Evaluate for metastatic disease.     TECHNIQUE:  19.1 mCi of 99m technetium MDP was administered intravenous  followed by 3 hour delayed phase whole body imaging with selected spot  views.     COMPARISON: None.     FINDINGS:  There is mild increased uptake at the right sternoclavicular  joint that is attributed to arthritis. Arthritic uptake is present in  both shoulders, knees, and mid feet.  There is also mild increased  lumbar spine uptake that is most likely degenerative/arthritic. Both  kidneys and the urinary bladder are visualized.     IMPRESSION:  Areas of mild uptake are typical for arthritis/degenerative  change and there is no scintigraphic evidence to suggest bony metastatic  disease.        MAMMO SCREENING DIGITAL TOMOSYNTHESIS BILATERAL W CAD- 4/14/2023   IMPRESSION:  1. There is an area of focal asymmetry in the middle third retroareolar  region of the right breast. Further evaluation with spot compression CC  and MLO mammographic and Tomosynthesis images and targeted right breast  sonography is recommended.  2. There are no findings suspicious for malignancy in the left breast.     BI-RADS Category 0: Incomplete. Needs additional imaging evaluation.    EXAMINATIONS: UNILATERAL RIGHT DIGITAL DIAGNOSTIC MAMMOGRAPHY WITH  TOMOSYNTHESIS AND LIMITED RIGHT BREAST SONOGRAPHY 5/17/2023  IMPRESSION:  There is an irregular 1 cm  hypoechoic mass with internal peripheral  vascularity that is seen in the right breast at the 6:30 position on the  order of 3 cm from the nipple. It is immediately adjacent to a 1.9 cm  oval circumscribed hypoechoic mass. Together both lesions measure on the  order 2.7 cm in greatest dimension. Ultrasound guided biopsy of both  lesions is recommended. Given the close proximity of both lesions, both  lesions may be able to be sampled and submitted as one specimen.     BI-RADS Category 4: Suspicious abnormality or suspicious finding. Biopsy  should be considered.    MRI BREAST BILATERAL W WO CONTRAST-  6/17/2023  IMPRESSION AND RECOMMENDATION:     1.  Adjacent irregular enhancing masses each measuring 1.6 cm at 6:00 to  7:00 in the middle right breast represent the 2 sites of biopsy-proven  malignancy. Adjacent suspicious enhancing foci and areas of nonmass  enhancement are identified, in addition to an irregular enhancing mass  and multiple additional enhancing foci centered at 2:00 to 3:00 in the  right breast, which are also suspicious. The 2 sites of biopsy-proven  malignancy and suspicious areas of enhancement together measure up to  approximately 8.2 cm in maximum dimension, as detailed above. Surgical  management is recommended.  2.  No MRI evidence of malignancy in the left breast.     BI-RADS Category 4: Suspicious      Assessment & Plan     *Stage IIIA infiltrating ductal carcinoma of the right breast   Treated with a lumpectomy, radiation, 6 cycles of TAC chemotherapy and ongoing hormonal therapy. She completed 10 years of adjuvant hormonal therapy with 5 years of tamoxifen followed by 5 years of Arimidex which she completed in 2017.  Carcinoma of the remaining right breast.  Tumor was positive for HER2/julián overexpression and positive for estrogen receptors and negative for progesterone receptors.  Bilateral mastectomies with immediate implant reconstruction 8/16/2023.  Mediport placement  8/16/2023  Echocardiogram from 6/19/2023 shows normal left ventricular ejection fraction of 61% (patient previously had 6 cycles of Adriamycin containing chemotherapy and will be needing Herceptin therapy postoperatively).  Plan for postop adjuvant treatment with weekly Taxol and Herceptin x12 weeks followed by continued single agent Herceptin every 3 weeks for completion of 12 months of therapy total.  Plan for hormonal therapy possibly with Aromasin to begin once Taxol is completed.  9/14/2023: Patient returns for cycle 1 day 15 weekly Taxol and Herceptin.  Unfortunately she is neutropenic with an ANC of 800 and will not receive Taxol today but will receive Herceptin.  Her future Taxol doses will be modified to an 85% dose level.  10/6/2023: Proceed with cycle 2 day 1 taxol and Herceptin. We will continue taxol at 85% reduction of original dose.   10/27/2023: Palma will receive her 6th weekly dose of Taxol and her seventh weekly dose of Herceptin in the office today.Taxol dose will remain at 85% dose level from the original treatment due to development of neutropenia.  12/1/2023: We will proceed with 12th and final weekly Taxol and  Herceptin in office today.  She is tolerating treatment well.   12/29/2023: Proceed with Herceptin today as scheduled.  She continues to tolerate treatment well.  Neuropathy remains very minimal and is only bothersome at nighttime.  She has gabapentin to take at bedtime however she has not required it recently.    *Chemotherapy-induced neuropathy  11/17/2023: Patient continues to use cooling mitts and gloves with treatment.  She has been noticing some mild neuropathy developing in her toes of her bilateral feet.  She feels that this discomfort is more noticeable in the evening when she is trying to sleep.  We will start her on gabapentin 100 mg p.o. at bedtime.     *Right upper extremity lymphedema  11/17/2023: Patient has had history of lymphedema in her right upper extremity.  She  does feel that in the past week or so her edema has been more noticeable to her.  She has been wearing her compression garments.  She has an appointment with lymphedema clinic next week on 12/8/2023.  12/29/2023: Continues compression garment and has followed up with the lymphedema clinic.  Stable at this time.      PLAN:     Proceed with Herceptin today.  Encouraged use of compression garments to right upper extremity and compression.  Encouraged to call the office if right upper extremity symptoms worsen in any way.  Continue Neurontin 100 mg p.o. nightly.    2 unit MD appointment with Herceptin treatment January 19, 2024.  On that visit we will also plan to initiate hormonal therapy with Aromasin.  She will be undergoing an echocardiogram for monitoring of her ejection fraction 1 week prior to that visit as well.  Also has a scheduled follow-up with Dr Zuly Reynolds of cardiology with another ECHO in April      Patient remains on high risk medication and requires close monitoring for toxicity.        12/29/2023      CC:

## 2024-01-04 ENCOUNTER — PATIENT OUTREACH (OUTPATIENT)
Dept: OTHER | Facility: HOSPITAL | Age: 59
End: 2024-01-04
Payer: COMMERCIAL

## 2024-01-04 ENCOUNTER — PATIENT ROUNDING (BHMG ONLY) (OUTPATIENT)
Dept: INTERNAL MEDICINE | Facility: CLINIC | Age: 59
End: 2024-01-04
Payer: COMMERCIAL

## 2024-01-04 DIAGNOSIS — Z90.13 HISTORY OF BILATERAL MASTECTOMY: Primary | ICD-10-CM

## 2024-01-04 NOTE — PROGRESS NOTES
Called MsDelmer Roblero to see how she was doing. Left a message with my contact information and asked her to call back at her convenience.

## 2024-01-12 ENCOUNTER — HOSPITAL ENCOUNTER (OUTPATIENT)
Dept: CARDIOLOGY | Facility: HOSPITAL | Age: 59
Discharge: HOME | End: 2024-01-12
Payer: COMMERCIAL

## 2024-01-12 ENCOUNTER — TELEPHONE (OUTPATIENT)
Dept: CARDIOLOGY | Facility: CLINIC | Age: 59
End: 2024-01-12
Payer: COMMERCIAL

## 2024-01-12 VITALS
SYSTOLIC BLOOD PRESSURE: 114 MMHG | BODY MASS INDEX: 44.17 KG/M2 | WEIGHT: 225 LBS | HEART RATE: 84 BPM | DIASTOLIC BLOOD PRESSURE: 60 MMHG | HEIGHT: 60 IN

## 2024-01-12 DIAGNOSIS — Z17.0 MALIGNANT NEOPLASM OF LOWER-OUTER QUADRANT OF RIGHT BREAST OF FEMALE, ESTROGEN RECEPTOR POSITIVE: ICD-10-CM

## 2024-01-12 DIAGNOSIS — C50.911 MALIGNANT NEOPLASM OF RIGHT BREAST IN FEMALE, ESTROGEN RECEPTOR POSITIVE, UNSPECIFIED SITE OF BREAST: ICD-10-CM

## 2024-01-12 DIAGNOSIS — Z17.0 MALIGNANT NEOPLASM OF RIGHT BREAST IN FEMALE, ESTROGEN RECEPTOR POSITIVE, UNSPECIFIED SITE OF BREAST: ICD-10-CM

## 2024-01-12 DIAGNOSIS — Z17.0 MALIGNANT NEOPLASM OF OVERLAPPING SITES OF RIGHT BREAST IN FEMALE, ESTROGEN RECEPTOR POSITIVE: ICD-10-CM

## 2024-01-12 DIAGNOSIS — C50.811 MALIGNANT NEOPLASM OF OVERLAPPING SITES OF RIGHT BREAST IN FEMALE, ESTROGEN RECEPTOR POSITIVE: ICD-10-CM

## 2024-01-12 DIAGNOSIS — Z45.2 ENCOUNTER FOR ADJUSTMENT OR MANAGEMENT OF VASCULAR ACCESS DEVICE: ICD-10-CM

## 2024-01-12 DIAGNOSIS — C50.511 MALIGNANT NEOPLASM OF LOWER-OUTER QUADRANT OF RIGHT BREAST OF FEMALE, ESTROGEN RECEPTOR POSITIVE: ICD-10-CM

## 2024-01-12 LAB
AORTIC ARCH: 2.6 CM
ASCENDING AORTA: 2.8 CM
BH CV ECHO LEFT VENTRICLE GLOBAL LONGITUDINAL STRAIN: -22.3 %
BH CV ECHO MEAS - ACS: 1.83 CM
BH CV ECHO MEAS - AO MAX PG: 7.5 MMHG
BH CV ECHO MEAS - AO MEAN PG: 4 MMHG
BH CV ECHO MEAS - AO ROOT DIAM: 2.6 CM
BH CV ECHO MEAS - AO V2 MAX: 137 CM/SEC
BH CV ECHO MEAS - AO V2 VTI: 26.9 CM
BH CV ECHO MEAS - AVA(I,D): 2.45 CM2
BH CV ECHO MEAS - EDV(CUBED): 65.7 ML
BH CV ECHO MEAS - EDV(MOD-SP2): 117 ML
BH CV ECHO MEAS - EDV(MOD-SP4): 109 ML
BH CV ECHO MEAS - EF(MOD-BP): 64.3 %
BH CV ECHO MEAS - EF(MOD-SP2): 68.4 %
BH CV ECHO MEAS - EF(MOD-SP4): 65.1 %
BH CV ECHO MEAS - EF_3D-VOL: 62 %
BH CV ECHO MEAS - ESV(CUBED): 20.8 ML
BH CV ECHO MEAS - ESV(MOD-SP2): 37 ML
BH CV ECHO MEAS - ESV(MOD-SP4): 38 ML
BH CV ECHO MEAS - FS: 31.8 %
BH CV ECHO MEAS - IVS/LVPW: 0.9 CM
BH CV ECHO MEAS - IVSD: 0.9 CM
BH CV ECHO MEAS - LAT PEAK E' VEL: 12.5 CM/SEC
BH CV ECHO MEAS - LV DIASTOLIC VOL/BSA (35-75): 53 CM2
BH CV ECHO MEAS - LV MASS(C)D: 119.1 GRAMS
BH CV ECHO MEAS - LV MAX PG: 5.1 MMHG
BH CV ECHO MEAS - LV MEAN PG: 3 MMHG
BH CV ECHO MEAS - LV SYSTOLIC VOL/BSA (12-30): 18.5 CM2
BH CV ECHO MEAS - LV V1 MAX: 113 CM/SEC
BH CV ECHO MEAS - LV V1 VTI: 20 CM
BH CV ECHO MEAS - LVIDD: 4 CM
BH CV ECHO MEAS - LVIDS: 2.8 CM
BH CV ECHO MEAS - LVOT AREA: 3.3 CM2
BH CV ECHO MEAS - LVOT DIAM: 2.05 CM
BH CV ECHO MEAS - LVPWD: 1 CM
BH CV ECHO MEAS - MED PEAK E' VEL: 11.5 CM/SEC
BH CV ECHO MEAS - MV A DUR: 0.13 SEC
BH CV ECHO MEAS - MV A MAX VEL: 123.7 CM/SEC
BH CV ECHO MEAS - MV DEC SLOPE: 663.9 CM/SEC2
BH CV ECHO MEAS - MV DEC TIME: 0.13 SEC
BH CV ECHO MEAS - MV E MAX VEL: 77.6 CM/SEC
BH CV ECHO MEAS - MV E/A: 0.63
BH CV ECHO MEAS - MV MAX PG: 3.7 MMHG
BH CV ECHO MEAS - MV MEAN PG: 2 MMHG
BH CV ECHO MEAS - MV P1/2T: 43.1 MSEC
BH CV ECHO MEAS - MV V2 VTI: 20.1 CM
BH CV ECHO MEAS - MVA(P1/2T): 5.1 CM2
BH CV ECHO MEAS - MVA(VTI): 3.3 CM2
BH CV ECHO MEAS - PA ACC TIME: 0.13 SEC
BH CV ECHO MEAS - PA V2 MAX: 97.7 CM/SEC
BH CV ECHO MEAS - PULM A REVS DUR: 0.14 SEC
BH CV ECHO MEAS - PULM A REVS VEL: 28.5 CM/SEC
BH CV ECHO MEAS - PULM DIAS VEL: 45 CM/SEC
BH CV ECHO MEAS - PULM S/D: 0.65
BH CV ECHO MEAS - PULM SYS VEL: 29.4 CM/SEC
BH CV ECHO MEAS - QP/QS: 0.4
BH CV ECHO MEAS - RV MAX PG: 1.44 MMHG
BH CV ECHO MEAS - RV V1 MAX: 60 CM/SEC
BH CV ECHO MEAS - RV V1 VTI: 13.1 CM
BH CV ECHO MEAS - RVOT DIAM: 1.6 CM
BH CV ECHO MEAS - SI(MOD-SP2): 38.9 ML/M2
BH CV ECHO MEAS - SI(MOD-SP4): 34.5 ML/M2
BH CV ECHO MEAS - SV(LVOT): 65.8 ML
BH CV ECHO MEAS - SV(MOD-SP2): 80 ML
BH CV ECHO MEAS - SV(MOD-SP4): 71 ML
BH CV ECHO MEAS - SV(RVOT): 26.3 ML
BH CV ECHO MEAS - TAPSE (>1.6): 1.91 CM
BH CV ECHO MEASUREMENTS AVERAGE E/E' RATIO: 6.47
BH CV XLRA - RV BASE: 2.8 CM
BH CV XLRA - RV LENGTH: 8.2 CM
BH CV XLRA - RV MID: 2.25 CM
BH CV XLRA - TDI S': 14.3 CM/SEC
LEFT ATRIUM VOLUME INDEX: 24 ML/M2
SINUS: 2.6 CM
STJ: 2.25 CM

## 2024-01-12 PROCEDURE — 93356 MYOCRD STRAIN IMG SPCKL TRCK: CPT

## 2024-01-12 PROCEDURE — 25510000001 PERFLUTREN (DEFINITY) 8.476 MG IN SODIUM CHLORIDE (PF) 0.9 % 10 ML INJECTION: Performed by: INTERNAL MEDICINE

## 2024-01-12 PROCEDURE — 93306 TTE W/DOPPLER COMPLETE: CPT

## 2024-01-12 RX ADMIN — PERFLUTREN 2 ML: 6.52 INJECTION, SUSPENSION INTRAVENOUS at 13:01

## 2024-01-12 NOTE — TELEPHONE ENCOUNTER
Results and recommendations called to pt.  Instructed to call with any further questions or concerns.  Verbalized understanding.    Twyla Velez RN  Triage Nurse, [facility] – [place], [place]  01/12/24 14:39 EST

## 2024-01-12 NOTE — TELEPHONE ENCOUNTER
Please let her know that echo looks good. Her heart is strong and functioning well. Stable to better from her baseline echo. Continue current medications and call with questions or concerns.

## 2024-01-12 NOTE — TELEPHONE ENCOUNTER
Left voicemail for Palma Roblero requesting callback.    HUB: please transfer to Triage if patient returns call     Thank you,  Katherine FRYE RN  Triage Nurse [facility] – [place]   14:35 EST

## 2024-01-15 PROBLEM — Z86.010 HISTORY OF ADENOMATOUS POLYP OF COLON: Status: ACTIVE | Noted: 2023-03-23

## 2024-01-15 PROBLEM — Z12.11 SCREENING FOR MALIGNANT NEOPLASM OF COLON: Status: ACTIVE | Noted: 2023-03-23

## 2024-01-15 PROBLEM — Z86.0101 HISTORY OF ADENOMATOUS POLYP OF COLON: Status: ACTIVE | Noted: 2023-03-23

## 2024-01-19 ENCOUNTER — INFUSION (OUTPATIENT)
Dept: ONCOLOGY | Facility: HOSPITAL | Age: 59
End: 2024-01-19
Payer: COMMERCIAL

## 2024-01-19 ENCOUNTER — OFFICE VISIT (OUTPATIENT)
Dept: ONCOLOGY | Facility: CLINIC | Age: 59
End: 2024-01-19
Payer: COMMERCIAL

## 2024-01-19 VITALS
RESPIRATION RATE: 18 BRPM | DIASTOLIC BLOOD PRESSURE: 72 MMHG | HEIGHT: 60 IN | WEIGHT: 224.8 LBS | TEMPERATURE: 97.7 F | HEART RATE: 95 BPM | BODY MASS INDEX: 44.14 KG/M2 | OXYGEN SATURATION: 99 % | SYSTOLIC BLOOD PRESSURE: 114 MMHG

## 2024-01-19 DIAGNOSIS — Z17.0 MALIGNANT NEOPLASM OF OVERLAPPING SITES OF RIGHT BREAST IN FEMALE, ESTROGEN RECEPTOR POSITIVE: ICD-10-CM

## 2024-01-19 DIAGNOSIS — Z90.13 HISTORY OF BILATERAL MASTECTOMY: ICD-10-CM

## 2024-01-19 DIAGNOSIS — C50.811 MALIGNANT NEOPLASM OF OVERLAPPING SITES OF RIGHT BREAST IN FEMALE, ESTROGEN RECEPTOR POSITIVE: Primary | ICD-10-CM

## 2024-01-19 DIAGNOSIS — C50.811 MALIGNANT NEOPLASM OF OVERLAPPING SITES OF RIGHT BREAST IN FEMALE, ESTROGEN RECEPTOR POSITIVE: ICD-10-CM

## 2024-01-19 DIAGNOSIS — Z45.2 ENCOUNTER FOR ADJUSTMENT OR MANAGEMENT OF VASCULAR ACCESS DEVICE: ICD-10-CM

## 2024-01-19 DIAGNOSIS — Z17.0 MALIGNANT NEOPLASM OF OVERLAPPING SITES OF RIGHT BREAST IN FEMALE, ESTROGEN RECEPTOR POSITIVE: Primary | ICD-10-CM

## 2024-01-19 DIAGNOSIS — C80.1 LYMPHEDEMA DUE TO MALIGNANT NEOPLASM: ICD-10-CM

## 2024-01-19 DIAGNOSIS — I89.0 LYMPHEDEMA DUE TO MALIGNANT NEOPLASM: ICD-10-CM

## 2024-01-19 DIAGNOSIS — C50.911 MALIGNANT NEOPLASM OF RIGHT BREAST IN FEMALE, ESTROGEN RECEPTOR POSITIVE, UNSPECIFIED SITE OF BREAST: Primary | ICD-10-CM

## 2024-01-19 DIAGNOSIS — Z17.0 MALIGNANT NEOPLASM OF RIGHT BREAST IN FEMALE, ESTROGEN RECEPTOR POSITIVE, UNSPECIFIED SITE OF BREAST: Primary | ICD-10-CM

## 2024-01-19 LAB
BASOPHILS # BLD AUTO: 0.04 10*3/MM3 (ref 0–0.2)
BASOPHILS NFR BLD AUTO: 0.7 % (ref 0–1.5)
DEPRECATED RDW RBC AUTO: 44.9 FL (ref 37–54)
EOSINOPHIL # BLD AUTO: 0.16 10*3/MM3 (ref 0–0.4)
EOSINOPHIL NFR BLD AUTO: 3 % (ref 0.3–6.2)
ERYTHROCYTE [DISTWIDTH] IN BLOOD BY AUTOMATED COUNT: 14.5 % (ref 12.3–15.4)
HCT VFR BLD AUTO: 38.5 % (ref 34–46.6)
HGB BLD-MCNC: 11.9 G/DL (ref 12–15.9)
IMM GRANULOCYTES # BLD AUTO: 0.01 10*3/MM3 (ref 0–0.05)
IMM GRANULOCYTES NFR BLD AUTO: 0.2 % (ref 0–0.5)
LYMPHOCYTES # BLD AUTO: 1.87 10*3/MM3 (ref 0.7–3.1)
LYMPHOCYTES NFR BLD AUTO: 34.6 % (ref 19.6–45.3)
MCH RBC QN AUTO: 26.2 PG (ref 26.6–33)
MCHC RBC AUTO-ENTMCNC: 30.9 G/DL (ref 31.5–35.7)
MCV RBC AUTO: 84.8 FL (ref 79–97)
MONOCYTES # BLD AUTO: 0.53 10*3/MM3 (ref 0.1–0.9)
MONOCYTES NFR BLD AUTO: 9.8 % (ref 5–12)
NEUTROPHILS NFR BLD AUTO: 2.79 10*3/MM3 (ref 1.7–7)
NEUTROPHILS NFR BLD AUTO: 51.7 % (ref 42.7–76)
NRBC BLD AUTO-RTO: 0 /100 WBC (ref 0–0.2)
PLATELET # BLD AUTO: 189 10*3/MM3 (ref 140–450)
PMV BLD AUTO: 12 FL (ref 6–12)
RBC # BLD AUTO: 4.54 10*6/MM3 (ref 3.77–5.28)
WBC NRBC COR # BLD AUTO: 5.4 10*3/MM3 (ref 3.4–10.8)

## 2024-01-19 PROCEDURE — 85025 COMPLETE CBC W/AUTO DIFF WBC: CPT | Performed by: INTERNAL MEDICINE

## 2024-01-19 PROCEDURE — 96413 CHEMO IV INFUSION 1 HR: CPT

## 2024-01-19 PROCEDURE — 25810000003 SODIUM CHLORIDE 0.9 % SOLUTION: Performed by: INTERNAL MEDICINE

## 2024-01-19 PROCEDURE — 25010000002 HEPARIN LOCK FLUSH PER 10 UNITS: Performed by: INTERNAL MEDICINE

## 2024-01-19 PROCEDURE — 25810000003 SODIUM CHLORIDE 0.9 % SOLUTION 250 ML FLEX CONT: Performed by: INTERNAL MEDICINE

## 2024-01-19 PROCEDURE — 25010000002 TRASTUZUMAB-QYYP 420 MG RECONSTITUTED SOLUTION 1 EACH VIAL: Performed by: INTERNAL MEDICINE

## 2024-01-19 RX ORDER — HEPARIN SODIUM (PORCINE) LOCK FLUSH IV SOLN 100 UNIT/ML 100 UNIT/ML
500 SOLUTION INTRAVENOUS AS NEEDED
Status: DISCONTINUED | OUTPATIENT
Start: 2024-01-19 | End: 2024-01-19 | Stop reason: HOSPADM

## 2024-01-19 RX ORDER — HEPARIN SODIUM (PORCINE) LOCK FLUSH IV SOLN 100 UNIT/ML 100 UNIT/ML
500 SOLUTION INTRAVENOUS AS NEEDED
OUTPATIENT
Start: 2024-01-19

## 2024-01-19 RX ORDER — SODIUM CHLORIDE 0.9 % (FLUSH) 0.9 %
10 SYRINGE (ML) INJECTION AS NEEDED
Status: DISCONTINUED | OUTPATIENT
Start: 2024-01-19 | End: 2024-01-19 | Stop reason: HOSPADM

## 2024-01-19 RX ORDER — SODIUM CHLORIDE 9 MG/ML
250 INJECTION, SOLUTION INTRAVENOUS ONCE
Status: CANCELLED | OUTPATIENT
Start: 2024-01-19

## 2024-01-19 RX ORDER — SODIUM CHLORIDE 9 MG/ML
250 INJECTION, SOLUTION INTRAVENOUS ONCE
OUTPATIENT
Start: 2024-02-09

## 2024-01-19 RX ORDER — SODIUM CHLORIDE 0.9 % (FLUSH) 0.9 %
10 SYRINGE (ML) INJECTION AS NEEDED
OUTPATIENT
Start: 2024-01-19

## 2024-01-19 RX ORDER — EXEMESTANE 25 MG/1
25 TABLET ORAL DAILY
Qty: 30 TABLET | Refills: 3 | Status: SHIPPED | OUTPATIENT
Start: 2024-01-19 | End: 2024-05-18

## 2024-01-19 RX ORDER — SODIUM CHLORIDE 9 MG/ML
250 INJECTION, SOLUTION INTRAVENOUS ONCE
Status: COMPLETED | OUTPATIENT
Start: 2024-01-19 | End: 2024-01-19

## 2024-01-19 RX ADMIN — SODIUM CHLORIDE 250 ML: 9 INJECTION, SOLUTION INTRAVENOUS at 08:54

## 2024-01-19 RX ADMIN — SODIUM CHLORIDE 580 MG: 9 INJECTION, SOLUTION INTRAVENOUS at 09:20

## 2024-01-19 RX ADMIN — Medication 10 ML: at 09:53

## 2024-01-19 RX ADMIN — Medication 500 UNITS: at 09:53

## 2024-01-19 NOTE — PROGRESS NOTES
Subjective   REASON FOR FOLLOWUP:  History of stage IIIA infiltrating ductal carcinoma of the right breast with 4 positive nodes, ER positive, HER-2/julián negative; treated with lumpectomy, status post 6 cycles of TAC. Received tamoxifen between February 2007 and March 2012. Femara was started in March 2012. Patient switched to Arimidex as of 06/21/2012 because of side effects with severe ankle joint pain with Femara.    2.   10 years of adjuvant therapy completed  March 2017.    3.   New diagnosis of carcinoma of the remaining right breast, needle biopsy 6/8/2023.  Tumor is HER2/julián positive and estrogen receptor positive/progesterone receptor negative.    4.   Bilateral mastectomies with implant reconstruction 8/16/2023    5.   Initiation of postop adjuvant chemotherapy with weekly Taxol and Herceptin.  First dose delivered 9/15/2023.     6.  Weekly Taxol and Herceptin completed 12/1/2023.    7.  Every 3-week Herceptin initiated 12/8/2023.    8.  Hormonal therapy with Aromasin 25 mg daily initiated on the visit of 1/19/2024      HISTORY OF PRESENT ILLNESS:     History of Present Illness Ms. Roblero is a 58 y.o.  female with the above noted history of stage IIIA carcinoma of the right breast, treated with lumpectomy, chemotherapy and radiation.  Following initial treatment with 6 cycles of TAC chemotherapy, she received extended hormonal therapy with 5 years of tamoxifen followed by 5 years of aromatase inhibitor.  She completed her 10 years of hormonal therapy in March 2017  .     She was seeing us annually and had not been seen since January of 2023 but when she underwent her breast imaging in May 2023 she had suspicious findings and ultimately underwent ultrasound-guided biopsy on 6/8/2023.  There were 2 separate biopsies in 1 of these areas was positive for HER2/julián overexpression by FISH.  Both tumors were ER positive and [place] negative.    She was referred to Dr. Vidal who had performed her  previous lumpectomy surgery in 2007 and currently the plan is for her to undergo bilateral mastectomies with reconstruction.    We had her seen by cardiac oncology due to her previous treatment with 6 cycles of TAC chemotherapy in 2007.  Thankfully her ejection fraction still seems to be normal at 61% based on her echocardiogram from 6/19/2023.    Dr. Vidal has scheduled the patient for radiographic staging with CT scan of the chest abdomen pelvis and a bone scan and the studies did not show any evidence of distant metastatic spread.    She had a bilateral mastectomies with implant reconstruction performed on 8/16/2023.  The tumor in the right breast was 3 x 1.5 x 1.2 cm.  Margins of resection were clear.  She also had placement of a left chest wall Mediport at the time of her surgery.    We recommended weekly Taxol and Herceptin for 12 weeks followed by continued Herceptin every 3 weeks for the remainder of the year of treatment.  She was estrogen receptor positive and will also be started on hormonal therapy once her chemotherapy is completed.    She completed her combined weekly Taxol and Herceptin in early December 2023 and is now receiving every 3-week Herceptin infusion with plans to continue this through September 2024 (12 months total).    INTERVAL HISTORY:  Palma returns to the office today for further Herceptin treatment which is now being delivered every 3 weeks at a dose of 6 mg/kg.    She had a follow-up echocardiogram performed on 1/12/2024 showing her ejection fraction is still normal at 64%.    Because she was estrogen receptor positive we also plan to initiate further hormonal therapy today with Aromasin 25 mg p.o. daily.    She reports she is having her tissue expanders taken out March 1 and having permanent implants.    She reports that her neuropathy symptoms are less noticeable since discontinuing Taxol.        Past Medical History, Past Surgical History, Social History, Family History have  "been reviewed and are without significant changes except as mentioned.    Review of Systems   Constitutional:  Negative for activity change, appetite change, fatigue, fever and unexpected weight change.   HENT:  Negative for hearing loss, nosebleeds, trouble swallowing and voice change.    Eyes:  Negative for visual disturbance.   Respiratory:  Negative for cough, shortness of breath and wheezing.    Cardiovascular:  Negative for chest pain and palpitations.   Gastrointestinal:  Negative for abdominal pain, diarrhea, nausea and vomiting.   Genitourinary:  Negative for difficulty urinating, frequency, hematuria and urgency.   Musculoskeletal:  Negative for back pain and neck pain.        Mild lymphedema right upper extremity.  Patient is wearing a sleeve and glove   Skin:  Negative for rash.   Neurological:  Positive for numbness. Negative for dizziness, seizures, syncope and headaches.   Hematological:  Negative for adenopathy. Does not bruise/bleed easily.   Psychiatric/Behavioral:  Negative for behavioral problems. The patient is not nervous/anxious.       A comprehensive 14 point review of systems was performed and was negative except as mentioned.    Medications:  The current medication list was reviewed in the EMR    ALLERGIES:    Allergies   Allergen Reactions    Oxycodone Nausea And Vomiting       Objective      Vitals:    01/19/24 0816   BP: 114/72   Pulse: 95   Resp: 18   Temp: 97.7 °F (36.5 °C)   TempSrc: Temporal   SpO2: 99%   Weight: 102 kg (224 lb 12.8 oz)   Height: 152.4 cm (60\")   PainSc: 0-No pain                 1/19/2024     8:19 AM   Current Status   ECOG score 0       Physical Exam   Constitutional: She is oriented to person, place, and time. She appears well-developed. No distress.   HENT:   Head: Normocephalic.   Eyes: Pupils are equal, round, and reactive to light. Conjunctivae are normal. No scleral icterus.   Neck: No JVD present. No thyromegaly present.   Cardiovascular: Normal rate and " regular rhythm. Exam reveals no gallop and no friction rub.   No murmur heard.  Pulmonary/Chest: Effort normal and breath sounds normal. She has no wheezes. She has no rales. Right breast exhibits no mass, no nipple discharge and no skin change. Left breast exhibits no mass, no nipple discharge and no skin change.   Bilateral mastectomies with implant reconstructions.  Right chest wall Mediport   Abdominal: Soft. Bowel sounds are normal. She exhibits no distension and no mass. There is no abdominal tenderness.   Musculoskeletal: Normal range of motion. Swelling (Mild lymphedema in right upper extremity.  No pain, tenderness, or cording noted on exam.) present. No tenderness or deformity.   Lymphadenopathy:     She has no cervical adenopathy.   Neurological: She is alert and oriented to person, place, and time. She has normal reflexes. No cranial nerve deficit.   Skin: Skin is warm and dry. No rash noted. No erythema.   Psychiatric: Her behavior is normal. Judgment normal.        I have reexamined the patient and the results are consistent with the previously documented exam. Weston Solares MD           RECENT LABS:  Hematology WBC   Date Value Ref Range Status   01/19/2024 5.40 3.40 - 10.80 10*3/mm3 Final   11/01/2021 5.9 3.4 - 10.8 x10E3/uL Final     RBC   Date Value Ref Range Status   01/19/2024 4.54 3.77 - 5.28 10*6/mm3 Final   11/01/2021 5.14 3.77 - 5.28 x10E6/uL Final     Hemoglobin   Date Value Ref Range Status   01/19/2024 11.9 (L) 12.0 - 15.9 g/dL Final     Hematocrit   Date Value Ref Range Status   01/19/2024 38.5 34.0 - 46.6 % Final     Platelets   Date Value Ref Range Status   01/19/2024 189 140 - 450 10*3/mm3 Final            Lab Results   Component Value Date    NEUTROABS 2.79 01/19/2024       Lab Results   Component Value Date    GLUCOSE 96 12/01/2023    BUN 15 12/01/2023    CREATININE 0.82 12/01/2023    EGFRRESULT 91.8 11/30/2022    EGFR 82.0 12/01/2023    BCR 18.3 12/01/2023    K 3.7 12/01/2023     CO2 26.7 12/01/2023    CALCIUM 8.9 12/01/2023    PROTENTOTREF 6.6 11/30/2022    ALBUMIN 3.9 12/01/2023    BILITOT 0.4 12/01/2023    AST 31 12/01/2023    ALT 35 (H) 12/01/2023     PATHOLOGY 8/16/2023  Final Diagnosis   1. Right Breast, Oriented Simple Skin Sparing Mastectomy (1,097 Grams): INVASIVE MAMMARY CARCINOMA,       NO SPECIAL TYPE (INVASIVE DUCTAL CARCINOMA).  A. Tumor site: Lower outer quadrant.               B. Histologic grade: [place] histologic score III (tubules = 3, nuclei = 2, mitosis = 3).               C. Tumor size: 30 x 15 x 12 mm.               D. Tumor focality: Single focus.               E. Ductal Carcinoma in-situ (DCIS): Not identified.               F. No lobular neoplasia identified.               G. Overlying skin and nipple are uninvolved by tumor.               H. No definitive lymphovascular space invasion identified.               I. Focal perineural invasion is present.  J. Margins: Uninvolved by invasive and in-situ carcinoma.               1. Invasive carcinoma comes to within 7 mm of the anterior margin, 40 mm from the inferior margin,      60 mm from the posterior and lateral margins, 80 mm from the medial margin and 130 mm from the       superior margin of resection.               K. Lymph Nodes: Not submitted.  L. Hormone receptor status: ER positive, IL negative, HER2 positive by FISH (right medial biopsy),       Ki-67 35-40%  (performed on prior biopsy [id number]).  M. Pathologic stage: pT2, NX.     2. Left Breast, Oriented Simple Skin Sparing Mastectomy (1,593 Grams):   A. Benign unremarkable breast tissue, skin and nipple.     PATHOLOGY 6/8/2023  Final Diagnosis   1. Breast, Right Lateral 6:30 Position, 3 cm FN, Core Biopsy:               A. Invasive mammary carcinoma, no special type (ductal), [place] histologic grade II (tubules = 3, nuclei = 2,        mitoses = 2) measuring 6 mm maximally and involving four core fragments.  B. No definitive in-situ carcinoma is  identified.  C. No definitive lymphovascular space invasion identified.     2. Breast, Right Medial 6:30 Position, 3 cm FN, Core Biopsy:                A. Invasive mammary carcinoma, no special type (ductal), Perez histologic grade II (tubules = 3, nuclei = 2,        mitoses = 1) measuring 10 mm maximally and involving three core fragments.  B. No definitive in-situ carcinoma is identified.  C. No definitive lymphovascular space invasion identified.     Echocardiogram 1/12/2024  Interpretation Summary       Left ventricular systolic function is normal. Calculated left ventricular EF = 64.3% Normal global longitudinal LV strain (GLS) = -22.3%. Left ventricle strain data was reviewed by the physician and found to be accurate. Normal left ventricular cavity size and wall thickness noted. All left ventricular wall segments contract normally. Left ventricular diastolic function is consistent with (grade I) impaired relaxation    Limited 2D imaging of cardiac valves with normal valve function by Doppler      CT CHEST, ABDOMEN, AND PELVIS WITHOUT CONTRAST. 7/21/2023  FINDINGS:  CHEST: Right breast findings discussed on breast MRI 06/17/2023. There  is a cluster of nodular densities at the right axilla in close proximity  to a cluster of surgical clips and some of these may represent vessels  or lymphatics. Characterization is limited in the absence of IV  contrast. There is no definitive pathologically enlarged node present.  There are no pathologically enlarged nodes at the left axilla and there  is no subpectoral lymphadenopathy, and there is no lymphadenopathy at  the mediastinum or jovita given constraints of noncontrasted technique.  The nodularity along the pleura at the dependent aspect of the lower  chest is likely related to dependent atelectatic change. No definite  suspicious pulmonary nodules are seen. There are no pleural or  pericardial effusions.     ABDOMEN/PELVIS: Noncontrasted liver appears  unremarkable. The  gallbladder appears unremarkable and there is no biliary dilatation.  Splenic size is normal. Noncontrasted pancreas, adrenals, and kidneys  appear unremarkable. There is no acute bowel abnormality. Appendix  appears within normal limits. The uterus is slightly lobular in contour  likely secondary to small fibroids. Noncontrasted adnexa appear  unremarkable. There is no free fluid or lymphadenopathy. There are mild  abdominal aortic atherosclerotic changes throughout the abdominal aorta  without aneurysmal dilatation. No suspicious bone lesion is seen.     IMPRESSION:  Given constraints of noncontrasted technique, there is no  convincing evidence for metastatic disease within the chest, abdomen, or  pelvis.      Narrative & Impression   NUCLEAR MEDICINE WHOLE BODY BONE SCAN 7/14/2023     HISTORY: Breast cancer. Evaluate for metastatic disease.     TECHNIQUE:  19.1 mCi of 99m technetium MDP was administered intravenous  followed by 3 hour delayed phase whole body imaging with selected spot  views.     COMPARISON: None.     FINDINGS:  There is mild increased uptake at the right sternoclavicular  joint that is attributed to arthritis. Arthritic uptake is present in  both shoulders, knees, and mid feet.  There is also mild increased  lumbar spine uptake that is most likely degenerative/arthritic. Both  kidneys and the urinary bladder are visualized.     IMPRESSION:  Areas of mild uptake are typical for arthritis/degenerative  change and there is no scintigraphic evidence to suggest bony metastatic  disease.        MAMMO SCREENING DIGITAL TOMOSYNTHESIS BILATERAL W CAD- 4/14/2023   IMPRESSION:  1. There is an area of focal asymmetry in the middle third retroareolar  region of the right breast. Further evaluation with spot compression CC  and MLO mammographic and Tomosynthesis images and targeted right breast  sonography is recommended.  2. There are no findings suspicious for malignancy in the left  breast.     BI-RADS Category 0: Incomplete. Needs additional imaging evaluation.    EXAMINATIONS: UNILATERAL RIGHT DIGITAL DIAGNOSTIC MAMMOGRAPHY WITH  TOMOSYNTHESIS AND LIMITED RIGHT BREAST SONOGRAPHY 5/17/2023  IMPRESSION:  There is an irregular 1 cm hypoechoic mass with internal peripheral  vascularity that is seen in the right breast at the 6:30 position on the  order of 3 cm from the nipple. It is immediately adjacent to a 1.9 cm  oval circumscribed hypoechoic mass. Together both lesions measure on the  order 2.7 cm in greatest dimension. Ultrasound guided biopsy of both  lesions is recommended. Given the close proximity of both lesions, both  lesions may be able to be sampled and submitted as one specimen.     BI-RADS Category 4: Suspicious abnormality or suspicious finding. Biopsy  should be considered.    MRI BREAST BILATERAL W WO CONTRAST-  6/17/2023  IMPRESSION AND RECOMMENDATION:     1.  Adjacent irregular enhancing masses each measuring 1.6 cm at 6:00 to  7:00 in the middle right breast represent the 2 sites of biopsy-proven  malignancy. Adjacent suspicious enhancing foci and areas of nonmass  enhancement are identified, in addition to an irregular enhancing mass  and multiple additional enhancing foci centered at 2:00 to 3:00 in the  right breast, which are also suspicious. The 2 sites of biopsy-proven  malignancy and suspicious areas of enhancement together measure up to  approximately 8.2 cm in maximum dimension, as detailed above. Surgical  management is recommended.  2.  No MRI evidence of malignancy in the left breast.     BI-RADS Category 4: Suspicious      Assessment & Plan     *Stage IIIA infiltrating ductal carcinoma of the right breast   Treated with a lumpectomy, radiation, 6 cycles of TAC chemotherapy and ongoing hormonal therapy. She completed 10 years of adjuvant hormonal therapy with 5 years of tamoxifen followed by 5 years of Arimidex which she completed in 2017.  Carcinoma of the  remaining right breast.  Tumor was positive for HER2/julián overexpression and positive for estrogen receptors and negative for progesterone receptors.  Bilateral mastectomies with immediate implant reconstruction 8/16/2023.  Mediport placement 8/16/2023  Echocardiogram from 6/19/2023 shows normal left ventricular ejection fraction of 61% (patient previously had 6 cycles of Adriamycin containing chemotherapy and will be needing Herceptin therapy postoperatively).  Plan for postop adjuvant treatment with weekly Taxol and Herceptin x12 weeks followed by continued single agent Herceptin every 3 weeks for completion of 12 months of therapy total.  Plan for hormonal therapy possibly with Aromasin to begin once Taxol is completed.  9/14/2023: Patient returns for cycle 1 day 15 weekly Taxol and Herceptin.  Unfortunately she is neutropenic with an ANC of 800 and will not receive Taxol today but will receive Herceptin.  Her future Taxol doses will be modified to an 85% dose level.  10/6/2023: Proceed with cycle 2 day 1 taxol and Herceptin. We will continue taxol at 85% reduction of original dose.   10/27/2023: Palma will receive her 6th weekly dose of Taxol and her seventh weekly dose of Herceptin in the office today.Taxol dose will remain at 85% dose level from the original treatment due to development of neutropenia.  12/1/2023: We will proceed with 12th and final weekly Taxol and  Herceptin in office today.  She is tolerating treatment well.   Initial dose of every 3-week Herceptin at 6 mg/kg delivered 12/8/2023.  Aromasin 25 mg daily initiated after the visit of 1/19/2024.    *Chemotherapy-induced neuropathy  11/17/2023: Patient continues to use cooling mitts and gloves with treatment.  She has been noticing some mild neuropathy developing in her toes of her bilateral feet.  She feels that this discomfort is more noticeable in the evening when she is trying to sleep.  Continue gabapentin 100 mg p.o. at bedtime.     *Right  upper extremity lymphedema  11/17/2023: Patient has had history of lymphedema in her right upper extremity.  She does feel that in the past week or so her edema has been more noticeable to her.  She has been wearing her compression garments.  She has an appointment with lymphedema clinic next week on 12/8/2023.  12/29/2023: Continues compression garment and has followed up with the lymphedema clinic.  Stable at this time.      PLAN:     Proceed with Herceptin today on a dose and schedule of 6 mg/kg IV every 3 weeks.  We anticipate this will be continued for the remainder of 12 months of therapy through September 2024..  Encouraged use of compression garments to right upper extremity and compression.  Continue Neurontin 100 mg p.o. nightly.    We discussed the role of Aromasin since her tumor was estrogen receptor positive.  We have E scribed a prescription for Aromasin 25 mg daily to her pharmacy.  She will return in 3 weeks for lab, Herceptin treatment, and MD evaluation for initial tolerance to Aromasin.  She will be undergoing surgery for removal of her tissue expanders and implant placement March 1, 2024.      Patient remains on high risk medication and requires close monitoring for toxicity.        1/19/2024      CC:

## 2024-02-09 ENCOUNTER — INFUSION (OUTPATIENT)
Dept: ONCOLOGY | Facility: HOSPITAL | Age: 59
End: 2024-02-09
Payer: COMMERCIAL

## 2024-02-09 ENCOUNTER — OFFICE VISIT (OUTPATIENT)
Dept: ONCOLOGY | Facility: CLINIC | Age: 59
End: 2024-02-09
Payer: COMMERCIAL

## 2024-02-09 VITALS
BODY MASS INDEX: 44.45 KG/M2 | DIASTOLIC BLOOD PRESSURE: 63 MMHG | TEMPERATURE: 98 F | WEIGHT: 226.4 LBS | HEART RATE: 88 BPM | HEIGHT: 60 IN | SYSTOLIC BLOOD PRESSURE: 134 MMHG | OXYGEN SATURATION: 100 % | RESPIRATION RATE: 16 BRPM

## 2024-02-09 DIAGNOSIS — Z90.13 HISTORY OF BILATERAL MASTECTOMY: ICD-10-CM

## 2024-02-09 DIAGNOSIS — Z45.2 ENCOUNTER FOR ADJUSTMENT OR MANAGEMENT OF VASCULAR ACCESS DEVICE: ICD-10-CM

## 2024-02-09 DIAGNOSIS — Z17.0 MALIGNANT NEOPLASM OF OVERLAPPING SITES OF RIGHT BREAST IN FEMALE, ESTROGEN RECEPTOR POSITIVE: Primary | ICD-10-CM

## 2024-02-09 DIAGNOSIS — Z17.0 MALIGNANT NEOPLASM OF OVERLAPPING SITES OF RIGHT BREAST IN FEMALE, ESTROGEN RECEPTOR POSITIVE: ICD-10-CM

## 2024-02-09 DIAGNOSIS — C50.811 MALIGNANT NEOPLASM OF OVERLAPPING SITES OF RIGHT BREAST IN FEMALE, ESTROGEN RECEPTOR POSITIVE: ICD-10-CM

## 2024-02-09 DIAGNOSIS — C80.1 LYMPHEDEMA DUE TO MALIGNANT NEOPLASM: ICD-10-CM

## 2024-02-09 DIAGNOSIS — C50.811 MALIGNANT NEOPLASM OF OVERLAPPING SITES OF RIGHT BREAST IN FEMALE, ESTROGEN RECEPTOR POSITIVE: Primary | ICD-10-CM

## 2024-02-09 DIAGNOSIS — Z17.0 MALIGNANT NEOPLASM OF LOWER-OUTER QUADRANT OF RIGHT BREAST OF FEMALE, ESTROGEN RECEPTOR POSITIVE: ICD-10-CM

## 2024-02-09 DIAGNOSIS — C50.911 MALIGNANT NEOPLASM OF RIGHT BREAST IN FEMALE, ESTROGEN RECEPTOR POSITIVE, UNSPECIFIED SITE OF BREAST: ICD-10-CM

## 2024-02-09 DIAGNOSIS — Z17.0 MALIGNANT NEOPLASM OF RIGHT BREAST IN FEMALE, ESTROGEN RECEPTOR POSITIVE, UNSPECIFIED SITE OF BREAST: ICD-10-CM

## 2024-02-09 DIAGNOSIS — C50.511 MALIGNANT NEOPLASM OF LOWER-OUTER QUADRANT OF RIGHT BREAST OF FEMALE, ESTROGEN RECEPTOR POSITIVE: ICD-10-CM

## 2024-02-09 DIAGNOSIS — I89.0 LYMPHEDEMA DUE TO MALIGNANT NEOPLASM: ICD-10-CM

## 2024-02-09 DIAGNOSIS — Z90.13 HISTORY OF BILATERAL MASTECTOMY: Primary | ICD-10-CM

## 2024-02-09 LAB
ALBUMIN SERPL-MCNC: 4.1 G/DL (ref 3.5–5.2)
ALBUMIN/GLOB SERPL: 1.4 G/DL
ALP SERPL-CCNC: 59 U/L (ref 39–117)
ALT SERPL W P-5'-P-CCNC: 26 U/L (ref 1–33)
ANION GAP SERPL CALCULATED.3IONS-SCNC: 8 MMOL/L (ref 5–15)
AST SERPL-CCNC: 26 U/L (ref 1–32)
BASOPHILS # BLD AUTO: 0.04 10*3/MM3 (ref 0–0.2)
BASOPHILS NFR BLD AUTO: 0.6 % (ref 0–1.5)
BILIRUB SERPL-MCNC: 0.5 MG/DL (ref 0–1.2)
BUN SERPL-MCNC: 16 MG/DL (ref 8–23)
BUN/CREAT SERPL: 17.2 (ref 7–25)
CALCIUM SPEC-SCNC: 9.1 MG/DL (ref 8.6–10.5)
CHLORIDE SERPL-SCNC: 106 MMOL/L (ref 98–107)
CO2 SERPL-SCNC: 26 MMOL/L (ref 22–29)
CREAT SERPL-MCNC: 0.93 MG/DL (ref 0.57–1)
DEPRECATED RDW RBC AUTO: 41.2 FL (ref 37–54)
EGFRCR SERPLBLD CKD-EPI 2021: 70.5 ML/MIN/1.73
EOSINOPHIL # BLD AUTO: 0.11 10*3/MM3 (ref 0–0.4)
EOSINOPHIL NFR BLD AUTO: 1.7 % (ref 0.3–6.2)
ERYTHROCYTE [DISTWIDTH] IN BLOOD BY AUTOMATED COUNT: 13.4 % (ref 12.3–15.4)
GLOBULIN UR ELPH-MCNC: 3 GM/DL
GLUCOSE SERPL-MCNC: 92 MG/DL (ref 65–99)
HCT VFR BLD AUTO: 38.9 % (ref 34–46.6)
HGB BLD-MCNC: 12.3 G/DL (ref 12–15.9)
IMM GRANULOCYTES # BLD AUTO: 0.03 10*3/MM3 (ref 0–0.05)
IMM GRANULOCYTES NFR BLD AUTO: 0.5 % (ref 0–0.5)
LYMPHOCYTES # BLD AUTO: 2.15 10*3/MM3 (ref 0.7–3.1)
LYMPHOCYTES NFR BLD AUTO: 34.2 % (ref 19.6–45.3)
MCH RBC QN AUTO: 26.6 PG (ref 26.6–33)
MCHC RBC AUTO-ENTMCNC: 31.6 G/DL (ref 31.5–35.7)
MCV RBC AUTO: 84 FL (ref 79–97)
MONOCYTES # BLD AUTO: 0.52 10*3/MM3 (ref 0.1–0.9)
MONOCYTES NFR BLD AUTO: 8.3 % (ref 5–12)
NEUTROPHILS NFR BLD AUTO: 3.44 10*3/MM3 (ref 1.7–7)
NEUTROPHILS NFR BLD AUTO: 54.7 % (ref 42.7–76)
NRBC BLD AUTO-RTO: 0 /100 WBC (ref 0–0.2)
PLATELET # BLD AUTO: 185 10*3/MM3 (ref 140–450)
PMV BLD AUTO: 12.1 FL (ref 6–12)
POTASSIUM SERPL-SCNC: 4 MMOL/L (ref 3.5–5.2)
PROT SERPL-MCNC: 7.1 G/DL (ref 6–8.5)
RBC # BLD AUTO: 4.63 10*6/MM3 (ref 3.77–5.28)
SODIUM SERPL-SCNC: 140 MMOL/L (ref 136–145)
WBC NRBC COR # BLD AUTO: 6.29 10*3/MM3 (ref 3.4–10.8)

## 2024-02-09 PROCEDURE — 96413 CHEMO IV INFUSION 1 HR: CPT

## 2024-02-09 PROCEDURE — 25010000002 TRASTUZUMAB-QYYP 420 MG RECONSTITUTED SOLUTION 1 EACH VIAL: Performed by: INTERNAL MEDICINE

## 2024-02-09 PROCEDURE — 25010000002 HEPARIN LOCK FLUSH PER 10 UNITS: Performed by: INTERNAL MEDICINE

## 2024-02-09 PROCEDURE — 85025 COMPLETE CBC W/AUTO DIFF WBC: CPT | Performed by: INTERNAL MEDICINE

## 2024-02-09 PROCEDURE — 80053 COMPREHEN METABOLIC PANEL: CPT | Performed by: INTERNAL MEDICINE

## 2024-02-09 PROCEDURE — 25810000003 SODIUM CHLORIDE 0.9 % SOLUTION: Performed by: INTERNAL MEDICINE

## 2024-02-09 PROCEDURE — 25810000003 SODIUM CHLORIDE 0.9 % SOLUTION 250 ML FLEX CONT: Performed by: INTERNAL MEDICINE

## 2024-02-09 RX ORDER — SODIUM CHLORIDE 9 MG/ML
250 INJECTION, SOLUTION INTRAVENOUS ONCE
OUTPATIENT
Start: 2024-03-29

## 2024-02-09 RX ORDER — SODIUM CHLORIDE 0.9 % (FLUSH) 0.9 %
10 SYRINGE (ML) INJECTION AS NEEDED
Status: DISCONTINUED | OUTPATIENT
Start: 2024-02-09 | End: 2024-02-09 | Stop reason: HOSPADM

## 2024-02-09 RX ORDER — SODIUM CHLORIDE 9 MG/ML
250 INJECTION, SOLUTION INTRAVENOUS ONCE
OUTPATIENT
Start: 2024-03-08

## 2024-02-09 RX ORDER — HEPARIN SODIUM (PORCINE) LOCK FLUSH IV SOLN 100 UNIT/ML 100 UNIT/ML
500 SOLUTION INTRAVENOUS AS NEEDED
OUTPATIENT
Start: 2024-02-09

## 2024-02-09 RX ORDER — SODIUM CHLORIDE 9 MG/ML
250 INJECTION, SOLUTION INTRAVENOUS ONCE
OUTPATIENT
Start: 2024-04-19

## 2024-02-09 RX ORDER — HEPARIN SODIUM (PORCINE) LOCK FLUSH IV SOLN 100 UNIT/ML 100 UNIT/ML
500 SOLUTION INTRAVENOUS AS NEEDED
Status: DISCONTINUED | OUTPATIENT
Start: 2024-02-09 | End: 2024-02-09 | Stop reason: HOSPADM

## 2024-02-09 RX ORDER — SODIUM CHLORIDE 9 MG/ML
250 INJECTION, SOLUTION INTRAVENOUS ONCE
Status: COMPLETED | OUTPATIENT
Start: 2024-02-09 | End: 2024-02-09

## 2024-02-09 RX ORDER — SODIUM CHLORIDE 0.9 % (FLUSH) 0.9 %
10 SYRINGE (ML) INJECTION AS NEEDED
OUTPATIENT
Start: 2024-02-09

## 2024-02-09 RX ADMIN — Medication 10 ML: at 12:51

## 2024-02-09 RX ADMIN — SODIUM CHLORIDE 580 MG: 9 INJECTION, SOLUTION INTRAVENOUS at 12:16

## 2024-02-09 RX ADMIN — Medication 500 UNITS: at 12:51

## 2024-02-09 RX ADMIN — SODIUM CHLORIDE 250 ML: 9 INJECTION, SOLUTION INTRAVENOUS at 12:16

## 2024-02-09 NOTE — PROGRESS NOTES
Subjective   REASON FOR FOLLOWUP:  History of stage IIIA infiltrating ductal carcinoma of the right breast with 4 positive nodes, ER positive, HER-2/julián negative; treated with lumpectomy, status post 6 cycles of TAC. Received tamoxifen between February 2007 and March 2012. Femara was started in March 2012. Patient switched to Arimidex as of 06/21/2012 because of side effects with severe ankle joint pain with Femara.    2.   10 years of adjuvant therapy completed  March 2017.    3.   New diagnosis of carcinoma of the remaining right breast, needle biopsy 6/8/2023.  Tumor is HER2/julián positive and estrogen receptor positive/progesterone receptor negative.    4.   Bilateral mastectomies with implant reconstruction 8/16/2023    5.   Initiation of postop adjuvant chemotherapy with weekly Taxol and Herceptin.  First dose delivered 9/15/2023.     6.  Weekly Taxol and Herceptin completed 12/1/2023.    7.  Every 3-week Herceptin initiated 12/8/2023.    8.  Plan Hormonal therapy with Aromasin 25 mg daily.  This was prescribed on the visit of 1/19/2024 but the patient has not yet started taking the medication and wishes to wait until after her upcoming reconstructive surgery.      HISTORY OF PRESENT ILLNESS:     History of Present Illness Ms. Roblero is a 58 y.o.  female with the above noted history of stage IIIA carcinoma of the right breast, treated with lumpectomy, chemotherapy and radiation.  Following initial treatment with 6 cycles of TAC chemotherapy, she received extended hormonal therapy with 5 years of tamoxifen followed by 5 years of aromatase inhibitor.  She completed her 10 years of hormonal therapy in March 2017  .     She was seeing us annually and had not been seen since January of 2023 but when she underwent her breast imaging in May 2023 she had suspicious findings and ultimately underwent ultrasound-guided biopsy on 6/8/2023.  There were 2 separate biopsies in 1 of these areas was positive for  HER2/julián overexpression by FISH.  Both tumors were ER positive and MS negative.    She was referred to Dr. Vidal who had performed her previous lumpectomy surgery in 2007 and currently the plan is for her to undergo bilateral mastectomies with reconstruction.    We had her seen by cardiac oncology due to her previous treatment with 6 cycles of TAC chemotherapy in 2007.  Thankfully her ejection fraction still seems to be normal at 61% based on her echocardiogram from 6/19/2023.    Dr. Vidal has scheduled the patient for radiographic staging with CT scan of the chest abdomen pelvis and a bone scan and the studies did not show any evidence of distant metastatic spread.    She had a bilateral mastectomies with implant reconstruction performed on 8/16/2023.  The tumor in the right breast was 3 x 1.5 x 1.2 cm.  Margins of resection were clear.  She also had placement of a left chest wall Mediport at the time of her surgery.    We recommended weekly Taxol and Herceptin for 12 weeks followed by continued Herceptin every 3 weeks for the remainder of the year of treatment.  She was estrogen receptor positive and will also be started on hormonal therapy once her chemotherapy is completed.    She completed her combined weekly Taxol and Herceptin in early December 2023 and is now receiving every 3-week Herceptin infusion with plans to continue this through September 2024 (12 months total).    INTERVAL HISTORY:  Palma returns to the office today for further Herceptin treatment which is now being delivered every 3 weeks at a dose of 6 mg/kg.    She had a follow-up echocardiogram performed on 1/12/2024 showing her ejection fraction is still normal at 64%.    On her last visit we also discussed further hormonal therapy with Aromasin 25 mg p.o. daily.  We sent in a prescription for the medication but she tells me today that she would rather wait to start this until after her upcoming surgery.    She is having her tissue expanders  "taken out and have permanent implants inserted on 3/1/2024.    She reports that her neuropathy symptoms are less noticeable since discontinuing Taxol.        Past Medical History, Past Surgical History, Social History, Family History have been reviewed and are without significant changes except as mentioned.    Review of Systems   Constitutional:  Negative for activity change, appetite change, fatigue, fever and unexpected weight change.   HENT:  Negative for hearing loss, nosebleeds, trouble swallowing and voice change.    Eyes:  Negative for visual disturbance.   Respiratory:  Negative for cough, shortness of breath and wheezing.    Cardiovascular:  Negative for chest pain and palpitations.   Gastrointestinal:  Negative for abdominal pain, diarrhea, nausea and vomiting.   Genitourinary:  Negative for difficulty urinating, frequency, hematuria and urgency.   Musculoskeletal:  Negative for back pain and neck pain.        Mild lymphedema right upper extremity.  Patient is wearing a sleeve and glove   Skin:  Negative for rash.   Neurological:  Positive for numbness. Negative for dizziness, seizures, syncope and headaches.   Hematological:  Negative for adenopathy. Does not bruise/bleed easily.   Psychiatric/Behavioral:  Negative for behavioral problems. The patient is not nervous/anxious.       A comprehensive 14 point review of systems was performed and was negative except as mentioned.    Medications:  The current medication list was reviewed in the EMR    ALLERGIES:    Allergies   Allergen Reactions    Oxycodone Nausea And Vomiting       Objective      Vitals:    02/09/24 1122   BP: 134/63   Pulse: 88   Resp: 16   Temp: 98 °F (36.7 °C)   TempSrc: Temporal   SpO2: 100%   Weight: 103 kg (226 lb 6.4 oz)   Height: 152 cm (59.84\")   PainSc: 0-No pain                   2/9/2024    11:25 AM   Current Status   ECOG score 0       Physical Exam   Constitutional: She is oriented to person, place, and time. She appears " well-developed. No distress.   HENT:   Head: Normocephalic.   Eyes: Pupils are equal, round, and reactive to light. Conjunctivae are normal. No scleral icterus.   Neck: No JVD present. No thyromegaly present.   Cardiovascular: Normal rate and regular rhythm. Exam reveals no gallop and no friction rub.   No murmur heard.  Pulmonary/Chest: Effort normal and breath sounds normal. She has no wheezes. She has no rales. Right breast exhibits no mass, no nipple discharge and no skin change. Left breast exhibits no mass, no nipple discharge and no skin change.   Bilateral mastectomies with implant reconstructions.  Right chest wall Mediport   Abdominal: Soft. Bowel sounds are normal. She exhibits no distension and no mass. There is no abdominal tenderness.   Musculoskeletal: Normal range of motion. Swelling (Mild lymphedema in right upper extremity.  No pain, tenderness, or cording noted on exam.) present. No tenderness or deformity.   Lymphadenopathy:     She has no cervical adenopathy.   Neurological: She is alert and oriented to person, place, and time. She has normal reflexes. No cranial nerve deficit.   Skin: Skin is warm and dry. No rash noted. No erythema.   Psychiatric: Her behavior is normal. Judgment normal.        I have reexamined the patient and the results are consistent with the previously documented exam. Weston Solares MD           RECENT LABS:  Hematology WBC   Date Value Ref Range Status   02/09/2024 6.29 3.40 - 10.80 10*3/mm3 Final   11/01/2021 5.9 3.4 - 10.8 x10E3/uL Final     RBC   Date Value Ref Range Status   02/09/2024 4.63 3.77 - 5.28 10*6/mm3 Final   11/01/2021 5.14 3.77 - 5.28 x10E6/uL Final     Hemoglobin   Date Value Ref Range Status   02/09/2024 12.3 12.0 - 15.9 g/dL Final     Hematocrit   Date Value Ref Range Status   02/09/2024 38.9 34.0 - 46.6 % Final     Platelets   Date Value Ref Range Status   02/09/2024 185 140 - 450 10*3/mm3 Final            Lab Results   Component Value Date     NEUTROABS 3.44 02/09/2024       Lab Results   Component Value Date    GLUCOSE 96 12/01/2023    BUN 15 12/01/2023    CREATININE 0.82 12/01/2023    EGFRRESULT 91.8 11/30/2022    EGFR 82.0 12/01/2023    BCR 18.3 12/01/2023    K 3.7 12/01/2023    CO2 26.7 12/01/2023    CALCIUM 8.9 12/01/2023    PROTENTOTREF 6.6 11/30/2022    ALBUMIN 3.9 12/01/2023    BILITOT 0.4 12/01/2023    AST 31 12/01/2023    ALT 35 (H) 12/01/2023     PATHOLOGY 8/16/2023  Final Diagnosis   1. Right Breast, Oriented Simple Skin Sparing Mastectomy (1,097 Grams): INVASIVE MAMMARY CARCINOMA,       NO SPECIAL TYPE (INVASIVE DUCTAL CARCINOMA).  A. Tumor site: Lower outer quadrant.               B. Histologic grade: Perez histologic score III (tubules = 3, nuclei = 2, mitosis = 3).               C. Tumor size: 30 x 15 x 12 mm.               D. Tumor focality: Single focus.               E. Ductal Carcinoma in-situ (DCIS): Not identified.               F. No lobular neoplasia identified.               G. Overlying skin and nipple are uninvolved by tumor.               H. No definitive lymphovascular space invasion identified.               I. Focal perineural invasion is present.  J. Margins: Uninvolved by invasive and in-situ carcinoma.               1. Invasive carcinoma comes to within 7 mm of the anterior margin, 40 mm from the inferior margin,      60 mm from the posterior and lateral margins, 80 mm from the medial margin and 130 mm from the       superior margin of resection.               K. Lymph Nodes: Not submitted.  L. Hormone receptor status: ER positive, NE negative, HER2 positive by FISH (right medial biopsy),       Ki-67 35-40%  (performed on prior biopsy [id number]).  M. Pathologic stage: pT2, NX.     2. Left Breast, Oriented Simple Skin Sparing Mastectomy (1,593 Grams):   A. Benign unremarkable breast tissue, skin and nipple.     PATHOLOGY 6/8/2023  Final Diagnosis   1. Breast, Right Lateral 6:30 Position, 3 cm FN, Core Biopsy:                A. Invasive mammary carcinoma, no special type (ductal), Perez histologic grade II (tubules = 3, nuclei = 2,        mitoses = 2) measuring 6 mm maximally and involving four core fragments.  B. No definitive in-situ carcinoma is identified.  C. No definitive lymphovascular space invasion identified.     2. Breast, Right Medial 6:30 Position, 3 cm FN, Core Biopsy:                A. Invasive mammary carcinoma, no special type (ductal), Perez histologic grade II (tubules = 3, nuclei = 2,        mitoses = 1) measuring 10 mm maximally and involving three core fragments.  B. No definitive in-situ carcinoma is identified.  C. No definitive lymphovascular space invasion identified.     Echocardiogram 1/12/2024  Interpretation Summary       Left ventricular systolic function is normal. Calculated left ventricular EF = 64.3% Normal global longitudinal LV strain (GLS) = -22.3%. Left ventricle strain data was reviewed by the physician and found to be accurate. Normal left ventricular cavity size and wall thickness noted. All left ventricular wall segments contract normally. Left ventricular diastolic function is consistent with (grade I) impaired relaxation    Limited 2D imaging of cardiac valves with normal valve function by Doppler      CT CHEST, ABDOMEN, AND PELVIS WITHOUT CONTRAST. 7/21/2023  FINDINGS:  CHEST: Right breast findings discussed on breast MRI 06/17/2023. There  is a cluster of nodular densities at the right axilla in close proximity  to a cluster of surgical clips and some of these may represent vessels  or lymphatics. Characterization is limited in the absence of IV  contrast. There is no definitive pathologically enlarged node present.  There are no pathologically enlarged nodes at the left axilla and there  is no subpectoral lymphadenopathy, and there is no lymphadenopathy at  the mediastinum or jovita given constraints of noncontrasted technique.  The nodularity along the pleura at the  dependent aspect of the lower  chest is likely related to dependent atelectatic change. No definite  suspicious pulmonary nodules are seen. There are no pleural or  pericardial effusions.     ABDOMEN/PELVIS: Noncontrasted liver appears unremarkable. The  gallbladder appears unremarkable and there is no biliary dilatation.  Splenic size is normal. Noncontrasted pancreas, adrenals, and kidneys  appear unremarkable. There is no acute bowel abnormality. Appendix  appears within normal limits. The uterus is slightly lobular in contour  likely secondary to small fibroids. Noncontrasted adnexa appear  unremarkable. There is no free fluid or lymphadenopathy. There are mild  abdominal aortic atherosclerotic changes throughout the abdominal aorta  without aneurysmal dilatation. No suspicious bone lesion is seen.     IMPRESSION:  Given constraints of noncontrasted technique, there is no  convincing evidence for metastatic disease within the chest, abdomen, or  pelvis.      Narrative & Impression   NUCLEAR MEDICINE WHOLE BODY BONE SCAN 7/14/2023     HISTORY: Breast cancer. Evaluate for metastatic disease.     TECHNIQUE:  19.1 mCi of 99m technetium MDP was administered intravenous  followed by 3 hour delayed phase whole body imaging with selected spot  views.     COMPARISON: None.     FINDINGS:  There is mild increased uptake at the right sternoclavicular  joint that is attributed to arthritis. Arthritic uptake is present in  both shoulders, knees, and mid feet.  There is also mild increased  lumbar spine uptake that is most likely degenerative/arthritic. Both  kidneys and the urinary bladder are visualized.     IMPRESSION:  Areas of mild uptake are typical for arthritis/degenerative  change and there is no scintigraphic evidence to suggest bony metastatic  disease.        MAMMO SCREENING DIGITAL TOMOSYNTHESIS BILATERAL W CAD- 4/14/2023   IMPRESSION:  1. There is an area of focal asymmetry in the middle third  retroareolar  region of the right breast. Further evaluation with spot compression CC  and MLO mammographic and Tomosynthesis images and targeted right breast  sonography is recommended.  2. There are no findings suspicious for malignancy in the left breast.     BI-RADS Category 0: Incomplete. Needs additional imaging evaluation.    EXAMINATIONS: UNILATERAL RIGHT DIGITAL DIAGNOSTIC MAMMOGRAPHY WITH  TOMOSYNTHESIS AND LIMITED RIGHT BREAST SONOGRAPHY 5/17/2023  IMPRESSION:  There is an irregular 1 cm hypoechoic mass with internal peripheral  vascularity that is seen in the right breast at the 6:30 position on the  order of 3 cm from the nipple. It is immediately adjacent to a 1.9 cm  oval circumscribed hypoechoic mass. Together both lesions measure on the  order 2.7 cm in greatest dimension. Ultrasound guided biopsy of both  lesions is recommended. Given the close proximity of both lesions, both  lesions may be able to be sampled and submitted as one specimen.     BI-RADS Category 4: Suspicious abnormality or suspicious finding. Biopsy  should be considered.    MRI BREAST BILATERAL W WO CONTRAST-  6/17/2023  IMPRESSION AND RECOMMENDATION:     1.  Adjacent irregular enhancing masses each measuring 1.6 cm at 6:00 to  7:00 in the middle right breast represent the 2 sites of biopsy-proven  malignancy. Adjacent suspicious enhancing foci and areas of nonmass  enhancement are identified, in addition to an irregular enhancing mass  and multiple additional enhancing foci centered at 2:00 to 3:00 in the  right breast, which are also suspicious. The 2 sites of biopsy-proven  malignancy and suspicious areas of enhancement together measure up to  approximately 8.2 cm in maximum dimension, as detailed above. Surgical  management is recommended.  2.  No MRI evidence of malignancy in the left breast.     BI-RADS Category 4: Suspicious      Assessment & Plan     *Stage IIIA infiltrating ductal carcinoma of the right breast   Treated  with a lumpectomy, radiation, 6 cycles of TAC chemotherapy and ongoing hormonal therapy. She completed 10 years of adjuvant hormonal therapy with 5 years of tamoxifen followed by 5 years of Arimidex which she completed in 2017.  Carcinoma of the remaining right breast.  Tumor was positive for HER2/julián overexpression and positive for estrogen receptors and negative for progesterone receptors.  Bilateral mastectomies with immediate implant reconstruction 8/16/2023.  Mediport placement 8/16/2023  Echocardiogram from 6/19/2023 shows normal left ventricular ejection fraction of 61% (patient previously had 6 cycles of Adriamycin containing chemotherapy and will be needing Herceptin therapy postoperatively).  Plan for postop adjuvant treatment with weekly Taxol and Herceptin x12 weeks followed by continued single agent Herceptin every 3 weeks for completion of 12 months of therapy total.  Plan for hormonal therapy possibly with Aromasin to begin once Taxol is completed.  9/14/2023: Patient returns for cycle 1 day 15 weekly Taxol and Herceptin.  Unfortunately she is neutropenic with an ANC of 800 and will not receive Taxol today but will receive Herceptin.  Her future Taxol doses will be modified to an 85% dose level.  10/6/2023: Proceed with cycle 2 day 1 taxol and Herceptin. We will continue taxol at 85% reduction of original dose.   10/27/2023: Palma will receive her 6th weekly dose of Taxol and her seventh weekly dose of Herceptin in the office today.Taxol dose will remain at 85% dose level from the original treatment due to development of neutropenia.  12/1/2023: We will proceed with 12th and final weekly Taxol and  Herceptin in office today.  She is tolerating treatment well.   Initial dose of every 3-week Herceptin at 6 mg/kg delivered 12/8/2023.  Aromasin 25 mg daily initiated after the visit of 1/19/2024.    *Chemotherapy-induced neuropathy  11/17/2023: Patient continues to use cooling mitts and gloves with  treatment.  She has been noticing some mild neuropathy developing in her toes of her bilateral feet.  She feels that this discomfort is more noticeable in the evening when she is trying to sleep.  Continue gabapentin 100 mg p.o. at bedtime.     *Right upper extremity lymphedema  11/17/2023: Patient has had history of lymphedema in her right upper extremity.  She does feel that in the past week or so her edema has been more noticeable to her.  She has been wearing her compression garments.  She has an appointment with lymphedema clinic next week on 12/8/2023.  12/29/2023: Continues compression garment and has followed up with the lymphedema clinic.  Stable at this time.      PLAN:     Proceed with Herceptin today on a dose and schedule of 6 mg/kg IV every 3 weeks.  We anticipate this will be continued for the remainder of 12 months of therapy through September 2024..  Encouraged use of compression garments to right upper extremity and compression.  Continue Neurontin 100 mg p.o. nightly.    Patient wishes to wait to start Aromasin until after her upcoming surgery.  She will return every 3 weeks for lab and Herceptin treatment.  She will be undergoing surgery for removal of her tissue expanders and implant placement March 1, 2024.  Next echocardiogram and follow-up with Dr. Reynolds in April  Due to her surgery on 3/1/2024 her next Herceptin infusion will be moved to 3/8/2024.  MD follow-up with lab and Herceptin infusion 3/29/2024      Patient remains on high risk medication and requires close monitoring for toxicity.        2/9/2024      CC:

## 2024-02-21 ENCOUNTER — HOSPITAL ENCOUNTER (OUTPATIENT)
Dept: PHYSICAL THERAPY | Facility: HOSPITAL | Age: 59
Setting detail: THERAPIES SERIES
Discharge: HOME | End: 2024-02-21
Payer: COMMERCIAL

## 2024-02-21 DIAGNOSIS — Z90.13 HISTORY OF BILATERAL MASTECTOMY: Primary | ICD-10-CM

## 2024-02-21 DIAGNOSIS — Z91.89 AT RISK FOR LYMPHEDEMA: ICD-10-CM

## 2024-02-21 DIAGNOSIS — Z17.0 MALIGNANT NEOPLASM OF LOWER-OUTER QUADRANT OF RIGHT BREAST OF FEMALE, ESTROGEN RECEPTOR POSITIVE: ICD-10-CM

## 2024-02-21 DIAGNOSIS — C50.511 MALIGNANT NEOPLASM OF LOWER-OUTER QUADRANT OF RIGHT BREAST OF FEMALE, ESTROGEN RECEPTOR POSITIVE: ICD-10-CM

## 2024-02-21 PROCEDURE — 97535 SELF CARE MNGMENT TRAINING: CPT

## 2024-02-21 NOTE — THERAPY RE-EVALUATION
Physical Therapy Lymphedema Re-Evaluation  [facility]     Patient Name: Palma Roblero  : 1965  MRN: [id number]  Today's Date: 2024      Visit Date: 2024    Visit Dx:    ICD-10-CM ICD-9-CM   1. History of bilateral mastectomy  Z90.13 V45.71   2. Malignant neoplasm of lower-outer quadrant of right breast of female, estrogen receptor positive  C50.511 174.5    Z17.0 V86.0   3. At risk for lymphedema  Z91.89 V49.89       Patient Active Problem List   Diagnosis    Malignant neoplasm of right female breast    Hyperlipidemia    Sebaceous cyst    Colon cancer screening    History of breast cancer    History of colon polyps    Malignant neoplasm of overlapping sites of right breast in female, estrogen receptor positive    Breast cancer    Encounter for adjustment or management of vascular access device    History of bilateral mastectomy    Lymphedema due to malignant neoplasm    Screening for malignant neoplasm of colon    History of adenomatous polyp of colon        Past Medical History:   Diagnosis Date    Allergic     Breast cancer     Right infiltrating ductal carcinoma, grade I, T2N1, ER/SC positive, 90% HER-2/julián negative    Cervical polyp     History of benign cervical polyp    Colon polyps     Drug therapy     Hx of radiation therapy     Hypercholesteremia     Hyperthyroidism     NO LONGER PROBLEM    PONV (postoperative nausea and vomiting)     Radiation     Sebaceous cyst     BACK        Past Surgical History:   Procedure Laterality Date    BREAST BIOPSY Right 2006    stereotactic biopsy    BREAST LUMPECTOMY Right 2006    Right axillary sentinel node biopsy (positive), right axillay dissection, right needle lumpectomy-Dr. Quyen Vidal    BREAST RECONSTRUCTION Bilateral 2023    Procedure: BILATERAL PREPECTORAL PLACEMENT  TISSUE EXPANDER AND ALLODERM, ADJACENT LEFT BREAST TISSUE REARRANGEMENT;  Surgeon: Favian Brooke MD;  Location: [facility];  Service:  Plastics;  Laterality: Bilateral;    CERVICAL CONE BIOPSY  2005    [facility]    COLONOSCOPY N/A 04/07/2014    3 mm cecal polyp, 6 mm ascending colon polyp, 8 mm transvese colon polyp-Dr. Quyen Vidal    COLONOSCOPY N/A 03/16/2009    Tortuous colon-Dr. Quyen Vidal    COLONOSCOPY N/A 06/11/2018    Procedure: COLONOSCOPY TO CECUM TO TERMINAL ILEUM WITH HOT SNARE POLYPECTOMY;  Surgeon: Quyen Vidal MD;  Location: [facility] ENDOSCOPY;  Service: Gastroenterology    COLPOSCOPY      DILATATION AND CURETTAGE  03/23/2005    EXCISION MASS TRUNK N/A 09/05/2017    Procedure: EXCISION OF SEBACEOUS CYST FROM BACK ;  Surgeon: Rosendo Crum MD;  Location: [facility] MAIN OR;  Service:     MASTECTOMY W/ SENTINEL NODE BIOPSY Bilateral 8/16/2023    Procedure: BREAST MASTECTOMY BILATERAL;  Surgeon: Quyen Vidal MD;  Location: [facility] MAIN OR;  Service: General;  Laterality: Bilateral;    SHOULDER ROTATOR CUFF REPAIR Right 07/2022    VENOUS ACCESS DEVICE (PORT) INSERTION Left 10/06/2006    Left subclavian Kyfyqm-o-Bwyu placement-Dr. Quyen Vidal    VENOUS ACCESS DEVICE (PORT) INSERTION N/A 8/16/2023    Procedure: with port placement using fluoroscopy and ultrasound;  Surgeon: Quyen Vidal MD;  Location: [facility] MAIN OR;  Service: General;  Laterality: N/A;    VENOUS ACCESS DEVICE (PORT) REMOVAL Left 05/29/2007    Left subclavian Fzklkp-k-Ifrv removal-Dr. Quyen Vidal       Visit Dx:    ICD-10-CM ICD-9-CM   1. History of bilateral mastectomy  Z90.13 V45.71   2. Malignant neoplasm of lower-outer quadrant of right breast of female, estrogen receptor positive  C50.511 174.5    Z17.0 V86.0   3. At risk for lymphedema  Z91.89 V49.89            Lymphedema       Row Name 02/21/24 1300             Subjective Pain    Able to rate subjective pain? yes  -LB      Pre-Treatment Pain Level 0  -LB         Subjective    Subjective Comments I am doing well. I am wearing my glove after work and at lunch when I take my walk. I think it is  staying stable.  -LB         Lymphedema Assessment    Lymphedema Classification RUE:;at risk/stage 0  -LB      Lymphedema Cancer Related Sx right;lumpectomy;axillary dissection  -LB      Lymph Nodes Removed # 16  -LB      Positive Lymph Nodes # 2  -LB      Chemo Received yes  -LB      Radiation Therapy Received yes  -LB      Infections or Cellulitis? no  -LB         Posture/Observations    Posture/Observations Comments WNL  -LB         General ROM    GENERAL ROM COMMENTS BUE WFL  -LB         Lymphedema Edema Assessment    Edema Assessment Comment R hand reducing lymphedema, slight edema noted across MCPs  -LB         Skin Changes/Observations    Skin Observations Comment normal  -LB         RUE Quick Girth (cm)    Axilla 35.3 cm  -LB      Mid upper arm 34 cm  -LB      Elbow 27.6 cm  -LB      Mid forearm 26.2 cm  -LB      Wrist crease 16.4 cm  -LB      Web space 20.1 cm  -LB      Met-heads 18.5 cm  -LB      RUE Quick Girth Total 178.1  -LB         Manual Lymphatic Drainage    Manual Therapy reviewed MLD  -LB         Compression/Skin Care    Compression/Skin Care Comments reviewed compression garment use  -LB                User Key  (r) = Recorded By, (t) = Taken By, (c) = Cosigned By      Initials Name Provider Type    LB Nina Olea, PT Physical Therapist                                    Therapy Education  Education Details: reviewed self MLD, use of compression, exercises, CDT vs. compression wear  Given: Mobility training, Symptoms/condition management, Edema management  Program: Reinforced  How Provided: Verbal  Provided to: Patient  Level of Understanding: Teach back education performed, Verbalized, Demonstrated  72351 - PT Self Care/Mgmt Minutes: 15       OP Exercises       Row Name 02/21/24 1300             Subjective    Subjective Comments I am doing well. I am wearing my glove after work and at lunch when I take my walk. I think it is staying stable.  -LB         Subjective Pain    Able to rate  subjective pain? yes  -LB      Pre-Treatment Pain Level 0  -LB                User Key  (r) = Recorded By, (t) = Taken By, (c) = Cosigned By      Initials Name Provider Type    Nina Resendez, PT Physical Therapist                                 PT OP Goals       Row Name 02/21/24 1400          Long Term Goals    LTG Date to Achieve 10/03/23  -LB     LTG 1 Pt will maintain LDex bioimpedance score WNL.  -LB     LTG 1 Progress Ongoing  -LB     LTG 2 Pt will demonstrate full AROM BUE post-operatively once cleared for mobility/ROM.  -LB     LTG 2 Progress Met  -LB     LTG 3 Pt will verbalize s/s of lymphedema and steps to prevention.  -LB     LTG 3 Progress Ongoing  -LB     LTG 3 Progress Comments continued to review  -LB               User Key  (r) = Recorded By, (t) = Taken By, (c) = Cosigned By      Initials Name Provider Type    Nina Resendez, PT Physical Therapist                     PT Assessment/Plan       Row Name 02/21/24 1434          PT Assessment    Functional Limitations Other (comment)  R hand lymphedema  -LB     Impairments Impaired lymphatic circulation;Impaired flexibility;Edema;Muscle strength;Sensation  -LB     Assessment Comments Pt returns after 2 months wearing appropriately fitted RUE compression glove for part of each day during lunch break and following work day due to limitations of her typing when she wears it during work hours. Total RUE circumferential measurements are stable and slightly improved compared to last visit with notable improvement in edema compared to last visit across MCPs. Continued to encourage daily MLD, elevation, and exercises. Pt is compliant with MLD and exercises daily for last 2 months. Pt symptoms are stable and we plan to continue current POC unless symptoms worsen.  -LB     Rehab Potential Good  -LB     Patient/caregiver participated in establishment of treatment plan and goals Yes  -LB     Patient would benefit from skilled therapy intervention Yes  -LB         PT Plan    Predicted Duration of Therapy Intervention (PT) return in 2 months for reassessment  -     Planned CPT's? PT EVAL LOW COMPLEXITY: 26848;PT RE-EVAL: 12654;PT THER PROC EA 15 MIN: 88709;PT THER ACT EA 15 MIN: 71936;PT MANUAL THERAPY EA 15 MIN: 72680;PT NEUROMUSC RE-EDUCATION EA 15 MIN: 49270;PT SELF CARE/HOME MGMT/TRAIN EA 15: 92639;PT BIS XTRACELL FLUID ANALYSIS: 01905  -     PT Plan Comments reassess in 2 months; consider reordering garments and begin wearing July/August  -               User Key  (r) = Recorded By, (t) = Taken By, (c) = Cosigned By      Initials Name Provider Type    LB Nina Olea, PT Physical Therapist                                 Time Calculation:   Start Time: 1315  Stop Time: 1330  Time Calculation (min): 15 min  Total Timed Code Minutes- PT: 15 minute(s)  Timed Charges  11583 - PT Self Care/Mgmt Minutes: 15  Total Minutes  Timed Charges Total Minutes: 15   Total Minutes: 15   Therapy Charges for Today       Code Description Service Date Service Provider Modifiers Qty    03111829174 HC PT SELF CARE/MGMT/TRAIN EA 15 MIN 2/21/2024 Nina Olea, PT GP 1                      Nina Olea PT  2/21/2024

## 2024-02-27 ENCOUNTER — PATIENT OUTREACH (OUTPATIENT)
Dept: OTHER | Facility: HOSPITAL | Age: 59
End: 2024-02-27
Payer: COMMERCIAL

## 2024-02-27 DIAGNOSIS — Z90.13 HISTORY OF BILATERAL MASTECTOMY: Primary | ICD-10-CM

## 2024-02-27 NOTE — PROGRESS NOTES
Called Ms. Roblero to see how she was doing. She stated she is set for her last surgery Friday and will start taking hormone blocking medication a few weeks after that. We talked through her questions and she was thankful for the help. She stated she has no needs at this time. She was thankful for the call and will reach out if any questions or needs arise.

## 2024-02-29 ENCOUNTER — ANESTHESIA EVENT (OUTPATIENT)
Age: 59
End: 2024-02-29
Payer: COMMERCIAL

## 2024-03-01 ENCOUNTER — ANESTHESIA (OUTPATIENT)
Age: 59
End: 2024-03-01
Payer: COMMERCIAL

## 2024-03-01 ENCOUNTER — HOSPITAL ENCOUNTER (OUTPATIENT)
Age: 59
Setting detail: HOSPITAL OUTPATIENT SURGERY
Discharge: HOME | End: 2024-03-01
Attending: PLASTIC SURGERY | Admitting: PLASTIC SURGERY
Payer: COMMERCIAL

## 2024-03-01 VITALS
SYSTOLIC BLOOD PRESSURE: 133 MMHG | BODY MASS INDEX: 37.9 KG/M2 | OXYGEN SATURATION: 95 % | DIASTOLIC BLOOD PRESSURE: 89 MMHG | HEART RATE: 79 BPM | HEIGHT: 64 IN | WEIGHT: 222 LBS | RESPIRATION RATE: 16 BRPM | TEMPERATURE: 97.8 F

## 2024-03-01 DIAGNOSIS — Z85.3 HX OF BREAST CANCER: ICD-10-CM

## 2024-03-01 DIAGNOSIS — Z17.0 MALIGNANT NEOPLASM OF LOWER-OUTER QUADRANT OF RIGHT BREAST OF FEMALE, ESTROGEN RECEPTOR POSITIVE: Primary | ICD-10-CM

## 2024-03-01 DIAGNOSIS — C50.511 MALIGNANT NEOPLASM OF LOWER-OUTER QUADRANT OF RIGHT BREAST OF FEMALE, ESTROGEN RECEPTOR POSITIVE: Primary | ICD-10-CM

## 2024-03-01 PROCEDURE — C1789 PROSTHESIS, BREAST, IMP: HCPCS | Performed by: PLASTIC SURGERY

## 2024-03-01 PROCEDURE — 25010000002 DEXAMETHASONE SODIUM PHOSPHATE 20 MG/5ML SOLUTION: Performed by: ANESTHESIOLOGY

## 2024-03-01 PROCEDURE — 88302 TISSUE EXAM BY PATHOLOGIST: CPT | Performed by: PLASTIC SURGERY

## 2024-03-01 PROCEDURE — 25010000002 GENTAMICIN PER 80 MG: Performed by: PLASTIC SURGERY

## 2024-03-01 PROCEDURE — 25010000002 CEFAZOLIN PER 500 MG: Performed by: PLASTIC SURGERY

## 2024-03-01 PROCEDURE — 25010000002 MIDAZOLAM PER 1 MG: Performed by: ANESTHESIOLOGY

## 2024-03-01 PROCEDURE — 25010000002 ONDANSETRON PER 1 MG: Performed by: ANESTHESIOLOGY

## 2024-03-01 PROCEDURE — 25010000002 PROPOFOL 10 MG/ML EMULSION: Performed by: ANESTHESIOLOGY

## 2024-03-01 PROCEDURE — 25010000002 PHENYLEPHRINE 10 MG/ML SOLUTION: Performed by: ANESTHESIOLOGY

## 2024-03-01 PROCEDURE — 25810000003 LACTATED RINGERS PER 1000 ML: Performed by: ANESTHESIOLOGY

## 2024-03-01 PROCEDURE — 19370 REVJ PERI-IMPLT CAPSULE BRST: CPT | Performed by: PLASTIC SURGERY

## 2024-03-01 PROCEDURE — 25010000002 FENTANYL CITRATE (PF) 100 MCG/2ML SOLUTION: Performed by: ANESTHESIOLOGY

## 2024-03-01 RX ORDER — DOCUSATE SODIUM 250 MG
250 CAPSULE ORAL 2 TIMES DAILY PRN
Qty: 60 CAPSULE | Refills: 1 | Status: SHIPPED | OUTPATIENT
Start: 2024-03-01

## 2024-03-01 RX ORDER — HYDRALAZINE HYDROCHLORIDE 20 MG/ML
5 INJECTION INTRAMUSCULAR; INTRAVENOUS
Status: DISCONTINUED | OUTPATIENT
Start: 2024-03-01 | End: 2024-03-01 | Stop reason: SDUPTHER

## 2024-03-01 RX ORDER — ACETAMINOPHEN 500 MG
1000 TABLET ORAL ONCE
Status: COMPLETED | OUTPATIENT
Start: 2024-03-01 | End: 2024-03-01

## 2024-03-01 RX ORDER — DROPERIDOL 2.5 MG/ML
0.62 INJECTION, SOLUTION INTRAMUSCULAR; INTRAVENOUS
Status: DISCONTINUED | OUTPATIENT
Start: 2024-03-01 | End: 2024-03-01 | Stop reason: HOSPADM

## 2024-03-01 RX ORDER — PROMETHAZINE HYDROCHLORIDE 12.5 MG/1
25 TABLET ORAL ONCE AS NEEDED
Status: DISCONTINUED | OUTPATIENT
Start: 2024-03-01 | End: 2024-03-01 | Stop reason: HOSPADM

## 2024-03-01 RX ORDER — ONDANSETRON 2 MG/ML
INJECTION INTRAMUSCULAR; INTRAVENOUS AS NEEDED
Status: DISCONTINUED | OUTPATIENT
Start: 2024-03-01 | End: 2024-03-01 | Stop reason: SURG

## 2024-03-01 RX ORDER — NALOXONE HCL 0.4 MG/ML
0.2 VIAL (ML) INJECTION AS NEEDED
Status: DISCONTINUED | OUTPATIENT
Start: 2024-03-01 | End: 2024-03-01 | Stop reason: HOSPADM

## 2024-03-01 RX ORDER — ROCURONIUM BROMIDE 10 MG/ML
INJECTION, SOLUTION INTRAVENOUS AS NEEDED
Status: DISCONTINUED | OUTPATIENT
Start: 2024-03-01 | End: 2024-03-01 | Stop reason: SURG

## 2024-03-01 RX ORDER — FENTANYL CITRATE 50 UG/ML
INJECTION, SOLUTION INTRAMUSCULAR; INTRAVENOUS AS NEEDED
Status: DISCONTINUED | OUTPATIENT
Start: 2024-03-01 | End: 2024-03-01 | Stop reason: SURG

## 2024-03-01 RX ORDER — ONDANSETRON 2 MG/ML
4 INJECTION INTRAMUSCULAR; INTRAVENOUS ONCE AS NEEDED
Status: DISCONTINUED | OUTPATIENT
Start: 2024-03-01 | End: 2024-03-01 | Stop reason: HOSPADM

## 2024-03-01 RX ORDER — LABETALOL HYDROCHLORIDE 5 MG/ML
5 INJECTION, SOLUTION INTRAVENOUS
Status: DISCONTINUED | OUTPATIENT
Start: 2024-03-01 | End: 2024-03-01 | Stop reason: SDUPTHER

## 2024-03-01 RX ORDER — LIDOCAINE HYDROCHLORIDE 20 MG/ML
INJECTION, SOLUTION INFILTRATION; PERINEURAL AS NEEDED
Status: DISCONTINUED | OUTPATIENT
Start: 2024-03-01 | End: 2024-03-01 | Stop reason: SURG

## 2024-03-01 RX ORDER — DIPHENHYDRAMINE HYDROCHLORIDE 50 MG/ML
12.5 INJECTION INTRAMUSCULAR; INTRAVENOUS
Status: DISCONTINUED | OUTPATIENT
Start: 2024-03-01 | End: 2024-03-01 | Stop reason: SDUPTHER

## 2024-03-01 RX ORDER — OXYCODONE AND ACETAMINOPHEN 7.5; 325 MG/1; MG/1
1 TABLET ORAL EVERY 4 HOURS PRN
Status: DISCONTINUED | OUTPATIENT
Start: 2024-03-01 | End: 2024-03-01 | Stop reason: SDUPTHER

## 2024-03-01 RX ORDER — GABAPENTIN 100 MG/1
100 CAPSULE ORAL EVERY 8 HOURS
Qty: 60 CAPSULE | Refills: 1 | Status: SHIPPED | OUTPATIENT
Start: 2024-03-01 | End: 2025-03-01

## 2024-03-01 RX ORDER — NALOXONE HCL 0.4 MG/ML
0.2 VIAL (ML) INJECTION AS NEEDED
Status: DISCONTINUED | OUTPATIENT
Start: 2024-03-01 | End: 2024-03-01 | Stop reason: SDUPTHER

## 2024-03-01 RX ORDER — HYDROCODONE BITARTRATE AND ACETAMINOPHEN 5; 325 MG/1; MG/1
1 TABLET ORAL ONCE AS NEEDED
Status: DISCONTINUED | OUTPATIENT
Start: 2024-03-01 | End: 2024-03-01 | Stop reason: SDUPTHER

## 2024-03-01 RX ORDER — HYDROCODONE BITARTRATE AND ACETAMINOPHEN 5; 325 MG/1; MG/1
1 TABLET ORAL EVERY 4 HOURS PRN
Qty: 20 TABLET | Refills: 0 | Status: SHIPPED | OUTPATIENT
Start: 2024-03-01

## 2024-03-01 RX ORDER — ONDANSETRON 8 MG/1
8 TABLET, ORALLY DISINTEGRATING ORAL EVERY 8 HOURS PRN
Qty: 10 TABLET | Refills: 1 | Status: SHIPPED | OUTPATIENT
Start: 2024-03-01

## 2024-03-01 RX ORDER — SODIUM CHLORIDE, SODIUM LACTATE, POTASSIUM CHLORIDE, CALCIUM CHLORIDE 600; 310; 30; 20 MG/100ML; MG/100ML; MG/100ML; MG/100ML
125 INJECTION, SOLUTION INTRAVENOUS CONTINUOUS
Status: DISCONTINUED | OUTPATIENT
Start: 2024-03-01 | End: 2024-03-01 | Stop reason: HOSPADM

## 2024-03-01 RX ORDER — PHENYLEPHRINE HYDROCHLORIDE 10 MG/ML
INJECTION INTRAVENOUS AS NEEDED
Status: DISCONTINUED | OUTPATIENT
Start: 2024-03-01 | End: 2024-03-01 | Stop reason: SURG

## 2024-03-01 RX ORDER — MIDAZOLAM HYDROCHLORIDE 1 MG/ML
1 INJECTION INTRAMUSCULAR; INTRAVENOUS
Status: DISCONTINUED | OUTPATIENT
Start: 2024-03-01 | End: 2024-03-01 | Stop reason: HOSPADM

## 2024-03-01 RX ORDER — FENTANYL CITRATE 50 UG/ML
50 INJECTION, SOLUTION INTRAMUSCULAR; INTRAVENOUS ONCE AS NEEDED
Status: DISCONTINUED | OUTPATIENT
Start: 2024-03-01 | End: 2024-03-01 | Stop reason: HOSPADM

## 2024-03-01 RX ORDER — GABAPENTIN 300 MG/1
300 CAPSULE ORAL ONCE
Status: COMPLETED | OUTPATIENT
Start: 2024-03-01 | End: 2024-03-01

## 2024-03-01 RX ORDER — ONDANSETRON 4 MG/1
8 TABLET, FILM COATED ORAL ONCE
Status: DISCONTINUED | OUTPATIENT
Start: 2024-03-01 | End: 2024-03-01 | Stop reason: HOSPADM

## 2024-03-01 RX ORDER — HYDROMORPHONE HYDROCHLORIDE 1 MG/ML
0.5 INJECTION, SOLUTION INTRAMUSCULAR; INTRAVENOUS; SUBCUTANEOUS
Status: DISCONTINUED | OUTPATIENT
Start: 2024-03-01 | End: 2024-03-01 | Stop reason: SDUPTHER

## 2024-03-01 RX ORDER — SODIUM CHLORIDE 0.9 % (FLUSH) 0.9 %
3-10 SYRINGE (ML) INJECTION AS NEEDED
Status: DISCONTINUED | OUTPATIENT
Start: 2024-03-01 | End: 2024-03-01 | Stop reason: HOSPADM

## 2024-03-01 RX ORDER — FENTANYL CITRATE 50 UG/ML
50 INJECTION, SOLUTION INTRAMUSCULAR; INTRAVENOUS
Status: DISCONTINUED | OUTPATIENT
Start: 2024-03-01 | End: 2024-03-01 | Stop reason: SDUPTHER

## 2024-03-01 RX ORDER — DOXYCYCLINE 100 MG/1
200 CAPSULE ORAL ONCE
Status: COMPLETED | OUTPATIENT
Start: 2024-03-01 | End: 2024-03-01

## 2024-03-01 RX ORDER — FLUMAZENIL 0.1 MG/ML
0.2 INJECTION INTRAVENOUS AS NEEDED
Status: DISCONTINUED | OUTPATIENT
Start: 2024-03-01 | End: 2024-03-01 | Stop reason: HOSPADM

## 2024-03-01 RX ORDER — HYDRALAZINE HYDROCHLORIDE 20 MG/ML
5 INJECTION INTRAMUSCULAR; INTRAVENOUS
Status: DISCONTINUED | OUTPATIENT
Start: 2024-03-01 | End: 2024-03-01 | Stop reason: HOSPADM

## 2024-03-01 RX ORDER — HYDROMORPHONE HYDROCHLORIDE 1 MG/ML
0.5 INJECTION, SOLUTION INTRAMUSCULAR; INTRAVENOUS; SUBCUTANEOUS
Status: DISCONTINUED | OUTPATIENT
Start: 2024-03-01 | End: 2024-03-01 | Stop reason: HOSPADM

## 2024-03-01 RX ORDER — PROMETHAZINE HYDROCHLORIDE 25 MG/1
25 SUPPOSITORY RECTAL ONCE AS NEEDED
Status: DISCONTINUED | OUTPATIENT
Start: 2024-03-01 | End: 2024-03-01 | Stop reason: SDUPTHER

## 2024-03-01 RX ORDER — SODIUM CHLORIDE 0.9 % (FLUSH) 0.9 %
3 SYRINGE (ML) INJECTION EVERY 12 HOURS SCHEDULED
Status: DISCONTINUED | OUTPATIENT
Start: 2024-03-01 | End: 2024-03-01 | Stop reason: HOSPADM

## 2024-03-01 RX ORDER — PROMETHAZINE HYDROCHLORIDE 12.5 MG/1
25 TABLET ORAL ONCE AS NEEDED
Status: DISCONTINUED | OUTPATIENT
Start: 2024-03-01 | End: 2024-03-01 | Stop reason: SDUPTHER

## 2024-03-01 RX ORDER — PROMETHAZINE HYDROCHLORIDE 25 MG/1
25 SUPPOSITORY RECTAL ONCE AS NEEDED
Status: DISCONTINUED | OUTPATIENT
Start: 2024-03-01 | End: 2024-03-01 | Stop reason: HOSPADM

## 2024-03-01 RX ORDER — FENTANYL CITRATE 50 UG/ML
50 INJECTION, SOLUTION INTRAMUSCULAR; INTRAVENOUS
Status: DISCONTINUED | OUTPATIENT
Start: 2024-03-01 | End: 2024-03-01 | Stop reason: HOSPADM

## 2024-03-01 RX ORDER — OXYCODONE HYDROCHLORIDE 5 MG/1
10 TABLET ORAL ONCE
Status: COMPLETED | OUTPATIENT
Start: 2024-03-01 | End: 2024-03-01

## 2024-03-01 RX ORDER — LABETALOL HYDROCHLORIDE 5 MG/ML
5 INJECTION, SOLUTION INTRAVENOUS
Status: DISCONTINUED | OUTPATIENT
Start: 2024-03-01 | End: 2024-03-01 | Stop reason: HOSPADM

## 2024-03-01 RX ORDER — OXYCODONE AND ACETAMINOPHEN 7.5; 325 MG/1; MG/1
1 TABLET ORAL EVERY 4 HOURS PRN
Status: DISCONTINUED | OUTPATIENT
Start: 2024-03-01 | End: 2024-03-01 | Stop reason: HOSPADM

## 2024-03-01 RX ORDER — DIPHENHYDRAMINE HYDROCHLORIDE 50 MG/ML
12.5 INJECTION INTRAMUSCULAR; INTRAVENOUS
Status: DISCONTINUED | OUTPATIENT
Start: 2024-03-01 | End: 2024-03-01 | Stop reason: HOSPADM

## 2024-03-01 RX ORDER — FLUMAZENIL 0.1 MG/ML
0.2 INJECTION INTRAVENOUS AS NEEDED
Status: DISCONTINUED | OUTPATIENT
Start: 2024-03-01 | End: 2024-03-01 | Stop reason: SDUPTHER

## 2024-03-01 RX ORDER — TRANEXAMIC ACID 100 MG/ML
INJECTION, SOLUTION INTRAVENOUS AS NEEDED
Status: DISCONTINUED | OUTPATIENT
Start: 2024-03-01 | End: 2024-03-01 | Stop reason: HOSPADM

## 2024-03-01 RX ORDER — HYDROCODONE BITARTRATE AND ACETAMINOPHEN 5; 325 MG/1; MG/1
1 TABLET ORAL ONCE AS NEEDED
Status: COMPLETED | OUTPATIENT
Start: 2024-03-01 | End: 2024-03-01

## 2024-03-01 RX ORDER — SODIUM CHLORIDE, SODIUM LACTATE, POTASSIUM CHLORIDE, CALCIUM CHLORIDE 600; 310; 30; 20 MG/100ML; MG/100ML; MG/100ML; MG/100ML
INJECTION, SOLUTION INTRAVENOUS CONTINUOUS PRN
Status: DISCONTINUED | OUTPATIENT
Start: 2024-03-01 | End: 2024-03-01 | Stop reason: SURG

## 2024-03-01 RX ORDER — ONDANSETRON 2 MG/ML
4 INJECTION INTRAMUSCULAR; INTRAVENOUS ONCE AS NEEDED
Status: DISCONTINUED | OUTPATIENT
Start: 2024-03-01 | End: 2024-03-01 | Stop reason: SDUPTHER

## 2024-03-01 RX ORDER — DOXYCYCLINE 100 MG/1
100 CAPSULE ORAL 2 TIMES DAILY
Qty: 10 CAPSULE | Refills: 0 | Status: SHIPPED | OUTPATIENT
Start: 2024-03-01 | End: 2024-03-06

## 2024-03-01 RX ORDER — ACETAMINOPHEN 325 MG/1
650 TABLET ORAL EVERY 4 HOURS PRN
Qty: 60 TABLET | Refills: 0 | Status: SHIPPED | OUTPATIENT
Start: 2024-03-01

## 2024-03-01 RX ORDER — DEXAMETHASONE SODIUM PHOSPHATE 4 MG/ML
INJECTION, SOLUTION INTRA-ARTICULAR; INTRALESIONAL; INTRAMUSCULAR; INTRAVENOUS; SOFT TISSUE AS NEEDED
Status: DISCONTINUED | OUTPATIENT
Start: 2024-03-01 | End: 2024-03-01 | Stop reason: SURG

## 2024-03-01 RX ORDER — FAMOTIDINE 10 MG/ML
20 INJECTION, SOLUTION INTRAVENOUS ONCE
Status: COMPLETED | OUTPATIENT
Start: 2024-03-01 | End: 2024-03-01

## 2024-03-01 RX ORDER — SODIUM CHLORIDE, SODIUM LACTATE, POTASSIUM CHLORIDE, CALCIUM CHLORIDE 600; 310; 30; 20 MG/100ML; MG/100ML; MG/100ML; MG/100ML
9 INJECTION, SOLUTION INTRAVENOUS CONTINUOUS
Status: DISCONTINUED | OUTPATIENT
Start: 2024-03-01 | End: 2024-03-01 | Stop reason: HOSPADM

## 2024-03-01 RX ORDER — PROPOFOL 10 MG/ML
VIAL (ML) INTRAVENOUS AS NEEDED
Status: DISCONTINUED | OUTPATIENT
Start: 2024-03-01 | End: 2024-03-01 | Stop reason: SURG

## 2024-03-01 RX ORDER — AMOXICILLIN 250 MG
2 CAPSULE ORAL DAILY PRN
Qty: 30 TABLET | Refills: 1 | Status: SHIPPED | OUTPATIENT
Start: 2024-03-01 | End: 2025-03-01

## 2024-03-01 RX ORDER — MAGNESIUM HYDROXIDE 1200 MG/15ML
LIQUID ORAL AS NEEDED
Status: DISCONTINUED | OUTPATIENT
Start: 2024-03-01 | End: 2024-03-01 | Stop reason: HOSPADM

## 2024-03-01 RX ORDER — CELECOXIB 200 MG/1
400 CAPSULE ORAL ONCE
Status: COMPLETED | OUTPATIENT
Start: 2024-03-01 | End: 2024-03-01

## 2024-03-01 RX ADMIN — PHENYLEPHRINE HYDROCHLORIDE 100 MCG: 10 INJECTION INTRAVENOUS at 07:40

## 2024-03-01 RX ADMIN — ONDANSETRON 4 MG: 2 INJECTION INTRAMUSCULAR; INTRAVENOUS at 07:14

## 2024-03-01 RX ADMIN — FENTANYL CITRATE 50 MCG: 50 INJECTION, SOLUTION INTRAMUSCULAR; INTRAVENOUS at 07:22

## 2024-03-01 RX ADMIN — MIDAZOLAM 1 MG: 1 INJECTION INTRAMUSCULAR; INTRAVENOUS at 06:53

## 2024-03-01 RX ADMIN — PROPOFOL 150 MG: 10 INJECTION, EMULSION INTRAVENOUS at 07:03

## 2024-03-01 RX ADMIN — PHENYLEPHRINE HYDROCHLORIDE 100 MCG: 10 INJECTION INTRAVENOUS at 07:19

## 2024-03-01 RX ADMIN — ACETAMINOPHEN 1000 MG: 500 TABLET ORAL at 06:35

## 2024-03-01 RX ADMIN — PHENYLEPHRINE HYDROCHLORIDE 100 MCG: 10 INJECTION INTRAVENOUS at 07:29

## 2024-03-01 RX ADMIN — FAMOTIDINE 20 MG: 10 INJECTION INTRAVENOUS at 06:40

## 2024-03-01 RX ADMIN — CELECOXIB 400 MG: 200 CAPSULE ORAL at 06:35

## 2024-03-01 RX ADMIN — ROCURONIUM BROMIDE 50 MG: 10 INJECTION, SOLUTION INTRAVENOUS at 07:04

## 2024-03-01 RX ADMIN — DOXYCYCLINE 200 MG: 100 CAPSULE ORAL at 06:34

## 2024-03-01 RX ADMIN — SODIUM CHLORIDE, SODIUM LACTATE, POTASSIUM CHLORIDE, AND CALCIUM CHLORIDE: .6; .31; .03; .02 INJECTION, SOLUTION INTRAVENOUS at 07:03

## 2024-03-01 RX ADMIN — OXYCODONE HYDROCHLORIDE 10 MG: 5 TABLET ORAL at 06:35

## 2024-03-01 RX ADMIN — LIDOCAINE HYDROCHLORIDE 60 MG: 20 INJECTION, SOLUTION INFILTRATION; PERINEURAL at 07:03

## 2024-03-01 RX ADMIN — DEXAMETHASONE SODIUM PHOSPHATE 8 MG: 4 INJECTION, SOLUTION INTRAMUSCULAR; INTRAVENOUS at 07:13

## 2024-03-01 RX ADMIN — GABAPENTIN 300 MG: 300 CAPSULE ORAL at 06:35

## 2024-03-01 RX ADMIN — HYDROCODONE BITARTRATE AND ACETAMINOPHEN 1 TABLET: 5; 325 TABLET ORAL at 09:30

## 2024-03-01 RX ADMIN — PHENYLEPHRINE HYDROCHLORIDE 100 MCG: 10 INJECTION INTRAVENOUS at 07:35

## 2024-03-01 NOTE — ANESTHESIA PROCEDURE NOTES
Airway  Urgency: elective    Date/Time: 3/1/2024 7:06 AM    General Information and Staff    Patient location during procedure: OR  Anesthesiologist: Dav Stafford MD    Indications and Patient Condition  Indications for airway management: airway protection    Preoxygenated: yes  MILS maintained throughout  Mask difficulty assessment: 1 - vent by mask    Final Airway Details  Final airway type: endotracheal airway      Successful airway: ETT  Cuffed: yes   Successful intubation technique: direct laryngoscopy  Endotracheal tube insertion site: oral  Blade: Mitch  Blade size: 3  ETT size (mm): 7.0  Cormack-Lehane Classification: grade I - full view of glottis  Placement verified by: chest auscultation   Number of attempts at approach: 1  Assessment: lips, teeth, and gum same as pre-op and atraumatic intubation

## 2024-03-01 NOTE — ANESTHESIA POSTPROCEDURE EVALUATION
"Patient: Palma Roblero    Procedure Summary       Date: 03/01/24 Room / Location: [facility] OR [facility] / [facility]    Anesthesia Start: 0658 Anesthesia Stop: 0918    Procedure: BILATERAL REMOVAL TISSUE EXPANDERS AND PLACEMENT OF IMPLANTS AND LEFT ADJACENT TISSUE REARRANGEMENT (Bilateral: Breast) Diagnosis:     Surgeons: Favian Brooke MD Provider: Dav Stafford MD    Anesthesia Type: general ASA Status: 3            Anesthesia Type: general    Vitals  Vitals Value Taken Time   /88 03/01/24 0932   Temp 36.6 °C (97.8 °F) 03/01/24 0913   Pulse 92 03/01/24 0941   Resp 15 03/01/24 0930   SpO2 94 % 03/01/24 0941   Vitals shown include unfiled device data.        Post Anesthesia Care and Evaluation    Patient location during evaluation: bedside  Patient participation: complete - patient participated  Level of consciousness: awake and alert  Pain management: adequate    Airway patency: patent  Anesthetic complications: No anesthetic complications    Cardiovascular status: acceptable  Respiratory status: acceptable  Hydration status: acceptable    Comments: /88   Pulse 99   Temp 36.6 °C (97.8 °F) (Oral)   Resp 15   Ht 162.6 cm (64\")   Wt 101 kg (222 lb)   LMP  (LMP Unknown)   SpO2 99%   BMI 38.11 kg/m²     "

## 2024-03-01 NOTE — H&P
Allergies  Reviewed Allergies  NKDA  Medications  Reviewed Medications  atorvastatin 10 mg tablet  TAKE 1 TABLET BY MOUTH DAILY  12/14/23   filled surescripts  dexAMETHasone 4 mg tablet  TAKE 2 TABLETS BY MOUTH TWICE DAILY WITH MEALS. TAKE A DAY BEFORE FIRST TREATMENT  09/06/23   filled surescripts  docusate sodium 250 mg capsule  08/17/23   filled surescripts  doxycycline monohydrate 100 mg capsule  08/17/23   filled surescripts  gabapentin 100 mg capsule  TAKE 1 CAPSULE BY MOUTH EVERY NIGHT AT BEDTIME  11/17/23   filled surescripts  HYDROcodone 5 mg-acetaminophen 325 mg tablet  TAKE 1 TABLET BY MOUTH EVERY 12 HOURS  08/17/23   filled surescripts  lidocaine-prilocaine 2.5 %-2.5 % topical cream  09/06/23   filled surescripts  mupirocin 2 % topical ointment  APPLY SMALL AMOUNT TOPICALLY TO THE AFFECTED AREA THREE TIMES DAILY  08/22/23   filled surescripts  ondansetron 8 mg disintegrating tablet  08/17/23   filled surescripts  oxyCODONE-acetaminophen 10 mg-325 mg tablet  TAKE 1 TABLET BY MOUTH EVERY 6 HOURS AS NEEDED FOR PAIN  07/20/22   filled surescripts  promethazine 25 mg tablet  TAKE 1 TABLET BY MOUTH EVERY 6 TO 8 HOURS  07/20/22   filled surescripts  Senexon-S 8.6 mg-50 mg tablet  08/17/23   filled surescripts  Problems  Problems not reviewed (last reviewed 08/29/2023)  Malignant tumor of breast - Onset: 09/12/2023  Family History  Family History not reviewed (last reviewed 08/22/2023)  Brother - Hypertensive disorder  Social History  Social History not reviewed (last reviewed 08/22/2023)  Substance Use  Do you or have you ever smoked tobacco?: Never smoker  What is your level of alcohol consumption?: None  Surgical History  Breast Surgery  Colonoscopy  Lumpectomy  Orthopedic Surgery  Past Medical History  Breast Mass/Cyst/Biopsy: Y  Cancer: Y  Wear Glasses/Contact Lenses: Y  Screening  None recorded.  HPI  Completed chemotherapy at the beginning of december, feeling well, feels like right side is firmer. Does  not report any swelling or redness on either breast.  ROS  Patient reports change in skin color but reports no non-healing areas. She reports no fever, no night sweats, and no significant weight gain. She reports no dry eyes and no vision change. She reports no difficulty hearing and no ear pain. She reports no frequent nosebleeds, no nose problems, and no sinus problems. She reports no sore throat, no bleeding gums, and no dry mouth. She reports no chest pain, no arm pain on exertion, and no shortness of breath when walking. She reports no cough, no wheezing, and no shortness of breath. She reports no abdominal pain, no nausea, and no vomiting. She reports no incontinence. She reports no muscle aches, no muscle weakness, and no arthralgias/joint pain. She reports no loss of consciousness, no gait dysfunction, and no paralysis. She reports no agitation, no dementia, and no delirium.  Physical Exam  Chaperone: Chaperone: present.    Constitutional: General Appearance: healthy-appearing, well-nourished, and well-developed. Level of Distress: NAD. Ambulation: ambulating normally.    Psychiatric: Mental Status: normal mood and affect and active and alert.    Head: Head: normocephalic and atraumatic.    Eyes: Lids and Conjunctivae: non-injected. Pupils: PERRLA. EOM: EOMI.    Cardiovascular: Heart Auscultation: RRR.    Breast: Breast Exam: Expanders in reasonable position with the right side with a bit more contracture and some lymphedematous changes to the dependent portion of the breast, the left expander has descended a little bit with larger skin envelope, no free fluid and no masses in either.    Musculoskeletal:: Motor Strength and Tone: normal tone and motor strength. Joints, Bones, and Muscles: normal movement of all extremities. Extremities: no cyanosis or edema.    Neurologic: Gait and Station: normal gait and station.    Skin: Inspection and palpation: no rash or lesions.  Assessment / Plan  At this point the  expansion on the right side is achieved what can achieve given her contracted skin envelope. We discussed going and getting her scheduled for her exchange surgery for what ever sort of modest size implant we can on the right side and match the left with some additional skin excision to help reduce the droopiness to that side. Discussed risk of bleeding, infection, poor wound healing, asymmetry. We also discussed the importance of some lymphatic massage to the right skin envelope to try to help reduce the edema leading up to surgery.

## 2024-03-01 NOTE — OP NOTE
Pre-Operative Diagnosis: Acquired absence bilateral breast, right breast radiation injury    Post-Operative Diagnosis: Same    Procedure Performed:   1.  Bilateral removal of breast tissue expanders and placement of permanent prosthesis (SS )    Surgeon: LISANDRO Brooke MD    Assistant:   Melinda Jimenez PA-C  -Please note Ms. Tony's assistance was essential to the case.  She was the first and only assistant.  There is no available resident assistance.  Her skills were needed secondary to the complexity of the case with the complex revision of the radiated right breast and placement of implants in a sterile fashion.  2.  MONTEZ Lane, PA student observer    Anesthesia: General    Estimated Blood Loss:  20    Specimens:  Left breast skin    Complications: None    Indications: She is completed her tissue expansion and has had some softening of the right skin envelope.  We discussed that given her significant radiation changes that our planned modest implant size remains most appropriate for her reconstruction.  We discussed placement of smooth round silicone gel implants with a low plus profile.    We discussed risks, benefits and alternatives including but not limited to: bleeding, infection, asymmetry, poor or slow wound healing, need for further surgery, possible recurrence.  The patient elected to proceed.    Description of Procedure: The patient was met in the preoperative holding area.  All questions were answered and informed consent was assured.      She was marked in a standing position and breast landmarks drawn.  She was transferred the operative in supine position and arms secured and padded.    After induction of appropriate anesthesia, a timeout was performed correctly identifying the patient, operative site, and procedure to be performed.  All present were in agreement.    Local anesthetic was infiltrated in the chest was prepped and draped in a sterile fashion.  5 cm lateral IMF  incision was made on the right chest wall and dissection carried superiorly and the implant capsule was identified.  The capsule dissected from the mastectomy skin for several centimeters to create an adequate cuff for closure.  Capsulotomy then performed and some brown turbid fluid was evacuated along with some fibrosed clot.  The expander was removed and the capsule actually looked fairly healthy aside from the capsular contracture and there was some additional fibrosed clot in the pocket.  This was copiously irrigated.  Capsulotomies performed laterally and superiorly and sizer placed and it fit adequately.  Attention was then turned to the left breast and the mastectomy incision was reopened along its entire length and the previous dermal flap was identified and dissected superiorly until reaching the expander capsule.  The capsule was then dissected almost the entire anterior surface and capsulotomy performed and expander removed.  Sizer was placed and the skin tailor tacked to overlap additional inferior skin to get better congruity between both skin envelopes.  This overlap was marked and de-epithelialized.  Both sizers were then removed and pockets copiously irrigated and immaculate hemostasis achieved.  Topical TXA was placed in the pockets and then 50% Betadine solution left to dwell for several minutes.  Closure then changed and implants brought on the field.  Gloves were changed implants placed in both pockets with a Pruitt funnel and a no touch technique.  Capsules were closed with a running 2-0 Vicryl and then skin closure performed with 2-0 Vicryl in the subcutaneous layer, 3-0 Monocryl in the deep dermal layer and 4-0 Monocryl in the intracuticular.  Incisions were dressed with Dermabond and she was placed in a well-padded Ace wrap.    The patient was then aroused from anesthesia with ease and transferred to the postoperative care area in good condition. All sponge, needle, and instrument counts were  correct.

## 2024-03-02 LAB
LAB AP CASE REPORT: NORMAL
PATH REPORT.FINAL DX SPEC: NORMAL
PATH REPORT.GROSS SPEC: NORMAL

## 2024-03-05 DIAGNOSIS — E78.5 HYPERLIPIDEMIA, UNSPECIFIED HYPERLIPIDEMIA TYPE: Chronic | ICD-10-CM

## 2024-03-05 RX ORDER — ATORVASTATIN CALCIUM 10 MG/1
10 TABLET, FILM COATED ORAL DAILY
Qty: 90 TABLET | Refills: 3 | Status: SHIPPED | OUTPATIENT
Start: 2024-03-05

## 2024-03-05 NOTE — TELEPHONE ENCOUNTER
Caller: Palma Roblero    Relationship: Self    Best call back number: [phone]    Requested Prescriptions:   Requested Prescriptions     Pending Prescriptions Disp Refills    atorvastatin (LIPITOR) 10 MG tablet 90 tablet 3     Sig: Take 1 tablet by mouth Daily.        Pharmacy where request should be sent: [id number] [place], [place] - [address] AT [phone] [facility] [phone] FX     Last office visit with prescribing clinician: 12/29/2023   Last telemedicine visit with prescribing clinician: Visit date not found   Next office visit with prescribing clinician: Visit date not found    Does the patient have less than a 3 day supply:  [x] Yes  [] No    Felipe Gaming Rep   03/05/24 10:40 EST

## 2024-03-08 ENCOUNTER — INFUSION (OUTPATIENT)
Dept: ONCOLOGY | Facility: HOSPITAL | Age: 59
End: 2024-03-08
Payer: COMMERCIAL

## 2024-03-08 VITALS
SYSTOLIC BLOOD PRESSURE: 116 MMHG | BODY MASS INDEX: 38.21 KG/M2 | OXYGEN SATURATION: 98 % | TEMPERATURE: 97.9 F | WEIGHT: 222.6 LBS | HEART RATE: 86 BPM | DIASTOLIC BLOOD PRESSURE: 77 MMHG | RESPIRATION RATE: 16 BRPM

## 2024-03-08 DIAGNOSIS — Z17.0 MALIGNANT NEOPLASM OF OVERLAPPING SITES OF RIGHT BREAST IN FEMALE, ESTROGEN RECEPTOR POSITIVE: Primary | ICD-10-CM

## 2024-03-08 DIAGNOSIS — C50.811 MALIGNANT NEOPLASM OF OVERLAPPING SITES OF RIGHT BREAST IN FEMALE, ESTROGEN RECEPTOR POSITIVE: Primary | ICD-10-CM

## 2024-03-08 DIAGNOSIS — Z90.13 HISTORY OF BILATERAL MASTECTOMY: ICD-10-CM

## 2024-03-08 LAB
BASOPHILS # BLD AUTO: 0.05 10*3/MM3 (ref 0–0.2)
BASOPHILS NFR BLD AUTO: 0.7 % (ref 0–1.5)
DEPRECATED RDW RBC AUTO: 40.4 FL (ref 37–54)
EOSINOPHIL # BLD AUTO: 0.13 10*3/MM3 (ref 0–0.4)
EOSINOPHIL NFR BLD AUTO: 1.8 % (ref 0.3–6.2)
ERYTHROCYTE [DISTWIDTH] IN BLOOD BY AUTOMATED COUNT: 13.3 % (ref 12.3–15.4)
HCT VFR BLD AUTO: 40.2 % (ref 34–46.6)
HGB BLD-MCNC: 12.5 G/DL (ref 12–15.9)
IMM GRANULOCYTES # BLD AUTO: 0.02 10*3/MM3 (ref 0–0.05)
IMM GRANULOCYTES NFR BLD AUTO: 0.3 % (ref 0–0.5)
LYMPHOCYTES # BLD AUTO: 1.88 10*3/MM3 (ref 0.7–3.1)
LYMPHOCYTES NFR BLD AUTO: 26.7 % (ref 19.6–45.3)
MCH RBC QN AUTO: 25.7 PG (ref 26.6–33)
MCHC RBC AUTO-ENTMCNC: 31.1 G/DL (ref 31.5–35.7)
MCV RBC AUTO: 82.5 FL (ref 79–97)
MONOCYTES # BLD AUTO: 0.63 10*3/MM3 (ref 0.1–0.9)
MONOCYTES NFR BLD AUTO: 9 % (ref 5–12)
NEUTROPHILS NFR BLD AUTO: 4.32 10*3/MM3 (ref 1.7–7)
NEUTROPHILS NFR BLD AUTO: 61.5 % (ref 42.7–76)
NRBC BLD AUTO-RTO: 0 /100 WBC (ref 0–0.2)
PLATELET # BLD AUTO: 190 10*3/MM3 (ref 140–450)
PMV BLD AUTO: 12.2 FL (ref 6–12)
RBC # BLD AUTO: 4.87 10*6/MM3 (ref 3.77–5.28)
WBC NRBC COR # BLD AUTO: 7.03 10*3/MM3 (ref 3.4–10.8)

## 2024-03-08 PROCEDURE — 25010000002 TRASTUZUMAB-QYYP 420 MG RECONSTITUTED SOLUTION 1 EACH VIAL: Performed by: INTERNAL MEDICINE

## 2024-03-08 PROCEDURE — 96413 CHEMO IV INFUSION 1 HR: CPT

## 2024-03-08 PROCEDURE — 85025 COMPLETE CBC W/AUTO DIFF WBC: CPT | Performed by: INTERNAL MEDICINE

## 2024-03-08 PROCEDURE — 25810000003 SODIUM CHLORIDE 0.9 % SOLUTION 250 ML FLEX CONT: Performed by: INTERNAL MEDICINE

## 2024-03-08 PROCEDURE — 25810000003 SODIUM CHLORIDE 0.9 % SOLUTION: Performed by: INTERNAL MEDICINE

## 2024-03-08 RX ORDER — SODIUM CHLORIDE 9 MG/ML
250 INJECTION, SOLUTION INTRAVENOUS ONCE
Status: COMPLETED | OUTPATIENT
Start: 2024-03-08 | End: 2024-03-08

## 2024-03-08 RX ADMIN — SODIUM CHLORIDE 580 MG: 9 INJECTION, SOLUTION INTRAVENOUS at 13:22

## 2024-03-08 RX ADMIN — SODIUM CHLORIDE 250 ML: 9 INJECTION, SOLUTION INTRAVENOUS at 12:00

## 2024-03-08 NOTE — NURSING NOTE
Patient had surgery 3/1/24 for Bilateral removal of breast tissue expanders and placement of permanent implants. Port site under edematous tissue from this surgery. Patient denies tenderness, otherwise site is WDL. Unable to palpate port for access. PIV started for treatment today.     Patient tolerated treatment without complaints. Discharged in stable condition.

## 2024-03-25 ENCOUNTER — HOSPITAL ENCOUNTER (OUTPATIENT)
Dept: PHYSICAL THERAPY | Facility: HOSPITAL | Age: 59
Setting detail: THERAPIES SERIES
Discharge: HOME | End: 2024-03-25
Payer: COMMERCIAL

## 2024-03-25 DIAGNOSIS — Z91.89 AT RISK FOR LYMPHEDEMA: ICD-10-CM

## 2024-03-25 DIAGNOSIS — Z17.0 MALIGNANT NEOPLASM OF LOWER-OUTER QUADRANT OF RIGHT BREAST OF FEMALE, ESTROGEN RECEPTOR POSITIVE: ICD-10-CM

## 2024-03-25 DIAGNOSIS — Z90.13 HISTORY OF BILATERAL MASTECTOMY: Primary | ICD-10-CM

## 2024-03-25 DIAGNOSIS — C50.511 MALIGNANT NEOPLASM OF LOWER-OUTER QUADRANT OF RIGHT BREAST OF FEMALE, ESTROGEN RECEPTOR POSITIVE: ICD-10-CM

## 2024-03-25 PROCEDURE — 97535 SELF CARE MNGMENT TRAINING: CPT

## 2024-03-25 NOTE — THERAPY RE-EVALUATION
Outpatient Physical Therapy Lymphedema Progress Note  [facility]     Patient Name: Palma Roblero  : 1965  MRN: [id number]  Today's Date: 3/25/2024        Visit Date: 2024    Visit Dx:    ICD-10-CM ICD-9-CM   1. History of bilateral mastectomy  Z90.13 V45.71   2. Malignant neoplasm of lower-outer quadrant of right breast of female, estrogen receptor positive  C50.511 174.5    Z17.0 V86.0   3. At risk for lymphedema  Z91.89 V49.89       Patient Active Problem List   Diagnosis    Malignant neoplasm of right female breast    Hyperlipidemia    Sebaceous cyst    Colon cancer screening    History of breast cancer    History of colon polyps    Malignant neoplasm of overlapping sites of right breast in female, estrogen receptor positive    Breast cancer    Encounter for adjustment or management of vascular access device    History of bilateral mastectomy    Lymphedema due to malignant neoplasm    Screening for malignant neoplasm of colon    History of adenomatous polyp of colon         Lymphedema       Row Name 24 0800             Subjective Pain    Able to rate subjective pain? yes  -LB      Pre-Treatment Pain Level 0  -LB         Subjective    Subjective Comments I am doing well. I think I am a little puffy.  -LB         Lymphedema Assessment    Lymphedema Classification RUE:;at risk/stage 0  -LB      Lymphedema Cancer Related Sx right;lumpectomy;axillary dissection  -LB      Lymph Nodes Removed # 16  -LB      Positive Lymph Nodes # 2  -LB      Chemo Received yes  -LB      Radiation Therapy Received yes  -LB      Infections or Cellulitis? no  -LB         Posture/Observations    Posture/Observations Comments WNL  -LB         RUE Quick Girth (cm)    Axilla 36.4 cm  -LB      Mid upper arm 33.7 cm  -LB      Elbow 27.6 cm  -LB      Mid forearm 27.2 cm  -LB      Wrist crease 16.2 cm  -LB      Web space 20.2 cm  -LB      Met-heads 17.9 cm  -LB      RUE Quick Girth Total 179.2  -LB          Compression/Skin Care    Compression/Skin Care Comments discussed reordering glove from [employer]  -LB                User Key  (r) = Recorded By, (t) = Taken By, (c) = Cosigned By      Initials Name Provider Type    Nina Resendez, PT Physical Therapist                                   PT Assessment/Plan       Row Name 03/25/24 1014          PT Assessment    Functional Limitations Other (comment)  R hand lymphedema  -LB     Impairments Impaired lymphatic circulation;Impaired flexibility;Edema;Muscle strength;Sensation  -LB     Assessment Comments Pt returns after 1 month. She has now undergone implant exchange and tolerated well. She continues to wear appropriately fitted RUE compression glove for part of each day during lunch break and following work day due to limitations of her typing when she wears it during work hours. Total RUE circumferential measurements are stable compared to last visit. Slight edema noted proximal to MCPs R hand. Continued to encourage daily MLD, elevation, and exercises. Pt symptoms are stable and we plan to continue current POC unless symptoms worsen.  -LB     Rehab Potential Good  -LB     Patient/caregiver participated in establishment of treatment plan and goals Yes  -LB     Patient would benefit from skilled therapy intervention Yes  -LB        PT Plan    PT Frequency Other (comment)  -LB     Predicted Duration of Therapy Intervention (PT) return in 6 weeks then consider dec frequency of follow ups  -LB     Planned CPT's? PT EVAL LOW COMPLEXITY: 50459;PT RE-EVAL: 97436;PT THER PROC EA 15 MIN: 04699;PT THER ACT EA 15 MIN: 98067;PT NEUROMUSC RE-EDUCATION EA 15 MIN: 93931;PT SELF CARE/HOME MGMT/TRAIN EA 15: 59525;PT BIS XTRACELL FLUID ANALYSIS: 09388  -LB     PT Plan Comments Reassess in 6 weeks, consider dec frequency after that visit, did pt acquire new glove?  -LB               User Key  (r) = Recorded By, (t) = Taken By, (c) = Cosigned By      Initials Name Provider Type    [place]  Nina Olea, JAMESON Physical Therapist                        OP Exercises       Row Name 03/25/24 0800             Subjective    Subjective Comments I am doing well. I think I am a little puffy.  -LB         Subjective Pain    Able to rate subjective pain? yes  -LB      Pre-Treatment Pain Level 0  -LB                User Key  (r) = Recorded By, (t) = Taken By, (c) = Cosigned By      Initials Name Provider Type    Nina Resendez PT Physical Therapist                                 PT OP Goals       Row Name 03/25/24 1000          Long Term Goals    LTG Date to Achieve 10/03/23  -LB     LTG 1 Pt will maintain LDex bioimpedance score WNL.  -LB     LTG 1 Progress Ongoing  -LB     LTG 2 Pt will demonstrate full AROM BUE post-operatively once cleared for mobility/ROM.  -LB     LTG 2 Progress Met  -LB     LTG 3 Pt will verbalize s/s of lymphedema and steps to prevention.  -LB     LTG 3 Progress Ongoing  -LB     LTG 3 Progress Comments reviewed today  -LB               User Key  (r) = Recorded By, (t) = Taken By, (c) = Cosigned By      Initials Name Provider Type    Nina Resendez PT Physical Therapist                    Therapy Education  Education Details: reviewed s/s of lymphedema, steps to prevention, compression garment considerations, swell spot  Given: Symptoms/condition management, Edema management  Program: Reinforced  How Provided: Verbal  Provided to: Patient  Level of Understanding: Verbalized  09979 - PT Self Care/Mgmt Minutes: 30              Time Calculation:   Start Time: 0830  Stop Time: 0900  Time Calculation (min): 30 min  Total Timed Code Minutes- PT: 30 minute(s)  Timed Charges  59698 - PT Self Care/Mgmt Minutes: 30  Total Minutes  Timed Charges Total Minutes: 30   Total Minutes: 30   Therapy Charges for Today       Code Description Service Date Service Provider Modifiers Qty    [id number]  PT SELF CARE/MGMT/TRAIN EA 15 MIN 3/25/2024 Nina Olea, PT GP 2                      Nina Olea  PT  3/25/2024

## 2024-03-29 ENCOUNTER — INFUSION (OUTPATIENT)
Dept: ONCOLOGY | Facility: HOSPITAL | Age: 59
End: 2024-03-29
Payer: COMMERCIAL

## 2024-03-29 ENCOUNTER — OFFICE VISIT (OUTPATIENT)
Dept: ONCOLOGY | Facility: CLINIC | Age: 59
End: 2024-03-29
Payer: COMMERCIAL

## 2024-03-29 VITALS
HEART RATE: 84 BPM | TEMPERATURE: 97.8 F | HEIGHT: 64 IN | RESPIRATION RATE: 16 BRPM | DIASTOLIC BLOOD PRESSURE: 75 MMHG | SYSTOLIC BLOOD PRESSURE: 115 MMHG | WEIGHT: 222.8 LBS | BODY MASS INDEX: 38.04 KG/M2 | OXYGEN SATURATION: 100 %

## 2024-03-29 DIAGNOSIS — Z90.13 HISTORY OF BILATERAL MASTECTOMY: ICD-10-CM

## 2024-03-29 DIAGNOSIS — Z17.0 MALIGNANT NEOPLASM OF OVERLAPPING SITES OF RIGHT BREAST IN FEMALE, ESTROGEN RECEPTOR POSITIVE: Primary | ICD-10-CM

## 2024-03-29 DIAGNOSIS — C80.1 LYMPHEDEMA DUE TO MALIGNANT NEOPLASM: ICD-10-CM

## 2024-03-29 DIAGNOSIS — Z17.0 MALIGNANT NEOPLASM OF OVERLAPPING SITES OF RIGHT BREAST IN FEMALE, ESTROGEN RECEPTOR POSITIVE: ICD-10-CM

## 2024-03-29 DIAGNOSIS — C50.811 MALIGNANT NEOPLASM OF OVERLAPPING SITES OF RIGHT BREAST IN FEMALE, ESTROGEN RECEPTOR POSITIVE: Primary | ICD-10-CM

## 2024-03-29 DIAGNOSIS — I89.0 LYMPHEDEMA DUE TO MALIGNANT NEOPLASM: ICD-10-CM

## 2024-03-29 DIAGNOSIS — G62.0 CHEMOTHERAPY-INDUCED NEUROPATHY: ICD-10-CM

## 2024-03-29 DIAGNOSIS — Z45.2 ENCOUNTER FOR ADJUSTMENT OR MANAGEMENT OF VASCULAR ACCESS DEVICE: ICD-10-CM

## 2024-03-29 DIAGNOSIS — C50.811 MALIGNANT NEOPLASM OF OVERLAPPING SITES OF RIGHT BREAST IN FEMALE, ESTROGEN RECEPTOR POSITIVE: ICD-10-CM

## 2024-03-29 DIAGNOSIS — T45.1X5A CHEMOTHERAPY-INDUCED NEUROPATHY: ICD-10-CM

## 2024-03-29 LAB
BASOPHILS # BLD AUTO: 0.05 10*3/MM3 (ref 0–0.2)
BASOPHILS NFR BLD AUTO: 0.8 % (ref 0–1.5)
DEPRECATED RDW RBC AUTO: 39.9 FL (ref 37–54)
EOSINOPHIL # BLD AUTO: 0.13 10*3/MM3 (ref 0–0.4)
EOSINOPHIL NFR BLD AUTO: 2.1 % (ref 0.3–6.2)
ERYTHROCYTE [DISTWIDTH] IN BLOOD BY AUTOMATED COUNT: 13.7 % (ref 12.3–15.4)
HCT VFR BLD AUTO: 41.8 % (ref 34–46.6)
HGB BLD-MCNC: 13.2 G/DL (ref 12–15.9)
IMM GRANULOCYTES # BLD AUTO: 0.01 10*3/MM3 (ref 0–0.05)
IMM GRANULOCYTES NFR BLD AUTO: 0.2 % (ref 0–0.5)
LYMPHOCYTES # BLD AUTO: 2.04 10*3/MM3 (ref 0.7–3.1)
LYMPHOCYTES NFR BLD AUTO: 33.7 % (ref 19.6–45.3)
MCH RBC QN AUTO: 25.7 PG (ref 26.6–33)
MCHC RBC AUTO-ENTMCNC: 31.6 G/DL (ref 31.5–35.7)
MCV RBC AUTO: 81.3 FL (ref 79–97)
MONOCYTES # BLD AUTO: 0.51 10*3/MM3 (ref 0.1–0.9)
MONOCYTES NFR BLD AUTO: 8.4 % (ref 5–12)
NEUTROPHILS NFR BLD AUTO: 3.31 10*3/MM3 (ref 1.7–7)
NEUTROPHILS NFR BLD AUTO: 54.8 % (ref 42.7–76)
NRBC BLD AUTO-RTO: 0 /100 WBC (ref 0–0.2)
PLATELET # BLD AUTO: 184 10*3/MM3 (ref 140–450)
PMV BLD AUTO: 11.9 FL (ref 6–12)
RBC # BLD AUTO: 5.14 10*6/MM3 (ref 3.77–5.28)
WBC NRBC COR # BLD AUTO: 6.05 10*3/MM3 (ref 3.4–10.8)

## 2024-03-29 PROCEDURE — 25810000003 SODIUM CHLORIDE 0.9 % SOLUTION 250 ML FLEX CONT: Performed by: INTERNAL MEDICINE

## 2024-03-29 PROCEDURE — 25810000003 SODIUM CHLORIDE 0.9 % SOLUTION: Performed by: INTERNAL MEDICINE

## 2024-03-29 PROCEDURE — 25010000002 TRASTUZUMAB-QYYP 420 MG RECONSTITUTED SOLUTION 1 EACH VIAL: Performed by: INTERNAL MEDICINE

## 2024-03-29 PROCEDURE — 25010000002 HEPARIN LOCK FLUSH PER 10 UNITS: Performed by: INTERNAL MEDICINE

## 2024-03-29 PROCEDURE — 96413 CHEMO IV INFUSION 1 HR: CPT

## 2024-03-29 PROCEDURE — 85025 COMPLETE CBC W/AUTO DIFF WBC: CPT | Performed by: INTERNAL MEDICINE

## 2024-03-29 RX ORDER — SODIUM CHLORIDE 0.9 % (FLUSH) 0.9 %
10 SYRINGE (ML) INJECTION AS NEEDED
Status: DISCONTINUED | OUTPATIENT
Start: 2024-03-29 | End: 2024-03-29 | Stop reason: HOSPADM

## 2024-03-29 RX ORDER — SODIUM CHLORIDE 9 MG/ML
250 INJECTION, SOLUTION INTRAVENOUS ONCE
OUTPATIENT
Start: 2024-05-10

## 2024-03-29 RX ORDER — SODIUM CHLORIDE 0.9 % (FLUSH) 0.9 %
10 SYRINGE (ML) INJECTION AS NEEDED
OUTPATIENT
Start: 2024-03-29

## 2024-03-29 RX ORDER — HEPARIN SODIUM (PORCINE) LOCK FLUSH IV SOLN 100 UNIT/ML 100 UNIT/ML
500 SOLUTION INTRAVENOUS AS NEEDED
OUTPATIENT
Start: 2024-03-29

## 2024-03-29 RX ORDER — SODIUM CHLORIDE 9 MG/ML
250 INJECTION, SOLUTION INTRAVENOUS ONCE
Status: COMPLETED | OUTPATIENT
Start: 2024-03-29 | End: 2024-03-29

## 2024-03-29 RX ORDER — HEPARIN SODIUM (PORCINE) LOCK FLUSH IV SOLN 100 UNIT/ML 100 UNIT/ML
500 SOLUTION INTRAVENOUS AS NEEDED
Status: DISCONTINUED | OUTPATIENT
Start: 2024-03-29 | End: 2024-03-29 | Stop reason: HOSPADM

## 2024-03-29 RX ADMIN — SODIUM CHLORIDE 250 ML: 9 INJECTION, SOLUTION INTRAVENOUS at 11:40

## 2024-03-29 RX ADMIN — Medication 10 ML: at 12:28

## 2024-03-29 RX ADMIN — Medication 500 UNITS: at 12:28

## 2024-03-29 RX ADMIN — SODIUM CHLORIDE 580 MG: 9 INJECTION, SOLUTION INTRAVENOUS at 11:57

## 2024-03-29 NOTE — PROGRESS NOTES
Subjective   REASON FOR FOLLOWUP:  History of stage IIIA infiltrating ductal carcinoma of the right breast with 4 positive nodes, ER positive, HER-2/julián negative; treated with lumpectomy, status post 6 cycles of TAC. Received tamoxifen between February 2007 and March 2012. Femara was started in March 2012. Patient switched to Arimidex as of 06/21/2012 because of side effects with severe ankle joint pain with Femara.    2.   10 years of adjuvant therapy completed  March 2017.    3.   New diagnosis of carcinoma of the remaining right breast, needle biopsy 6/8/2023.  Tumor is HER2/juilán positive and estrogen receptor positive/progesterone receptor negative.    4.   Bilateral mastectomies with implant reconstruction 8/16/2023    5.   Initiation of postop adjuvant chemotherapy with weekly Taxol and Herceptin.  First dose delivered 9/15/2023.     6.  Weekly Taxol and Herceptin completed 12/1/2023.    7.  Every 3-week Herceptin initiated 12/8/2023.    8.  Plan Hormonal therapy with Aromasin 25 mg daily.  This was prescribed on the visit of 1/19/2024 but the patient has not yet started taking the medication and wished to wait until after her reconstructive surgery.    9.  Reconstructive surgery with permanent breast implant insertions 3/1/2024      HISTORY OF PRESENT ILLNESS:     History of Present Illness Ms. Roblero is a 58 y.o.  female with the above noted history of stage IIIA carcinoma of the right breast, treated with lumpectomy, chemotherapy and radiation.  Following initial treatment with 6 cycles of TAC chemotherapy, she received extended hormonal therapy with 5 years of tamoxifen followed by 5 years of aromatase inhibitor.  She completed her 10 years of hormonal therapy in March 2017  .     She was seeing us annually and had not been seen since January of 2023 but when she underwent her breast imaging in May 2023 she had suspicious findings and ultimately underwent ultrasound-guided biopsy on  6/8/2023.  There were 2 separate biopsies in 1 of these areas was positive for HER2/julián overexpression by FISH.  Both tumors were ER positive and DE negative.    She was referred to Dr. Vidal who had performed her previous lumpectomy surgery in 2007 and currently the plan is for her to undergo bilateral mastectomies with reconstruction.    We had her seen by cardiac oncology due to her previous treatment with 6 cycles of TAC chemotherapy in 2007.  Thankfully her ejection fraction still seems to be normal at 61% based on her echocardiogram from 6/19/2023.    Dr. Vidal has scheduled the patient for radiographic staging with CT scan of the chest abdomen pelvis and a bone scan and the studies did not show any evidence of distant metastatic spread.    She had a bilateral mastectomies with implant reconstruction performed on 8/16/2023.  The tumor in the right breast was 3 x 1.5 x 1.2 cm.  Margins of resection were clear.  She also had placement of a left chest wall Mediport at the time of her surgery.    We recommended weekly Taxol and Herceptin for 12 weeks followed by continued Herceptin every 3 weeks for the remainder of the year of treatment.  She was estrogen receptor positive and will also be started on hormonal therapy once her chemotherapy is completed.    She completed her combined weekly Taxol and Herceptin in early December 2023 and is now receiving every 3-week Herceptin infusion with plans to continue this through September 2024 (12 months total).    INTERVAL HISTORY:  Palma returns to the office today for further Herceptin treatment which is now being delivered every 3 weeks at a dose of 6 mg/kg.    She had a follow-up echocardiogram performed on 1/12/2024 showing her ejection fraction is still normal at 64%.    She had her tissue expanders taken out and had permanent implants inserted on 3/1/2024.    Looks great in the office today.  Her blood count is normal.  She tells me she is planning to start the  "Aromasin after the Easter weekend.    She has another echocardiogram scheduled with Dr. Reynolds in late April.        Past Medical History, Past Surgical History, Social History, Family History have been reviewed and are without significant changes except as mentioned.    Review of Systems   Constitutional:  Negative for activity change, appetite change, fatigue, fever and unexpected weight change.   HENT:  Negative for hearing loss, nosebleeds, trouble swallowing and voice change.    Eyes:  Negative for visual disturbance.   Respiratory:  Negative for cough, shortness of breath and wheezing.    Cardiovascular:  Negative for chest pain and palpitations.   Gastrointestinal:  Negative for abdominal pain, diarrhea, nausea and vomiting.   Genitourinary:  Negative for difficulty urinating, frequency, hematuria and urgency.   Musculoskeletal:  Negative for back pain and neck pain.        Mild lymphedema right upper extremity.     Skin:  Negative for rash.   Neurological:  Positive for numbness. Negative for dizziness, seizures, syncope and headaches.   Hematological:  Negative for adenopathy. Does not bruise/bleed easily.   Psychiatric/Behavioral:  Negative for behavioral problems. The patient is not nervous/anxious.       A comprehensive 14 point review of systems was performed and was negative except as mentioned.    Medications:  The current medication list was reviewed in the EMR    ALLERGIES:    No Known Allergies      Objective      Vitals:    03/29/24 1111   BP: 115/75   Pulse: 84   Resp: 16   Temp: 97.8 °F (36.6 °C)   TempSrc: Temporal   SpO2: 100%   Weight: 101 kg (222 lb 12.8 oz)   Height: 162 cm (63.78\")   PainSc: 0-No pain           3/29/2024    11:12 AM   Current Status   ECOG score 0       Physical Exam   Constitutional: She is oriented to person, place, and time. She appears well-developed. No distress.   HENT:   Head: Normocephalic.   Eyes: Pupils are equal, round, and reactive to light. Conjunctivae are " normal. No scleral icterus.   Neck: No JVD present. No thyromegaly present.   Cardiovascular: Normal rate and regular rhythm. Exam reveals no gallop and no friction rub.   No murmur heard.  Pulmonary/Chest: Effort normal and breath sounds normal. She has no wheezes. She has no rales. Right breast exhibits no mass, no nipple discharge and no skin change. Left breast exhibits no mass, no nipple discharge and no skin change.   Bilateral mastectomies with implant reconstructions.  Right chest wall Mediport   Abdominal: Soft. Bowel sounds are normal. She exhibits no distension and no mass. There is no abdominal tenderness.   Musculoskeletal: Normal range of motion. Swelling (Mild lymphedema in right upper extremity.  No pain, tenderness, or cording noted on exam.) present. No tenderness or deformity.   Lymphadenopathy:     She has no cervical adenopathy.   Neurological: She is alert and oriented to person, place, and time. She has normal reflexes. No cranial nerve deficit.   Skin: Skin is warm and dry. No rash noted. No erythema.   Psychiatric: Her behavior is normal. Judgment normal.        I have reexamined the patient and the results are consistent with the previously documented exam. Weston Solares MD           RECENT LABS:  Hematology WBC   Date Value Ref Range Status   03/29/2024 6.05 3.40 - 10.80 10*3/mm3 Final   11/01/2021 5.9 3.4 - 10.8 x10E3/uL Final     RBC   Date Value Ref Range Status   03/29/2024 5.14 3.77 - 5.28 10*6/mm3 Final   11/01/2021 5.14 3.77 - 5.28 x10E6/uL Final     Hemoglobin   Date Value Ref Range Status   03/29/2024 13.2 12.0 - 15.9 g/dL Final     Hematocrit   Date Value Ref Range Status   03/29/2024 41.8 34.0 - 46.6 % Final     Platelets   Date Value Ref Range Status   03/29/2024 184 140 - 450 10*3/mm3 Final            Lab Results   Component Value Date    NEUTROABS 3.31 03/29/2024       Lab Results   Component Value Date    GLUCOSE 92 02/09/2024    BUN 16 02/09/2024    CREATININE 0.93  02/09/2024    EGFRRESULT 91.8 11/30/2022    EGFR 70.5 02/09/2024    BCR 17.2 02/09/2024    K 4.0 02/09/2024    CO2 26.0 02/09/2024    CALCIUM 9.1 02/09/2024    PROTENTOTREF 6.6 11/30/2022    ALBUMIN 4.1 02/09/2024    BILITOT 0.5 02/09/2024    AST 26 02/09/2024    ALT 26 02/09/2024     PATHOLOGY 8/16/2023  Final Diagnosis   1. Right Breast, Oriented Simple Skin Sparing Mastectomy (1,097 Grams): INVASIVE MAMMARY CARCINOMA,       NO SPECIAL TYPE (INVASIVE DUCTAL CARCINOMA).  A. Tumor site: Lower outer quadrant.               B. Histologic grade: [place] histologic score III (tubules = 3, nuclei = 2, mitosis = 3).               C. Tumor size: 30 x 15 x 12 mm.               D. Tumor focality: Single focus.               E. Ductal Carcinoma in-situ (DCIS): Not identified.               F. No lobular neoplasia identified.               G. Overlying skin and nipple are uninvolved by tumor.               H. No definitive lymphovascular space invasion identified.               I. Focal perineural invasion is present.  J. Margins: Uninvolved by invasive and in-situ carcinoma.               1. Invasive carcinoma comes to within 7 mm of the anterior margin, 40 mm from the inferior margin,      60 mm from the posterior and lateral margins, 80 mm from the medial margin and 130 mm from the       superior margin of resection.               K. Lymph Nodes: Not submitted.  L. Hormone receptor status: ER positive, PA negative, HER2 positive by FISH (right medial biopsy),       Ki-67 35-40%  (performed on prior biopsy [id number]).  M. Pathologic stage: pT2, NX.     2. Left Breast, Oriented Simple Skin Sparing Mastectomy (1,593 Grams):   A. Benign unremarkable breast tissue, skin and nipple.     PATHOLOGY 6/8/2023  Final Diagnosis   1. Breast, Right Lateral 6:30 Position, 3 cm FN, Core Biopsy:               A. Invasive mammary carcinoma, no special type (ductal), Perez histologic grade II (tubules = 3, nuclei = 2,        mitoses  = 2) measuring 6 mm maximally and involving four core fragments.  B. No definitive in-situ carcinoma is identified.  C. No definitive lymphovascular space invasion identified.     2. Breast, Right Medial 6:30 Position, 3 cm FN, Core Biopsy:                A. Invasive mammary carcinoma, no special type (ductal), Perez histologic grade II (tubules = 3, nuclei = 2,        mitoses = 1) measuring 10 mm maximally and involving three core fragments.  B. No definitive in-situ carcinoma is identified.  C. No definitive lymphovascular space invasion identified.     Echocardiogram 1/12/2024  Interpretation Summary       Left ventricular systolic function is normal. Calculated left ventricular EF = 64.3% Normal global longitudinal LV strain (GLS) = -22.3%. Left ventricle strain data was reviewed by the physician and found to be accurate. Normal left ventricular cavity size and wall thickness noted. All left ventricular wall segments contract normally. Left ventricular diastolic function is consistent with (grade I) impaired relaxation    Limited 2D imaging of cardiac valves with normal valve function by Doppler      CT CHEST, ABDOMEN, AND PELVIS WITHOUT CONTRAST. 7/21/2023  FINDINGS:  CHEST: Right breast findings discussed on breast MRI 06/17/2023. There  is a cluster of nodular densities at the right axilla in close proximity  to a cluster of surgical clips and some of these may represent vessels  or lymphatics. Characterization is limited in the absence of IV  contrast. There is no definitive pathologically enlarged node present.  There are no pathologically enlarged nodes at the left axilla and there  is no subpectoral lymphadenopathy, and there is no lymphadenopathy at  the mediastinum or jovita given constraints of noncontrasted technique.  The nodularity along the pleura at the dependent aspect of the lower  chest is likely related to dependent atelectatic change. No definite  suspicious pulmonary nodules are seen.  There are no pleural or  pericardial effusions.     ABDOMEN/PELVIS: Noncontrasted liver appears unremarkable. The  gallbladder appears unremarkable and there is no biliary dilatation.  Splenic size is normal. Noncontrasted pancreas, adrenals, and kidneys  appear unremarkable. There is no acute bowel abnormality. Appendix  appears within normal limits. The uterus is slightly lobular in contour  likely secondary to small fibroids. Noncontrasted adnexa appear  unremarkable. There is no free fluid or lymphadenopathy. There are mild  abdominal aortic atherosclerotic changes throughout the abdominal aorta  without aneurysmal dilatation. No suspicious bone lesion is seen.     IMPRESSION:  Given constraints of noncontrasted technique, there is no  convincing evidence for metastatic disease within the chest, abdomen, or  pelvis.      Narrative & Impression   NUCLEAR MEDICINE WHOLE BODY BONE SCAN 7/14/2023     HISTORY: Breast cancer. Evaluate for metastatic disease.     TECHNIQUE:  19.1 mCi of 99m technetium MDP was administered intravenous  followed by 3 hour delayed phase whole body imaging with selected spot  views.     COMPARISON: None.     FINDINGS:  There is mild increased uptake at the right sternoclavicular  joint that is attributed to arthritis. Arthritic uptake is present in  both shoulders, knees, and mid feet.  There is also mild increased  lumbar spine uptake that is most likely degenerative/arthritic. Both  kidneys and the urinary bladder are visualized.     IMPRESSION:  Areas of mild uptake are typical for arthritis/degenerative  change and there is no scintigraphic evidence to suggest bony metastatic  disease.        MAMMO SCREENING DIGITAL TOMOSYNTHESIS BILATERAL W CAD- 4/14/2023   IMPRESSION:  1. There is an area of focal asymmetry in the middle third retroareolar  region of the right breast. Further evaluation with spot compression CC  and MLO mammographic and Tomosynthesis images and targeted right  breast  sonography is recommended.  2. There are no findings suspicious for malignancy in the left breast.     BI-RADS Category 0: Incomplete. Needs additional imaging evaluation.    EXAMINATIONS: UNILATERAL RIGHT DIGITAL DIAGNOSTIC MAMMOGRAPHY WITH  TOMOSYNTHESIS AND LIMITED RIGHT BREAST SONOGRAPHY 5/17/2023  IMPRESSION:  There is an irregular 1 cm hypoechoic mass with internal peripheral  vascularity that is seen in the right breast at the 6:30 position on the  order of 3 cm from the nipple. It is immediately adjacent to a 1.9 cm  oval circumscribed hypoechoic mass. Together both lesions measure on the  order 2.7 cm in greatest dimension. Ultrasound guided biopsy of both  lesions is recommended. Given the close proximity of both lesions, both  lesions may be able to be sampled and submitted as one specimen.     BI-RADS Category 4: Suspicious abnormality or suspicious finding. Biopsy  should be considered.    MRI BREAST BILATERAL W WO CONTRAST-  6/17/2023  IMPRESSION AND RECOMMENDATION:     1.  Adjacent irregular enhancing masses each measuring 1.6 cm at 6:00 to  7:00 in the middle right breast represent the 2 sites of biopsy-proven  malignancy. Adjacent suspicious enhancing foci and areas of nonmass  enhancement are identified, in addition to an irregular enhancing mass  and multiple additional enhancing foci centered at 2:00 to 3:00 in the  right breast, which are also suspicious. The 2 sites of biopsy-proven  malignancy and suspicious areas of enhancement together measure up to  approximately 8.2 cm in maximum dimension, as detailed above. Surgical  management is recommended.  2.  No MRI evidence of malignancy in the left breast.     BI-RADS Category 4: Suspicious      Assessment & Plan     *Stage IIIA infiltrating ductal carcinoma of the right breast   Treated with a lumpectomy, radiation, 6 cycles of TAC chemotherapy and ongoing hormonal therapy. She completed 10 years of adjuvant hormonal therapy with 5 years  of tamoxifen followed by 5 years of Arimidex which she completed in 2017.  Carcinoma of the remaining right breast.  Tumor was positive for HER2/julián overexpression and positive for estrogen receptors and negative for progesterone receptors.  Bilateral mastectomies with immediate implant reconstruction 8/16/2023.  Mediport placement 8/16/2023  Echocardiogram from 6/19/2023 shows normal left ventricular ejection fraction of 61% (patient previously had 6 cycles of Adriamycin containing chemotherapy and will be needing Herceptin therapy postoperatively).  Plan for postop adjuvant treatment with weekly Taxol and Herceptin x12 weeks followed by continued single agent Herceptin every 3 weeks for completion of 12 months of therapy total.  Plan for hormonal therapy possibly with Aromasin to begin once Taxol is completed.  9/14/2023: Patient returns for cycle 1 day 15 weekly Taxol and Herceptin.  Unfortunately she is neutropenic with an ANC of 800 and will not receive Taxol today but will receive Herceptin.  Her future Taxol doses will be modified to an 85% dose level.  10/6/2023: Proceed with cycle 2 day 1 taxol and Herceptin. We will continue taxol at 85% reduction of original dose.   10/27/2023: Palma will receive her 6th weekly dose of Taxol and her seventh weekly dose of Herceptin in the office today.Taxol dose will remain at 85% dose level from the original treatment due to development of neutropenia.  12/1/2023: We will proceed with 12th and final weekly Taxol and  Herceptin in office today.  She is tolerating treatment well.   Initial dose of every 3-week Herceptin at 6 mg/kg delivered 12/8/2023.  Aromasin 25 mg daily prescribed after the visit of 1/19/2024.  He elected not to start the medication until after her final reconstructive surgery.    *Chemotherapy-induced neuropathy  11/17/2023: Patient continues to use cooling mitts and gloves with treatment.  She has been noticing some mild neuropathy developing in her  toes of her bilateral feet.  She feels that this discomfort is more noticeable in the evening when she is trying to sleep.  Continue gabapentin 100 mg p.o. at bedtime.     *Right upper extremity lymphedema  11/17/2023: Patient has had history of lymphedema in her right upper extremity.  She does feel that in the past week or so her edema has been more noticeable to her.  She has been wearing her compression garments.  She has an appointment with lymphedema clinic next week on 12/8/2023.  12/29/2023: Continues compression garment and has followed up with the lymphedema clinic.  Stable at this time.      PLAN:     Proceed with Herceptin today on a dose and schedule of 6 mg/kg IV every 3 weeks.  We anticipate this will be continued for the remainder of 12 months of therapy through September 2024..  Encouraged use of compression garments to right upper extremity and compression.  Continue Neurontin 100 mg p.o. nightly.    Patient willing to start the daily Aromasin hormonal therapy at this point.  She will return every 3 weeks for lab and Herceptin treatment.  Next echocardiogram and follow-up with Dr. Reynolds in April  MD follow-up with lab and Herceptin infusion in 3 months  We told Palma to contact us if she has any problems once she starts her Aromasin medication.      Patient remains on high risk medication and requires close monitoring for toxicity.        3/29/2024      CC:

## 2024-04-02 ENCOUNTER — PATIENT OUTREACH (OUTPATIENT)
Dept: OTHER | Facility: HOSPITAL | Age: 59
End: 2024-04-02
Payer: COMMERCIAL

## 2024-04-02 DIAGNOSIS — Z17.0 MALIGNANT NEOPLASM OF OVERLAPPING SITES OF RIGHT BREAST IN FEMALE, ESTROGEN RECEPTOR POSITIVE: Primary | ICD-10-CM

## 2024-04-02 DIAGNOSIS — C50.811 MALIGNANT NEOPLASM OF OVERLAPPING SITES OF RIGHT BREAST IN FEMALE, ESTROGEN RECEPTOR POSITIVE: Primary | ICD-10-CM

## 2024-04-02 NOTE — PROGRESS NOTES
Called Ms. Roblero to see how she was doing. She stated she is doing well and has no needs at this time. She will follow with Dr. Landis moving forward and has great support. We discussed our survivorship program and she was interested in getting an appointment. That appointment has been made for May 15th. She was thankful for the call and will reach out if any questions or needs arise.

## 2024-04-19 ENCOUNTER — INFUSION (OUTPATIENT)
Dept: ONCOLOGY | Facility: HOSPITAL | Age: 59
End: 2024-04-19
Payer: COMMERCIAL

## 2024-04-19 VITALS
HEART RATE: 90 BPM | TEMPERATURE: 98.2 F | DIASTOLIC BLOOD PRESSURE: 71 MMHG | SYSTOLIC BLOOD PRESSURE: 120 MMHG | RESPIRATION RATE: 15 BRPM | HEIGHT: 64 IN | WEIGHT: 225 LBS | BODY MASS INDEX: 38.41 KG/M2 | OXYGEN SATURATION: 100 %

## 2024-04-19 DIAGNOSIS — C50.811 MALIGNANT NEOPLASM OF OVERLAPPING SITES OF RIGHT BREAST IN FEMALE, ESTROGEN RECEPTOR POSITIVE: Primary | ICD-10-CM

## 2024-04-19 DIAGNOSIS — Z45.2 ENCOUNTER FOR ADJUSTMENT OR MANAGEMENT OF VASCULAR ACCESS DEVICE: ICD-10-CM

## 2024-04-19 DIAGNOSIS — Z17.0 MALIGNANT NEOPLASM OF OVERLAPPING SITES OF RIGHT BREAST IN FEMALE, ESTROGEN RECEPTOR POSITIVE: Primary | ICD-10-CM

## 2024-04-19 DIAGNOSIS — Z90.13 HISTORY OF BILATERAL MASTECTOMY: ICD-10-CM

## 2024-04-19 LAB
BASOPHILS # BLD AUTO: 0.03 10*3/MM3 (ref 0–0.2)
BASOPHILS NFR BLD AUTO: 0.5 % (ref 0–1.5)
DEPRECATED RDW RBC AUTO: 41.1 FL (ref 37–54)
EOSINOPHIL # BLD AUTO: 0.15 10*3/MM3 (ref 0–0.4)
EOSINOPHIL NFR BLD AUTO: 2.3 % (ref 0.3–6.2)
ERYTHROCYTE [DISTWIDTH] IN BLOOD BY AUTOMATED COUNT: 14.3 % (ref 12.3–15.4)
HCT VFR BLD AUTO: 40.5 % (ref 34–46.6)
HGB BLD-MCNC: 12.9 G/DL (ref 12–15.9)
IMM GRANULOCYTES # BLD AUTO: 0.01 10*3/MM3 (ref 0–0.05)
IMM GRANULOCYTES NFR BLD AUTO: 0.2 % (ref 0–0.5)
LYMPHOCYTES # BLD AUTO: 2.01 10*3/MM3 (ref 0.7–3.1)
LYMPHOCYTES NFR BLD AUTO: 31.4 % (ref 19.6–45.3)
MCH RBC QN AUTO: 25.4 PG (ref 26.6–33)
MCHC RBC AUTO-ENTMCNC: 31.9 G/DL (ref 31.5–35.7)
MCV RBC AUTO: 79.9 FL (ref 79–97)
MONOCYTES # BLD AUTO: 0.59 10*3/MM3 (ref 0.1–0.9)
MONOCYTES NFR BLD AUTO: 9.2 % (ref 5–12)
NEUTROPHILS NFR BLD AUTO: 3.61 10*3/MM3 (ref 1.7–7)
NEUTROPHILS NFR BLD AUTO: 56.4 % (ref 42.7–76)
NRBC BLD AUTO-RTO: 0 /100 WBC (ref 0–0.2)
PLATELET # BLD AUTO: 198 10*3/MM3 (ref 140–450)
PMV BLD AUTO: 12.3 FL (ref 6–12)
RBC # BLD AUTO: 5.07 10*6/MM3 (ref 3.77–5.28)
WBC NRBC COR # BLD AUTO: 6.4 10*3/MM3 (ref 3.4–10.8)

## 2024-04-19 PROCEDURE — 25810000003 SODIUM CHLORIDE 0.9 % SOLUTION 250 ML FLEX CONT: Performed by: INTERNAL MEDICINE

## 2024-04-19 PROCEDURE — 25810000003 SODIUM CHLORIDE 0.9 % SOLUTION: Performed by: INTERNAL MEDICINE

## 2024-04-19 PROCEDURE — 96413 CHEMO IV INFUSION 1 HR: CPT

## 2024-04-19 PROCEDURE — 85025 COMPLETE CBC W/AUTO DIFF WBC: CPT | Performed by: INTERNAL MEDICINE

## 2024-04-19 PROCEDURE — 25010000002 HEPARIN LOCK FLUSH PER 10 UNITS: Performed by: INTERNAL MEDICINE

## 2024-04-19 PROCEDURE — 25010000002 TRASTUZUMAB-QYYP 420 MG RECONSTITUTED SOLUTION 1 EACH VIAL: Performed by: INTERNAL MEDICINE

## 2024-04-19 RX ORDER — HEPARIN SODIUM (PORCINE) LOCK FLUSH IV SOLN 100 UNIT/ML 100 UNIT/ML
500 SOLUTION INTRAVENOUS AS NEEDED
OUTPATIENT
Start: 2024-04-19

## 2024-04-19 RX ORDER — HEPARIN SODIUM (PORCINE) LOCK FLUSH IV SOLN 100 UNIT/ML 100 UNIT/ML
500 SOLUTION INTRAVENOUS AS NEEDED
Status: DISCONTINUED | OUTPATIENT
Start: 2024-04-19 | End: 2024-04-19 | Stop reason: HOSPADM

## 2024-04-19 RX ORDER — SODIUM CHLORIDE 0.9 % (FLUSH) 0.9 %
10 SYRINGE (ML) INJECTION AS NEEDED
Status: DISCONTINUED | OUTPATIENT
Start: 2024-04-19 | End: 2024-04-19 | Stop reason: HOSPADM

## 2024-04-19 RX ORDER — SODIUM CHLORIDE 0.9 % (FLUSH) 0.9 %
10 SYRINGE (ML) INJECTION AS NEEDED
OUTPATIENT
Start: 2024-04-19

## 2024-04-19 RX ORDER — SODIUM CHLORIDE 9 MG/ML
250 INJECTION, SOLUTION INTRAVENOUS ONCE
Status: COMPLETED | OUTPATIENT
Start: 2024-04-19 | End: 2024-04-19

## 2024-04-19 RX ADMIN — SODIUM CHLORIDE 250 ML: 9 INJECTION, SOLUTION INTRAVENOUS at 13:20

## 2024-04-19 RX ADMIN — Medication 500 UNITS: at 13:56

## 2024-04-19 RX ADMIN — Medication 10 ML: at 13:56

## 2024-04-19 RX ADMIN — SODIUM CHLORIDE 580 MG: 9 INJECTION, SOLUTION INTRAVENOUS at 13:20

## 2024-04-25 ENCOUNTER — OFFICE VISIT (OUTPATIENT)
Dept: CARDIOLOGY | Facility: CLINIC | Age: 59
End: 2024-04-25
Payer: COMMERCIAL

## 2024-04-25 ENCOUNTER — TELEPHONE (OUTPATIENT)
Dept: CARDIOLOGY | Facility: CLINIC | Age: 59
End: 2024-04-25

## 2024-04-25 ENCOUNTER — HOSPITAL ENCOUNTER (OUTPATIENT)
Dept: CARDIOLOGY | Facility: HOSPITAL | Age: 59
Discharge: HOME | End: 2024-04-25
Admitting: INTERNAL MEDICINE
Payer: COMMERCIAL

## 2024-04-25 VITALS
DIASTOLIC BLOOD PRESSURE: 78 MMHG | HEIGHT: 64 IN | WEIGHT: 225 LBS | SYSTOLIC BLOOD PRESSURE: 120 MMHG | BODY MASS INDEX: 38.41 KG/M2 | HEART RATE: 66 BPM | OXYGEN SATURATION: 98 %

## 2024-04-25 VITALS
WEIGHT: 227 LBS | HEART RATE: 74 BPM | DIASTOLIC BLOOD PRESSURE: 72 MMHG | BODY MASS INDEX: 38.76 KG/M2 | SYSTOLIC BLOOD PRESSURE: 120 MMHG | HEIGHT: 64 IN

## 2024-04-25 DIAGNOSIS — Z51.81 ENCOUNTER FOR MONITORING CARDIOTOXIC DRUG THERAPY: Primary | ICD-10-CM

## 2024-04-25 DIAGNOSIS — Z17.0 MALIGNANT NEOPLASM OF LOWER-OUTER QUADRANT OF RIGHT BREAST OF FEMALE, ESTROGEN RECEPTOR POSITIVE: ICD-10-CM

## 2024-04-25 DIAGNOSIS — E78.5 DYSLIPIDEMIA: ICD-10-CM

## 2024-04-25 DIAGNOSIS — Z79.899 ENCOUNTER FOR MONITORING CARDIOTOXIC DRUG THERAPY: Primary | ICD-10-CM

## 2024-04-25 DIAGNOSIS — Z79.899 ENCOUNTER FOR MONITORING CARDIOTOXIC DRUG THERAPY: ICD-10-CM

## 2024-04-25 DIAGNOSIS — Z51.81 ENCOUNTER FOR MONITORING CARDIOTOXIC DRUG THERAPY: ICD-10-CM

## 2024-04-25 DIAGNOSIS — C50.511 MALIGNANT NEOPLASM OF LOWER-OUTER QUADRANT OF RIGHT BREAST OF FEMALE, ESTROGEN RECEPTOR POSITIVE: ICD-10-CM

## 2024-04-25 LAB
BH CV ECHO LEFT VENTRICLE GLOBAL LONGITUDINAL STRAIN: -22.4 %
BH CV ECHO MEAS - EDV(CUBED): 85.2 ML
BH CV ECHO MEAS - EDV(MOD-SP2): 94 ML
BH CV ECHO MEAS - EDV(MOD-SP4): 99 ML
BH CV ECHO MEAS - EF(MOD-BP): 62.5 %
BH CV ECHO MEAS - EF(MOD-SP2): 66 %
BH CV ECHO MEAS - EF(MOD-SP4): 60.6 %
BH CV ECHO MEAS - ESV(CUBED): 23 ML
BH CV ECHO MEAS - ESV(MOD-SP2): 32 ML
BH CV ECHO MEAS - ESV(MOD-SP4): 39 ML
BH CV ECHO MEAS - FS: 35.4 %
BH CV ECHO MEAS - IVS/LVPW: 0.78 CM
BH CV ECHO MEAS - IVSD: 0.7 CM
BH CV ECHO MEAS - LAT PEAK E' VEL: 14.8 CM/SEC
BH CV ECHO MEAS - LV DIASTOLIC VOL/BSA (35-75): 48.1 CM2
BH CV ECHO MEAS - LV MASS(C)D: 109.4 GRAMS
BH CV ECHO MEAS - LV SYSTOLIC VOL/BSA (12-30): 19 CM2
BH CV ECHO MEAS - LVIDD: 4.4 CM
BH CV ECHO MEAS - LVIDS: 2.8 CM
BH CV ECHO MEAS - LVPWD: 0.9 CM
BH CV ECHO MEAS - MED PEAK E' VEL: 9.7 CM/SEC
BH CV ECHO MEAS - MR MAX PG: 25 MMHG
BH CV ECHO MEAS - MR MAX VEL: 249.8 CM/SEC
BH CV ECHO MEAS - MV A DUR: 0.11 SEC
BH CV ECHO MEAS - MV A MAX VEL: 81.4 CM/SEC
BH CV ECHO MEAS - MV DEC SLOPE: 333.8 CM/SEC2
BH CV ECHO MEAS - MV DEC TIME: 0.21 SEC
BH CV ECHO MEAS - MV E MAX VEL: 76.3 CM/SEC
BH CV ECHO MEAS - MV E/A: 0.94
BH CV ECHO MEAS - MV MAX PG: 3.6 MMHG
BH CV ECHO MEAS - MV MEAN PG: 1.54 MMHG
BH CV ECHO MEAS - MV P1/2T: 77.4 MSEC
BH CV ECHO MEAS - MV V2 VTI: 25.5 CM
BH CV ECHO MEAS - MVA(P1/2T): 2.8 CM2
BH CV ECHO MEAS - PULM A REVS DUR: 0.12 SEC
BH CV ECHO MEAS - PULM A REVS VEL: 26.2 CM/SEC
BH CV ECHO MEAS - PULM DIAS VEL: 49.1 CM/SEC
BH CV ECHO MEAS - PULM S/D: 1.22
BH CV ECHO MEAS - PULM SYS VEL: 59.8 CM/SEC
BH CV ECHO MEAS - RAP SYSTOLE: 3 MMHG
BH CV ECHO MEAS - RVSP: 20.5 MMHG
BH CV ECHO MEAS - SV(MOD-SP2): 62 ML
BH CV ECHO MEAS - SV(MOD-SP4): 60 ML
BH CV ECHO MEAS - SVI(MOD-SP2): 30.1 ML/M2
BH CV ECHO MEAS - SVI(MOD-SP4): 29.2 ML/M2
BH CV ECHO MEAS - TAPSE (>1.6): 2.19 CM
BH CV ECHO MEAS - TR MAX PG: 17.5 MMHG
BH CV ECHO MEAS - TR MAX VEL: 209 CM/SEC
BH CV ECHO MEASUREMENTS AVERAGE E/E' RATIO: 6.23
BH CV XLRA - RV BASE: 2.7 CM
BH CV XLRA - RV LENGTH: 7.5 CM
BH CV XLRA - RV MID: 2.32 CM
BH CV XLRA - TDI S': 12.4 CM/SEC
LEFT ATRIUM VOLUME INDEX: 21.5 ML/M2

## 2024-04-25 PROCEDURE — 93308 TTE F-UP OR LMTD: CPT

## 2024-04-25 PROCEDURE — 93325 DOPPLER ECHO COLOR FLOW MAPG: CPT

## 2024-04-25 PROCEDURE — 93356 MYOCRD STRAIN IMG SPCKL TRCK: CPT

## 2024-04-25 PROCEDURE — 25510000001 PERFLUTREN (DEFINITY) 8.476 MG IN SODIUM CHLORIDE (PF) 0.9 % 10 ML INJECTION: Performed by: INTERNAL MEDICINE

## 2024-04-25 PROCEDURE — 93321 DOPPLER ECHO F-UP/LMTD STD: CPT

## 2024-04-25 RX ADMIN — PERFLUTREN 1.5 ML: 6.52 INJECTION, SUSPENSION INTRAVENOUS at 09:16

## 2024-04-25 NOTE — TELEPHONE ENCOUNTER
Patient will need fasting lipids drawn on oncology appointment 5/10/2024.  I have placed an order.  Can you please let them know

## 2024-04-29 ENCOUNTER — HOSPITAL ENCOUNTER (OUTPATIENT)
Dept: PHYSICAL THERAPY | Facility: HOSPITAL | Age: 59
Setting detail: THERAPIES SERIES
Discharge: HOME | End: 2024-04-29
Payer: OTHER GOVERNMENT

## 2024-04-29 DIAGNOSIS — I89.0 LYMPHEDEMA OF RIGHT UPPER EXTREMITY: ICD-10-CM

## 2024-04-29 DIAGNOSIS — C50.511 MALIGNANT NEOPLASM OF LOWER-OUTER QUADRANT OF RIGHT BREAST OF FEMALE, ESTROGEN RECEPTOR POSITIVE: ICD-10-CM

## 2024-04-29 DIAGNOSIS — Z17.0 MALIGNANT NEOPLASM OF LOWER-OUTER QUADRANT OF RIGHT BREAST OF FEMALE, ESTROGEN RECEPTOR POSITIVE: ICD-10-CM

## 2024-04-29 DIAGNOSIS — Z90.13 HISTORY OF BILATERAL MASTECTOMY: Primary | ICD-10-CM

## 2024-04-29 PROCEDURE — 97535 SELF CARE MNGMENT TRAINING: CPT

## 2024-04-29 NOTE — THERAPY RE-EVALUATION
Physical Therapy Lymphedema Re-Evaluation  [facility]     Patient Name: Palma Roblero  : 1965  MRN: [id number]  Today's Date: 2024      Visit Date: 2024    Visit Dx:    ICD-10-CM ICD-9-CM   1. History of bilateral mastectomy  Z90.13 V45.71   2. Malignant neoplasm of lower-outer quadrant of right breast of female, estrogen receptor positive  C50.511 174.5    Z17.0 V86.0   3. Lymphedema of right upper extremity  I89.0 457.1       Patient Active Problem List   Diagnosis    Malignant neoplasm of right female breast    Hyperlipidemia    Sebaceous cyst    Colon cancer screening    History of breast cancer    History of colon polyps    Malignant neoplasm of overlapping sites of right breast in female, estrogen receptor positive    Breast cancer    Encounter for adjustment or management of vascular access device    History of bilateral mastectomy    Lymphedema due to malignant neoplasm    Screening for malignant neoplasm of colon    History of adenomatous polyp of colon        Past Medical History:   Diagnosis Date    Allergic     Breast cancer     Right infiltrating ductal carcinoma, grade I, T2N1, ER/AR positive, 90% HER-2/julián negative    Cervical polyp     History of benign cervical polyp    Colon polyps     Drug therapy     Hx of radiation therapy     Hypercholesteremia     Hyperthyroidism     NO LONGER PROBLEM    PONV (postoperative nausea and vomiting)     Radiation 2007    Recurrent breast cancer 2023    right--chemo and s/p bilat. mastectomy    Sebaceous cyst     BACK        Past Surgical History:   Procedure Laterality Date    BREAST BIOPSY Right 2006    stereotactic biopsy    BREAST LUMPECTOMY Right 2006    Right axillary sentinel node biopsy (positive), right axillay dissection, right needle lumpectomy-Dr. Quyen Vidal    BREAST RECONSTRUCTION Bilateral 2023    Procedure: BILATERAL PREPECTORAL PLACEMENT  TISSUE EXPANDER AND ALLODERM, ADJACENT LEFT BREAST  TISSUE REARRANGEMENT;  Surgeon: Favian Brooke MD;  Location: [facility] MAIN OR;  Service: Plastics;  Laterality: Bilateral;    BREAST TISSUE EXPANDER REMOVAL INSERTION OF IMPLANT Bilateral 3/1/2024    Procedure: BILATERAL REMOVAL TISSUE EXPANDERS AND PLACEMENT OF IMPLANTS AND LEFT ADJACENT TISSUE REARRANGEMENT;  Surgeon: Favian Brooke MD;  Location: [facility] MAIN OR;  Service: Plastics;  Laterality: Bilateral;    CERVICAL CONE BIOPSY  2005    [facility]    COLONOSCOPY N/A 04/07/2014    3 mm cecal polyp, 6 mm ascending colon polyp, 8 mm transvese colon polyp-Dr. Quyen Vidal    COLONOSCOPY N/A 03/16/2009    Tortuous colon-Dr. Quyen Vidal    COLONOSCOPY N/A 06/11/2018    Procedure: COLONOSCOPY TO CECUM TO TERMINAL ILEUM WITH HOT SNARE POLYPECTOMY;  Surgeon: Quyen Vidal MD;  Location: [facility] ENDOSCOPY;  Service: Gastroenterology    COLPOSCOPY      DILATATION AND CURETTAGE  03/23/2005    EXCISION MASS TRUNK N/A 09/05/2017    Procedure: EXCISION OF SEBACEOUS CYST FROM BACK ;  Surgeon: Rosendo Crum MD;  Location: [facility] MAIN OR;  Service:     MASTECTOMY W/ SENTINEL NODE BIOPSY Bilateral 8/16/2023    Procedure: BREAST MASTECTOMY BILATERAL;  Surgeon: Quyen Vidal MD;  Location: [facility] MAIN OR;  Service: General;  Laterality: Bilateral;    SHOULDER ROTATOR CUFF REPAIR Right 07/2022    VENOUS ACCESS DEVICE (PORT) INSERTION Left 10/06/2006    Left subclavian Lzwlbi-a-Qkad placement-Dr. Quyen Vidal    VENOUS ACCESS DEVICE (PORT) INSERTION N/A 8/16/2023    Procedure: with port placement using fluoroscopy and ultrasound;  Surgeon: Quyen Vidal MD;  Location: [facility] MAIN OR;  Service: General;  Laterality: N/A;    VENOUS ACCESS DEVICE (PORT) REMOVAL Left 05/29/2007    Left subclavian Bgaipb-y-Daco removal-Dr. Quyen Vidal       Visit Dx:    ICD-10-CM ICD-9-CM   1. History of bilateral mastectomy  Z90.13 V45.71   2. Malignant neoplasm of lower-outer quadrant of right breast of female, estrogen  receptor positive  C50.511 174.5    Z17.0 V86.0   3. Lymphedema of right upper extremity  I89.0 457.1            Lymphedema       Row Name 04/29/24 0900             Subjective Pain    Able to rate subjective pain? yes  -LB      Pre-Treatment Pain Level 0  -LB         Subjective    Subjective Comments I am doing well, no issues.  -LB         Lymphedema Assessment    Lymphedema Classification RUE:;at risk/stage 0  -LB      Lymphedema Cancer Related Sx right;lumpectomy;axillary dissection  -LB      Lymph Nodes Removed # 16  -LB      Positive Lymph Nodes # 2  -LB      Chemo Received yes  -LB      Radiation Therapy Received yes  -LB      Infections or Cellulitis? no  -LB         Posture/Observations    Posture/Observations Comments WNL  -LB         RUE Quick Girth (cm)    Axilla 37 cm  -LB      Mid upper arm 33.8 cm  -LB      Elbow 27.7 cm  -LB      Mid forearm 25.1 cm  -LB      Wrist crease 16.6 cm  -LB      Web space 20 cm  -LB      Met-heads 17.9 cm  -LB      RUE Quick Girth Total 178.1  -LB         Compression/Skin Care    Compression/Skin Care Comments reviewed glove considerations for re-order in July  -LB                User Key  (r) = Recorded By, (t) = Taken By, (c) = Cosigned By      Initials Name Provider Type    LB Nina Olea, PT Physical Therapist                                    Therapy Education  Education Details: discussed and reviewed compression garment use, considerations for wear, management of condition, s/s of lymphedema  Given: Symptoms/condition management, Edema management  Program: Reinforced  How Provided: Verbal  Provided to: Patient  Level of Understanding: Verbalized  45291 - PT Self Care/Mgmt Minutes: 10       OP Exercises       Row Name 04/29/24 0900             Subjective    Subjective Comments I am doing well, no issues.  -LB         Subjective Pain    Able to rate subjective pain? yes  -LB      Pre-Treatment Pain Level 0  -LB                User Key  (r) = Recorded By, (t) = Taken  By, (c) = Cosigned By      Initials Name Provider Type    Nina Resendez, PT Physical Therapist                                 PT OP Goals       Row Name 04/29/24 0900          Long Term Goals    LTG Date to Achieve 10/03/23  -LB     LTG 1 Pt will maintain LDex bioimpedance score WNL.  -LB     LTG 1 Progress Ongoing  -LB     LTG 2 Pt will demonstrate full AROM BUE post-operatively once cleared for mobility/ROM.  -LB     LTG 2 Progress Met  -LB     LTG 3 Pt will verbalize s/s of lymphedema and steps to prevention.  -LB     LTG 3 Progress Met  -LB     LTG 3 Progress Comments reviewed today  -LB               User Key  (r) = Recorded By, (t) = Taken By, (c) = Cosigned By      Initials Name Provider Type    Nina Resendez, PT Physical Therapist                     PT Assessment/Plan       Row Name 04/29/24 0950          PT Assessment    Functional Limitations Other (comment)  RUE lymphedema  -LB     Impairments Impaired lymphatic circulation;Impaired flexibility;Edema;Muscle strength;Sensation  -LB     Assessment Comments Pt continues to demonstrate RUE lymphedema in R hand. However, symptoms are stable and controlled well with daily use of RUE compression glove 20-30 mmHg that was custom ordered in January. Glove does demonstrate some use at wrist and we discussed possible options for modification to address inc elasticity at wrist and improve fit. Pt is compliant with MLD and daily compression wear. She has met 2/3 LTGs. She will benefit from continued skilled PT services to reassess RUE lymphedema in 3 months and will consider re-ordering compression glove with inc forearm coverage at time of 6 month re-order.  -LB     Rehab Potential Good  -LB     Patient/caregiver participated in establishment of treatment plan and goals Yes  -LB     Patient would benefit from skilled therapy intervention Yes  -LB        PT Plan    PT Frequency Other (comment)  -LB     Predicted Duration of Therapy Intervention (PT) 3 month  reassessment  -LB     Planned CPT's? PT EVAL LOW COMPLEXITY: 12919;PT RE-EVAL: 74022;PT THER PROC EA 15 MIN: 45689;PT THER ACT EA 15 MIN: 05232;PT MANUAL THERAPY EA 15 MIN: 98355;PT NEUROMUSC RE-EDUCATION EA 15 MIN: 32070;PT SELF CARE/HOME MGMT/TRAIN EA 15: 03268;PT BIS XTRACELL FLUID ANALYSIS: 15103  -LB     PT Plan Comments reassess in 3 months, re-order inc forearm length for glove at next re-order  -LB               User Key  (r) = Recorded By, (t) = Taken By, (c) = Cosigned By      Initials Name Provider Type    LB Nina Olea, PT Physical Therapist                                 Time Calculation:   Start Time: 0915  Stop Time: 0930  Time Calculation (min): 15 min  Total Timed Code Minutes- PT: 10 minute(s)  Timed Charges  78561 - PT Self Care/Mgmt Minutes: 10  Total Minutes  Timed Charges Total Minutes: 10   Total Minutes: 10   Therapy Charges for Today       Code Description Service Date Service Provider Modifiers Qty    30288985428 HC PT SELF CARE/MGMT/TRAIN EA 15 MIN 4/29/2024 Nina Olea, PT GP 1                      Nina Olea PT  4/29/2024

## 2024-05-10 ENCOUNTER — TELEMEDICINE (OUTPATIENT)
Dept: ONCOLOGY | Facility: CLINIC | Age: 59
End: 2024-05-10
Payer: COMMERCIAL

## 2024-05-10 ENCOUNTER — INFUSION (OUTPATIENT)
Dept: ONCOLOGY | Facility: HOSPITAL | Age: 59
End: 2024-05-10
Payer: OTHER GOVERNMENT

## 2024-05-10 ENCOUNTER — TELEPHONE (OUTPATIENT)
Dept: CARDIOLOGY | Facility: CLINIC | Age: 59
End: 2024-05-10
Payer: COMMERCIAL

## 2024-05-10 VITALS
SYSTOLIC BLOOD PRESSURE: 129 MMHG | HEART RATE: 73 BPM | BODY MASS INDEX: 38.04 KG/M2 | DIASTOLIC BLOOD PRESSURE: 78 MMHG | TEMPERATURE: 97.1 F | RESPIRATION RATE: 18 BRPM | OXYGEN SATURATION: 99 % | WEIGHT: 222.8 LBS | HEIGHT: 64 IN

## 2024-05-10 DIAGNOSIS — C50.811 MALIGNANT NEOPLASM OF OVERLAPPING SITES OF RIGHT BREAST IN FEMALE, ESTROGEN RECEPTOR POSITIVE: ICD-10-CM

## 2024-05-10 DIAGNOSIS — Z90.13 HISTORY OF BILATERAL MASTECTOMY: ICD-10-CM

## 2024-05-10 DIAGNOSIS — C80.1 LYMPHEDEMA DUE TO MALIGNANT NEOPLASM: ICD-10-CM

## 2024-05-10 DIAGNOSIS — Z45.2 ENCOUNTER FOR ADJUSTMENT OR MANAGEMENT OF VASCULAR ACCESS DEVICE: Primary | ICD-10-CM

## 2024-05-10 DIAGNOSIS — Z17.0 MALIGNANT NEOPLASM OF OVERLAPPING SITES OF RIGHT BREAST IN FEMALE, ESTROGEN RECEPTOR POSITIVE: ICD-10-CM

## 2024-05-10 DIAGNOSIS — Z90.13 HISTORY OF BILATERAL MASTECTOMY: Primary | ICD-10-CM

## 2024-05-10 DIAGNOSIS — E78.5 DYSLIPIDEMIA: ICD-10-CM

## 2024-05-10 DIAGNOSIS — G62.0 CHEMOTHERAPY-INDUCED NEUROPATHY: ICD-10-CM

## 2024-05-10 DIAGNOSIS — I89.0 LYMPHEDEMA DUE TO MALIGNANT NEOPLASM: ICD-10-CM

## 2024-05-10 DIAGNOSIS — T45.1X5A CHEMOTHERAPY-INDUCED NEUROPATHY: ICD-10-CM

## 2024-05-10 LAB
ALBUMIN SERPL-MCNC: 3.9 G/DL (ref 3.5–5.2)
ALBUMIN/GLOB SERPL: 1.2 G/DL
ALP SERPL-CCNC: 62 U/L (ref 39–117)
ALT SERPL W P-5'-P-CCNC: 18 U/L (ref 1–33)
ANION GAP SERPL CALCULATED.3IONS-SCNC: 10.5 MMOL/L (ref 5–15)
AST SERPL-CCNC: 20 U/L (ref 1–32)
BASOPHILS # BLD AUTO: 0.03 10*3/MM3 (ref 0–0.2)
BASOPHILS NFR BLD AUTO: 0.5 % (ref 0–1.5)
BILIRUB SERPL-MCNC: 1 MG/DL (ref 0–1.2)
BUN SERPL-MCNC: 18 MG/DL (ref 8–23)
BUN/CREAT SERPL: 20.2 (ref 7–25)
CALCIUM SPEC-SCNC: 9.1 MG/DL (ref 8.6–10.5)
CHLORIDE SERPL-SCNC: 106 MMOL/L (ref 98–107)
CHOLEST SERPL-MCNC: 144 MG/DL (ref 0–200)
CO2 SERPL-SCNC: 24.5 MMOL/L (ref 22–29)
CREAT SERPL-MCNC: 0.89 MG/DL (ref 0.57–1)
DEPRECATED RDW RBC AUTO: 42.7 FL (ref 37–54)
EGFRCR SERPLBLD CKD-EPI 2021: 73.9 ML/MIN/1.73
EOSINOPHIL # BLD AUTO: 0.1 10*3/MM3 (ref 0–0.4)
EOSINOPHIL NFR BLD AUTO: 1.7 % (ref 0.3–6.2)
ERYTHROCYTE [DISTWIDTH] IN BLOOD BY AUTOMATED COUNT: 14.7 % (ref 12.3–15.4)
GLOBULIN UR ELPH-MCNC: 3.2 GM/DL
GLUCOSE SERPL-MCNC: 94 MG/DL (ref 65–99)
HCT VFR BLD AUTO: 40.7 % (ref 34–46.6)
HDLC SERPL-MCNC: 45 MG/DL (ref 40–60)
HGB BLD-MCNC: 12.9 G/DL (ref 12–15.9)
IMM GRANULOCYTES # BLD AUTO: 0.02 10*3/MM3 (ref 0–0.05)
IMM GRANULOCYTES NFR BLD AUTO: 0.3 % (ref 0–0.5)
LDLC SERPL CALC-MCNC: 86 MG/DL (ref 0–100)
LDLC/HDLC SERPL: 1.91 {RATIO}
LYMPHOCYTES # BLD AUTO: 2.11 10*3/MM3 (ref 0.7–3.1)
LYMPHOCYTES NFR BLD AUTO: 35 % (ref 19.6–45.3)
MCH RBC QN AUTO: 25.5 PG (ref 26.6–33)
MCHC RBC AUTO-ENTMCNC: 31.7 G/DL (ref 31.5–35.7)
MCV RBC AUTO: 80.4 FL (ref 79–97)
MONOCYTES # BLD AUTO: 0.55 10*3/MM3 (ref 0.1–0.9)
MONOCYTES NFR BLD AUTO: 9.1 % (ref 5–12)
NEUTROPHILS NFR BLD AUTO: 3.21 10*3/MM3 (ref 1.7–7)
NEUTROPHILS NFR BLD AUTO: 53.4 % (ref 42.7–76)
NRBC BLD AUTO-RTO: 0 /100 WBC (ref 0–0.2)
PLATELET # BLD AUTO: 170 10*3/MM3 (ref 140–450)
PMV BLD AUTO: 12.3 FL (ref 6–12)
POTASSIUM SERPL-SCNC: 3.9 MMOL/L (ref 3.5–5.2)
PROT SERPL-MCNC: 7.1 G/DL (ref 6–8.5)
RBC # BLD AUTO: 5.06 10*6/MM3 (ref 3.77–5.28)
SODIUM SERPL-SCNC: 141 MMOL/L (ref 136–145)
TRIGL SERPL-MCNC: 65 MG/DL (ref 0–150)
VLDLC SERPL-MCNC: 13 MG/DL (ref 5–40)
WBC NRBC COR # BLD AUTO: 6.02 10*3/MM3 (ref 3.4–10.8)

## 2024-05-10 PROCEDURE — 80053 COMPREHEN METABOLIC PANEL: CPT | Performed by: INTERNAL MEDICINE

## 2024-05-10 PROCEDURE — 80061 LIPID PANEL: CPT | Performed by: INTERNAL MEDICINE

## 2024-05-10 PROCEDURE — 25010000002 HEPARIN LOCK FLUSH PER 10 UNITS: Performed by: INTERNAL MEDICINE

## 2024-05-10 PROCEDURE — 96413 CHEMO IV INFUSION 1 HR: CPT

## 2024-05-10 PROCEDURE — 25810000003 SODIUM CHLORIDE 0.9 % SOLUTION: Performed by: INTERNAL MEDICINE

## 2024-05-10 PROCEDURE — 85025 COMPLETE CBC W/AUTO DIFF WBC: CPT | Performed by: INTERNAL MEDICINE

## 2024-05-10 PROCEDURE — 25810000003 SODIUM CHLORIDE 0.9 % SOLUTION 250 ML FLEX CONT: Performed by: INTERNAL MEDICINE

## 2024-05-10 PROCEDURE — 25010000002 TRASTUZUMAB-QYYP 420 MG RECONSTITUTED SOLUTION 1 EACH VIAL: Performed by: INTERNAL MEDICINE

## 2024-05-10 RX ORDER — HEPARIN SODIUM (PORCINE) LOCK FLUSH IV SOLN 100 UNIT/ML 100 UNIT/ML
500 SOLUTION INTRAVENOUS AS NEEDED
Status: DISCONTINUED | OUTPATIENT
Start: 2024-05-10 | End: 2024-05-10 | Stop reason: HOSPADM

## 2024-05-10 RX ORDER — SODIUM CHLORIDE 9 MG/ML
250 INJECTION, SOLUTION INTRAVENOUS ONCE
Status: COMPLETED | OUTPATIENT
Start: 2024-05-10 | End: 2024-05-10

## 2024-05-10 RX ORDER — HEPARIN SODIUM (PORCINE) LOCK FLUSH IV SOLN 100 UNIT/ML 100 UNIT/ML
500 SOLUTION INTRAVENOUS AS NEEDED
OUTPATIENT
Start: 2024-05-10

## 2024-05-10 RX ORDER — SODIUM CHLORIDE 0.9 % (FLUSH) 0.9 %
10 SYRINGE (ML) INJECTION AS NEEDED
Status: DISCONTINUED | OUTPATIENT
Start: 2024-05-10 | End: 2024-05-10 | Stop reason: HOSPADM

## 2024-05-10 RX ORDER — SODIUM CHLORIDE 0.9 % (FLUSH) 0.9 %
10 SYRINGE (ML) INJECTION AS NEEDED
OUTPATIENT
Start: 2024-05-10

## 2024-05-10 RX ADMIN — Medication 500 UNITS: at 13:02

## 2024-05-10 RX ADMIN — SODIUM CHLORIDE 580 MG: 9 INJECTION, SOLUTION INTRAVENOUS at 12:29

## 2024-05-10 RX ADMIN — SODIUM CHLORIDE 250 ML: 9 INJECTION, SOLUTION INTRAVENOUS at 12:27

## 2024-05-10 RX ADMIN — Medication 10 ML: at 13:02

## 2024-05-22 ENCOUNTER — TELEPHONE (OUTPATIENT)
Dept: ONCOLOGY | Facility: CLINIC | Age: 59
End: 2024-05-22
Payer: COMMERCIAL

## 2024-05-22 DIAGNOSIS — Z78.0 POST-MENOPAUSAL: ICD-10-CM

## 2024-05-22 DIAGNOSIS — Z17.0 MALIGNANT NEOPLASM OF OVERLAPPING SITES OF RIGHT BREAST IN FEMALE, ESTROGEN RECEPTOR POSITIVE: Primary | ICD-10-CM

## 2024-05-22 DIAGNOSIS — C50.811 MALIGNANT NEOPLASM OF OVERLAPPING SITES OF RIGHT BREAST IN FEMALE, ESTROGEN RECEPTOR POSITIVE: Primary | ICD-10-CM

## 2024-05-22 NOTE — TELEPHONE ENCOUNTER
Contacted pt by phone per Dr Landis's request. Advised her that her last dexa was 2 years ago so we will order and schedule one for this year.  She voiced understanding.

## 2024-05-31 ENCOUNTER — INFUSION (OUTPATIENT)
Dept: ONCOLOGY | Facility: HOSPITAL | Age: 59
End: 2024-05-31
Payer: COMMERCIAL

## 2024-05-31 VITALS
HEIGHT: 64 IN | HEART RATE: 76 BPM | BODY MASS INDEX: 37.9 KG/M2 | OXYGEN SATURATION: 98 % | WEIGHT: 222 LBS | DIASTOLIC BLOOD PRESSURE: 76 MMHG | RESPIRATION RATE: 15 BRPM | TEMPERATURE: 97.8 F | SYSTOLIC BLOOD PRESSURE: 121 MMHG

## 2024-05-31 DIAGNOSIS — C50.811 MALIGNANT NEOPLASM OF OVERLAPPING SITES OF RIGHT BREAST IN FEMALE, ESTROGEN RECEPTOR POSITIVE: Primary | ICD-10-CM

## 2024-05-31 DIAGNOSIS — Z90.13 HISTORY OF BILATERAL MASTECTOMY: ICD-10-CM

## 2024-05-31 DIAGNOSIS — Z45.2 ENCOUNTER FOR ADJUSTMENT OR MANAGEMENT OF VASCULAR ACCESS DEVICE: ICD-10-CM

## 2024-05-31 DIAGNOSIS — Z17.0 MALIGNANT NEOPLASM OF OVERLAPPING SITES OF RIGHT BREAST IN FEMALE, ESTROGEN RECEPTOR POSITIVE: Primary | ICD-10-CM

## 2024-05-31 LAB
BASOPHILS # BLD AUTO: 0.03 10*3/MM3 (ref 0–0.2)
BASOPHILS NFR BLD AUTO: 0.6 % (ref 0–1.5)
DEPRECATED RDW RBC AUTO: 43 FL (ref 37–54)
EOSINOPHIL # BLD AUTO: 0.08 10*3/MM3 (ref 0–0.4)
EOSINOPHIL NFR BLD AUTO: 1.5 % (ref 0.3–6.2)
ERYTHROCYTE [DISTWIDTH] IN BLOOD BY AUTOMATED COUNT: 14.9 % (ref 12.3–15.4)
HCT VFR BLD AUTO: 40 % (ref 34–46.6)
HGB BLD-MCNC: 12.9 G/DL (ref 12–15.9)
IMM GRANULOCYTES # BLD AUTO: 0.01 10*3/MM3 (ref 0–0.05)
IMM GRANULOCYTES NFR BLD AUTO: 0.2 % (ref 0–0.5)
LYMPHOCYTES # BLD AUTO: 1.58 10*3/MM3 (ref 0.7–3.1)
LYMPHOCYTES NFR BLD AUTO: 29.9 % (ref 19.6–45.3)
MCH RBC QN AUTO: 25.8 PG (ref 26.6–33)
MCHC RBC AUTO-ENTMCNC: 32.3 G/DL (ref 31.5–35.7)
MCV RBC AUTO: 80 FL (ref 79–97)
MONOCYTES # BLD AUTO: 0.48 10*3/MM3 (ref 0.1–0.9)
MONOCYTES NFR BLD AUTO: 9.1 % (ref 5–12)
NEUTROPHILS NFR BLD AUTO: 3.11 10*3/MM3 (ref 1.7–7)
NEUTROPHILS NFR BLD AUTO: 58.7 % (ref 42.7–76)
NRBC BLD AUTO-RTO: 0 /100 WBC (ref 0–0.2)
PLATELET # BLD AUTO: 191 10*3/MM3 (ref 140–450)
PMV BLD AUTO: 12 FL (ref 6–12)
RBC # BLD AUTO: 5 10*6/MM3 (ref 3.77–5.28)
WBC NRBC COR # BLD AUTO: 5.29 10*3/MM3 (ref 3.4–10.8)

## 2024-05-31 PROCEDURE — 25810000003 SODIUM CHLORIDE 0.9 % SOLUTION 250 ML FLEX CONT: Performed by: NURSE PRACTITIONER

## 2024-05-31 PROCEDURE — 25810000003 SODIUM CHLORIDE 0.9 % SOLUTION: Performed by: NURSE PRACTITIONER

## 2024-05-31 PROCEDURE — 25010000002 HEPARIN LOCK FLUSH PER 10 UNITS: Performed by: INTERNAL MEDICINE

## 2024-05-31 PROCEDURE — 85025 COMPLETE CBC W/AUTO DIFF WBC: CPT | Performed by: INTERNAL MEDICINE

## 2024-05-31 PROCEDURE — 96413 CHEMO IV INFUSION 1 HR: CPT

## 2024-05-31 PROCEDURE — 25010000002 TRASTUZUMAB-QYYP 420 MG RECONSTITUTED SOLUTION 1 EACH VIAL: Performed by: NURSE PRACTITIONER

## 2024-05-31 RX ORDER — HEPARIN SODIUM (PORCINE) LOCK FLUSH IV SOLN 100 UNIT/ML 100 UNIT/ML
500 SOLUTION INTRAVENOUS AS NEEDED
Status: DISCONTINUED | OUTPATIENT
Start: 2024-05-31 | End: 2024-05-31 | Stop reason: HOSPADM

## 2024-05-31 RX ORDER — SODIUM CHLORIDE 0.9 % (FLUSH) 0.9 %
10 SYRINGE (ML) INJECTION AS NEEDED
Status: DISCONTINUED | OUTPATIENT
Start: 2024-05-31 | End: 2024-05-31 | Stop reason: HOSPADM

## 2024-05-31 RX ORDER — HEPARIN SODIUM (PORCINE) LOCK FLUSH IV SOLN 100 UNIT/ML 100 UNIT/ML
500 SOLUTION INTRAVENOUS AS NEEDED
OUTPATIENT
Start: 2024-05-31

## 2024-05-31 RX ORDER — SODIUM CHLORIDE 9 MG/ML
250 INJECTION, SOLUTION INTRAVENOUS ONCE
Status: COMPLETED | OUTPATIENT
Start: 2024-05-31 | End: 2024-05-31

## 2024-05-31 RX ORDER — SODIUM CHLORIDE 0.9 % (FLUSH) 0.9 %
10 SYRINGE (ML) INJECTION AS NEEDED
OUTPATIENT
Start: 2024-05-31

## 2024-05-31 RX ORDER — SODIUM CHLORIDE 9 MG/ML
250 INJECTION, SOLUTION INTRAVENOUS ONCE
Status: CANCELLED | OUTPATIENT
Start: 2024-05-31

## 2024-05-31 RX ADMIN — SODIUM CHLORIDE 580 MG: 9 INJECTION, SOLUTION INTRAVENOUS at 13:37

## 2024-05-31 RX ADMIN — SODIUM CHLORIDE 250 ML: 9 INJECTION, SOLUTION INTRAVENOUS at 13:37

## 2024-05-31 RX ADMIN — Medication 500 UNITS: at 14:12

## 2024-05-31 RX ADMIN — Medication 10 ML: at 14:12

## 2024-05-31 NOTE — NURSING NOTE
Pt arrived for trazimera. Pt insurance changed.     ----- Message -----   From: Gwen Davidson   Sent: 5/31/2024   1:13 PM EDT   To: Lizabeth Henry RP; Griselda Durán, RN; *     We will go ahead and treat with the Trazimera.   I will file the auth and see what happens. They might let us do it since she has already been on it.   ----- Message -----   From: Lizabeth Henry [facility]   Sent: 5/31/2024   1:09 PM EDT   To: Gwen Davidson; Griselda Durán, JAIRO; *     Thanks Gwen.   The current plan is in for Trazimera, but I am showing Herceptin as the preferred for [place]. What should we use?   Thanks!!   ----- Message -----   From: Gwen Davidson   Sent: 5/31/2024   1:04 PM EDT   To: Lizabeth Henry [place]; Kali Craig RN; *     We will have to file a new auth, you can treat   ----- Message -----   From: Lizabeth Henry [facility]   Sent: 5/31/2024  12:59 PM EDT   To: Kali Craig RN; Lynette Hatch [facility]; *     This patient's FYI shows [employer], but their active coverage shows [place]. Can you please advise asap as patient is here.   (Please respond all as I may be at lunch.)     Thanks!!!

## 2024-06-05 ENCOUNTER — OFFICE VISIT (OUTPATIENT)
Dept: OTHER | Facility: HOSPITAL | Age: 59
End: 2024-06-05
Payer: COMMERCIAL

## 2024-06-05 VITALS
WEIGHT: 219.4 LBS | BODY MASS INDEX: 37.46 KG/M2 | OXYGEN SATURATION: 98 % | HEART RATE: 69 BPM | DIASTOLIC BLOOD PRESSURE: 71 MMHG | HEIGHT: 64 IN | SYSTOLIC BLOOD PRESSURE: 114 MMHG

## 2024-06-05 DIAGNOSIS — C50.811 MALIGNANT NEOPLASM OF OVERLAPPING SITES OF RIGHT BREAST IN FEMALE, ESTROGEN RECEPTOR POSITIVE: Primary | ICD-10-CM

## 2024-06-05 DIAGNOSIS — Z17.0 MALIGNANT NEOPLASM OF OVERLAPPING SITES OF RIGHT BREAST IN FEMALE, ESTROGEN RECEPTOR POSITIVE: Primary | ICD-10-CM

## 2024-06-05 PROCEDURE — G0463 HOSPITAL OUTPT CLINIC VISIT: HCPCS | Performed by: NURSE PRACTITIONER

## 2024-06-05 PROCEDURE — 99215 OFFICE O/P EST HI 40 MIN: CPT | Performed by: NURSE PRACTITIONER

## 2024-06-05 NOTE — PROGRESS NOTES
[facility] MULTIDISCIPLINARY CLINIC   IN CLINIC Initial Visit  Survivorship Care Plan Treatment Summary      Palma Roblero is a pleasant 59 y.o. female being followed by Letitia Landis MD for invasive right breast cancer. Reviewed today in Multidisciplinary Clinic, for initial Survivorship Care Plan Treatment Summary    HPI  59-year-old patient with a history of stage IIIa infiltrating ductal carcinoma of the right breast with 4 positive nodes treated with lumpectomy, 6 cycles of TAC, received tamoxifen between February 2007 and March 2012.  She started Femara March 2012 and was switched to Arimidex in June 2012 due to side effects with severe ankle joint pain completing 10 years of adjuvant therapy in March 2017.    She has new diagnosis of carcinoma in the remaining right breast with a needle biopsy confirming on 6/8/2023, estrogen positive, progesterone negative, HER2 positive.  She underwent bilateral mastectomies with immediate reconstruction on 8/16/2023.  She received adjuvant Taxol Herceptin which was completed December 1, 2023.  She has been receiving Herceptin every 3 weeks.  She initiated Aromasin about a month ago.    She is generally feeling well.  No changes in appetite.  She does have some fatigue.  She has ongoing issues with sleep continuity she reports for the last 15-17 years.    She is generally tolerating the Aromasin well.  She does feel little stiffer in the mornings but when she gets going and moving she does okay.    Distress: 0  PHQ-9: (P) 1 1-4 (minimal depression sx)      TREATMENT HISTORY:     Oncology/Hematology History Overview Note   1. Stage IIIA infiltrating ductal carcinoma of the right breast with 4 positive     nodes, ER positive, HER-2/julián negative .Right lumpectomy 2006.  2. 6 cycles of TAC.Completed 2007.  3. tamoxifen between February 2007 and March 2012.  4. Femara was started in March 2012.  5. Patient switched to Arimidex as of 06/21/2012 because of side  effects with     severe ankle joint pain with Femara.    She was found to have a new breast cancer in the right breast on imaging studies in May 2023.  Tumor appeared to be multifocal with one of the tumors biopsied being positive for HER2/julián overexpression by FISH.    Patient planning to undergo bilateral mastectomy with reconstruction.     Malignant neoplasm of right female breast   4/15/2016 Initial Diagnosis    Malignant neoplasm of right female breast    1. Stage IIIA infiltrating ductal carcinoma of the right breast with 4 positive     nodes, ER positive, HER-2/julián negative .Right lumpectomy 2006.  2. 6 cycles of TAC.Completed 2007.  3. tamoxifen between February 2007 and March 2012.  4. Femara was started in March 2012.  5. Patient switched to Arimidex as of 06/21/2012 because of side effects with     severe ankle joint pain with Femara.       Malignant neoplasm of overlapping sites of right breast in female, estrogen receptor positive   6/8/2023 Initial Diagnosis    Malignant neoplasm of overlapping sites of right breast in female, estrogen receptor positive     6/8/2023 Biopsy    Final Diagnosis   1. Breast, Right Lateral 6:30 Position, 3 cm FN, Core Biopsy:               A. Invasive mammary carcinoma, no special type (ductal), Perez histologic grade II (tubules = 3, nuclei = 2,        mitoses = 2) measuring 6 mm maximally and involving four core fragments.  B. No definitive in-situ carcinoma is identified.  C. No definitive lymphovascular space invasion identified.     2. Breast, Right Medial 6:30 Position, 3 cm FN, Core Biopsy:                A. Invasive mammary carcinoma, no special type (ductal), Perez histologic grade II (tubules = 3, nuclei = 2,        mitoses = 1) measuring 10 mm maximally and involving three core fragments.  B. No definitive in-situ carcinoma is identified.  C. No definitive lymphovascular space invasion identified.     Biomarker reporting: Block 1A:  Estrogen receptor:  Positive, %  Progesterone receptor: Negative, less than 1%  HER2 status: Equivocal, 2+ IHC; not amplified on FISH  Ki 67: 35%    Biomarker reporting: Block 2A:  Estrogen receptor: Positive, %  Progesterone receptor: Negative, less than 1%  HER2 status: Equivocal, 2+ IHC; amplification of the HER2 gene is present at a level consistent with clinically significant enhancement of HER2 expression in 60-70% of tumor nuclei on FISH   Ki 67: 40%       8/16/2023 Surgery    Final Diagnosis   1. Right Breast, Oriented Simple Skin Sparing Mastectomy (1,097 Grams): INVASIVE MAMMARY CARCINOMA,       NO SPECIAL TYPE (INVASIVE DUCTAL CARCINOMA).  A. Tumor site: Lower outer quadrant.               B. Histologic grade: Windham histologic score III (tubules = 3, nuclei = 2, mitosis = 3).               C. Tumor size: 30 x 15 x 12 mm.               D. Tumor focality: Single focus.               E. Ductal Carcinoma in-situ (DCIS): Not identified.               F. No lobular neoplasia identified.               G. Overlying skin and nipple are uninvolved by tumor.               H. No definitive lymphovascular space invasion identified.               I. Focal perineural invasion is present.  J. Margins: Uninvolved by invasive and in-situ carcinoma.               1. Invasive carcinoma comes to within 7 mm of the anterior margin, 40 mm from the inferior margin,      60 mm from the posterior and lateral margins, 80 mm from the medial margin and 130 mm from the       superior margin of resection.               K. Lymph Nodes: Not submitted.  L. Hormone receptor status: ER positive, RI negative, HER2 positive by FISH (right medial biopsy),       Ki-67 35-40%  (performed on prior biopsy [id number]).  M. Pathologic stage: pT2, NX.     2. Left Breast, Oriented Simple Skin Sparing Mastectomy (1,593 Grams):   A. Benign unremarkable breast tissue, skin and nipple.     Pathologic stage: PT2PN not assigned     9/15/2023 -  Chemotherapy     OP BREAST PACLitaxel / Trastuzumab (Weekly x 12) then Trastuzumab every 21 days)      9/15/2023 -  Chemotherapy    OP BREAST PACLitaxel / Trastuzumab (Weekly x 12) then Trastuzumab every 21 days)   Last cycle Taxol 12/1/2023     3/1/2024 Surgery    Final Diagnosis   1.  Skin, left breast, excision:               A.  Benign skin and subcutaneous tissue with cicatrix formation.          3/29/2024 -  Hormonal Therapy    Exemestane      6/13/2024 Genetic Testing    Invitae 9 gene panel: Negative         Past Medical History:   Diagnosis Date    Allergic     Breast cancer 2006    Right infiltrating ductal carcinoma, grade I, T2N1, ER/IN positive, 90% HER-2/julián negative    Cervical polyp     History of benign cervical polyp    Colon polyps     Drug therapy     Hx of radiation therapy     Hypercholesteremia     Hyperthyroidism     NO LONGER PROBLEM    PONV (postoperative nausea and vomiting)     Radiation 2007    Recurrent breast cancer 06/23/2023    right--chemo and s/p bilat. mastectomy    Sebaceous cyst     BACK       Past Surgical History:   Procedure Laterality Date    BREAST BIOPSY Right 08/03/2006    stereotactic biopsy    BREAST LUMPECTOMY Right 08/18/2006    Right axillary sentinel node biopsy (positive), right axillay dissection, right needle lumpectomy-Dr. Quyen Vidal    BREAST RECONSTRUCTION Bilateral 8/16/2023    Procedure: BILATERAL PREPECTORAL PLACEMENT  TISSUE EXPANDER AND ALLODERM, ADJACENT LEFT BREAST TISSUE REARRANGEMENT;  Surgeon: Favian Brooke MD;  Location: [facility] OR;  Service: Plastics;  Laterality: Bilateral;    BREAST TISSUE EXPANDER REMOVAL INSERTION OF IMPLANT Bilateral 3/1/2024    Procedure: BILATERAL REMOVAL TISSUE EXPANDERS AND PLACEMENT OF IMPLANTS AND LEFT ADJACENT TISSUE REARRANGEMENT;  Surgeon: Favian Brooke MD;  Location: [facility] MAIN OR;  Service: Plastics;  Laterality: Bilateral;    CERVICAL CONE BIOPSY  2005    [facility]    COLONOSCOPY N/A 04/07/2014    3 mm  cecal polyp, 6 mm ascending colon polyp, 8 mm transvese colon polyp-Dr. Quyen Vidal    COLONOSCOPY N/A 03/16/2009    Tortuous colon-Dr. Quyen Vidal    COLONOSCOPY N/A 06/11/2018    Procedure: COLONOSCOPY TO CECUM TO TERMINAL ILEUM WITH HOT SNARE POLYPECTOMY;  Surgeon: Quyen Vidal MD;  Location: [facility] ENDOSCOPY;  Service: Gastroenterology    COLPOSCOPY      DILATATION AND CURETTAGE  03/23/2005    EXCISION MASS TRUNK N/A 09/05/2017    Procedure: EXCISION OF SEBACEOUS CYST FROM BACK ;  Surgeon: Rosendo Crum MD;  Location: [facility] MAIN OR;  Service:     MASTECTOMY W/ SENTINEL NODE BIOPSY Bilateral 8/16/2023    Procedure: BREAST MASTECTOMY BILATERAL;  Surgeon: Quyen Vidal MD;  Location: [facility] MAIN OR;  Service: General;  Laterality: Bilateral;    SHOULDER ROTATOR CUFF REPAIR Right 07/2022    VENOUS ACCESS DEVICE (PORT) INSERTION Left 10/06/2006    Left subclavian Azdahe-j-Btbl placement-Dr. Quyen Vidal    VENOUS ACCESS DEVICE (PORT) INSERTION N/A 8/16/2023    Procedure: with port placement using fluoroscopy and ultrasound;  Surgeon: Quyen Vidal MD;  Location: [facility] MAIN OR;  Service: General;  Laterality: N/A;    VENOUS ACCESS DEVICE (PORT) REMOVAL Left 05/29/2007    Left subclavian Gnjbxq-[id number]-Vyma removal-Dr. Quyen Vidal       Social History     Socioeconomic History    Marital status:      Spouse name: Philip   Tobacco Use    Smoking status: Never    Smokeless tobacco: Never    Tobacco comments:          No Caffeine    Vaping Use    Vaping status: Never Used   Substance and Sexual Activity    Alcohol use: No    Drug use: No    Sexual activity: Not Currently     Partners: Male     Birth control/protection: None         LDH   Date Value Ref Range Status   04/15/2016 212 99 - 259 U/L Final     Uric Acid   Date Value Ref Range Status   04/15/2016 3.4 2.8 - 7.4 mg/dL Final         Lab Results   Component Value Date    GLUCOSE 94 05/10/2024    BUN 18 05/10/2024    CREATININE 0.89 05/10/2024     "EGFRIFNONA 82 11/01/2021    EGFRIFAFRI 95 01/13/2022    BCR 20.2 05/10/2024    K 3.9 05/10/2024    CO2 24.5 05/10/2024    CALCIUM 9.1 05/10/2024    PROTENTOTREF 6.6 11/30/2022    ALBUMIN 3.9 05/10/2024    LABIL2 1.8 11/30/2022    AST 20 05/10/2024    ALT 18 05/10/2024       CBC w/diff          4/19/2024    12:09 5/10/2024    11:11 5/31/2024    12:36   CBC w/Diff   WBC 6.40  6.02  5.29    RBC 5.07  5.06  5.00    Hemoglobin 12.9  12.9  12.9    Hematocrit 40.5  40.7  40.0    MCV 79.9  80.4  80.0    MCH 25.4  25.5  25.8    MCHC 31.9  31.7  32.3    RDW 14.3  14.7  14.9    Platelets 198  170  191    Neutrophil Rel % 56.4  53.4  58.7    Immature Granulocyte Rel % 0.2  0.3  0.2    Lymphocyte Rel % 31.4  35.0  29.9    Monocyte Rel % 9.2  9.1  9.1    Eosinophil Rel % 2.3  1.7  1.5    Basophil Rel % 0.5  0.5  0.6        Allergies as of 06/05/2024    (No Known Allergies)        MEDICATIONS:  Patient medication list reviewed today    Review of Systems   Constitutional:  Positive for fatigue. Negative for activity change, appetite change and unexpected weight change.        Positive for hot flashes   Respiratory:  Negative for chest tightness and shortness of breath.    Cardiovascular:  Negative for chest pain and leg swelling.   Gastrointestinal:  Negative for blood in stool, constipation and diarrhea.   Genitourinary:  Negative for dysuria and hematuria.   Musculoskeletal:  Negative for arthralgias and myalgias.        Right hand lymphedema positive   Neurological:  Negative for dizziness, weakness and numbness.   Psychiatric/Behavioral:  Positive for sleep disturbance. Negative for dysphoric mood. The patient is not nervous/anxious.        /71   Pulse 69   Ht 162.6 cm (64.02\")   Wt 99.5 kg (219 lb 6.4 oz)   LMP  (LMP Unknown)   SpO2 98%   BMI 37.64 kg/m²     Wt Readings from Last 3 Encounters:   06/05/24 99.5 kg (219 lb 6.4 oz)   05/31/24 101 kg (222 lb)   05/10/24 101 kg (222 lb 12.8 oz)        Pain Score    " 06/05/24 1403   PainSc: 0-No pain           Physical Exam  Constitutional:       Appearance: Normal appearance. She is well-groomed.   HENT:      Head: Normocephalic and atraumatic.   Cardiovascular:      Rate and Rhythm: Normal rate and regular rhythm.   Pulmonary:      Effort: No tachypnea or respiratory distress.   Abdominal:      General: Abdomen is flat. There is no distension.   Musculoskeletal:      Right ankle: No swelling. No tenderness. Normal pulse.      Left ankle: No swelling. No tenderness. Normal pulse.   Skin:     General: Skin is warm and dry.   Neurological:      Mental Status: She is alert and oriented to person, place, and time.   Psychiatric:         Attention and Perception: Attention and perception normal.         Mood and Affect: Mood and affect normal.         Speech: Speech normal.         Behavior: Behavior normal. Behavior is cooperative.         Thought Content: Thought content normal.           Advance Care Planning     Patient does not have advance care planning complete, we do not have a copy on file.    Patient has not designated a healthcare surrogate:     Arrangements for further assistance with advance care planning can be made by scheduling an appointment with myself or another advance care planning facilitator at their convenience. Patients may also call the toll free [facility] ACP Help Line at [phone].           DISCUSSION HELD TODAY:   Discussed NCCN recommendations for all cancer survivors of 150 minutes/week moderate intensity exercise, achieve and maintain a healthy weight, plants-based whole-foods diet, avoid tobacco and second hand smoke, avoid alcohol or minimize alcohol intake - no more than 1 drink in a day for adults.     A copy of the Survivorship Treatment Summary & Care Plan for Ms. Roblero was provided to and forwarded to the providers identified on the care team.    She has had initial evaluation with lymphedema therapy, continues to struggle with  intermittent swelling in the right hand, has had some difficulty getting a good fit for compression garments that they are working on  Tolerating Aromasin generally well without increased arthralgias or myalgias.  She does report intermittent hot flashes.  We discussed some strategies for management of this including tart cherry extract, acupuncture generally she is hoping to avoid additional medications.  She is having some sleep continuity issues which began about 17 years ago.  No problem falling asleep but does have frequent nighttime wake ups.  We did discuss nonpharmacologic strategies including CBT-I and self-directed management with CBT-I tabatha which she is willing to try  Some residual peripheral neuropathy in her toes but denies any difficulties with ambulation and has had no history of falls.  Surveillance: Bilateral mastectomies, no routine imaging recommended however I did encourage her to discuss with her plastic surgeon the need for any monitoring with her particular implants.  She was due for colonoscopy prior to her breast cancer diagnosis and knows she will need to complete that this year.    Plan and recommendations:  DEXA scan scheduled for July  Herceptin plan to continue through September 2024  Continues follow-up with cardio oncology as directed  Continue follow-up with lymphedema therapist as directed  Discussed self-directed therapy with CBT-I tabatha, return to clinic as needed to further discuss clinician directed CBT-I in clinic  Continue physical activity as tolerated  Discussed programs available at [facility], [facility], [facility]  Surveillance as above, she is overdue for colonoscopy  No formal follow up arranged for now. Return to clinic as needed.  Call my office as needed at any point for additional information, resources or support at [phone]      Diagnoses and all orders for this visit:    1. Malignant neoplasm of overlapping sites of right breast in female, estrogen receptor positive  (Primary)            I spent 40 minutes caring for this patient on this date of service by face-to-face counseling. This time includes time spent by me in the following activities: preparing for the visit, reviewing tests, obtaining and/or reviewing a separately obtained history, performing a medically appropriate examination and/or evaluation, counseling and educating the patient/family/caregiver, documenting information in the medical record, and care coordination

## 2024-06-21 ENCOUNTER — INFUSION (OUTPATIENT)
Dept: ONCOLOGY | Facility: HOSPITAL | Age: 59
End: 2024-06-21
Payer: COMMERCIAL

## 2024-06-21 VITALS
DIASTOLIC BLOOD PRESSURE: 77 MMHG | TEMPERATURE: 98.6 F | WEIGHT: 226 LBS | RESPIRATION RATE: 15 BRPM | SYSTOLIC BLOOD PRESSURE: 137 MMHG | HEIGHT: 64 IN | OXYGEN SATURATION: 98 % | BODY MASS INDEX: 38.58 KG/M2 | HEART RATE: 81 BPM

## 2024-06-21 DIAGNOSIS — Z17.0 MALIGNANT NEOPLASM OF OVERLAPPING SITES OF RIGHT BREAST IN FEMALE, ESTROGEN RECEPTOR POSITIVE: ICD-10-CM

## 2024-06-21 DIAGNOSIS — Z90.13 HISTORY OF BILATERAL MASTECTOMY: ICD-10-CM

## 2024-06-21 DIAGNOSIS — C50.811 MALIGNANT NEOPLASM OF OVERLAPPING SITES OF RIGHT BREAST IN FEMALE, ESTROGEN RECEPTOR POSITIVE: ICD-10-CM

## 2024-06-21 DIAGNOSIS — Z45.2 ENCOUNTER FOR ADJUSTMENT OR MANAGEMENT OF VASCULAR ACCESS DEVICE: Primary | ICD-10-CM

## 2024-06-21 LAB
BASOPHILS # BLD AUTO: 0.04 10*3/MM3 (ref 0–0.2)
BASOPHILS NFR BLD AUTO: 0.6 % (ref 0–1.5)
DEPRECATED RDW RBC AUTO: 43.9 FL (ref 37–54)
EOSINOPHIL # BLD AUTO: 0.1 10*3/MM3 (ref 0–0.4)
EOSINOPHIL NFR BLD AUTO: 1.6 % (ref 0.3–6.2)
ERYTHROCYTE [DISTWIDTH] IN BLOOD BY AUTOMATED COUNT: 14.7 % (ref 12.3–15.4)
HCT VFR BLD AUTO: 39.4 % (ref 34–46.6)
HGB BLD-MCNC: 12.5 G/DL (ref 12–15.9)
IMM GRANULOCYTES # BLD AUTO: 0.02 10*3/MM3 (ref 0–0.05)
IMM GRANULOCYTES NFR BLD AUTO: 0.3 % (ref 0–0.5)
LYMPHOCYTES # BLD AUTO: 1.92 10*3/MM3 (ref 0.7–3.1)
LYMPHOCYTES NFR BLD AUTO: 31 % (ref 19.6–45.3)
MCH RBC QN AUTO: 25.9 PG (ref 26.6–33)
MCHC RBC AUTO-ENTMCNC: 31.7 G/DL (ref 31.5–35.7)
MCV RBC AUTO: 81.6 FL (ref 79–97)
MONOCYTES # BLD AUTO: 0.49 10*3/MM3 (ref 0.1–0.9)
MONOCYTES NFR BLD AUTO: 7.9 % (ref 5–12)
NEUTROPHILS NFR BLD AUTO: 3.63 10*3/MM3 (ref 1.7–7)
NEUTROPHILS NFR BLD AUTO: 58.6 % (ref 42.7–76)
NRBC BLD AUTO-RTO: 0 /100 WBC (ref 0–0.2)
PLATELET # BLD AUTO: 183 10*3/MM3 (ref 140–450)
PMV BLD AUTO: 12.3 FL (ref 6–12)
RBC # BLD AUTO: 4.83 10*6/MM3 (ref 3.77–5.28)
WBC NRBC COR # BLD AUTO: 6.2 10*3/MM3 (ref 3.4–10.8)

## 2024-06-21 PROCEDURE — 85025 COMPLETE CBC W/AUTO DIFF WBC: CPT | Performed by: INTERNAL MEDICINE

## 2024-06-21 PROCEDURE — 96413 CHEMO IV INFUSION 1 HR: CPT

## 2024-06-21 PROCEDURE — 25010000002 TRASTUZUMAB-QYYP 420 MG RECONSTITUTED SOLUTION 1 EACH VIAL: Performed by: NURSE PRACTITIONER

## 2024-06-21 PROCEDURE — 25810000003 SODIUM CHLORIDE 0.9 % SOLUTION: Performed by: NURSE PRACTITIONER

## 2024-06-21 PROCEDURE — 25010000002 HEPARIN LOCK FLUSH PER 10 UNITS: Performed by: INTERNAL MEDICINE

## 2024-06-21 PROCEDURE — 25810000003 SODIUM CHLORIDE 0.9 % SOLUTION 250 ML FLEX CONT: Performed by: NURSE PRACTITIONER

## 2024-06-21 RX ORDER — SODIUM CHLORIDE 0.9 % (FLUSH) 0.9 %
10 SYRINGE (ML) INJECTION AS NEEDED
Status: DISCONTINUED | OUTPATIENT
Start: 2024-06-21 | End: 2024-06-21 | Stop reason: HOSPADM

## 2024-06-21 RX ORDER — SODIUM CHLORIDE 0.9 % (FLUSH) 0.9 %
10 SYRINGE (ML) INJECTION AS NEEDED
OUTPATIENT
Start: 2024-06-21

## 2024-06-21 RX ORDER — EXEMESTANE 25 MG/1
1 TABLET ORAL DAILY
COMMUNITY
Start: 2024-06-11

## 2024-06-21 RX ORDER — SODIUM CHLORIDE 9 MG/ML
250 INJECTION, SOLUTION INTRAVENOUS ONCE
Status: COMPLETED | OUTPATIENT
Start: 2024-06-21 | End: 2024-06-21

## 2024-06-21 RX ORDER — HEPARIN SODIUM (PORCINE) LOCK FLUSH IV SOLN 100 UNIT/ML 100 UNIT/ML
500 SOLUTION INTRAVENOUS AS NEEDED
OUTPATIENT
Start: 2024-06-21

## 2024-06-21 RX ORDER — HEPARIN SODIUM (PORCINE) LOCK FLUSH IV SOLN 100 UNIT/ML 100 UNIT/ML
500 SOLUTION INTRAVENOUS AS NEEDED
Status: DISCONTINUED | OUTPATIENT
Start: 2024-06-21 | End: 2024-06-21 | Stop reason: HOSPADM

## 2024-06-21 RX ORDER — SODIUM CHLORIDE 9 MG/ML
250 INJECTION, SOLUTION INTRAVENOUS ONCE
Status: CANCELLED | OUTPATIENT
Start: 2024-06-21

## 2024-06-21 RX ADMIN — SODIUM CHLORIDE 250 ML: 9 INJECTION, SOLUTION INTRAVENOUS at 13:34

## 2024-06-21 RX ADMIN — Medication 10 ML: at 14:44

## 2024-06-21 RX ADMIN — SODIUM CHLORIDE 580 MG: 900 INJECTION, SOLUTION INTRAVENOUS at 14:09

## 2024-06-21 RX ADMIN — Medication 500 UNITS: at 14:44

## 2024-07-12 ENCOUNTER — INFUSION (OUTPATIENT)
Dept: ONCOLOGY | Facility: HOSPITAL | Age: 59
End: 2024-07-12
Payer: COMMERCIAL

## 2024-07-12 VITALS
SYSTOLIC BLOOD PRESSURE: 118 MMHG | TEMPERATURE: 97.7 F | DIASTOLIC BLOOD PRESSURE: 76 MMHG | HEIGHT: 64 IN | BODY MASS INDEX: 38.24 KG/M2 | HEART RATE: 77 BPM | RESPIRATION RATE: 15 BRPM | WEIGHT: 224 LBS | OXYGEN SATURATION: 100 %

## 2024-07-12 DIAGNOSIS — Z17.0 MALIGNANT NEOPLASM OF OVERLAPPING SITES OF RIGHT BREAST IN FEMALE, ESTROGEN RECEPTOR POSITIVE: ICD-10-CM

## 2024-07-12 DIAGNOSIS — Z90.13 HISTORY OF BILATERAL MASTECTOMY: ICD-10-CM

## 2024-07-12 DIAGNOSIS — Z45.2 ENCOUNTER FOR ADJUSTMENT OR MANAGEMENT OF VASCULAR ACCESS DEVICE: Primary | ICD-10-CM

## 2024-07-12 DIAGNOSIS — C50.811 MALIGNANT NEOPLASM OF OVERLAPPING SITES OF RIGHT BREAST IN FEMALE, ESTROGEN RECEPTOR POSITIVE: ICD-10-CM

## 2024-07-12 LAB
BASOPHILS # BLD AUTO: 0.03 10*3/MM3 (ref 0–0.2)
BASOPHILS NFR BLD AUTO: 0.6 % (ref 0–1.5)
DEPRECATED RDW RBC AUTO: 43.4 FL (ref 37–54)
EOSINOPHIL # BLD AUTO: 0.06 10*3/MM3 (ref 0–0.4)
EOSINOPHIL NFR BLD AUTO: 1.2 % (ref 0.3–6.2)
ERYTHROCYTE [DISTWIDTH] IN BLOOD BY AUTOMATED COUNT: 14.6 % (ref 12.3–15.4)
HCT VFR BLD AUTO: 38.5 % (ref 34–46.6)
HGB BLD-MCNC: 12.2 G/DL (ref 12–15.9)
IMM GRANULOCYTES # BLD AUTO: 0.01 10*3/MM3 (ref 0–0.05)
IMM GRANULOCYTES NFR BLD AUTO: 0.2 % (ref 0–0.5)
LYMPHOCYTES # BLD AUTO: 1.73 10*3/MM3 (ref 0.7–3.1)
LYMPHOCYTES NFR BLD AUTO: 33.2 % (ref 19.6–45.3)
MCH RBC QN AUTO: 25.9 PG (ref 26.6–33)
MCHC RBC AUTO-ENTMCNC: 31.7 G/DL (ref 31.5–35.7)
MCV RBC AUTO: 81.7 FL (ref 79–97)
MONOCYTES # BLD AUTO: 0.52 10*3/MM3 (ref 0.1–0.9)
MONOCYTES NFR BLD AUTO: 10 % (ref 5–12)
NEUTROPHILS NFR BLD AUTO: 2.86 10*3/MM3 (ref 1.7–7)
NEUTROPHILS NFR BLD AUTO: 54.8 % (ref 42.7–76)
NRBC BLD AUTO-RTO: 0 /100 WBC (ref 0–0.2)
PLATELET # BLD AUTO: 165 10*3/MM3 (ref 140–450)
PMV BLD AUTO: 12.3 FL (ref 6–12)
RBC # BLD AUTO: 4.71 10*6/MM3 (ref 3.77–5.28)
WBC NRBC COR # BLD AUTO: 5.21 10*3/MM3 (ref 3.4–10.8)

## 2024-07-12 PROCEDURE — 85025 COMPLETE CBC W/AUTO DIFF WBC: CPT | Performed by: INTERNAL MEDICINE

## 2024-07-12 PROCEDURE — 25010000002 HEPARIN LOCK FLUSH PER 10 UNITS: Performed by: INTERNAL MEDICINE

## 2024-07-12 PROCEDURE — 25810000003 SODIUM CHLORIDE 0.9 % SOLUTION: Performed by: NURSE PRACTITIONER

## 2024-07-12 PROCEDURE — 96413 CHEMO IV INFUSION 1 HR: CPT

## 2024-07-12 PROCEDURE — 25010000002 TRASTUZUMAB-QYYP 420 MG RECONSTITUTED SOLUTION 1 EACH VIAL: Performed by: NURSE PRACTITIONER

## 2024-07-12 PROCEDURE — 25810000003 SODIUM CHLORIDE 0.9 % SOLUTION 250 ML FLEX CONT: Performed by: NURSE PRACTITIONER

## 2024-07-12 RX ORDER — SODIUM CHLORIDE 0.9 % (FLUSH) 0.9 %
10 SYRINGE (ML) INJECTION AS NEEDED
Status: DISCONTINUED | OUTPATIENT
Start: 2024-07-12 | End: 2024-07-12 | Stop reason: HOSPADM

## 2024-07-12 RX ORDER — SODIUM CHLORIDE 9 MG/ML
250 INJECTION, SOLUTION INTRAVENOUS ONCE
Status: COMPLETED | OUTPATIENT
Start: 2024-07-12 | End: 2024-07-12

## 2024-07-12 RX ORDER — HEPARIN SODIUM (PORCINE) LOCK FLUSH IV SOLN 100 UNIT/ML 100 UNIT/ML
500 SOLUTION INTRAVENOUS AS NEEDED
Status: DISCONTINUED | OUTPATIENT
Start: 2024-07-12 | End: 2024-07-12 | Stop reason: HOSPADM

## 2024-07-12 RX ORDER — HEPARIN SODIUM (PORCINE) LOCK FLUSH IV SOLN 100 UNIT/ML 100 UNIT/ML
500 SOLUTION INTRAVENOUS AS NEEDED
OUTPATIENT
Start: 2024-07-12

## 2024-07-12 RX ORDER — SODIUM CHLORIDE 0.9 % (FLUSH) 0.9 %
10 SYRINGE (ML) INJECTION AS NEEDED
OUTPATIENT
Start: 2024-07-12

## 2024-07-12 RX ADMIN — Medication 500 UNITS: at 14:54

## 2024-07-12 RX ADMIN — SODIUM CHLORIDE 580 MG: 900 INJECTION, SOLUTION INTRAVENOUS at 14:21

## 2024-07-12 RX ADMIN — SODIUM CHLORIDE 250 ML: 9 INJECTION, SOLUTION INTRAVENOUS at 14:19

## 2024-07-12 RX ADMIN — Medication 10 ML: at 14:54

## 2024-07-31 ENCOUNTER — HOSPITAL ENCOUNTER (OUTPATIENT)
Dept: BONE DENSITY | Facility: HOSPITAL | Age: 59
Discharge: HOME | End: 2024-07-31
Admitting: INTERNAL MEDICINE
Payer: COMMERCIAL

## 2024-07-31 DIAGNOSIS — C50.811 MALIGNANT NEOPLASM OF OVERLAPPING SITES OF RIGHT BREAST IN FEMALE, ESTROGEN RECEPTOR POSITIVE: ICD-10-CM

## 2024-07-31 DIAGNOSIS — Z17.0 MALIGNANT NEOPLASM OF OVERLAPPING SITES OF RIGHT BREAST IN FEMALE, ESTROGEN RECEPTOR POSITIVE: ICD-10-CM

## 2024-07-31 DIAGNOSIS — Z78.0 POST-MENOPAUSAL: ICD-10-CM

## 2024-07-31 PROCEDURE — 77080 DXA BONE DENSITY AXIAL: CPT

## 2024-08-02 ENCOUNTER — INFUSION (OUTPATIENT)
Dept: ONCOLOGY | Facility: HOSPITAL | Age: 59
End: 2024-08-02
Payer: OTHER GOVERNMENT

## 2024-08-02 ENCOUNTER — OFFICE VISIT (OUTPATIENT)
Dept: ONCOLOGY | Facility: CLINIC | Age: 59
End: 2024-08-02
Payer: OTHER GOVERNMENT

## 2024-08-02 VITALS
WEIGHT: 226.7 LBS | OXYGEN SATURATION: 97 % | SYSTOLIC BLOOD PRESSURE: 128 MMHG | HEIGHT: 64 IN | TEMPERATURE: 98.3 F | HEART RATE: 77 BPM | DIASTOLIC BLOOD PRESSURE: 84 MMHG | RESPIRATION RATE: 18 BRPM | BODY MASS INDEX: 38.7 KG/M2

## 2024-08-02 DIAGNOSIS — Z17.0 MALIGNANT NEOPLASM OF OVERLAPPING SITES OF RIGHT BREAST IN FEMALE, ESTROGEN RECEPTOR POSITIVE: Primary | ICD-10-CM

## 2024-08-02 DIAGNOSIS — Z17.0 MALIGNANT NEOPLASM OF OVERLAPPING SITES OF RIGHT BREAST IN FEMALE, ESTROGEN RECEPTOR POSITIVE: ICD-10-CM

## 2024-08-02 DIAGNOSIS — Z90.13 HISTORY OF BILATERAL MASTECTOMY: Primary | ICD-10-CM

## 2024-08-02 DIAGNOSIS — Z45.2 ENCOUNTER FOR ADJUSTMENT OR MANAGEMENT OF VASCULAR ACCESS DEVICE: ICD-10-CM

## 2024-08-02 DIAGNOSIS — C50.811 MALIGNANT NEOPLASM OF OVERLAPPING SITES OF RIGHT BREAST IN FEMALE, ESTROGEN RECEPTOR POSITIVE: ICD-10-CM

## 2024-08-02 DIAGNOSIS — Z90.13 HISTORY OF BILATERAL MASTECTOMY: ICD-10-CM

## 2024-08-02 DIAGNOSIS — C50.811 MALIGNANT NEOPLASM OF OVERLAPPING SITES OF RIGHT BREAST IN FEMALE, ESTROGEN RECEPTOR POSITIVE: Primary | ICD-10-CM

## 2024-08-02 LAB
BASOPHILS # BLD AUTO: 0.05 10*3/MM3 (ref 0–0.2)
BASOPHILS NFR BLD AUTO: 0.8 % (ref 0–1.5)
DEPRECATED RDW RBC AUTO: 42.4 FL (ref 37–54)
EOSINOPHIL # BLD AUTO: 0.07 10*3/MM3 (ref 0–0.4)
EOSINOPHIL NFR BLD AUTO: 1.1 % (ref 0.3–6.2)
ERYTHROCYTE [DISTWIDTH] IN BLOOD BY AUTOMATED COUNT: 14.3 % (ref 12.3–15.4)
HCT VFR BLD AUTO: 41.1 % (ref 34–46.6)
HGB BLD-MCNC: 13.3 G/DL (ref 12–15.9)
IMM GRANULOCYTES # BLD AUTO: 0.02 10*3/MM3 (ref 0–0.05)
IMM GRANULOCYTES NFR BLD AUTO: 0.3 % (ref 0–0.5)
LYMPHOCYTES # BLD AUTO: 2.02 10*3/MM3 (ref 0.7–3.1)
LYMPHOCYTES NFR BLD AUTO: 33 % (ref 19.6–45.3)
MCH RBC QN AUTO: 26.6 PG (ref 26.6–33)
MCHC RBC AUTO-ENTMCNC: 32.4 G/DL (ref 31.5–35.7)
MCV RBC AUTO: 82.2 FL (ref 79–97)
MONOCYTES # BLD AUTO: 0.59 10*3/MM3 (ref 0.1–0.9)
MONOCYTES NFR BLD AUTO: 9.6 % (ref 5–12)
NEUTROPHILS NFR BLD AUTO: 3.38 10*3/MM3 (ref 1.7–7)
NEUTROPHILS NFR BLD AUTO: 55.2 % (ref 42.7–76)
NRBC BLD AUTO-RTO: 0 /100 WBC (ref 0–0.2)
PLATELET # BLD AUTO: 190 10*3/MM3 (ref 140–450)
PMV BLD AUTO: 12.1 FL (ref 6–12)
RBC # BLD AUTO: 5 10*6/MM3 (ref 3.77–5.28)
WBC NRBC COR # BLD AUTO: 6.13 10*3/MM3 (ref 3.4–10.8)

## 2024-08-02 PROCEDURE — 25810000003 SODIUM CHLORIDE 0.9 % SOLUTION: Performed by: INTERNAL MEDICINE

## 2024-08-02 PROCEDURE — 25810000003 SODIUM CHLORIDE 0.9 % SOLUTION 250 ML FLEX CONT: Performed by: INTERNAL MEDICINE

## 2024-08-02 PROCEDURE — 96413 CHEMO IV INFUSION 1 HR: CPT

## 2024-08-02 PROCEDURE — 25010000002 HEPARIN LOCK FLUSH PER 10 UNITS: Performed by: INTERNAL MEDICINE

## 2024-08-02 PROCEDURE — 85025 COMPLETE CBC W/AUTO DIFF WBC: CPT

## 2024-08-02 PROCEDURE — 25010000002 TRASTUZUMAB-QYYP 420 MG RECONSTITUTED SOLUTION 1 EACH VIAL: Performed by: INTERNAL MEDICINE

## 2024-08-02 RX ORDER — SODIUM CHLORIDE 9 MG/ML
250 INJECTION, SOLUTION INTRAVENOUS ONCE
Status: CANCELLED | OUTPATIENT
Start: 2024-08-02

## 2024-08-02 RX ORDER — SODIUM CHLORIDE 0.9 % (FLUSH) 0.9 %
10 SYRINGE (ML) INJECTION AS NEEDED
OUTPATIENT
Start: 2024-08-02

## 2024-08-02 RX ORDER — SODIUM CHLORIDE 9 MG/ML
250 INJECTION, SOLUTION INTRAVENOUS ONCE
Status: COMPLETED | OUTPATIENT
Start: 2024-08-02 | End: 2024-08-02

## 2024-08-02 RX ORDER — SODIUM CHLORIDE 0.9 % (FLUSH) 0.9 %
10 SYRINGE (ML) INJECTION AS NEEDED
Status: DISCONTINUED | OUTPATIENT
Start: 2024-08-02 | End: 2024-08-02 | Stop reason: HOSPADM

## 2024-08-02 RX ORDER — HEPARIN SODIUM (PORCINE) LOCK FLUSH IV SOLN 100 UNIT/ML 100 UNIT/ML
500 SOLUTION INTRAVENOUS AS NEEDED
Status: DISCONTINUED | OUTPATIENT
Start: 2024-08-02 | End: 2024-08-02 | Stop reason: HOSPADM

## 2024-08-02 RX ORDER — HEPARIN SODIUM (PORCINE) LOCK FLUSH IV SOLN 100 UNIT/ML 100 UNIT/ML
500 SOLUTION INTRAVENOUS AS NEEDED
OUTPATIENT
Start: 2024-08-02

## 2024-08-02 RX ADMIN — Medication 500 UNITS: at 15:19

## 2024-08-02 RX ADMIN — Medication 10 ML: at 15:19

## 2024-08-02 RX ADMIN — SODIUM CHLORIDE 250 ML: 9 INJECTION, SOLUTION INTRAVENOUS at 14:43

## 2024-08-02 RX ADMIN — SODIUM CHLORIDE 580 MG: 900 INJECTION, SOLUTION INTRAVENOUS at 14:43

## 2024-08-02 NOTE — PROGRESS NOTES
Subjective   REASON FOR FOLLOWUP:  History of stage IIIA infiltrating ductal carcinoma of the right breast with 4 positive nodes, ER positive, HER-2/julián negative; treated with lumpectomy, status post 6 cycles of TAC. Received tamoxifen between February 2007 and March 2012. Femara was started in March 2012. Patient switched to Arimidex as of 06/21/2012 because of side effects with severe ankle joint pain with Femara.    2.   10 years of adjuvant therapy completed  March 2017.    3.   New diagnosis of carcinoma of the remaining right breast, needle biopsy 6/8/2023.  Tumor is HER2/julián positive and estrogen receptor positive/progesterone receptor negative.    4.   Bilateral mastectomies with implant reconstruction 8/16/2023    5.   Initiation of postop adjuvant chemotherapy with weekly Taxol and Herceptin.  First dose delivered 9/15/2023.     6.  Weekly Taxol and Herceptin completed 12/1/2023.    7.  Every 3-week Herceptin initiated 12/8/2023.    8.  Plan Hormonal therapy with Aromasin 25 mg daily.  This was prescribed on the visit of 1/19/2024 but the patient has not yet started taking the medication and wished to wait until after her reconstructive surgery.    9.  Reconstructive surgery with permanent breast implant insertions 3/1/2024      HISTORY OF PRESENT ILLNESS:     History of Present Illness Ms. Roblero is a 59 y.o.  female with the above noted history of stage IIIA carcinoma of the right breast, treated with lumpectomy, chemotherapy and radiation.  Following initial treatment with 6 cycles of TAC chemotherapy, she received extended hormonal therapy with 5 years of tamoxifen followed by 5 years of aromatase inhibitor.  She completed her 10 years of hormonal therapy in March 2017  .     She was seeing us annually and had not been seen since January of 2023 but when she underwent her breast imaging in May 2023 she had suspicious findings and ultimately underwent ultrasound-guided biopsy on  6/8/2023.  There were 2 separate biopsies in 1 of these areas was positive for HER2/julián overexpression by FISH.  Both tumors were ER positive and NJ negative.    She was referred to Dr. Vidal who had performed her previous lumpectomy surgery in 2007 and currently the plan is for her to undergo bilateral mastectomies with reconstruction.    We had her seen by cardiac oncology due to her previous treatment with 6 cycles of TAC chemotherapy in 2007.  Thankfully her ejection fraction still seems to be normal at 61% based on her echocardiogram from 6/19/2023.    Dr. Vidal has scheduled the patient for radiographic staging with CT scan of the chest abdomen pelvis and a bone scan and the studies did not show any evidence of distant metastatic spread.    She had a bilateral mastectomies with implant reconstruction performed on 8/16/2023.  The tumor in the right breast was 3 x 1.5 x 1.2 cm.  Margins of resection were clear.  She also had placement of a left chest wall Mediport at the time of her surgery.    We recommended weekly Taxol and Herceptin for 12 weeks followed by continued Herceptin every 3 weeks for the remainder of the year of treatment.  She was estrogen receptor positive and will also be started on hormonal therapy once her chemotherapy is completed.    She completed her combined weekly Taxol and Herceptin in early December 2023 and is now receiving every 3-week Herceptin infusion with plans to continue this through September 2024 (12 months total).    INTERVAL HISTORY:  Ms. Roblero returns to the clinic today to receive Herceptin.  She continues on Aromasin and she is complaining of cramps in her muscles particularly in the calfs and this is limiting her ability to swim.  She denies any new bone pains, cough, abdominal pain nausea vomiting.  She has been compliant with Aromasin.  No new chest wall lesions.  DEXA scan from July 2024 reviewed and shows normal bone density.      Past Medical History, Past  "Surgical History, Social History, Family History have been reviewed and are without significant changes except as mentioned.    Review of Systems   Constitutional:  Negative for activity change, appetite change, fatigue, fever and unexpected weight change.   HENT:  Negative for hearing loss, nosebleeds, trouble swallowing and voice change.    Eyes:  Negative for visual disturbance.   Respiratory:  Negative for cough, shortness of breath and wheezing.    Cardiovascular:  Negative for chest pain and palpitations.   Gastrointestinal:  Negative for abdominal pain, diarrhea, nausea and vomiting.   Genitourinary:  Negative for difficulty urinating, frequency, hematuria and urgency.   Musculoskeletal:  Negative for back pain and neck pain.        Mild lymphedema right upper extremity.     Skin:  Negative for rash.   Neurological:  Positive for numbness. Negative for dizziness, seizures, syncope and headaches.   Hematological:  Negative for adenopathy. Does not bruise/bleed easily.   Psychiatric/Behavioral:  Negative for behavioral problems. The patient is not nervous/anxious.       Review of systems as mentioned HPI otherwise negative    Medications:  The current medication list was reviewed in the EMR    ALLERGIES:    No Known Allergies      Objective      Vitals:    08/02/24 1247   BP: 128/84   Pulse: 77   Resp: 18   Temp: 98.3 °F (36.8 °C)   TempSrc: Oral   SpO2: 97%   Weight: 103 kg (226 lb 11.2 oz)   Height: 162.6 cm (64\")   PainSc: 0-No pain             6/21/2024    12:53 PM   Current Status   ECOG score 0       Physical Exam   Constitutional: She is oriented to person, place, and time. She appears well-developed. No distress.   HENT:   Head: Normocephalic.   Eyes: Pupils are equal, round, and reactive to light. Conjunctivae are normal. No scleral icterus.   Neck: No JVD present. No thyromegaly present.   Cardiovascular: Normal rate and regular rhythm. Exam reveals no gallop and no friction rub.   No murmur " heard.  Pulmonary/Chest: Effort normal and breath sounds normal. She has no wheezes. She has no rales. Right breast exhibits no mass, no nipple discharge and no skin change. Left breast exhibits no mass, no nipple discharge and no skin change.   Bilateral mastectomies with implant reconstructions.  Right chest wall Mediport   Abdominal: Soft. Bowel sounds are normal. She exhibits no distension and no mass. There is no abdominal tenderness.   Musculoskeletal: Normal range of motion. Swelling (Mild lymphedema in right upper extremity.  No pain, tenderness, or cording noted on exam.) present. No tenderness or deformity.   Lymphadenopathy:     She has no cervical adenopathy.   Neurological: She is alert and oriented to person, place, and time. She has normal reflexes. No cranial nerve deficit.   Skin: Skin is warm and dry. No rash noted. No erythema.   Psychiatric: Her behavior is normal. Judgment normal.      I have reexamined the patient and the results are consistent with the previously documented exam. Letitia Landis MD            RECENT LABS:  Hematology WBC   Date Value Ref Range Status   08/02/2024 6.13 3.40 - 10.80 10*3/mm3 Final   11/01/2021 5.9 3.4 - 10.8 x10E3/uL Final     RBC   Date Value Ref Range Status   08/02/2024 5.00 3.77 - 5.28 10*6/mm3 Final   11/01/2021 5.14 3.77 - 5.28 x10E6/uL Final     Hemoglobin   Date Value Ref Range Status   08/02/2024 13.3 12.0 - 15.9 g/dL Final     Hematocrit   Date Value Ref Range Status   08/02/2024 41.1 34.0 - 46.6 % Final     Platelets   Date Value Ref Range Status   08/02/2024 190 140 - 450 10*3/mm3 Final            Lab Results   Component Value Date    NEUTROABS 3.38 08/02/2024       Lab Results   Component Value Date    GLUCOSE 94 05/10/2024    BUN 18 05/10/2024    CREATININE 0.89 05/10/2024    EGFRRESULT 91.8 11/30/2022    EGFR 73.9 05/10/2024    BCR 20.2 05/10/2024    K 3.9 05/10/2024    CO2 24.5 05/10/2024    CALCIUM 9.1 05/10/2024    PROTENTOTREF 6.6 11/30/2022     ALBUMIN 3.9 05/10/2024    BILITOT 1.0 05/10/2024    AST 20 05/10/2024    ALT 18 05/10/2024     PATHOLOGY 8/16/2023  Final Diagnosis   1. Right Breast, Oriented Simple Skin Sparing Mastectomy (1,097 Grams): INVASIVE MAMMARY CARCINOMA,       NO SPECIAL TYPE (INVASIVE DUCTAL CARCINOMA).  A. Tumor site: Lower outer quadrant.               B. Histologic grade: Perez histologic score III (tubules = 3, nuclei = 2, mitosis = 3).               C. Tumor size: 30 x 15 x 12 mm.               D. Tumor focality: Single focus.               E. Ductal Carcinoma in-situ (DCIS): Not identified.               F. No lobular neoplasia identified.               G. Overlying skin and nipple are uninvolved by tumor.               H. No definitive lymphovascular space invasion identified.               I. Focal perineural invasion is present.  J. Margins: Uninvolved by invasive and in-situ carcinoma.               1. Invasive carcinoma comes to within 7 mm of the anterior margin, 40 mm from the inferior margin,      60 mm from the posterior and lateral margins, 80 mm from the medial margin and 130 mm from the       superior margin of resection.               K. Lymph Nodes: Not submitted.  L. Hormone receptor status: ER positive, WI negative, HER2 positive by FISH (right medial biopsy),       Ki-67 35-40%  (performed on prior biopsy [id number]).  M. Pathologic stage: pT2, NX.     2. Left Breast, Oriented Simple Skin Sparing Mastectomy (1,593 Grams):   A. Benign unremarkable breast tissue, skin and nipple.     PATHOLOGY 6/8/2023  Final Diagnosis   1. Breast, Right Lateral 6:30 Position, 3 cm FN, Core Biopsy:               A. Invasive mammary carcinoma, no special type (ductal), [place] histologic grade II (tubules = 3, nuclei = 2,        mitoses = 2) measuring 6 mm maximally and involving four core fragments.  B. No definitive in-situ carcinoma is identified.  C. No definitive lymphovascular space invasion identified.     2.  Breast, Right Medial 6:30 Position, 3 cm FN, Core Biopsy:                A. Invasive mammary carcinoma, no special type (ductal), Perez histologic grade II (tubules = 3, nuclei = 2,        mitoses = 1) measuring 10 mm maximally and involving three core fragments.  B. No definitive in-situ carcinoma is identified.  C. No definitive lymphovascular space invasion identified.     Echocardiogram 1/12/2024  Interpretation Summary       Left ventricular systolic function is normal. Calculated left ventricular EF = 64.3% Normal global longitudinal LV strain (GLS) = -22.3%. Left ventricle strain data was reviewed by the physician and found to be accurate. Normal left ventricular cavity size and wall thickness noted. All left ventricular wall segments contract normally. Left ventricular diastolic function is consistent with (grade I) impaired relaxation    Limited 2D imaging of cardiac valves with normal valve function by Doppler      CT CHEST, ABDOMEN, AND PELVIS WITHOUT CONTRAST. 7/21/2023  FINDINGS:  CHEST: Right breast findings discussed on breast MRI 06/17/2023. There  is a cluster of nodular densities at the right axilla in close proximity  to a cluster of surgical clips and some of these may represent vessels  or lymphatics. Characterization is limited in the absence of IV  contrast. There is no definitive pathologically enlarged node present.  There are no pathologically enlarged nodes at the left axilla and there  is no subpectoral lymphadenopathy, and there is no lymphadenopathy at  the mediastinum or jovita given constraints of noncontrasted technique.  The nodularity along the pleura at the dependent aspect of the lower  chest is likely related to dependent atelectatic change. No definite  suspicious pulmonary nodules are seen. There are no pleural or  pericardial effusions.     ABDOMEN/PELVIS: Noncontrasted liver appears unremarkable. The  gallbladder appears unremarkable and there is no biliary  dilatation.  Splenic size is normal. Noncontrasted pancreas, adrenals, and kidneys  appear unremarkable. There is no acute bowel abnormality. Appendix  appears within normal limits. The uterus is slightly lobular in contour  likely secondary to small fibroids. Noncontrasted adnexa appear  unremarkable. There is no free fluid or lymphadenopathy. There are mild  abdominal aortic atherosclerotic changes throughout the abdominal aorta  without aneurysmal dilatation. No suspicious bone lesion is seen.     IMPRESSION:  Given constraints of noncontrasted technique, there is no  convincing evidence for metastatic disease within the chest, abdomen, or  pelvis.      Narrative & Impression   NUCLEAR MEDICINE WHOLE BODY BONE SCAN 7/14/2023     HISTORY: Breast cancer. Evaluate for metastatic disease.     TECHNIQUE:  19.1 mCi of 99m technetium MDP was administered intravenous  followed by 3 hour delayed phase whole body imaging with selected spot  views.     COMPARISON: None.     FINDINGS:  There is mild increased uptake at the right sternoclavicular  joint that is attributed to arthritis. Arthritic uptake is present in  both shoulders, knees, and mid feet.  There is also mild increased  lumbar spine uptake that is most likely degenerative/arthritic. Both  kidneys and the urinary bladder are visualized.     IMPRESSION:  Areas of mild uptake are typical for arthritis/degenerative  change and there is no scintigraphic evidence to suggest bony metastatic  disease.        MAMMO SCREENING DIGITAL TOMOSYNTHESIS BILATERAL W CAD- 4/14/2023   IMPRESSION:  1. There is an area of focal asymmetry in the middle third retroareolar  region of the right breast. Further evaluation with spot compression CC  and MLO mammographic and Tomosynthesis images and targeted right breast  sonography is recommended.  2. There are no findings suspicious for malignancy in the left breast.     BI-RADS Category 0: Incomplete. Needs additional imaging  evaluation.    EXAMINATIONS: UNILATERAL RIGHT DIGITAL DIAGNOSTIC MAMMOGRAPHY WITH  TOMOSYNTHESIS AND LIMITED RIGHT BREAST SONOGRAPHY 5/17/2023  IMPRESSION:  There is an irregular 1 cm hypoechoic mass with internal peripheral  vascularity that is seen in the right breast at the 6:30 position on the  order of 3 cm from the nipple. It is immediately adjacent to a 1.9 cm  oval circumscribed hypoechoic mass. Together both lesions measure on the  order 2.7 cm in greatest dimension. Ultrasound guided biopsy of both  lesions is recommended. Given the close proximity of both lesions, both  lesions may be able to be sampled and submitted as one specimen.     BI-RADS Category 4: Suspicious abnormality or suspicious finding. Biopsy  should be considered.    MRI BREAST BILATERAL W WO CONTRAST-  6/17/2023  IMPRESSION AND RECOMMENDATION:     1.  Adjacent irregular enhancing masses each measuring 1.6 cm at 6:00 to  7:00 in the middle right breast represent the 2 sites of biopsy-proven  malignancy. Adjacent suspicious enhancing foci and areas of nonmass  enhancement are identified, in addition to an irregular enhancing mass  and multiple additional enhancing foci centered at 2:00 to 3:00 in the  right breast, which are also suspicious. The 2 sites of biopsy-proven  malignancy and suspicious areas of enhancement together measure up to  approximately 8.2 cm in maximum dimension, as detailed above. Surgical  management is recommended.  2.  No MRI evidence of malignancy in the left breast.     BI-RADS Category 4: Suspicious    5/10/2024-CBC reviewed and WBC 6.02, hemoglobin 12.9, platelets 170,000  CMP reviewed and BUN 18, creatinine 0.89, LFTs normal    I reviewed the MRI of the breast from June 2023 images independently and interpreted them independently, also reviewed the bone scan from 7/14/2023 and CT of the chest abdomen and pelvis from 7/21/2023 images independently and interpreted them independently.  No evidence of metastatic  disease.      Reviewed the pathology from 8/16/2023 bilateral mastectomy    Assessment & Plan     *Stage IIIA infiltrating ductal carcinoma of the right breast   Treated with a lumpectomy, radiation, 6 cycles of TAC chemotherapy and ongoing hormonal therapy. She completed 10 years of adjuvant hormonal therapy with 5 years of tamoxifen followed by 5 years of Arimidex which she completed in 2017.  Carcinoma of the remaining right breast.  Tumor was positive for HER2/julián overexpression and positive for estrogen receptors and negative for progesterone receptors.  Bilateral mastectomies with immediate implant reconstruction 8/16/2023.  Mediport placement 8/16/2023  Echocardiogram from 6/19/2023 shows normal left ventricular ejection fraction of 61% (patient previously had 6 cycles of Adriamycin containing chemotherapy and will be needing Herceptin therapy postoperatively).  Plan for postop adjuvant treatment with weekly Taxol and Herceptin x12 weeks followed by continued single agent Herceptin every 3 weeks for completion of 12 months of therapy total.  Plan for hormonal therapy possibly with Aromasin to begin once Taxol is completed.  9/14/2023: Patient returns for cycle 1 day 15 weekly Taxol and Herceptin.  Unfortunately she is neutropenic with an ANC of 800 and will not receive Taxol today but will receive Herceptin.  Her future Taxol doses will be modified to an 85% dose level.  10/6/2023: Proceed with cycle 2 day 1 taxol and Herceptin. We will continue taxol at 85% reduction of original dose.   10/27/2023: Palma will receive her 6th weekly dose of Taxol and her seventh weekly dose of Herceptin in the office today.Taxol dose will remain at 85% dose level from the original treatment due to development of neutropenia.  12/1/2023: We will proceed with 12th and final weekly Taxol and  Herceptin in office today.  She is tolerating treatment well.   Initial dose of every 3-week Herceptin at 6 mg/kg delivered  12/8/2023.  Aromasin 25 mg prescribed in January 2024 but patient started taking this only in April 2024.  She continues on Aromasin and she is complaining of calf cramps and tightness limiting her ability to swim.  No other evidence of recurrent disease  Continues on adjuvant Herceptin and tolerating that well.  Up-to-date on her visits with cardio oncology, upcoming echo this month.    *Chemotherapy-induced neuropathy  11/17/2023: Patient continues to use cooling mitts and gloves with treatment.  She has been noticing some mild neuropathy developing in her toes of her bilateral feet.  She feels that this discomfort is more noticeable in the evening when she is trying to sleep.    Unchanged, continue gabapentin 100 mg nightly    *Right upper extremity lymphedema  11/17/2023: Patient has had history of lymphedema in her right upper extremity.  She does feel that in the past week or so her edema has been more noticeable to her.  She has been wearing her compression garments.  She has an appointment with lymphedema clinic next week on 12/8/2023.  12/29/2023: Continues compression garment and has followed up with the lymphedema clinic.    Lymphedema stable at this time      *Bone density  DEXA scan from July 2024 reviewed and bone density normal.  Repeat in 2 years which would be in July 2026      PLAN:     Proceed with Herceptin today on a dose and schedule of 6 mg/kg IV every 3 weeks.  We anticipate this will be continued for the remainder of 12 months of therapy through September 2024.  Continue compression garments of the right upper extremity  Continue Neurontin 100 mg p.o. nightly.    DEXA in July 2024 normal, repeat in 2 years  Continue Aromasin  Continue Herceptin every 3 weeks  Echocardiogram this month  Continue follow-up with cardio oncology  APRN in 4 months and MD in 8 months        Patient remains on high risk medication and requires close monitoring for toxicity.  41 minutes total spent on the encounter  including reviewing all the old records including the imaging, pathology and previous notes of Dr. Solares.  Face-to-face time, care coordination and documentation on the same day        8/2/2024      CC:

## 2024-08-14 ENCOUNTER — HOSPITAL ENCOUNTER (OUTPATIENT)
Dept: PHYSICAL THERAPY | Facility: HOSPITAL | Age: 59
Setting detail: THERAPIES SERIES
Discharge: HOME | End: 2024-08-14
Payer: OTHER GOVERNMENT

## 2024-08-14 DIAGNOSIS — Z17.0 MALIGNANT NEOPLASM OF LOWER-OUTER QUADRANT OF RIGHT BREAST OF FEMALE, ESTROGEN RECEPTOR POSITIVE: ICD-10-CM

## 2024-08-14 DIAGNOSIS — I89.0 LYMPHEDEMA OF RIGHT UPPER EXTREMITY: ICD-10-CM

## 2024-08-14 DIAGNOSIS — C50.511 MALIGNANT NEOPLASM OF LOWER-OUTER QUADRANT OF RIGHT BREAST OF FEMALE, ESTROGEN RECEPTOR POSITIVE: ICD-10-CM

## 2024-08-14 DIAGNOSIS — Z90.13 HISTORY OF BILATERAL MASTECTOMY: Primary | ICD-10-CM

## 2024-08-14 DIAGNOSIS — Z91.89 AT RISK FOR LYMPHEDEMA: ICD-10-CM

## 2024-08-14 PROCEDURE — 97535 SELF CARE MNGMENT TRAINING: CPT

## 2024-08-14 NOTE — THERAPY RE-EVALUATION
Physical Therapy Lymphedema Re-Evaluation  [facility]     Patient Name: Palma Roblero  : 1965  MRN: [id number]  Today's Date: 2024      Visit Date: 2024    Visit Dx:    ICD-10-CM ICD-9-CM   1. History of bilateral mastectomy  Z90.13 V45.71   2. Malignant neoplasm of lower-outer quadrant of right breast of female, estrogen receptor positive  C50.511 174.5    Z17.0 V86.0   3. At risk for lymphedema  Z91.89 V49.89   4. Lymphedema of right upper extremity  I89.0 457.1       Patient Active Problem List   Diagnosis    Malignant neoplasm of right female breast    Hyperlipidemia    Sebaceous cyst    Colon cancer screening    History of breast cancer    History of colon polyps    Malignant neoplasm of overlapping sites of right breast in female, estrogen receptor positive    Breast cancer    Encounter for adjustment or management of vascular access device    History of bilateral mastectomy    Lymphedema due to malignant neoplasm    Screening for malignant neoplasm of colon    History of adenomatous polyp of colon        Past Medical History:   Diagnosis Date    Allergic     Breast cancer     Right infiltrating ductal carcinoma, grade I, T2N1, ER/FL positive, 90% HER-2/julián negative    Cervical polyp     History of benign cervical polyp    Colon polyps     Drug therapy     Hx of radiation therapy     Hypercholesteremia     Hyperthyroidism     NO LONGER PROBLEM    PONV (postoperative nausea and vomiting)     Radiation     Recurrent breast cancer 2023    right--chemo and s/p bilat. mastectomy    Sebaceous cyst     BACK        Past Surgical History:   Procedure Laterality Date    BREAST BIOPSY Right 2006    stereotactic biopsy    BREAST LUMPECTOMY Right 2006    Right axillary sentinel node biopsy (positive), right axillay dissection, right needle lumpectomy-Dr. Quyen iVdal    BREAST RECONSTRUCTION Bilateral 2023    Procedure: BILATERAL PREPECTORAL PLACEMENT  TISSUE  EXPANDER AND ALLODERM, ADJACENT LEFT BREAST TISSUE REARRANGEMENT;  Surgeon: Favian Brooke MD;  Location: [facility] MAIN OR;  Service: Plastics;  Laterality: Bilateral;    BREAST TISSUE EXPANDER REMOVAL INSERTION OF IMPLANT Bilateral 3/1/2024    Procedure: BILATERAL REMOVAL TISSUE EXPANDERS AND PLACEMENT OF IMPLANTS AND LEFT ADJACENT TISSUE REARRANGEMENT;  Surgeon: Favian Brooke MD;  Location: [facility] MAIN OR;  Service: Plastics;  Laterality: Bilateral;    CERVICAL CONE BIOPSY  2005    [facility]    COLONOSCOPY N/A 04/07/2014    3 mm cecal polyp, 6 mm ascending colon polyp, 8 mm transvese colon polyp-Dr. Quyen Vidal    COLONOSCOPY N/A 03/16/2009    Tortuous colon-Dr. Quyen Vidal    COLONOSCOPY N/A 06/11/2018    Procedure: COLONOSCOPY TO CECUM TO TERMINAL ILEUM WITH HOT SNARE POLYPECTOMY;  Surgeon: Quyen Vidal MD;  Location: [facility] ENDOSCOPY;  Service: Gastroenterology    COLPOSCOPY      DILATATION AND CURETTAGE  03/23/2005    EXCISION MASS TRUNK N/A 09/05/2017    Procedure: EXCISION OF SEBACEOUS CYST FROM BACK ;  Surgeon: Rosendo Crum MD;  Location: [facility] MAIN OR;  Service:     MASTECTOMY W/ SENTINEL NODE BIOPSY Bilateral 8/16/2023    Procedure: BREAST MASTECTOMY BILATERAL;  Surgeon: Quyen Vidal MD;  Location: [facility] OR;  Service: General;  Laterality: Bilateral;    SHOULDER ROTATOR CUFF REPAIR Right 07/2022    VENOUS ACCESS DEVICE (PORT) INSERTION Left 10/06/2006    Left subclavian Vlreem-w-Moui placement-Dr. Quyen Vidal    VENOUS ACCESS DEVICE (PORT) INSERTION N/A 8/16/2023    Procedure: with port placement using fluoroscopy and ultrasound;  Surgeon: Quyen Vidal MD;  Location: [facility] MAIN OR;  Service: General;  Laterality: N/A;    VENOUS ACCESS DEVICE (PORT) REMOVAL Left 05/29/2007    Left subclavian Cbgmau-g-Xwpx removal-Dr. Quyen Vidal       Visit Dx:    ICD-10-CM ICD-9-CM   1. History of bilateral mastectomy  Z90.13 V45.71   2. Malignant neoplasm of lower-outer  quadrant of right breast of female, estrogen receptor positive  C50.511 174.5    Z17.0 V86.0   3. At risk for lymphedema  Z91.89 V49.89   4. Lymphedema of right upper extremity  I89.0 457.1            Lymphedema       Row Name 08/14/24 0900             Subjective Pain    Able to rate subjective pain? yes  -LB      Pre-Treatment Pain Level 0  -LB         Subjective    Subjective Comments I am doing well. I have been working on my daughter's house.  -LB         Lymphedema Assessment    Lymphedema Classification RUE:;at risk/stage 0  -LB      Lymphedema Cancer Related Sx right;lumpectomy;axillary dissection  -LB      Lymph Nodes Removed # 16  -LB      Positive Lymph Nodes # 2  -LB      Chemo Received yes  -LB      Radiation Therapy Received yes  -LB      Infections or Cellulitis? no  -LB         Posture/Observations    Posture/Observations Comments WNL  -LB         RUE Quick Girth (cm)    Axilla 36.2 cm  -LB      Mid upper arm 33.9 cm  -LB      Elbow 27.5 cm  -LB      Mid forearm 25.8 cm  -LB      Wrist crease 17 cm  -LB      Web space 20.7 cm  -LB      Met-heads 19.2 cm  -LB      RUE Quick Girth Total 180.3  -LB         Compression/Skin Care    Compression/Skin Care Comments reviewed glove considerations for re-order in August  -LB                User Key  (r) = Recorded By, (t) = Taken By, (c) = Cosigned By      Initials Name Provider Type    LB Nina Olea, PT Physical Therapist                                    Therapy Education  Education Details: reviewed s/s of lymphedema, steps to prevention,compression glove considerations  Given: Symptoms/condition management, Edema management  Program: Reinforced  How Provided: Verbal  Provided to: Patient  Level of Understanding: Verbalized  44946 - PT Self Care/Mgmt Minutes: 15       OP Exercises       Row Name 08/14/24 0900             Subjective    Subjective Comments I am doing well. I have been working on my daughter's house.  -LB         Subjective Pain    Able to  rate subjective pain? yes  -LB      Pre-Treatment Pain Level 0  -LB                User Key  (r) = Recorded By, (t) = Taken By, (c) = Cosigned By      Initials Name Provider Type    Nina Resendez, PT Physical Therapist                                 PT OP Goals       Row Name 08/14/24 0900          Long Term Goals    LTG Date to Achieve 10/03/23  -LB     LTG 1 Pt will maintain LDex bioimpedance score WNL.  -LB     LTG 1 Progress Ongoing  -LB     LTG 2 Pt will demonstrate full AROM BUE post-operatively once cleared for mobility/ROM.  -LB     LTG 2 Progress Met  -LB     LTG 3 Pt will verbalize s/s of lymphedema and steps to prevention.  -LB     LTG 3 Progress Met  -LB               User Key  (r) = Recorded By, (t) = Taken By, (c) = Cosigned By      Initials Name Provider Type    Nina Resendez, PT Physical Therapist                     PT Assessment/Plan       Row Name 08/14/24 0950          PT Assessment    Functional Limitations Other (comment)  RUE lymphedema  -LB     Impairments Impaired lymphatic circulation;Impaired flexibility;Edema;Muscle strength;Sensation  -LB     Assessment Comments Pt continues to demonstrate RUE lymphedema in R hand. However, symptoms are stable and controlled well with daily use of RUE compression glove 20-30 mmHg that was custom ordered in January. Glove is too large at wrist and does not provide adequate compression. We discussed possible options for modification to address inc elasticity at wrist and improve fit. Pt is compliant with MLD and daily compression wear. She has met 2/3 LTGs. She will benefit from continued skilled PT services to reassess RUE lymphedema in 3 months and will plan to re-ordering compression glove with inc forearm coverage.  -LB     Rehab Potential Good  -LB     Patient/caregiver participated in establishment of treatment plan and goals Yes  -LB     Patient would benefit from skilled therapy intervention Yes  -LB        PT Plan    PT Frequency Other  (comment)  -LB     Predicted Duration of Therapy Intervention (PT) 3 month reassessment  -LB     Planned CPT's? PT EVAL LOW COMPLEXITY: 16378;PT RE-EVAL: 63283;PT THER PROC EA 15 MIN: 75494;PT THER ACT EA 15 MIN: 40202;PT MANUAL THERAPY EA 15 MIN: 31111;PT NEUROMUSC RE-EDUCATION EA 15 MIN: 12378;PT SELF CARE/HOME MGMT/TRAIN EA 15: 67852;PT BIS XTRACELL FLUID ANALYSIS: 70262  -LB     PT Plan Comments reassess in 3 months; reorder custom glove with inc length  -LB               User Key  (r) = Recorded By, (t) = Taken By, (c) = Cosigned By      Initials Name Provider Type    LB Nina Olea, PT Physical Therapist                                 Time Calculation:   Start Time: 0915  Stop Time: 0935  Time Calculation (min): 20 min  Total Timed Code Minutes- PT: 15 minute(s)  Timed Charges  37850 - PT Self Care/Mgmt Minutes: 15  Total Minutes  Timed Charges Total Minutes: 15   Total Minutes: 15   Therapy Charges for Today       Code Description Service Date Service Provider Modifiers Qty    [id number]  PT SELF CARE/MGMT/TRAIN EA 15 MIN 8/14/2024 Nina Olea, PT GP 1                      Nina Olea PT  8/14/2024

## 2024-08-21 NOTE — PROGRESS NOTES
Date of Office Visit: 2024  Encounter Provider: ZAN Mccoy  Place of Service: [facility] CARDIOLOGY  Patient Name: Palma Roblero  :1965    Chief complaint  Cardio-oncology care    History of Present Illness  Patient is a 59 y.o. year old female  patient of Dr. Reynolds. Past medical history includes  hyperlipidemia hypothyroidism with history of right-sided breast cancer treated since  with lumpectomy chemoradiation (received Taxol AC therapy) and 5 years of tamoxifen and then aromatase inhibitor therapy.  On 2023 she was found to have recurrent breast cancer right breast.  She underwent bilateral mastectomy 2023.  She initiated adjuvant chemotherapy with Taxol and Herceptin first dose 9/15/2023 with plan for Taxol Herceptin weekly for 12 weeks Herceptin every 3 weeks for a year.  Taxol dose was reduced due to neutropenia.  Plans are to also add hormonal therapy after completion of Taxol.  She continues with Herceptin at this time.     Interval history  Patient presents today for routine follow-up.  I will visit with her today and have reviewed her medical record.  Since last visit she has overall been doing well.  She continues on Herceptin therapy to continue until 2024.  Echo today with results pending.  She denies palpitations, shortness of breath, edema, dizziness, chest pain or chest pressure, fatigue, syncope or presyncope.  Blood pressure today is well-controlled and she reports similar readings at home and other providers.  She has noticed joint and muscle pain on Aromasin.  She does not currently take co-Q10.    Past Medical History:   Diagnosis Date    Allergic     Breast cancer     Right infiltrating ductal carcinoma, grade I, T2N1, ER/ND positive, 90% HER-2/julián negative    Cervical polyp     History of benign cervical polyp    Colon polyps     Drug therapy     Hx of radiation therapy     Hypercholesteremia     Hyperthyroidism      NO LONGER PROBLEM    PONV (postoperative nausea and vomiting)     Radiation 2007    Recurrent breast cancer 06/23/2023    right--chemo and s/p bilat. mastectomy    Sebaceous cyst     BACK     Past Surgical History:   Procedure Laterality Date    BREAST BIOPSY Right 08/03/2006    stereotactic biopsy    BREAST LUMPECTOMY Right 08/18/2006    Right axillary sentinel node biopsy (positive), right axillay dissection, right needle lumpectomy-Dr. Quyen Vidal    BREAST RECONSTRUCTION Bilateral 8/16/2023    Procedure: BILATERAL PREPECTORAL PLACEMENT  TISSUE EXPANDER AND ALLODERM, ADJACENT LEFT BREAST TISSUE REARRANGEMENT;  Surgeon: Favian Brooke MD;  Location: [facility] MAIN OR;  Service: Plastics;  Laterality: Bilateral;    BREAST TISSUE EXPANDER REMOVAL INSERTION OF IMPLANT Bilateral 3/1/2024    Procedure: BILATERAL REMOVAL TISSUE EXPANDERS AND PLACEMENT OF IMPLANTS AND LEFT ADJACENT TISSUE REARRANGEMENT;  Surgeon: Favian Brooke MD;  Location: [facility] OR;  Service: Plastics;  Laterality: Bilateral;    CERVICAL CONE BIOPSY  2005    [facility]    COLONOSCOPY N/A 04/07/2014    3 mm cecal polyp, 6 mm ascending colon polyp, 8 mm transvese colon polyp-Dr. Quyen Vidal    COLONOSCOPY N/A 03/16/2009    Tortuous colon-Dr. Quyen Vidal    COLONOSCOPY N/A 06/11/2018    Procedure: COLONOSCOPY TO CECUM TO TERMINAL ILEUM WITH HOT SNARE POLYPECTOMY;  Surgeon: Quyen Vidal MD;  Location: [facility] ENDOSCOPY;  Service: Gastroenterology    COLPOSCOPY      DILATATION AND CURETTAGE  03/23/2005    EXCISION MASS TRUNK N/A 09/05/2017    Procedure: EXCISION OF SEBACEOUS CYST FROM BACK ;  Surgeon: Rosendo Crum MD;  Location: [facility] MAIN OR;  Service:     MASTECTOMY W/ SENTINEL NODE BIOPSY Bilateral 8/16/2023    Procedure: BREAST MASTECTOMY BILATERAL;  Surgeon: Quyen Vidal MD;  Location: [facility] MAIN OR;  Service: General;  Laterality: Bilateral;    SHOULDER ROTATOR CUFF REPAIR Right 07/2022    VENOUS ACCESS DEVICE  (PORT) INSERTION Left 10/06/2006    Left subclavian Asjxjv-g-Gkdt placement-Dr. Quyen Vidal    VENOUS ACCESS DEVICE (PORT) INSERTION N/A 8/16/2023    Procedure: with port placement using fluoroscopy and ultrasound;  Surgeon: Quyen Vidal MD;  Location: [facility] MAIN OR;  Service: General;  Laterality: N/A;    VENOUS ACCESS DEVICE (PORT) REMOVAL Left 05/29/2007    Left subclavian Ujfxbe-j-Vffd removal-Dr. Quyen Vidal     Outpatient Medications Prior to Visit   Medication Sig Dispense Refill    acetaminophen (TYLENOL) 325 MG tablet Take 2 tablets by mouth Every 4 (Four) Hours As Needed for Mild Pain. 60 tablet 0    atorvastatin (LIPITOR) 10 MG tablet Take 1 tablet by mouth Daily. 90 tablet 3    docusate sodium (COLACE) 250 MG capsule Take 1 capsule by mouth 2 (Two) Times a Day As Needed for Constipation. 60 capsule 1    guaiFENesin (Mucinex) 600 MG 12 hr tablet Take 2 tablets by mouth 2 (Two) Times a Day. (Patient taking differently: Take 2 tablets by mouth Daily As Needed.) 14 tablet 0    sennosides-docusate (PERICOLACE) 8.6-50 MG per tablet Take 2 tablets by mouth Daily As Needed for Constipation. 30 tablet 1    Triamcinolone Acetonide (NASACORT) 55 MCG/ACT nasal inhaler 2 sprays into the nostril(s) as directed by provider Daily As Needed (nasal congestion). 16.5 g 5    exemestane (AROMASIN) 25 MG tablet Take 1 tablet by mouth Daily.       No facility-administered medications prior to visit.       Allergies as of 08/22/2024    (No Known Allergies)     Social History     Socioeconomic History    Marital status:      Spouse name: Philip   Tobacco Use    Smoking status: Never    Smokeless tobacco: Never    Tobacco comments:          No Caffeine    Vaping Use    Vaping status: Never Used   Substance and Sexual Activity    Alcohol use: No    Drug use: No    Sexual activity: Not Currently     Partners: Male     Birth control/protection: None     Family History   Problem Relation Age of Onset    Alzheimer's  "disease Mother     No Known Problems Father     Hyperlipidemia Sister     Hypertension Brother     No Known Problems Maternal Aunt     No Known Problems Paternal Aunt     Heart attack Paternal Uncle     Heart disease Paternal Uncle     Heart disease Paternal Uncle     Heart attack Paternal Uncle     No Known Problems Maternal Grandmother     No Known Problems Paternal Grandmother     Miscarriages / Stillbirths Daughter     ADD / ADHD Son     BRCA 1/2 Neg Hx     Breast cancer Neg Hx     Colon cancer Neg Hx     Endometrial cancer Neg Hx     Ovarian cancer Neg Hx     Malig Hyperthermia Neg Hx      Review of Systems   Constitutional: Negative for malaise/fatigue.   Cardiovascular:  Negative for chest pain, claudication, dyspnea on exertion, leg swelling, near-syncope, orthopnea, palpitations, paroxysmal nocturnal dyspnea and syncope.   Respiratory:  Negative for shortness of breath.    Neurological:  Negative for brief paralysis, dizziness, headaches and light-headedness.   All other systems reviewed and are negative.       Objective:     Vitals:    08/22/24 0910   BP: 115/82   BP Location: Right arm   Patient Position: Sitting   Cuff Size: Adult   Pulse: 62   SpO2: 97%   Weight: 101 kg (222 lb 12.8 oz)   Height: 162.6 cm (64\")     Body mass index is 38.24 kg/m².    Vitals reviewed.   Constitutional:       General: Not in acute distress.     Appearance: Well-developed and not in distress. Not diaphoretic.   HENT:      Head: Normocephalic.   Pulmonary:      Effort: Pulmonary effort is normal. No respiratory distress.      Breath sounds: Normal breath sounds. No wheezing. No rhonchi. No rales.   Cardiovascular:      Normal rate. Regular rhythm.      Murmurs: There is no murmur.   Pulses:     Radial: 2+ bilaterally.  Edema:     Peripheral edema absent.   Skin:     General: Skin is warm and dry. There is no cyanosis.      Findings: No rash.   Neurological:      Mental Status: Alert and oriented to person, place, and time. "   Psychiatric:         Behavior: Behavior normal. Behavior is cooperative.         Thought Content: Thought content normal.         Judgment: Judgment normal.       Lab Review:     Lab Results   Component Value Date     05/10/2024     02/09/2024    K 3.9 05/10/2024    K 4.0 02/09/2024     05/10/2024     02/09/2024    CO2 24.5 05/10/2024    CO2 26.0 02/09/2024    BUN 18 05/10/2024    BUN 16 02/09/2024    CREATININE 0.89 05/10/2024    CREATININE 0.93 02/09/2024    EGFRIFNONA 82 11/01/2021    EGFRIFNONA 73 03/24/2021    EGFRIFAFRI 95 01/13/2022    EGFRIFAFRI 94 11/01/2021    GLUCOSE 94 05/10/2024    GLUCOSE 92 02/09/2024    CALCIUM 9.1 05/10/2024    CALCIUM 9.1 02/09/2024    PROTENTOTREF 6.6 11/30/2022    PROTENTOTREF 7.4 05/24/2022    ALBUMIN 3.9 05/10/2024    ALBUMIN 4.1 02/09/2024    BILITOT 1.0 05/10/2024    BILITOT 0.5 02/09/2024    AST 20 05/10/2024    AST 26 02/09/2024    ALT 18 05/10/2024    ALT 26 02/09/2024     Lab Results   Component Value Date    WBC 6.13 08/02/2024    WBC 5.21 07/12/2024    HGB 13.3 08/02/2024    HGB 12.2 07/12/2024    HCT 41.1 08/02/2024    HCT 38.5 07/12/2024    MCV 82.2 08/02/2024    MCV 81.7 07/12/2024     08/02/2024     07/12/2024     Lab Results   Component Value Date    PROBNP 21.9 07/03/2023     Lab Results   Component Value Date    TROPONINT 7 07/03/2023     Lab Results   Component Value Date    TSH 1.360 11/30/2022    TSH 1.940 11/01/2021      Lipid Panel          11/16/2023    13:32 5/10/2024    11:11   Lipid Panel   Total Cholesterol  144    Total Cholesterol 179     Triglycerides 77  65    HDL Cholesterol 45  45    VLDL Cholesterol 14  13    LDL Cholesterol  120  86    LDL/HDL Ratio  1.91           ECG 12 Lead    Date/Time: 8/22/2024 10:32 AM  Performed by: Griselda Portillo APRN    Authorized by: Griselda Portillo APRN  Comparison: compared with previous ECG   Similar to previous ECG  Rhythm: sinus rhythm  Rate: normal  BPM: 62  QRS  axis: normal  Comments: Similar to prior        Assessment:       Diagnosis Plan   1. Encounter for monitoring cardiotoxic drug therapy  Adult Transthoracic Echo Complete w/ Color, Spectral and Contrast if Necessary Per Protocol      2. Dyslipidemia  Adult Transthoracic Echo Complete w/ Color, Spectral and Contrast if Necessary Per Protocol        Plan:       1.  Cardio oncology care.  She has recurrent right-sided breast cancer and has a history of lumpectomy with TAC therapy as well as right-sided radiation therapy in 2007.  S/P bilateral mastectomy and completed Taxol therapy. To complete Herceptin therapy on 9/13/24.  No evidence of cardiotoxicity so far.  Echocardiogram today with results pending.  Will continue with current regimen follow-up in 3 months with repeat echo for end of treatment. Will then continue with post Herceptin surveillance echoes Q6 months for 2 years.  2.  Recurrent right-sided breast cancer  3.  Hyperlipidemia.  I have ordered a lipid panel to be checked with next blood draw at oncology.  She will also increase her exercise regimen.  4.  Probable sleep apnea.  She has a STOP-BANG score 4.  She snores at night has daytime somnolence and no family history of sleep apnea.  I reviewed with her sleep apnea can cause cardiomyopathy on its own as well as other cardiovascular events.  She declines further evaluation for sleep apnea at this time.      Time Spent: I spent 30 minutes caring for Palma on this date of service. This time includes time spent by me in the following activities: preparing for the visit, reviewing tests, performing a medically appropriate examination and/or evaluations, counseling and educating the patient/family/caregiver, ordering medications, tests, or procedures, documenting information in the medical record, and independently interpreting results and communicating that information with the patient/family/caregiver.   I spent 1 minutes on the separately reported service  of ECG. This time is not included in the time used to support the E/M service also reported today.        Your medication list            Accurate as of August 22, 2024 10:32 AM. If you have any questions, ask your nurse or doctor.                CHANGE how you take these medications        Instructions Last Dose Given Next Dose Due   guaiFENesin 600 MG 12 hr tablet  Commonly known as: Mucinex  What changed:   when to take this  reasons to take this      Take 2 tablets by mouth 2 (Two) Times a Day.              CONTINUE taking these medications        Instructions Last Dose Given Next Dose Due   acetaminophen 325 MG tablet  Commonly known as: TYLENOL      Take 2 tablets by mouth Every 4 (Four) Hours As Needed for Mild Pain.       atorvastatin 10 MG tablet  Commonly known as: LIPITOR      Take 1 tablet by mouth Daily.       docusate sodium 250 MG capsule  Commonly known as: COLACE      Take 1 capsule by mouth 2 (Two) Times a Day As Needed for Constipation.       exemestane 25 MG tablet  Commonly known as: AROMASIN      Take 1 tablet by mouth Daily.       sennosides-docusate 8.6-50 MG per tablet  Commonly known as: PERICOLACE      Take 2 tablets by mouth Daily As Needed for Constipation.       Triamcinolone Acetonide 55 MCG/ACT nasal inhaler  Commonly known as: NASACORT      2 sprays into the nostril(s) as directed by provider Daily As Needed (nasal congestion).                Patient is no longer taking -.  I corrected the med list to reflect this.  I did not stop these medications.    Return in about 3 months (around 11/22/2024) for with Dr. Reynolds with echo (cardio-onc).      Dictated utilizing Dragon dictation

## 2024-08-22 ENCOUNTER — HOSPITAL ENCOUNTER (OUTPATIENT)
Dept: CARDIOLOGY | Facility: HOSPITAL | Age: 59
Discharge: HOME | End: 2024-08-22
Admitting: INTERNAL MEDICINE
Payer: OTHER GOVERNMENT

## 2024-08-22 ENCOUNTER — OFFICE VISIT (OUTPATIENT)
Dept: CARDIOLOGY | Facility: CLINIC | Age: 59
End: 2024-08-22
Payer: COMMERCIAL

## 2024-08-22 VITALS
WEIGHT: 226 LBS | DIASTOLIC BLOOD PRESSURE: 76 MMHG | HEIGHT: 64 IN | BODY MASS INDEX: 38.58 KG/M2 | HEART RATE: 63 BPM | OXYGEN SATURATION: 99 % | SYSTOLIC BLOOD PRESSURE: 124 MMHG

## 2024-08-22 VITALS
SYSTOLIC BLOOD PRESSURE: 115 MMHG | HEART RATE: 62 BPM | HEIGHT: 64 IN | DIASTOLIC BLOOD PRESSURE: 82 MMHG | WEIGHT: 222.8 LBS | OXYGEN SATURATION: 97 % | BODY MASS INDEX: 38.04 KG/M2

## 2024-08-22 DIAGNOSIS — E78.5 DYSLIPIDEMIA: ICD-10-CM

## 2024-08-22 DIAGNOSIS — Z79.899 ENCOUNTER FOR MONITORING CARDIOTOXIC DRUG THERAPY: ICD-10-CM

## 2024-08-22 DIAGNOSIS — Z51.81 ENCOUNTER FOR MONITORING CARDIOTOXIC DRUG THERAPY: ICD-10-CM

## 2024-08-22 DIAGNOSIS — Z79.899 ENCOUNTER FOR MONITORING CARDIOTOXIC DRUG THERAPY: Primary | ICD-10-CM

## 2024-08-22 DIAGNOSIS — Z51.81 ENCOUNTER FOR MONITORING CARDIOTOXIC DRUG THERAPY: Primary | ICD-10-CM

## 2024-08-22 PROCEDURE — 93308 TTE F-UP OR LMTD: CPT

## 2024-08-22 PROCEDURE — 93325 DOPPLER ECHO COLOR FLOW MAPG: CPT

## 2024-08-22 PROCEDURE — 93356 MYOCRD STRAIN IMG SPCKL TRCK: CPT

## 2024-08-22 PROCEDURE — 25510000001 PERFLUTREN 6.52 MG/ML SUSPENSION 2 ML VIAL: Performed by: INTERNAL MEDICINE

## 2024-08-22 PROCEDURE — 93321 DOPPLER ECHO F-UP/LMTD STD: CPT

## 2024-08-22 RX ADMIN — PERFLUTREN 2 ML: 6.52 INJECTION, SUSPENSION INTRAVENOUS at 09:18

## 2024-08-23 ENCOUNTER — TELEPHONE (OUTPATIENT)
Dept: CARDIOLOGY | Facility: CLINIC | Age: 59
End: 2024-08-23
Payer: COMMERCIAL

## 2024-08-23 ENCOUNTER — INFUSION (OUTPATIENT)
Dept: ONCOLOGY | Facility: HOSPITAL | Age: 59
End: 2024-08-23
Payer: COMMERCIAL

## 2024-08-23 VITALS
OXYGEN SATURATION: 96 % | HEART RATE: 96 BPM | WEIGHT: 224.2 LBS | RESPIRATION RATE: 16 BRPM | SYSTOLIC BLOOD PRESSURE: 132 MMHG | TEMPERATURE: 98 F | DIASTOLIC BLOOD PRESSURE: 79 MMHG | BODY MASS INDEX: 38.48 KG/M2

## 2024-08-23 DIAGNOSIS — Z45.2 ENCOUNTER FOR ADJUSTMENT OR MANAGEMENT OF VASCULAR ACCESS DEVICE: ICD-10-CM

## 2024-08-23 DIAGNOSIS — Z90.13 HISTORY OF BILATERAL MASTECTOMY: ICD-10-CM

## 2024-08-23 DIAGNOSIS — C50.811 MALIGNANT NEOPLASM OF OVERLAPPING SITES OF RIGHT BREAST IN FEMALE, ESTROGEN RECEPTOR POSITIVE: Primary | ICD-10-CM

## 2024-08-23 DIAGNOSIS — Z17.0 MALIGNANT NEOPLASM OF OVERLAPPING SITES OF RIGHT BREAST IN FEMALE, ESTROGEN RECEPTOR POSITIVE: Primary | ICD-10-CM

## 2024-08-23 LAB
BASOPHILS # BLD AUTO: 0.02 10*3/MM3 (ref 0–0.2)
BASOPHILS NFR BLD AUTO: 0.4 % (ref 0–1.5)
BH CV ECHO LEFT VENTRICLE GLOBAL LONGITUDINAL STRAIN: -22.5 %
BH CV ECHO MEAS - EDV(CUBED): 54 ML
BH CV ECHO MEAS - EDV(MOD-SP2): 94 ML
BH CV ECHO MEAS - EDV(MOD-SP4): 81 ML
BH CV ECHO MEAS - EF(MOD-BP): 62.3 %
BH CV ECHO MEAS - EF(MOD-SP2): 68.1 %
BH CV ECHO MEAS - EF(MOD-SP4): 59.3 %
BH CV ECHO MEAS - ESV(CUBED): 18.1 ML
BH CV ECHO MEAS - ESV(MOD-SP2): 30 ML
BH CV ECHO MEAS - ESV(MOD-SP4): 33 ML
BH CV ECHO MEAS - FS: 30.5 %
BH CV ECHO MEAS - IVS/LVPW: 1.06 CM
BH CV ECHO MEAS - IVSD: 1 CM
BH CV ECHO MEAS - LAT PEAK E' VEL: 10.9 CM/SEC
BH CV ECHO MEAS - LV DIASTOLIC VOL/BSA (35-75): 39.3 CM2
BH CV ECHO MEAS - LV MASS(C)D: 111.5 GRAMS
BH CV ECHO MEAS - LV SYSTOLIC VOL/BSA (12-30): 16 CM2
BH CV ECHO MEAS - LVIDD: 3.8 CM
BH CV ECHO MEAS - LVIDS: 2.6 CM
BH CV ECHO MEAS - LVPWD: 0.94 CM
BH CV ECHO MEAS - MED PEAK E' VEL: 7.8 CM/SEC
BH CV ECHO MEAS - MR MAX PG: 54.1 MMHG
BH CV ECHO MEAS - MR MAX VEL: 367.8 CM/SEC
BH CV ECHO MEAS - MV A DUR: 0.11 SEC
BH CV ECHO MEAS - MV A MAX VEL: 65.6 CM/SEC
BH CV ECHO MEAS - MV DEC SLOPE: 341.1 CM/SEC2
BH CV ECHO MEAS - MV DEC TIME: 0.23 SEC
BH CV ECHO MEAS - MV E MAX VEL: 81.4 CM/SEC
BH CV ECHO MEAS - MV E/A: 1.24
BH CV ECHO MEAS - MV MAX PG: 4.3 MMHG
BH CV ECHO MEAS - MV MEAN PG: 1.75 MMHG
BH CV ECHO MEAS - MV P1/2T: 83.9 MSEC
BH CV ECHO MEAS - MV V2 VTI: 31.4 CM
BH CV ECHO MEAS - MVA(P1/2T): 2.6 CM2
BH CV ECHO MEAS - PULM A REVS DUR: 0.1 SEC
BH CV ECHO MEAS - PULM A REVS VEL: 27.4 CM/SEC
BH CV ECHO MEAS - PULM DIAS VEL: 36.4 CM/SEC
BH CV ECHO MEAS - PULM S/D: 1.48
BH CV ECHO MEAS - PULM SYS VEL: 54 CM/SEC
BH CV ECHO MEAS - SV(MOD-SP2): 64 ML
BH CV ECHO MEAS - SV(MOD-SP4): 48 ML
BH CV ECHO MEAS - SVI(MOD-SP2): 31.1 ML/M2
BH CV ECHO MEAS - SVI(MOD-SP4): 23.3 ML/M2
BH CV ECHO MEASUREMENTS AVERAGE E/E' RATIO: 8.71
DEPRECATED RDW RBC AUTO: 41.6 FL (ref 37–54)
EOSINOPHIL # BLD AUTO: 0.11 10*3/MM3 (ref 0–0.4)
EOSINOPHIL NFR BLD AUTO: 2 % (ref 0.3–6.2)
ERYTHROCYTE [DISTWIDTH] IN BLOOD BY AUTOMATED COUNT: 13.9 % (ref 12.3–15.4)
HCT VFR BLD AUTO: 41.2 % (ref 34–46.6)
HGB BLD-MCNC: 13.2 G/DL (ref 12–15.9)
IMM GRANULOCYTES # BLD AUTO: 0.02 10*3/MM3 (ref 0–0.05)
IMM GRANULOCYTES NFR BLD AUTO: 0.4 % (ref 0–0.5)
LEFT ATRIUM VOLUME INDEX: 15.6 ML/M2
LYMPHOCYTES # BLD AUTO: 1.69 10*3/MM3 (ref 0.7–3.1)
LYMPHOCYTES NFR BLD AUTO: 31 % (ref 19.6–45.3)
MCH RBC QN AUTO: 26.5 PG (ref 26.6–33)
MCHC RBC AUTO-ENTMCNC: 32 G/DL (ref 31.5–35.7)
MCV RBC AUTO: 82.6 FL (ref 79–97)
MONOCYTES # BLD AUTO: 0.38 10*3/MM3 (ref 0.1–0.9)
MONOCYTES NFR BLD AUTO: 7 % (ref 5–12)
NEUTROPHILS NFR BLD AUTO: 3.24 10*3/MM3 (ref 1.7–7)
NEUTROPHILS NFR BLD AUTO: 59.2 % (ref 42.7–76)
NRBC BLD AUTO-RTO: 0 /100 WBC (ref 0–0.2)
PLATELET # BLD AUTO: 192 10*3/MM3 (ref 140–450)
PMV BLD AUTO: 12.2 FL (ref 6–12)
RBC # BLD AUTO: 4.99 10*6/MM3 (ref 3.77–5.28)
WBC NRBC COR # BLD AUTO: 5.46 10*3/MM3 (ref 3.4–10.8)

## 2024-08-23 PROCEDURE — 25010000002 HEPARIN LOCK FLUSH PER 10 UNITS: Performed by: INTERNAL MEDICINE

## 2024-08-23 PROCEDURE — 25810000003 SODIUM CHLORIDE 0.9 % SOLUTION 250 ML FLEX CONT: Performed by: NURSE PRACTITIONER

## 2024-08-23 PROCEDURE — 25010000002 TRASTUZUMAB-QYYP 420 MG RECONSTITUTED SOLUTION 1 EACH VIAL: Performed by: NURSE PRACTITIONER

## 2024-08-23 PROCEDURE — 96413 CHEMO IV INFUSION 1 HR: CPT

## 2024-08-23 PROCEDURE — 85025 COMPLETE CBC W/AUTO DIFF WBC: CPT

## 2024-08-23 PROCEDURE — 25810000003 SODIUM CHLORIDE 0.9 % SOLUTION: Performed by: NURSE PRACTITIONER

## 2024-08-23 RX ORDER — HEPARIN SODIUM (PORCINE) LOCK FLUSH IV SOLN 100 UNIT/ML 100 UNIT/ML
500 SOLUTION INTRAVENOUS AS NEEDED
OUTPATIENT
Start: 2024-08-23

## 2024-08-23 RX ORDER — SODIUM CHLORIDE 9 MG/ML
250 INJECTION, SOLUTION INTRAVENOUS ONCE
Status: CANCELLED | OUTPATIENT
Start: 2024-08-23

## 2024-08-23 RX ORDER — HEPARIN SODIUM (PORCINE) LOCK FLUSH IV SOLN 100 UNIT/ML 100 UNIT/ML
500 SOLUTION INTRAVENOUS AS NEEDED
Status: DISCONTINUED | OUTPATIENT
Start: 2024-08-23 | End: 2024-08-23 | Stop reason: HOSPADM

## 2024-08-23 RX ORDER — SODIUM CHLORIDE 0.9 % (FLUSH) 0.9 %
10 SYRINGE (ML) INJECTION AS NEEDED
Status: DISCONTINUED | OUTPATIENT
Start: 2024-08-23 | End: 2024-08-23 | Stop reason: HOSPADM

## 2024-08-23 RX ORDER — SODIUM CHLORIDE 0.9 % (FLUSH) 0.9 %
10 SYRINGE (ML) INJECTION AS NEEDED
OUTPATIENT
Start: 2024-08-23

## 2024-08-23 RX ORDER — SODIUM CHLORIDE 9 MG/ML
250 INJECTION, SOLUTION INTRAVENOUS ONCE
Status: COMPLETED | OUTPATIENT
Start: 2024-08-23 | End: 2024-08-23

## 2024-08-23 RX ADMIN — Medication 10 ML: at 16:29

## 2024-08-23 RX ADMIN — SODIUM CHLORIDE 250 ML: 9 INJECTION, SOLUTION INTRAVENOUS at 15:54

## 2024-08-23 RX ADMIN — SODIUM CHLORIDE 580 MG: 900 INJECTION, SOLUTION INTRAVENOUS at 15:54

## 2024-08-23 RX ADMIN — Medication 500 UNITS: at 16:29

## 2024-08-23 NOTE — TELEPHONE ENCOUNTER
Please let her know echo looks good.  Her heart is strong and functioning well.  Continue current plans for follow-up and echocardiogram in 3 months.

## 2024-08-23 NOTE — TELEPHONE ENCOUNTER
Reviewed results and recommendations with Palma Roblero.  Patient verbalized understanding of results and recommendations.    Thank you,  Katherine FRYE RN  Triage Nurse ERICK  08/23/24    08:26 EDT

## 2024-09-03 RX ORDER — EXEMESTANE 25 MG/1
TABLET ORAL DAILY
Qty: 90 TABLET | Refills: 3 | Status: SHIPPED | OUTPATIENT
Start: 2024-09-03

## 2024-09-13 ENCOUNTER — INFUSION (OUTPATIENT)
Dept: ONCOLOGY | Facility: HOSPITAL | Age: 59
End: 2024-09-13
Payer: COMMERCIAL

## 2024-09-13 VITALS
DIASTOLIC BLOOD PRESSURE: 82 MMHG | TEMPERATURE: 97.8 F | RESPIRATION RATE: 16 BRPM | WEIGHT: 224.2 LBS | OXYGEN SATURATION: 94 % | SYSTOLIC BLOOD PRESSURE: 145 MMHG | BODY MASS INDEX: 38.48 KG/M2 | HEART RATE: 87 BPM

## 2024-09-13 DIAGNOSIS — Z17.0 MALIGNANT NEOPLASM OF OVERLAPPING SITES OF RIGHT BREAST IN FEMALE, ESTROGEN RECEPTOR POSITIVE: Primary | ICD-10-CM

## 2024-09-13 DIAGNOSIS — Z45.2 ENCOUNTER FOR ADJUSTMENT OR MANAGEMENT OF VASCULAR ACCESS DEVICE: ICD-10-CM

## 2024-09-13 DIAGNOSIS — Z90.13 HISTORY OF BILATERAL MASTECTOMY: ICD-10-CM

## 2024-09-13 DIAGNOSIS — C50.811 MALIGNANT NEOPLASM OF OVERLAPPING SITES OF RIGHT BREAST IN FEMALE, ESTROGEN RECEPTOR POSITIVE: Primary | ICD-10-CM

## 2024-09-13 LAB
BASOPHILS # BLD AUTO: 0.03 10*3/MM3 (ref 0–0.2)
BASOPHILS NFR BLD AUTO: 0.4 % (ref 0–1.5)
DEPRECATED RDW RBC AUTO: 42.3 FL (ref 37–54)
EOSINOPHIL # BLD AUTO: 0.13 10*3/MM3 (ref 0–0.4)
EOSINOPHIL NFR BLD AUTO: 1.9 % (ref 0.3–6.2)
ERYTHROCYTE [DISTWIDTH] IN BLOOD BY AUTOMATED COUNT: 13.8 % (ref 12.3–15.4)
HCT VFR BLD AUTO: 39.7 % (ref 34–46.6)
HGB BLD-MCNC: 12.6 G/DL (ref 12–15.9)
IMM GRANULOCYTES # BLD AUTO: 0.02 10*3/MM3 (ref 0–0.05)
IMM GRANULOCYTES NFR BLD AUTO: 0.3 % (ref 0–0.5)
LYMPHOCYTES # BLD AUTO: 1.59 10*3/MM3 (ref 0.7–3.1)
LYMPHOCYTES NFR BLD AUTO: 23.6 % (ref 19.6–45.3)
MCH RBC QN AUTO: 26.5 PG (ref 26.6–33)
MCHC RBC AUTO-ENTMCNC: 31.7 G/DL (ref 31.5–35.7)
MCV RBC AUTO: 83.6 FL (ref 79–97)
MONOCYTES # BLD AUTO: 0.69 10*3/MM3 (ref 0.1–0.9)
MONOCYTES NFR BLD AUTO: 10.2 % (ref 5–12)
NEUTROPHILS NFR BLD AUTO: 4.28 10*3/MM3 (ref 1.7–7)
NEUTROPHILS NFR BLD AUTO: 63.6 % (ref 42.7–76)
NRBC BLD AUTO-RTO: 0 /100 WBC (ref 0–0.2)
PLATELET # BLD AUTO: 165 10*3/MM3 (ref 140–450)
PMV BLD AUTO: 11.5 FL (ref 6–12)
RBC # BLD AUTO: 4.75 10*6/MM3 (ref 3.77–5.28)
WBC NRBC COR # BLD AUTO: 6.74 10*3/MM3 (ref 3.4–10.8)

## 2024-09-13 PROCEDURE — 25810000003 SODIUM CHLORIDE 0.9 % SOLUTION 250 ML FLEX CONT: Performed by: NURSE PRACTITIONER

## 2024-09-13 PROCEDURE — 85025 COMPLETE CBC W/AUTO DIFF WBC: CPT

## 2024-09-13 PROCEDURE — 96413 CHEMO IV INFUSION 1 HR: CPT

## 2024-09-13 PROCEDURE — 25010000002 HEPARIN LOCK FLUSH PER 10 UNITS: Performed by: INTERNAL MEDICINE

## 2024-09-13 PROCEDURE — 25010000002 TRASTUZUMAB-QYYP 420 MG RECONSTITUTED SOLUTION 1 EACH VIAL: Performed by: NURSE PRACTITIONER

## 2024-09-13 PROCEDURE — 25810000003 SODIUM CHLORIDE 0.9 % SOLUTION: Performed by: NURSE PRACTITIONER

## 2024-09-13 RX ORDER — SODIUM CHLORIDE 9 MG/ML
250 INJECTION, SOLUTION INTRAVENOUS ONCE
Status: COMPLETED | OUTPATIENT
Start: 2024-09-13 | End: 2024-09-13

## 2024-09-13 RX ORDER — SODIUM CHLORIDE 0.9 % (FLUSH) 0.9 %
10 SYRINGE (ML) INJECTION AS NEEDED
Status: DISCONTINUED | OUTPATIENT
Start: 2024-09-13 | End: 2024-09-13 | Stop reason: HOSPADM

## 2024-09-13 RX ORDER — HEPARIN SODIUM (PORCINE) LOCK FLUSH IV SOLN 100 UNIT/ML 100 UNIT/ML
500 SOLUTION INTRAVENOUS AS NEEDED
Status: DISCONTINUED | OUTPATIENT
Start: 2024-09-13 | End: 2024-09-13 | Stop reason: HOSPADM

## 2024-09-13 RX ADMIN — Medication 500 UNITS: at 15:05

## 2024-09-13 RX ADMIN — SODIUM CHLORIDE 580 MG: 900 INJECTION, SOLUTION INTRAVENOUS at 14:29

## 2024-09-13 RX ADMIN — Medication 10 ML: at 15:05

## 2024-09-13 RX ADMIN — SODIUM CHLORIDE 250 ML: 9 INJECTION, SOLUTION INTRAVENOUS at 14:29

## 2024-11-15 ENCOUNTER — INFUSION (OUTPATIENT)
Dept: ONCOLOGY | Facility: HOSPITAL | Age: 59
End: 2024-11-15
Payer: COMMERCIAL

## 2024-11-15 ENCOUNTER — OFFICE VISIT (OUTPATIENT)
Dept: ONCOLOGY | Facility: CLINIC | Age: 59
End: 2024-11-15
Payer: COMMERCIAL

## 2024-11-15 VITALS
DIASTOLIC BLOOD PRESSURE: 82 MMHG | OXYGEN SATURATION: 98 % | WEIGHT: 222.2 LBS | HEART RATE: 85 BPM | HEIGHT: 64 IN | BODY MASS INDEX: 37.94 KG/M2 | RESPIRATION RATE: 16 BRPM | TEMPERATURE: 98.3 F | SYSTOLIC BLOOD PRESSURE: 132 MMHG

## 2024-11-15 DIAGNOSIS — Z17.0 MALIGNANT NEOPLASM OF OVERLAPPING SITES OF RIGHT BREAST IN FEMALE, ESTROGEN RECEPTOR POSITIVE: Primary | ICD-10-CM

## 2024-11-15 DIAGNOSIS — Z79.811 AROMATASE INHIBITOR USE: ICD-10-CM

## 2024-11-15 DIAGNOSIS — Z90.13 HISTORY OF BILATERAL MASTECTOMY: Primary | ICD-10-CM

## 2024-11-15 DIAGNOSIS — Z45.2 ENCOUNTER FOR ADJUSTMENT OR MANAGEMENT OF VASCULAR ACCESS DEVICE: ICD-10-CM

## 2024-11-15 DIAGNOSIS — C50.811 MALIGNANT NEOPLASM OF OVERLAPPING SITES OF RIGHT BREAST IN FEMALE, ESTROGEN RECEPTOR POSITIVE: Primary | ICD-10-CM

## 2024-11-15 PROCEDURE — 25010000002 HEPARIN LOCK FLUSH PER 10 UNITS

## 2024-11-15 PROCEDURE — 96523 IRRIG DRUG DELIVERY DEVICE: CPT

## 2024-11-15 RX ORDER — SODIUM CHLORIDE 0.9 % (FLUSH) 0.9 %
10 SYRINGE (ML) INJECTION AS NEEDED
Status: DISCONTINUED | OUTPATIENT
Start: 2024-11-15 | End: 2024-11-15 | Stop reason: HOSPADM

## 2024-11-15 RX ORDER — HEPARIN SODIUM (PORCINE) LOCK FLUSH IV SOLN 100 UNIT/ML 100 UNIT/ML
500 SOLUTION INTRAVENOUS AS NEEDED
Status: DISCONTINUED | OUTPATIENT
Start: 2024-11-15 | End: 2024-11-15 | Stop reason: HOSPADM

## 2024-11-15 RX ADMIN — Medication 500 UNITS: at 13:57

## 2024-11-15 RX ADMIN — Medication 10 ML: at 13:56

## 2024-11-15 NOTE — PROGRESS NOTES
Subjective   REASON FOR FOLLOWUP:  History of stage IIIA infiltrating ductal carcinoma of the right breast with 4 positive nodes, ER positive, HER-2/julián negative; treated with lumpectomy, status post 6 cycles of TAC. Received tamoxifen between February 2007 and March 2012. Femara was started in March 2012. Patient switched to Arimidex as of 06/21/2012 because of side effects with severe ankle joint pain with Femara.    2.   10 years of adjuvant therapy completed  March 2017.    3.   New diagnosis of carcinoma of the remaining right breast, needle biopsy 6/8/2023.  Tumor is HER2/julián positive and estrogen receptor positive/progesterone receptor negative.    4.   Bilateral mastectomies with implant reconstruction 8/16/2023    5.   Initiation of postop adjuvant chemotherapy with weekly Taxol and Herceptin.  First dose delivered 9/15/2023.     6.  Weekly Taxol and Herceptin completed 12/1/2023.    7.  Every 3-week Herceptin initiated 12/8/2023.    8.  Plan Hormonal therapy with Aromasin 25 mg daily.  This was prescribed on the visit of 1/19/2024 but the patient has not yet started taking the medication and wished to wait until after her reconstructive surgery.    9.  Reconstructive surgery with permanent breast implant insertions 3/1/2024      HISTORY OF PRESENT ILLNESS:   History of Present Illness Ms. Roblero is a 59 y.o.  female with the above noted history of stage IIIA carcinoma of the right breast, treated with lumpectomy, chemotherapy and radiation.  Following initial treatment with 6 cycles of TAC chemotherapy, she received extended hormonal therapy with 5 years of tamoxifen followed by 5 years of aromatase inhibitor.  She completed her 10 years of hormonal therapy in March 2017  .     She was seeing us annually and had not been seen since January of 2023 but when she underwent her breast imaging in May 2023 she had suspicious findings and ultimately underwent ultrasound-guided biopsy on 6/8/2023.   There were 2 separate biopsies in 1 of these areas was positive for HER2/julián overexpression by FISH.  Both tumors were ER positive and VT negative.    She was referred to Dr. Vidal who had performed her previous lumpectomy surgery in 2007 and currently the plan is for her to undergo bilateral mastectomies with reconstruction.    We had her seen by cardiac oncology due to her previous treatment with 6 cycles of TAC chemotherapy in 2007.  Thankfully her ejection fraction still seems to be normal at 61% based on her echocardiogram from 6/19/2023.    Dr. Vidal has scheduled the patient for radiographic staging with CT scan of the chest abdomen pelvis and a bone scan and the studies did not show any evidence of distant metastatic spread.    She had a bilateral mastectomies with implant reconstruction performed on 8/16/2023.  The tumor in the right breast was 3 x 1.5 x 1.2 cm.  Margins of resection were clear.  She also had placement of a left chest wall Mediport at the time of her surgery.    We recommended weekly Taxol and Herceptin for 12 weeks followed by continued Herceptin every 3 weeks for the remainder of the year of treatment.  She was estrogen receptor positive and will also be started on hormonal therapy once her chemotherapy is completed.    She completed her combined weekly Taxol and Herceptin in early December 2023 and is now receiving every 3-week Herceptin infusion with plans to continue this through September 2024 (12 months total).    INTERVAL HISTORY:  She continues on the aromasin. She does report ongoing arthralgias which she gets in the morning. It improves once she gets up and moves around. She has hot flashes, but these are tolerable. She denies shortness of breath, chest pains, changes in appetite or bowel changes, or new bone pains or aches.     Past Medical History, Past Surgical History, Social History, Family History have been reviewed and are without significant changes except as  "mentioned.      Medications:  The current medication list was reviewed in the EMR    ALLERGIES:    No Known Allergies      Objective      Vitals:    11/15/24 1407   BP: 132/82   Pulse: 85   Resp: 16   Temp: 98.3 °F (36.8 °C)   TempSrc: Oral   SpO2: 98%   Weight: 101 kg (222 lb 3.2 oz)   Height: 162.6 cm (64\")   PainSc: 0-No pain           9/13/2024     1:35 PM   Current Status   ECOG score 0       Physical Exam   Constitutional: She is oriented to person, place, and time. She appears well-developed.   HENT:   Head: Normocephalic.   Eyes: Pupils are equal, round, and reactive to light. Conjunctivae are normal.   Neck: No JVD present. No thyromegaly present.   Cardiovascular: Normal rate and regular rhythm.   Pulmonary/Chest: Effort normal and breath sounds normal.   Abdominal: Soft. Bowel sounds are normal.   Musculoskeletal: Normal range of motion.   Neurological: She is alert and oriented to person, place, and time. She has normal reflexes.   Skin: Skin is warm and dry.   Psychiatric: Her behavior is normal. Judgment normal.        Chest wall exam:   Bilateral mastectomies with implant reconstructions. No palpable abnormalities surrounding implants, no axillary lymphadenopathy.         PATHOLOGY 8/16/2023  Final Diagnosis   1. Right Breast, Oriented Simple Skin Sparing Mastectomy (1,097 Grams): INVASIVE MAMMARY CARCINOMA,       NO SPECIAL TYPE (INVASIVE DUCTAL CARCINOMA).  A. Tumor site: Lower outer quadrant.               B. Histologic grade: Perez histologic score III (tubules = 3, nuclei = 2, mitosis = 3).               C. Tumor size: 30 x 15 x 12 mm.               D. Tumor focality: Single focus.               E. Ductal Carcinoma in-situ (DCIS): Not identified.               F. No lobular neoplasia identified.               G. Overlying skin and nipple are uninvolved by tumor.               H. No definitive lymphovascular space invasion identified.               I. Focal perineural invasion is present.  J. " Margins: Uninvolved by invasive and in-situ carcinoma.               1. Invasive carcinoma comes to within 7 mm of the anterior margin, 40 mm from the inferior margin,      60 mm from the posterior and lateral margins, 80 mm from the medial margin and 130 mm from the       superior margin of resection.               K. Lymph Nodes: Not submitted.  L. Hormone receptor status: ER positive, HI negative, HER2 positive by FISH (right medial biopsy),       Ki-67 35-40%  (performed on prior biopsy [id number]).  M. Pathologic stage: pT2, NX.     2. Left Breast, Oriented Simple Skin Sparing Mastectomy (1,593 Grams):   A. Benign unremarkable breast tissue, skin and nipple.     PATHOLOGY 6/8/2023  Final Diagnosis   1. Breast, Right Lateral 6:30 Position, 3 cm FN, Core Biopsy:               A. Invasive mammary carcinoma, no special type (ductal), Perez histologic grade II (tubules = 3, nuclei = 2,        mitoses = 2) measuring 6 mm maximally and involving four core fragments.  B. No definitive in-situ carcinoma is identified.  C. No definitive lymphovascular space invasion identified.     2. Breast, Right Medial 6:30 Position, 3 cm FN, Core Biopsy:                A. Invasive mammary carcinoma, no special type (ductal), Perez histologic grade II (tubules = 3, nuclei = 2,        mitoses = 1) measuring 10 mm maximally and involving three core fragments.  B. No definitive in-situ carcinoma is identified.  C. No definitive lymphovascular space invasion identified.     Echocardiogram 1/12/2024  Interpretation Summary       Left ventricular systolic function is normal. Calculated left ventricular EF = 64.3% Normal global longitudinal LV strain (GLS) = -22.3%. Left ventricle strain data was reviewed by the physician and found to be accurate. Normal left ventricular cavity size and wall thickness noted. All left ventricular wall segments contract normally. Left ventricular diastolic function is consistent with (grade I)  impaired relaxation    Limited 2D imaging of cardiac valves with normal valve function by Doppler      CT CHEST, ABDOMEN, AND PELVIS WITHOUT CONTRAST. 7/21/2023  FINDINGS:  CHEST: Right breast findings discussed on breast MRI 06/17/2023. There  is a cluster of nodular densities at the right axilla in close proximity  to a cluster of surgical clips and some of these may represent vessels  or lymphatics. Characterization is limited in the absence of IV  contrast. There is no definitive pathologically enlarged node present.  There are no pathologically enlarged nodes at the left axilla and there  is no subpectoral lymphadenopathy, and there is no lymphadenopathy at  the mediastinum or jovita given constraints of noncontrasted technique.  The nodularity along the pleura at the dependent aspect of the lower  chest is likely related to dependent atelectatic change. No definite  suspicious pulmonary nodules are seen. There are no pleural or  pericardial effusions.     ABDOMEN/PELVIS: Noncontrasted liver appears unremarkable. The  gallbladder appears unremarkable and there is no biliary dilatation.  Splenic size is normal. Noncontrasted pancreas, adrenals, and kidneys  appear unremarkable. There is no acute bowel abnormality. Appendix  appears within normal limits. The uterus is slightly lobular in contour  likely secondary to small fibroids. Noncontrasted adnexa appear  unremarkable. There is no free fluid or lymphadenopathy. There are mild  abdominal aortic atherosclerotic changes throughout the abdominal aorta  without aneurysmal dilatation. No suspicious bone lesion is seen.     IMPRESSION:  Given constraints of noncontrasted technique, there is no  convincing evidence for metastatic disease within the chest, abdomen, or  pelvis.      Narrative & Impression   NUCLEAR MEDICINE WHOLE BODY BONE SCAN 7/14/2023     HISTORY: Breast cancer. Evaluate for metastatic disease.     TECHNIQUE:  19.1 mCi of 99m technetium MDP was  administered intravenous  followed by 3 hour delayed phase whole body imaging with selected spot  views.     COMPARISON: None.     FINDINGS:  There is mild increased uptake at the right sternoclavicular  joint that is attributed to arthritis. Arthritic uptake is present in  both shoulders, knees, and mid feet.  There is also mild increased  lumbar spine uptake that is most likely degenerative/arthritic. Both  kidneys and the urinary bladder are visualized.     IMPRESSION:  Areas of mild uptake are typical for arthritis/degenerative  change and there is no scintigraphic evidence to suggest bony metastatic  disease.        MAMMO SCREENING DIGITAL TOMOSYNTHESIS BILATERAL W CAD- 4/14/2023   IMPRESSION:  1. There is an area of focal asymmetry in the middle third retroareolar  region of the right breast. Further evaluation with spot compression CC  and MLO mammographic and Tomosynthesis images and targeted right breast  sonography is recommended.  2. There are no findings suspicious for malignancy in the left breast.     BI-RADS Category 0: Incomplete. Needs additional imaging evaluation.    EXAMINATIONS: UNILATERAL RIGHT DIGITAL DIAGNOSTIC MAMMOGRAPHY WITH  TOMOSYNTHESIS AND LIMITED RIGHT BREAST SONOGRAPHY 5/17/2023  IMPRESSION:  There is an irregular 1 cm hypoechoic mass with internal peripheral  vascularity that is seen in the right breast at the 6:30 position on the  order of 3 cm from the nipple. It is immediately adjacent to a 1.9 cm  oval circumscribed hypoechoic mass. Together both lesions measure on the  order 2.7 cm in greatest dimension. Ultrasound guided biopsy of both  lesions is recommended. Given the close proximity of both lesions, both  lesions may be able to be sampled and submitted as one specimen.     BI-RADS Category 4: Suspicious abnormality or suspicious finding. Biopsy  should be considered.    MRI BREAST BILATERAL W WO CONTRAST-  6/17/2023  IMPRESSION AND RECOMMENDATION:     1.  Adjacent irregular  enhancing masses each measuring 1.6 cm at 6:00 to  7:00 in the middle right breast represent the 2 sites of biopsy-proven  malignancy. Adjacent suspicious enhancing foci and areas of nonmass  enhancement are identified, in addition to an irregular enhancing mass  and multiple additional enhancing foci centered at 2:00 to 3:00 in the  right breast, which are also suspicious. The 2 sites of biopsy-proven  malignancy and suspicious areas of enhancement together measure up to  approximately 8.2 cm in maximum dimension, as detailed above. Surgical  management is recommended.  2.  No MRI evidence of malignancy in the left breast.     BI-RADS Category 4: Suspicious      Assessment & Plan     *Stage IIIA infiltrating ductal carcinoma of the right breast   Treated with a lumpectomy, radiation, 6 cycles of TAC chemotherapy and ongoing hormonal therapy. She completed 10 years of adjuvant hormonal therapy with 5 years of tamoxifen followed by 5 years of Arimidex which she completed in 2017.  Carcinoma of the remaining right breast.  Tumor was positive for HER2/julián overexpression and positive for estrogen receptors and negative for progesterone receptors.  Bilateral mastectomies with immediate implant reconstruction 8/16/2023.  Mediport placement 8/16/2023  Echocardiogram from 6/19/2023 shows normal left ventricular ejection fraction of 61% (patient previously had 6 cycles of Adriamycin containing chemotherapy and will be needing Herceptin therapy postoperatively).  Plan for postop adjuvant treatment with weekly Taxol and Herceptin x12 weeks followed by continued single agent Herceptin every 3 weeks for completion of 12 months of therapy total.  Plan for hormonal therapy possibly with Aromasin to begin once Taxol is completed.  9/14/2023: Patient returns for cycle 1 day 15 weekly Taxol and Herceptin.  Unfortunately she is neutropenic with an ANC of 800 and will not receive Taxol today but will receive Herceptin.  Her future  Taxol doses will be modified to an 85% dose level.  10/6/2023: Proceed with cycle 2 day 1 taxol and Herceptin. We will continue taxol at 85% reduction of original dose.   10/27/2023: Palma will receive her 6th weekly dose of Taxol and her seventh weekly dose of Herceptin in the office today.Taxol dose will remain at 85% dose level from the original treatment due to development of neutropenia.  12/1/2023: We will proceed with 12th and final weekly Taxol and  Herceptin in office today.  She is tolerating treatment well.   Initial dose of every 3-week Herceptin at 6 mg/kg delivered 12/8/2023.  Aromasin 25 mg prescribed in January 2024 but patient started taking this only in April 2024.  She continues on Aromasin and she is complaining of calf cramps and tightness limiting her ability to swim.  No other evidence of recurrent disease  Continues on adjuvant Herceptin and tolerating that well.  Up-to-date on her visits with cardio oncology, upcoming echo this month.  11/15/2024: Continues on aromasin. Aside from arthralgias in the morning, tolerating it well.     *Chemotherapy-induced neuropathy  11/17/2023: Patient continues to use cooling mitts and gloves with treatment.  She has been noticing some mild neuropathy developing in her toes of her bilateral feet.  She feels that this discomfort is more noticeable in the evening when she is trying to sleep.    Unchanged, continue gabapentin 100 mg nightly    *Right upper extremity lymphedema  11/17/2023: Patient has had history of lymphedema in her right upper extremity.  She does feel that in the past week or so her edema has been more noticeable to her.  She has been wearing her compression garments.  She has an appointment with lymphedema clinic next week on 12/8/2023.  12/29/2023: Continues compression garment and has followed up with the lymphedema clinic.    Lymphedema stable at this time      *Bone density  DEXA scan from July 2024 reviewed and bone density  normal.  Repeat in 2 years which would be in July 2026    PLAN:   Continue aromasin   Continue neurontin 100mg po nightly   DEXA scan due 7/2026  Continue to follow with cardio oncology   Return to clinic in 8 weeks for port flush.   Return to clinic in 4 months for MD visit.     Patient is on a high risk medication requiring close monitoring for toxicity.    ZAN Ferguson

## 2024-11-19 ENCOUNTER — HOSPITAL ENCOUNTER (OUTPATIENT)
Dept: CARDIOLOGY | Facility: HOSPITAL | Age: 59
Discharge: HOME | End: 2024-11-19
Admitting: NURSE PRACTITIONER
Payer: COMMERCIAL

## 2024-11-19 ENCOUNTER — OFFICE VISIT (OUTPATIENT)
Dept: CARDIOLOGY | Facility: CLINIC | Age: 59
End: 2024-11-19
Payer: COMMERCIAL

## 2024-11-19 VITALS
SYSTOLIC BLOOD PRESSURE: 100 MMHG | HEART RATE: 71 BPM | WEIGHT: 222 LBS | BODY MASS INDEX: 37.9 KG/M2 | OXYGEN SATURATION: 96 % | DIASTOLIC BLOOD PRESSURE: 60 MMHG | HEIGHT: 64 IN

## 2024-11-19 VITALS
HEIGHT: 64 IN | SYSTOLIC BLOOD PRESSURE: 118 MMHG | BODY MASS INDEX: 37.9 KG/M2 | HEART RATE: 64 BPM | DIASTOLIC BLOOD PRESSURE: 74 MMHG | WEIGHT: 222 LBS

## 2024-11-19 DIAGNOSIS — Z79.899 ENCOUNTER FOR MONITORING CARDIOTOXIC DRUG THERAPY: ICD-10-CM

## 2024-11-19 DIAGNOSIS — Z51.81 ENCOUNTER FOR MONITORING CARDIOTOXIC DRUG THERAPY: ICD-10-CM

## 2024-11-19 DIAGNOSIS — E78.5 HYPERLIPIDEMIA, UNSPECIFIED HYPERLIPIDEMIA TYPE: Chronic | ICD-10-CM

## 2024-11-19 DIAGNOSIS — E78.5 DYSLIPIDEMIA: ICD-10-CM

## 2024-11-19 DIAGNOSIS — Z79.899 ENCOUNTER FOR MONITORING CARDIOTOXIC DRUG THERAPY: Primary | ICD-10-CM

## 2024-11-19 DIAGNOSIS — Z51.81 ENCOUNTER FOR MONITORING CARDIOTOXIC DRUG THERAPY: Primary | ICD-10-CM

## 2024-11-19 DIAGNOSIS — C50.811 MALIGNANT NEOPLASM OF OVERLAPPING SITES OF RIGHT BREAST IN FEMALE, ESTROGEN RECEPTOR POSITIVE: ICD-10-CM

## 2024-11-19 DIAGNOSIS — Z17.0 MALIGNANT NEOPLASM OF OVERLAPPING SITES OF RIGHT BREAST IN FEMALE, ESTROGEN RECEPTOR POSITIVE: ICD-10-CM

## 2024-11-19 LAB
AORTIC ARCH: 2.4 CM
AORTIC DIMENSIONLESS INDEX: 0.89 (DI)
ASCENDING AORTA: 2.9 CM
BH CV ECHO LEFT VENTRICLE GLOBAL LONGITUDINAL STRAIN: -22 %
BH CV ECHO MEAS - ACS: 1.89 CM
BH CV ECHO MEAS - AO MAX PG: 5.6 MMHG
BH CV ECHO MEAS - AO MEAN PG: 3 MMHG
BH CV ECHO MEAS - AO ROOT AREA (BSA CORRECTED): 1.4 CM2
BH CV ECHO MEAS - AO ROOT DIAM: 2.9 CM
BH CV ECHO MEAS - AO V2 MAX: 118 CM/SEC
BH CV ECHO MEAS - AO V2 VTI: 25.7 CM
BH CV ECHO MEAS - AVA(I,D): 2.47 CM2
BH CV ECHO MEAS - EDV(CUBED): 56.8 ML
BH CV ECHO MEAS - EDV(MOD-SP2): 60 ML
BH CV ECHO MEAS - EDV(MOD-SP4): 57 ML
BH CV ECHO MEAS - EF(MOD-BP): 58.5 %
BH CV ECHO MEAS - EF(MOD-SP2): 58.3 %
BH CV ECHO MEAS - EF(MOD-SP4): 56.1 %
BH CV ECHO MEAS - EF_3D-VOL: 56 %
BH CV ECHO MEAS - ESV(CUBED): 19.2 ML
BH CV ECHO MEAS - ESV(MOD-SP2): 25 ML
BH CV ECHO MEAS - ESV(MOD-SP4): 25 ML
BH CV ECHO MEAS - FS: 30.3 %
BH CV ECHO MEAS - IVS/LVPW: 1.16 CM
BH CV ECHO MEAS - IVSD: 1.08 CM
BH CV ECHO MEAS - LA 3D VOL INDEX: 21
BH CV ECHO MEAS - LAT PEAK E' VEL: 9.7 CM/SEC
BH CV ECHO MEAS - LV DIASTOLIC VOL/BSA (35-75): 27.9 CM2
BH CV ECHO MEAS - LV MASS(C)D: 120 GRAMS
BH CV ECHO MEAS - LV MAX PG: 4.3 MMHG
BH CV ECHO MEAS - LV MEAN PG: 2 MMHG
BH CV ECHO MEAS - LV SYSTOLIC VOL/BSA (12-30): 12.2 CM2
BH CV ECHO MEAS - LV V1 MAX: 104 CM/SEC
BH CV ECHO MEAS - LV V1 VTI: 22.9 CM
BH CV ECHO MEAS - LVIDD: 3.8 CM
BH CV ECHO MEAS - LVIDS: 2.7 CM
BH CV ECHO MEAS - LVOT AREA: 2.8 CM2
BH CV ECHO MEAS - LVOT DIAM: 1.88 CM
BH CV ECHO MEAS - LVPWD: 0.93 CM
BH CV ECHO MEAS - MED PEAK E' VEL: 7.3 CM/SEC
BH CV ECHO MEAS - MV A DUR: 0.11 SEC
BH CV ECHO MEAS - MV A MAX VEL: 79 CM/SEC
BH CV ECHO MEAS - MV DEC SLOPE: 430.6 CM/SEC2
BH CV ECHO MEAS - MV DEC TIME: 0.25 SEC
BH CV ECHO MEAS - MV E MAX VEL: 60 CM/SEC
BH CV ECHO MEAS - MV E/A: 0.76
BH CV ECHO MEAS - MV MAX PG: 2.9 MMHG
BH CV ECHO MEAS - MV MEAN PG: 1.49 MMHG
BH CV ECHO MEAS - MV P1/2T: 57.2 MSEC
BH CV ECHO MEAS - MV V2 VTI: 23.8 CM
BH CV ECHO MEAS - MVA(P1/2T): 3.8 CM2
BH CV ECHO MEAS - MVA(VTI): 2.7 CM2
BH CV ECHO MEAS - PA ACC TIME: 0.11 SEC
BH CV ECHO MEAS - PA V2 MAX: 69.2 CM/SEC
BH CV ECHO MEAS - PULM A REVS DUR: 0.11 SEC
BH CV ECHO MEAS - PULM A REVS VEL: 37.2 CM/SEC
BH CV ECHO MEAS - PULM DIAS VEL: 38.6 CM/SEC
BH CV ECHO MEAS - PULM S/D: 1.2
BH CV ECHO MEAS - PULM SYS VEL: 46.4 CM/SEC
BH CV ECHO MEAS - QP/QS: 1.01
BH CV ECHO MEAS - RAP SYSTOLE: 3 MMHG
BH CV ECHO MEAS - RV MAX PG: 1.81 MMHG
BH CV ECHO MEAS - RV V1 MAX: 67.3 CM/SEC
BH CV ECHO MEAS - RV V1 VTI: 17.7 CM
BH CV ECHO MEAS - RVOT DIAM: 2.15 CM
BH CV ECHO MEAS - RVSP: 26 MMHG
BH CV ECHO MEAS - SV(LVOT): 63.4 ML
BH CV ECHO MEAS - SV(MOD-SP2): 35 ML
BH CV ECHO MEAS - SV(MOD-SP4): 32 ML
BH CV ECHO MEAS - SV(RVOT): 64.3 ML
BH CV ECHO MEAS - SVI(LVOT): 31 ML/M2
BH CV ECHO MEAS - SVI(MOD-SP2): 17.1 ML/M2
BH CV ECHO MEAS - SVI(MOD-SP4): 15.6 ML/M2
BH CV ECHO MEAS - TAPSE (>1.6): 1.99 CM
BH CV ECHO MEAS - TR MAX PG: 22.6 MMHG
BH CV ECHO MEAS - TR MAX VEL: 237.5 CM/SEC
BH CV ECHO MEASUREMENTS AVERAGE E/E' RATIO: 7.06
BH CV XLRA - RV BASE: 2.6 CM
BH CV XLRA - RV LENGTH: 8.3 CM
BH CV XLRA - RV MID: 2.45 CM
BH CV XLRA - TDI S': 9.2 CM/SEC
LEFT ATRIUM VOLUME INDEX: 20.5 ML/M2
SINUS: 2.8 CM
STJ: 2.4 CM

## 2024-11-19 PROCEDURE — 93306 TTE W/DOPPLER COMPLETE: CPT | Performed by: INTERNAL MEDICINE

## 2024-11-19 PROCEDURE — 93356 MYOCRD STRAIN IMG SPCKL TRCK: CPT | Performed by: INTERNAL MEDICINE

## 2024-11-19 PROCEDURE — 25510000001 PERFLUTREN 6.52 MG/ML SUSPENSION 2 ML VIAL: Performed by: NURSE PRACTITIONER

## 2024-11-19 PROCEDURE — 93356 MYOCRD STRAIN IMG SPCKL TRCK: CPT

## 2024-11-19 PROCEDURE — 93306 TTE W/DOPPLER COMPLETE: CPT

## 2024-11-19 RX ADMIN — PERFLUTREN 2 ML: 6.52 INJECTION, SUSPENSION INTRAVENOUS at 07:46

## 2024-11-19 NOTE — PROGRESS NOTES
Date of Office Visit: 2024  Encounter Provider: Zuly Reynolds MD  Place of Service: [facility]  Patient Name: Palma Roblero  :1965    Chief complaint  Cardio oncology care    History of Present Illness  Patient is a 59-year-old female with history of hyperlipidemia hypothyroidism with history of right-sided breast cancer treated since  with lumpectomy chemoradiation (received Taxol AC therapy) and 5 years of tamoxifen and then aromatase inhibitor therapy.  On 2023 she was found to have recurrent breast cancer right breast.  She underwent bilateral mastectomy 2023.  She has adjuvant chemotherapy with Taxol and Herceptin first dose 9/15/2023 with dose reduction subsequently of Taxol.  Has completed chemotherapy on 2024    Baseline echo performed on 2023 shows ejection fraction of 61.5% with GLS -21.9%, normal diastolic function no significant valvular heart disease.  Baseline troponin and proBNP were normal.  Echo 10/16/2023 showed ejection fraction 61% with GLS -23.9%.  Diastolic function was normal, nose no significant pulm hypertension or valvular dysfunction.  Echo 2024 showed an ejection fraction of 64%, GLS -22.3% no significant valvular dysfunction grade 1 diastolic function.  Will 2024 ejection fraction 62.3% GLS -20.5%, normal diastolic function.  Echo on 2024 (today) with EF 59% GLS -22%    Since last visit the patient is active walking 30 minutes a day.  She denies any palpitation shortness of breath dizziness chest pain.  She has had intermittent edema of the right hand    Past Medical History:   Diagnosis Date    Allergic     Breast cancer     Right infiltrating ductal carcinoma, grade I, T2N1, ER/NC positive, 90% HER-2/julián negative    Cervical polyp     History of benign cervical polyp    Colon polyps     Drug therapy     Hx of radiation therapy     Hypercholesteremia     Hyperthyroidism     NO LONGER PROBLEM    PONV  (postoperative nausea and vomiting)     Radiation 2007    Recurrent breast cancer 06/23/2023    right--chemo and s/p bilat. mastectomy    Sebaceous cyst     BACK     Past Surgical History:   Procedure Laterality Date    BREAST BIOPSY Right 08/03/2006    stereotactic biopsy    BREAST LUMPECTOMY Right 08/18/2006    Right axillary sentinel node biopsy (positive), right axillay dissection, right needle lumpectomy-Dr. Quyen Vidal    BREAST RECONSTRUCTION Bilateral 8/16/2023    Procedure: BILATERAL PREPECTORAL PLACEMENT  TISSUE EXPANDER AND ALLODERM, ADJACENT LEFT BREAST TISSUE REARRANGEMENT;  Surgeon: Favian Brooke MD;  Location: [facility] MAIN OR;  Service: Plastics;  Laterality: Bilateral;    BREAST TISSUE EXPANDER REMOVAL INSERTION OF IMPLANT Bilateral 3/1/2024    Procedure: BILATERAL REMOVAL TISSUE EXPANDERS AND PLACEMENT OF IMPLANTS AND LEFT ADJACENT TISSUE REARRANGEMENT;  Surgeon: Favian Brooke MD;  Location: [facility] MAIN OR;  Service: Plastics;  Laterality: Bilateral;    CERVICAL CONE BIOPSY  2005    [facility]    COLONOSCOPY N/A 04/07/2014    3 mm cecal polyp, 6 mm ascending colon polyp, 8 mm transvese colon polyp-Dr. Quyen Vidal    COLONOSCOPY N/A 03/16/2009    Tortuous colon-Dr. Quyen Vidal    COLONOSCOPY N/A 06/11/2018    Procedure: COLONOSCOPY TO CECUM TO TERMINAL ILEUM WITH HOT SNARE POLYPECTOMY;  Surgeon: Quyen Vidal MD;  Location: [facility] ENDOSCOPY;  Service: Gastroenterology    COLPOSCOPY      DILATATION AND CURETTAGE  03/23/2005    EXCISION MASS TRUNK N/A 09/05/2017    Procedure: EXCISION OF SEBACEOUS CYST FROM BACK ;  Surgeon: Rosendo Crum MD;  Location: [facility] MAIN OR;  Service:     MASTECTOMY W/ SENTINEL NODE BIOPSY Bilateral 8/16/2023    Procedure: BREAST MASTECTOMY BILATERAL;  Surgeon: Quyen Vidal MD;  Location: [facility] MAIN OR;  Service: General;  Laterality: Bilateral;    SHOULDER ROTATOR CUFF REPAIR Right 07/2022    VENOUS ACCESS DEVICE (PORT) INSERTION Left  10/06/2006    Left subclavian [id number] placement-Dr. Quyen Vidal    VENOUS ACCESS DEVICE (PORT) INSERTION N/A 8/16/2023    Procedure: with port placement using fluoroscopy and ultrasound;  Surgeon: Quyen Vidal MD;  Location: [facility] MAIN OR;  Service: General;  Laterality: N/A;    VENOUS ACCESS DEVICE (PORT) REMOVAL Left 05/29/2007    Left subclavian Meeuka-y-Zohi removal-Dr. Quyen Vidal     Outpatient Medications Prior to Visit   Medication Sig Dispense Refill    acetaminophen (TYLENOL) 325 MG tablet Take 2 tablets by mouth Every 4 (Four) Hours As Needed for Mild Pain. 60 tablet 0    atorvastatin (LIPITOR) 10 MG tablet Take 1 tablet by mouth Daily. 90 tablet 3    Triamcinolone Acetonide (NASACORT) 55 MCG/ACT nasal inhaler 2 sprays into the nostril(s) as directed by provider Daily As Needed (nasal congestion). 16.5 g 5    docusate sodium (COLACE) 250 MG capsule Take 1 capsule by mouth 2 (Two) Times a Day As Needed for Constipation. (Patient not taking: Reported on 11/19/2024) 60 capsule 1    exemestane (AROMASIN) 25 MG tablet TAKE 1 TABLET BY MOUTH DAILY (Patient not taking: Reported on 11/19/2024) 90 tablet 3    guaiFENesin (Mucinex) 600 MG 12 hr tablet Take 2 tablets by mouth 2 (Two) Times a Day. (Patient not taking: Reported on 11/19/2024) 14 tablet 0    sennosides-docusate (PERICOLACE) 8.6-50 MG per tablet Take 2 tablets by mouth Daily As Needed for Constipation. (Patient not taking: Reported on 11/19/2024) 30 tablet 1     No facility-administered medications prior to visit.       Allergies as of 11/19/2024    (No Known Allergies)     Social History     Socioeconomic History    Marital status:      Spouse name: Philip   Tobacco Use    Smoking status: Never    Smokeless tobacco: Never    Tobacco comments:          No Caffeine    Vaping Use    Vaping status: Never Used   Substance and Sexual Activity    Alcohol use: No    Drug use: No    Sexual activity: Not Currently     Partners: Male      "Birth control/protection: None     Family History   Problem Relation Age of Onset    Alzheimer's disease Mother     No Known Problems Father     Hyperlipidemia Sister     Hypertension Brother     No Known Problems Maternal Aunt     No Known Problems Paternal Aunt     Heart attack Paternal Uncle     Heart disease Paternal Uncle     Heart disease Paternal Uncle     Heart attack Paternal Uncle     No Known Problems Maternal Grandmother     No Known Problems Paternal Grandmother     Miscarriages / Stillbirths Daughter     ADD / ADHD Son     BRCA 1/2 Neg Hx     Breast cancer Neg Hx     Colon cancer Neg Hx     Endometrial cancer Neg Hx     Ovarian cancer Neg Hx     Malig Hyperthermia Neg Hx      Review of Systems   Constitutional: Negative for chills, fever, weight gain and weight loss.   Cardiovascular:  Negative for leg swelling.   Respiratory:  Negative for cough, snoring and wheezing.    Hematologic/Lymphatic: Negative for bleeding problem. Does not bruise/bleed easily.   Skin:  Negative for color change.   Musculoskeletal:  Negative for falls, joint pain and myalgias.   Gastrointestinal:  Negative for melena.   Genitourinary:  Negative for hematuria.   Neurological:  Negative for excessive daytime sleepiness.   Psychiatric/Behavioral:  Negative for depression. The patient is not nervous/anxious.         Objective:     Vitals:    11/19/24 0751   BP: 118/74   BP Location: Left arm   Patient Position: Sitting   Cuff Size: Large Adult   Pulse: 64   Weight: 101 kg (222 lb)   Height: 162.6 cm (64\")     Body mass index is 38.11 kg/m².    Vitals reviewed.   Constitutional:       Appearance: Well-developed. Obese.   Eyes:      General: No scleral icterus.        Right eye: No discharge.      Conjunctiva/sclera: Conjunctivae normal.      Pupils: Pupils are equal, round, and reactive to light.   HENT:      Head: Normocephalic.      Nose: Nose normal.   Neck:      Thyroid: No thyromegaly.      Vascular: No JVD.   Pulmonary:      " Effort: Pulmonary effort is normal. No respiratory distress.      Breath sounds: Normal breath sounds. No wheezing. No rales.   Cardiovascular:      Normal rate. Regular rhythm. Normal S1. Normal S2.       Murmurs: There is no murmur.      No gallop.    Pulses:     Intact distal pulses.      Carotid: 2+ bilaterally.     Radial: 2+ bilaterally.     Femoral: 2+ bilaterally.     Popliteal: 2+ bilaterally.     Dorsalis pedis: 2+ bilaterally.     Posterior tibial: 2+ bilaterally.  Edema:     Peripheral edema absent.   Abdominal:      General: Bowel sounds are normal. There is no distension.      Palpations: Abdomen is soft.      Tenderness: There is no abdominal tenderness. There is no rebound.   Musculoskeletal: Normal range of motion.         General: No tenderness.      Cervical back: Normal range of motion and neck supple. Skin:     General: Skin is warm and dry.      Findings: No erythema or rash.   Neurological:      Mental Status: Alert and oriented to person, place, and time.   Psychiatric:         Behavior: Behavior normal.         Thought Content: Thought content normal.         Judgment: Judgment normal.       Lab Review:   Lab Results - Last 18 Months   Lab Units 09/13/24  1343 08/23/24  1450   WBC 10*3/mm3 6.74 5.46   RBC 10*6/mm3 4.75 4.99   HEMOGLOBIN g/dL 12.6 13.2   HEMATOCRIT % 39.7 41.2   MCV fL 83.6 82.6   MCH pg 26.5* 26.5*   MCHC g/dL 31.7 32.0   RDW % 13.8 13.9   PLATELETS 10*3/mm3 165 192   NEUTROPHIL % % 63.6 59.2   LYMPHOCYTE % % 23.6 31.0   MONOCYTES % % 10.2 7.0   EOSINOPHIL % % 1.9 2.0   BASOPHIL % % 0.4 0.4   NEUTROS ABS 10*3/mm3 4.28 3.24   LYMPHS ABS 10*3/mm3 1.59 1.69   MONOS ABS 10*3/mm3 0.69 0.38   EOS ABS 10*3/mm3 0.13 0.11   BASOS ABS 10*3/mm3 0.03 0.02   RDW-SD fl 42.3 41.6   MPV fL 11.5 12.2*       Lab Results - Last 18 Months   Lab Units 05/10/24  1111 02/09/24  1127   GLUCOSE mg/dL 94 92   BUN mg/dL 18 16   CREATININE mg/dL 0.89 0.93   SODIUM mmol/L 141 140   POTASSIUM mmol/L 3.9  4.0   CHLORIDE mmol/L 106 106   CO2 mmol/L 24.5 26.0   CALCIUM mg/dL 9.1 9.1   TOTAL PROTEIN g/dL 7.1 7.1   ALBUMIN g/dL 3.9 4.1   ALT (SGPT) U/L 18 26   AST (SGOT) U/L 20 26   ALK PHOS U/L 62 59   BILIRUBIN mg/dL 1.0 0.5   GLOBULIN gm/dL 3.2 3.0   A/G RATIO g/dL 1.2 1.4   BUN / CREAT RATIO  20.2 17.2   ANION GAP mmol/L 10.5 8.0   EGFR mL/min/1.73 73.9 70.5     Lab Results - Last 18 Months   Lab Units 05/10/24  1111 11/16/23  1332   CHOLESTEROL mg/dL 144  --    TRIGLYCERIDES mg/dL 65 77   HDL CHOL mg/dL 45 45   LDL CHOL mg/dL 86 120*   VLDL CHOL mg/dL 13  --    VLDL CHOLESTEROL LISSETH mg/dL  --  14   LDL/HDL RATIO  1.91  --      Lab Results - Last 18 Months   Lab Units 07/03/23  1106   PROBNP pg/mL 21.9     Lab Results - Last 18 Months   Lab Units 07/03/23  1106   HSTROP T ng/L 7         ECG 12 Lead    Date/Time: 11/19/2024 8:28 AM  Performed by: Zuly Reynolds MD    Authorized by: Zuly Reynolds MD  Comparison: compared with previous ECG   Similar to previous ECG  Rhythm: sinus rhythm    Clinical impression: normal ECG        Assessment:       Diagnosis Plan   1. Encounter for monitoring cardiotoxic drug therapy  Adult Transthoracic Echo Complete W/ Cont if Necessary Per Protocol    ECG 12 Lead    Lipid Panel    TSH      2. Hyperlipidemia, unspecified hyperlipidemia type        3. Malignant neoplasm of overlapping sites of right breast in female, estrogen receptor positive          Plan:       1.  Cardio oncology care.  She has recurrent right-sided breast cancer and has a history of lumpectomy with TAC therapy as well as right-sided radiation therapy in 2007.  S/P bilateral mastectomy 8/2023, Taxol/Herceptin and then Aromasin.  This followed by Herceptin for a year which was completed on 9/2024.  To date with no evidence of LV dysfunction.  2.  Recurrent right-sided breast cancer.  As above.  Additional recommendations per oncology  3.  Hyperlipidemia.  On statin therapy patient to have blood work in February 2025 and  will forward these results.  She will also increase omega-3  4.  Probable sleep apnea.  She has a STOP-BANG score 4.  She snores at night has daytime somnolence and no family history of sleep apnea.  I reviewed with her sleep apnea can cause cardiomyopathy on its own as well as other cardiovascular events.  She declines further evaluation for sleep apnea.    This visit was of HIGH complexity medical decision making.    Palma Roblero is a 59 y.o. female with multiple comorbidites;  she was seen for Drug therapy requiring intensive monitoring for toxicity (herceptin).     Time Spent: I spent 45 minutes caring for Palma on this date of service. This time includes time spent by me in the following activities: preparing for the visit, reviewing tests, obtaining and/or reviewing a separately obtained history, performing a medically appropriate examination and/or evaluation, counseling and educating the patient/family/caregiver, ordering medications, tests, or procedures, documenting information in the medical record, and independently interpreting results and communicating that information with the patient/family/caregiver.   I spent 3 minutes on the separately reported service of ECG and Echo. This time is not included in the time used to support the E/M service also reported today.        Your medication list            Accurate as of November 19, 2024 11:59 PM. If you have any questions, ask your nurse or doctor.                CONTINUE taking these medications        Instructions Last Dose Given Next Dose Due   acetaminophen 325 MG tablet  Commonly known as: TYLENOL      Take 2 tablets by mouth Every 4 (Four) Hours As Needed for Mild Pain.       atorvastatin 10 MG tablet  Commonly known as: LIPITOR      Take 1 tablet by mouth Daily.       Triamcinolone Acetonide 55 MCG/ACT nasal inhaler  Commonly known as: NASACORT      2 sprays into the nostril(s) as directed by provider Daily As Needed (nasal congestion).                 Patient is no longer taking -.  I corrected the med list to reflect this.  I did not stop these medications.      Dictated utilizing Dragon dictation

## 2025-01-10 ENCOUNTER — INFUSION (OUTPATIENT)
Dept: ONCOLOGY | Facility: HOSPITAL | Age: 60
End: 2025-01-10
Payer: COMMERCIAL

## 2025-01-10 DIAGNOSIS — Z90.13 HISTORY OF BILATERAL MASTECTOMY: ICD-10-CM

## 2025-01-10 DIAGNOSIS — Z45.2 ENCOUNTER FOR ADJUSTMENT OR MANAGEMENT OF VASCULAR ACCESS DEVICE: Primary | ICD-10-CM

## 2025-01-10 PROCEDURE — 96523 IRRIG DRUG DELIVERY DEVICE: CPT

## 2025-01-10 PROCEDURE — 25010000002 HEPARIN LOCK FLUSH PER 10 UNITS: Performed by: INTERNAL MEDICINE

## 2025-01-10 RX ORDER — SODIUM CHLORIDE 0.9 % (FLUSH) 0.9 %
10 SYRINGE (ML) INJECTION AS NEEDED
OUTPATIENT
Start: 2025-01-10

## 2025-01-10 RX ORDER — HEPARIN SODIUM (PORCINE) LOCK FLUSH IV SOLN 100 UNIT/ML 100 UNIT/ML
500 SOLUTION INTRAVENOUS AS NEEDED
Status: DISCONTINUED | OUTPATIENT
Start: 2025-01-10 | End: 2025-01-10 | Stop reason: HOSPADM

## 2025-01-10 RX ORDER — SODIUM CHLORIDE 0.9 % (FLUSH) 0.9 %
10 SYRINGE (ML) INJECTION AS NEEDED
Status: DISCONTINUED | OUTPATIENT
Start: 2025-01-10 | End: 2025-01-10 | Stop reason: HOSPADM

## 2025-01-10 RX ORDER — HEPARIN SODIUM (PORCINE) LOCK FLUSH IV SOLN 100 UNIT/ML 100 UNIT/ML
500 SOLUTION INTRAVENOUS AS NEEDED
OUTPATIENT
Start: 2025-01-10

## 2025-01-10 RX ADMIN — Medication 10 ML: at 11:27

## 2025-01-10 RX ADMIN — Medication 500 UNITS: at 11:27

## 2025-02-24 ENCOUNTER — TRANSCRIBE ORDERS (OUTPATIENT)
Dept: PHYSICAL THERAPY | Facility: HOSPITAL | Age: 60
End: 2025-02-24
Payer: OTHER GOVERNMENT

## 2025-02-24 DIAGNOSIS — Z17.0 MALIGNANT NEOPLASM OF LOWER-OUTER QUADRANT OF RIGHT BREAST OF FEMALE, ESTROGEN RECEPTOR POSITIVE: Primary | ICD-10-CM

## 2025-02-24 DIAGNOSIS — Z90.13 S/P BILATERAL MASTECTOMY: ICD-10-CM

## 2025-02-24 DIAGNOSIS — Z91.89 AT RISK FOR LYMPHEDEMA: ICD-10-CM

## 2025-02-24 DIAGNOSIS — C50.511 MALIGNANT NEOPLASM OF LOWER-OUTER QUADRANT OF RIGHT BREAST OF FEMALE, ESTROGEN RECEPTOR POSITIVE: Primary | ICD-10-CM

## 2025-02-28 ENCOUNTER — OFFICE VISIT (OUTPATIENT)
Dept: ONCOLOGY | Facility: CLINIC | Age: 60
End: 2025-02-28
Payer: OTHER GOVERNMENT

## 2025-02-28 ENCOUNTER — INFUSION (OUTPATIENT)
Dept: ONCOLOGY | Facility: HOSPITAL | Age: 60
End: 2025-02-28
Payer: OTHER GOVERNMENT

## 2025-02-28 ENCOUNTER — HOSPITAL ENCOUNTER (OUTPATIENT)
Dept: PHYSICAL THERAPY | Facility: HOSPITAL | Age: 60
Discharge: HOME | End: 2025-02-28
Payer: COMMERCIAL

## 2025-02-28 VITALS
TEMPERATURE: 97.9 F | BODY MASS INDEX: 38.65 KG/M2 | SYSTOLIC BLOOD PRESSURE: 126 MMHG | RESPIRATION RATE: 16 BRPM | DIASTOLIC BLOOD PRESSURE: 77 MMHG | HEIGHT: 64 IN | OXYGEN SATURATION: 98 % | WEIGHT: 226.4 LBS | HEART RATE: 90 BPM

## 2025-02-28 DIAGNOSIS — Z90.13 HISTORY OF BILATERAL MASTECTOMY: ICD-10-CM

## 2025-02-28 DIAGNOSIS — Z45.2 ENCOUNTER FOR ADJUSTMENT OR MANAGEMENT OF VASCULAR ACCESS DEVICE: Primary | ICD-10-CM

## 2025-02-28 DIAGNOSIS — C50.811 MALIGNANT NEOPLASM OF OVERLAPPING SITES OF RIGHT BREAST IN FEMALE, ESTROGEN RECEPTOR POSITIVE: ICD-10-CM

## 2025-02-28 DIAGNOSIS — C50.511 MALIGNANT NEOPLASM OF LOWER-OUTER QUADRANT OF RIGHT BREAST OF FEMALE, ESTROGEN RECEPTOR POSITIVE: ICD-10-CM

## 2025-02-28 DIAGNOSIS — Z17.0 MALIGNANT NEOPLASM OF OVERLAPPING SITES OF RIGHT BREAST IN FEMALE, ESTROGEN RECEPTOR POSITIVE: ICD-10-CM

## 2025-02-28 DIAGNOSIS — Z17.0 MALIGNANT NEOPLASM OF LOWER-OUTER QUADRANT OF RIGHT BREAST OF FEMALE, ESTROGEN RECEPTOR POSITIVE: ICD-10-CM

## 2025-02-28 DIAGNOSIS — I89.0 LYMPHEDEMA OF RIGHT UPPER EXTREMITY: Primary | ICD-10-CM

## 2025-02-28 LAB
ALBUMIN SERPL-MCNC: 4 G/DL (ref 3.5–5.2)
ALBUMIN/GLOB SERPL: 1.3 G/DL
ALP SERPL-CCNC: 70 U/L (ref 39–117)
ALT SERPL W P-5'-P-CCNC: 21 U/L (ref 1–33)
ANION GAP SERPL CALCULATED.3IONS-SCNC: 11.2 MMOL/L (ref 5–15)
AST SERPL-CCNC: 21 U/L (ref 1–32)
BASOPHILS # BLD AUTO: 0.04 10*3/MM3 (ref 0–0.2)
BASOPHILS NFR BLD AUTO: 0.6 % (ref 0–1.5)
BILIRUB SERPL-MCNC: 0.6 MG/DL (ref 0–1.2)
BUN SERPL-MCNC: 17 MG/DL (ref 8–23)
BUN/CREAT SERPL: 20 (ref 7–25)
CALCIUM SPEC-SCNC: 9.4 MG/DL (ref 8.6–10.5)
CHLORIDE SERPL-SCNC: 104 MMOL/L (ref 98–107)
CO2 SERPL-SCNC: 25.8 MMOL/L (ref 22–29)
CREAT SERPL-MCNC: 0.85 MG/DL (ref 0.57–1)
DEPRECATED RDW RBC AUTO: 42.2 FL (ref 37–54)
EGFRCR SERPLBLD CKD-EPI 2021: 78.1 ML/MIN/1.73
EOSINOPHIL # BLD AUTO: 0.12 10*3/MM3 (ref 0–0.4)
EOSINOPHIL NFR BLD AUTO: 1.8 % (ref 0.3–6.2)
ERYTHROCYTE [DISTWIDTH] IN BLOOD BY AUTOMATED COUNT: 13.8 % (ref 12.3–15.4)
GLOBULIN UR ELPH-MCNC: 3.1 GM/DL
GLUCOSE SERPL-MCNC: 92 MG/DL (ref 65–99)
HCT VFR BLD AUTO: 42.2 % (ref 34–46.6)
HGB BLD-MCNC: 13.6 G/DL (ref 12–15.9)
IMM GRANULOCYTES # BLD AUTO: 0.01 10*3/MM3 (ref 0–0.05)
IMM GRANULOCYTES NFR BLD AUTO: 0.1 % (ref 0–0.5)
LYMPHOCYTES # BLD AUTO: 2.23 10*3/MM3 (ref 0.7–3.1)
LYMPHOCYTES NFR BLD AUTO: 33 % (ref 19.6–45.3)
MCH RBC QN AUTO: 27 PG (ref 26.6–33)
MCHC RBC AUTO-ENTMCNC: 32.2 G/DL (ref 31.5–35.7)
MCV RBC AUTO: 83.7 FL (ref 79–97)
MONOCYTES # BLD AUTO: 0.54 10*3/MM3 (ref 0.1–0.9)
MONOCYTES NFR BLD AUTO: 8 % (ref 5–12)
NEUTROPHILS NFR BLD AUTO: 3.81 10*3/MM3 (ref 1.7–7)
NEUTROPHILS NFR BLD AUTO: 56.5 % (ref 42.7–76)
NRBC BLD AUTO-RTO: 0 /100 WBC (ref 0–0.2)
PLATELET # BLD AUTO: 195 10*3/MM3 (ref 140–450)
PMV BLD AUTO: 11.8 FL (ref 6–12)
POTASSIUM SERPL-SCNC: 3.8 MMOL/L (ref 3.5–5.2)
PROT SERPL-MCNC: 7.1 G/DL (ref 6–8.5)
RBC # BLD AUTO: 5.04 10*6/MM3 (ref 3.77–5.28)
SODIUM SERPL-SCNC: 141 MMOL/L (ref 136–145)
WBC NRBC COR # BLD AUTO: 6.75 10*3/MM3 (ref 3.4–10.8)

## 2025-02-28 PROCEDURE — 85025 COMPLETE CBC W/AUTO DIFF WBC: CPT | Performed by: INTERNAL MEDICINE

## 2025-02-28 PROCEDURE — 25010000002 HEPARIN LOCK FLUSH PER 10 UNITS: Performed by: INTERNAL MEDICINE

## 2025-02-28 PROCEDURE — 80053 COMPREHEN METABOLIC PANEL: CPT | Performed by: INTERNAL MEDICINE

## 2025-02-28 PROCEDURE — 36591 DRAW BLOOD OFF VENOUS DEVICE: CPT

## 2025-02-28 PROCEDURE — 97535 SELF CARE MNGMENT TRAINING: CPT

## 2025-02-28 RX ORDER — HEPARIN SODIUM (PORCINE) LOCK FLUSH IV SOLN 100 UNIT/ML 100 UNIT/ML
500 SOLUTION INTRAVENOUS AS NEEDED
OUTPATIENT
Start: 2025-02-28

## 2025-02-28 RX ORDER — HEPARIN SODIUM (PORCINE) LOCK FLUSH IV SOLN 100 UNIT/ML 100 UNIT/ML
500 SOLUTION INTRAVENOUS AS NEEDED
Status: DISCONTINUED | OUTPATIENT
Start: 2025-02-28 | End: 2025-02-28 | Stop reason: HOSPADM

## 2025-02-28 RX ORDER — SODIUM CHLORIDE 0.9 % (FLUSH) 0.9 %
10 SYRINGE (ML) INJECTION AS NEEDED
Status: DISCONTINUED | OUTPATIENT
Start: 2025-02-28 | End: 2025-02-28 | Stop reason: HOSPADM

## 2025-02-28 RX ORDER — SODIUM CHLORIDE 0.9 % (FLUSH) 0.9 %
10 SYRINGE (ML) INJECTION AS NEEDED
OUTPATIENT
Start: 2025-02-28

## 2025-02-28 RX ADMIN — Medication 10 ML: at 14:03

## 2025-02-28 RX ADMIN — Medication 500 UNITS: at 14:03

## 2025-02-28 NOTE — PROGRESS NOTES
Subjective   REASON FOR FOLLOWUP:  History of stage IIIA infiltrating ductal carcinoma of the right breast with 4 positive nodes, ER positive, HER-2/julián negative; treated with lumpectomy, status post 6 cycles of TAC. Received tamoxifen between February 2007 and March 2012. Femara was started in March 2012. Patient switched to Arimidex as of 06/21/2012 because of side effects with severe ankle joint pain with Femara.    2.   10 years of adjuvant therapy completed  March 2017.    3.   New diagnosis of carcinoma of the remaining right breast, needle biopsy 6/8/2023.  Tumor is HER2/julián positive and estrogen receptor positive/progesterone receptor negative.    4.   Bilateral mastectomies with implant reconstruction 8/16/2023    5.   Initiation of postop adjuvant chemotherapy with weekly Taxol and Herceptin.  First dose delivered 9/15/2023.     6.  Weekly Taxol and Herceptin completed 12/1/2023.    7.  Every 3-week Herceptin initiated 12/8/2023.    8.  Plan Hormonal therapy with Aromasin 25 mg daily.  This was prescribed on the visit of 1/19/2024 but the patient has not yet started taking the medication and wished to wait until after her reconstructive surgery.    9.  Reconstructive surgery with permanent breast implant insertions 3/1/2024      HISTORY OF PRESENT ILLNESS:   History of Present Illness Ms. Roblero is a 59 y.o.  female with the above noted history of stage IIIA carcinoma of the right breast, treated with lumpectomy, chemotherapy and radiation.  Following initial treatment with 6 cycles of TAC chemotherapy, she received extended hormonal therapy with 5 years of tamoxifen followed by 5 years of aromatase inhibitor.  She completed her 10 years of hormonal therapy in March 2017  .     She was seeing us annually and had not been seen since January of 2023 but when she underwent her breast imaging in May 2023 she had suspicious findings and ultimately underwent ultrasound-guided biopsy on 6/8/2023.   There were 2 separate biopsies in 1 of these areas was positive for HER2/julián overexpression by FISH.  Both tumors were ER positive and MS negative.    She was referred to Dr. Vidal who had performed her previous lumpectomy surgery in 2007 and currently the plan is for her to undergo bilateral mastectomies with reconstruction.    We had her seen by cardiac oncology due to her previous treatment with 6 cycles of TAC chemotherapy in 2007.  Thankfully her ejection fraction still seems to be normal at 61% based on her echocardiogram from 6/19/2023.    Dr. Vidal has scheduled the patient for radiographic staging with CT scan of the chest abdomen pelvis and a bone scan and the studies did not show any evidence of distant metastatic spread.    She had a bilateral mastectomies with implant reconstruction performed on 8/16/2023.  The tumor in the right breast was 3 x 1.5 x 1.2 cm.  Margins of resection were clear.  She also had placement of a left chest wall Mediport at the time of her surgery.    We recommended weekly Taxol and Herceptin for 12 weeks followed by continued Herceptin every 3 weeks for the remainder of the year of treatment.  She was estrogen receptor positive and will also be started on hormonal therapy once her chemotherapy is completed.    She completed her combined weekly Taxol and Herceptin in early December 2023 and is now receiving every 3-week Herceptin infusion with plans to continue this through September 2024 (12 months total).    INTERVAL HISTORY:  She continues on the aromasin.   She does have some arthralgias associated with Aromasin otherwise tolerating it well  Denies any new bone pains, cough, abdominal pain nausea vomiting  She still has the left chest wall port and would like to get that removed.  This has been flushed every 8 to 12 weeks.    Past Medical History, Past Surgical History, Social History, Family History have been reviewed and are without significant changes except as  "mentioned.      Medications:  The current medication list was reviewed in the EMR    ALLERGIES:    No Known Allergies      Objective      Vitals:    02/28/25 1434   BP: 126/77   Pulse: 90   Resp: 16   Temp: 97.9 °F (36.6 °C)   TempSrc: Oral   SpO2: 98%   Weight: 103 kg (226 lb 6.4 oz)   Height: 162.6 cm (64.02\")   PainSc: 0-No pain           2/28/2025     2:33 PM   Current Status   ECOG score 0       Physical Exam   Constitutional: She is oriented to person, place, and time. She appears well-developed.   HENT:   Head: Normocephalic.   Eyes: Pupils are equal, round, and reactive to light. Conjunctivae are normal.   Neck: No JVD present. No thyromegaly present.   Cardiovascular: Normal rate and regular rhythm.   Pulmonary/Chest: Effort normal and breath sounds normal.   Abdominal: Soft. Bowel sounds are normal.   Musculoskeletal: Normal range of motion.   Neurological: She is alert and oriented to person, place, and time. She has normal reflexes.   Skin: Skin is warm and dry.   Psychiatric: Her behavior is normal. Judgment normal.        Chest wall exam:   Bilateral mastectomies with implant reconstructions. No palpable abnormalities surrounding implants, no axillary lymphadenopathy.         PATHOLOGY 8/16/2023  Final Diagnosis   1. Right Breast, Oriented Simple Skin Sparing Mastectomy (1,097 Grams): INVASIVE MAMMARY CARCINOMA,       NO SPECIAL TYPE (INVASIVE DUCTAL CARCINOMA).  A. Tumor site: Lower outer quadrant.               B. Histologic grade: Perez histologic score III (tubules = 3, nuclei = 2, mitosis = 3).               C. Tumor size: 30 x 15 x 12 mm.               D. Tumor focality: Single focus.               E. Ductal Carcinoma in-situ (DCIS): Not identified.               F. No lobular neoplasia identified.               G. Overlying skin and nipple are uninvolved by tumor.               H. No definitive lymphovascular space invasion identified.               I. Focal perineural invasion is " present.  J. Margins: Uninvolved by invasive and in-situ carcinoma.               1. Invasive carcinoma comes to within 7 mm of the anterior margin, 40 mm from the inferior margin,      60 mm from the posterior and lateral margins, 80 mm from the medial margin and 130 mm from the       superior margin of resection.               K. Lymph Nodes: Not submitted.  L. Hormone receptor status: ER positive, CO negative, HER2 positive by FISH (right medial biopsy),       Ki-67 35-40%  (performed on prior biopsy [id number]).  M. Pathologic stage: pT2, NX.     2. Left Breast, Oriented Simple Skin Sparing Mastectomy (1,593 Grams):   A. Benign unremarkable breast tissue, skin and nipple.     PATHOLOGY 6/8/2023  Final Diagnosis   1. Breast, Right Lateral 6:30 Position, 3 cm FN, Core Biopsy:               A. Invasive mammary carcinoma, no special type (ductal), Perez histologic grade II (tubules = 3, nuclei = 2,        mitoses = 2) measuring 6 mm maximally and involving four core fragments.  B. No definitive in-situ carcinoma is identified.  C. No definitive lymphovascular space invasion identified.     2. Breast, Right Medial 6:30 Position, 3 cm FN, Core Biopsy:                A. Invasive mammary carcinoma, no special type (ductal), [place] histologic grade II (tubules = 3, nuclei = 2,        mitoses = 1) measuring 10 mm maximally and involving three core fragments.  B. No definitive in-situ carcinoma is identified.  C. No definitive lymphovascular space invasion identified.     Echocardiogram 1/12/2024  Interpretation Summary       Left ventricular systolic function is normal. Calculated left ventricular EF = 64.3% Normal global longitudinal LV strain (GLS) = -22.3%. Left ventricle strain data was reviewed by the physician and found to be accurate. Normal left ventricular cavity size and wall thickness noted. All left ventricular wall segments contract normally. Left ventricular diastolic function is consistent with  (grade I) impaired relaxation    Limited 2D imaging of cardiac valves with normal valve function by Doppler      CT CHEST, ABDOMEN, AND PELVIS WITHOUT CONTRAST. 7/21/2023  FINDINGS:  CHEST: Right breast findings discussed on breast MRI 06/17/2023. There  is a cluster of nodular densities at the right axilla in close proximity  to a cluster of surgical clips and some of these may represent vessels  or lymphatics. Characterization is limited in the absence of IV  contrast. There is no definitive pathologically enlarged node present.  There are no pathologically enlarged nodes at the left axilla and there  is no subpectoral lymphadenopathy, and there is no lymphadenopathy at  the mediastinum or jovita given constraints of noncontrasted technique.  The nodularity along the pleura at the dependent aspect of the lower  chest is likely related to dependent atelectatic change. No definite  suspicious pulmonary nodules are seen. There are no pleural or  pericardial effusions.     ABDOMEN/PELVIS: Noncontrasted liver appears unremarkable. The  gallbladder appears unremarkable and there is no biliary dilatation.  Splenic size is normal. Noncontrasted pancreas, adrenals, and kidneys  appear unremarkable. There is no acute bowel abnormality. Appendix  appears within normal limits. The uterus is slightly lobular in contour  likely secondary to small fibroids. Noncontrasted adnexa appear  unremarkable. There is no free fluid or lymphadenopathy. There are mild  abdominal aortic atherosclerotic changes throughout the abdominal aorta  without aneurysmal dilatation. No suspicious bone lesion is seen.     IMPRESSION:  Given constraints of noncontrasted technique, there is no  convincing evidence for metastatic disease within the chest, abdomen, or  pelvis.      Narrative & Impression   NUCLEAR MEDICINE WHOLE BODY BONE SCAN 7/14/2023     HISTORY: Breast cancer. Evaluate for metastatic disease.     TECHNIQUE:  19.1 mCi of 99m technetium MDP  was administered intravenous  followed by 3 hour delayed phase whole body imaging with selected spot  views.     COMPARISON: None.     FINDINGS:  There is mild increased uptake at the right sternoclavicular  joint that is attributed to arthritis. Arthritic uptake is present in  both shoulders, knees, and mid feet.  There is also mild increased  lumbar spine uptake that is most likely degenerative/arthritic. Both  kidneys and the urinary bladder are visualized.     IMPRESSION:  Areas of mild uptake are typical for arthritis/degenerative  change and there is no scintigraphic evidence to suggest bony metastatic  disease.        MAMMO SCREENING DIGITAL TOMOSYNTHESIS BILATERAL W CAD- 4/14/2023   IMPRESSION:  1. There is an area of focal asymmetry in the middle third retroareolar  region of the right breast. Further evaluation with spot compression CC  and MLO mammographic and Tomosynthesis images and targeted right breast  sonography is recommended.  2. There are no findings suspicious for malignancy in the left breast.     BI-RADS Category 0: Incomplete. Needs additional imaging evaluation.    EXAMINATIONS: UNILATERAL RIGHT DIGITAL DIAGNOSTIC MAMMOGRAPHY WITH  TOMOSYNTHESIS AND LIMITED RIGHT BREAST SONOGRAPHY 5/17/2023  IMPRESSION:  There is an irregular 1 cm hypoechoic mass with internal peripheral  vascularity that is seen in the right breast at the 6:30 position on the  order of 3 cm from the nipple. It is immediately adjacent to a 1.9 cm  oval circumscribed hypoechoic mass. Together both lesions measure on the  order 2.7 cm in greatest dimension. Ultrasound guided biopsy of both  lesions is recommended. Given the close proximity of both lesions, both  lesions may be able to be sampled and submitted as one specimen.     BI-RADS Category 4: Suspicious abnormality or suspicious finding. Biopsy  should be considered.    MRI BREAST BILATERAL W WO CONTRAST-  6/17/2023  IMPRESSION AND RECOMMENDATION:     1.  Adjacent  irregular enhancing masses each measuring 1.6 cm at 6:00 to  7:00 in the middle right breast represent the 2 sites of biopsy-proven  malignancy. Adjacent suspicious enhancing foci and areas of nonmass  enhancement are identified, in addition to an irregular enhancing mass  and multiple additional enhancing foci centered at 2:00 to 3:00 in the  right breast, which are also suspicious. The 2 sites of biopsy-proven  malignancy and suspicious areas of enhancement together measure up to  approximately 8.2 cm in maximum dimension, as detailed above. Surgical  management is recommended.  2.  No MRI evidence of malignancy in the left breast.     BI-RADS Category 4: Suspicious      Assessment & Plan     *Stage IIIA infiltrating ductal carcinoma of the right breast   Treated with a lumpectomy, radiation, 6 cycles of TAC chemotherapy and ongoing hormonal therapy. She completed 10 years of adjuvant hormonal therapy with 5 years of tamoxifen followed by 5 years of Arimidex which she completed in 2017.  Carcinoma of the remaining right breast.  Tumor was positive for HER2/julián overexpression and positive for estrogen receptors and negative for progesterone receptors.  Bilateral mastectomies with immediate implant reconstruction 8/16/2023.  Mediport placement 8/16/2023  Echocardiogram from 6/19/2023 shows normal left ventricular ejection fraction of 61% (patient previously had 6 cycles of Adriamycin containing chemotherapy and will be needing Herceptin therapy postoperatively).  Plan for postop adjuvant treatment with weekly Taxol and Herceptin x12 weeks followed by continued single agent Herceptin every 3 weeks for completion of 12 months of therapy total.  Plan for hormonal therapy possibly with Aromasin to begin once Taxol is completed.  9/14/2023: Patient returns for cycle 1 day 15 weekly Taxol and Herceptin.  Unfortunately she is neutropenic with an ANC of 800 and will not receive Taxol today but will receive Herceptin.  Her  future Taxol doses will be modified to an 85% dose level.  12/1/2023: We will proceed with 12th and final weekly Taxol and  Herceptin in office today.    Initial dose of every 3-week Herceptin at 6 mg/kg delivered 12/8/2023.  Aromasin 25 mg prescribed in January 2024 but patient started taking this only in April 2024.  Continues on Aromasin.  No evidence of recurrent disease  Port can be removed.    *Chemotherapy-induced neuropathy  11/17/2023: Patient continues to use cooling mitts and gloves with treatment.  She has been noticing some mild neuropathy developing in her toes of her bilateral feet.  She feels that this discomfort is more noticeable in the evening when she is trying to sleep.    Change, continue gabapentin 100 mg nightly    *Right upper extremity lymphedema  11/17/2023: Patient has had history of lymphedema in her right upper extremity.  She does feel that in the past week or so her edema has been more noticeable to her.  She has been wearing her compression garments.  She has an appointment with lymphedema clinic next week on 12/8/2023.  12/29/2023: Continues compression garment and has followed up with the lymphedema clinic.    Lymphedema stable at this time      *Bone density  DEXA scan from July 2024 reviewed and bone density normal.  Repeat in 2 years which would be in July 2026    PLAN:   Continue aromasin   Continue neurontin 100mg po nightly   DEXA scan due 7/2026  Continue to follow with cardio oncology   APRN in 4 months and MD in 8 months    Patient is on a high risk medication requiring close monitoring for toxicity.

## 2025-02-28 NOTE — THERAPY RE-EVALUATION
Physical Therapy Lymphedema Re-Evaluation  [facility]     Patient Name: Palma Roblero  : 1965  MRN: [id number]  Today's Date: 2025      Visit Date: 2025    Visit Dx:    ICD-10-CM ICD-9-CM   1. Lymphedema of right upper extremity  I89.0 457.1   2. History of bilateral mastectomy  Z90.13 V45.71   3. Malignant neoplasm of lower-outer quadrant of right breast of female, estrogen receptor positive  C50.511 174.5    Z17.0 V86.0       Patient Active Problem List   Diagnosis    Malignant neoplasm of right female breast    Hyperlipidemia    Sebaceous cyst    Colon cancer screening    History of breast cancer    History of colon polyps    Malignant neoplasm of overlapping sites of right breast in female, estrogen receptor positive    Breast cancer    Encounter for adjustment or management of vascular access device    History of bilateral mastectomy    Lymphedema due to malignant neoplasm    Screening for malignant neoplasm of colon    History of adenomatous polyp of colon        Past Medical History:   Diagnosis Date    Allergic     Breast cancer     Right infiltrating ductal carcinoma, grade I, T2N1, ER/[place] positive, 90% HER-2/julián negative    Cervical polyp     History of benign cervical polyp    Colon polyps     Drug therapy     Hx of radiation therapy     Hypercholesteremia     Hyperthyroidism     NO LONGER PROBLEM    PONV (postoperative nausea and vomiting)     Radiation 2007    Recurrent breast cancer 2023    right--chemo and s/p bilat. mastectomy    Sebaceous cyst     BACK        Past Surgical History:   Procedure Laterality Date    BREAST BIOPSY Right 2006    stereotactic biopsy    BREAST LUMPECTOMY Right 2006    Right axillary sentinel node biopsy (positive), right axillay dissection, right needle lumpectomy-Dr. Quyen Vidal    BREAST RECONSTRUCTION Bilateral 2023    Procedure: BILATERAL PREPECTORAL PLACEMENT  TISSUE EXPANDER AND ALLODERM, ADJACENT LEFT BREAST  TISSUE REARRANGEMENT;  Surgeon: Favian Brooke MD;  Location: [facility] MAIN OR;  Service: Plastics;  Laterality: Bilateral;    BREAST TISSUE EXPANDER REMOVAL INSERTION OF IMPLANT Bilateral 3/1/2024    Procedure: BILATERAL REMOVAL TISSUE EXPANDERS AND PLACEMENT OF IMPLANTS AND LEFT ADJACENT TISSUE REARRANGEMENT;  Surgeon: Favian Brooke MD;  Location: [facility] MAIN OR;  Service: Plastics;  Laterality: Bilateral;    CERVICAL CONE BIOPSY  2005    [facility]    COLONOSCOPY N/A 04/07/2014    3 mm cecal polyp, 6 mm ascending colon polyp, 8 mm transvese colon polyp-Dr. Quyen Vidal    COLONOSCOPY N/A 03/16/2009    Tortuous colon-Dr. Quyen Vidal    COLONOSCOPY N/A 06/11/2018    Procedure: COLONOSCOPY TO CECUM TO TERMINAL ILEUM WITH HOT SNARE POLYPECTOMY;  Surgeon: Quyen Vidal MD;  Location: [facility] ENDOSCOPY;  Service: Gastroenterology    COLPOSCOPY      DILATATION AND CURETTAGE  03/23/2005    EXCISION MASS TRUNK N/A 09/05/2017    Procedure: EXCISION OF SEBACEOUS CYST FROM BACK ;  Surgeon: Rosendo Crum MD;  Location: [facility] MAIN OR;  Service:     MASTECTOMY W/ SENTINEL NODE BIOPSY Bilateral 8/16/2023    Procedure: BREAST MASTECTOMY BILATERAL;  Surgeon: Quyen Vidal MD;  Location: [facility] MAIN OR;  Service: General;  Laterality: Bilateral;    SHOULDER ROTATOR CUFF REPAIR Right 07/2022    VENOUS ACCESS DEVICE (PORT) INSERTION Left 10/06/2006    Left subclavian Bzxfmv-w-Almg placement-Dr. Quyen Vidal    VENOUS ACCESS DEVICE (PORT) INSERTION N/A 8/16/2023    Procedure: with port placement using fluoroscopy and ultrasound;  Surgeon: Quyen Vidal MD;  Location: [facility] MAIN OR;  Service: General;  Laterality: N/A;    VENOUS ACCESS DEVICE (PORT) REMOVAL Left 05/29/2007    Left subclavian Mgbvpg-d-Drww removal-Dr. Quyen Vidal       Visit Dx:    ICD-10-CM ICD-9-CM   1. Lymphedema of right upper extremity  I89.0 457.1   2. History of bilateral mastectomy  Z90.13 V45.71   3. Malignant neoplasm of  lower-outer quadrant of right breast of female, estrogen receptor positive  C50.511 174.5    Z17.0 V86.0            Lymphedema       Row Name 02/28/25 1300             Subjective Pain    Able to rate subjective pain? yes  -LB      Pre-Treatment Pain Level 0  -LB      Post-Treatment Pain Level 0  -LB         Subjective    Subjective Comments I am doing well, still wearing my glove.  -LB         Lymphedema Assessment    Lymphedema Classification RUE:;at risk/stage 0  -LB      Lymphedema Cancer Related Sx right;lumpectomy;axillary dissection  -LB      Lymph Nodes Removed # 16  -LB      Positive Lymph Nodes # 2  -LB      Chemo Received yes  -LB      Radiation Therapy Received yes  -LB      Infections or Cellulitis? no  -LB         Posture/Observations    Posture/Observations Comments WNL  -LB         RUE Quick Girth (cm)    Axilla 35.8 cm  -LB      Mid upper arm 33.4 cm  -LB      Elbow 27.8 cm  -LB      Mid forearm 25 cm  -LB      Wrist crease 16.5 cm  -LB      Web space 21.2 cm  -LB      Met-heads 19 cm  -LB      RUE Quick Girth Total 178.7  -LB                User Key  (r) = Recorded By, (t) = Taken By, (c) = Cosigned By      Initials Name Provider Type    Nina Resendez, JAMESON Physical Therapist                                    Therapy Education  Education Details: dicussed custom compression garments, reviewed managment of condition  Given: Symptoms/condition management, Edema management  Program: Reinforced  How Provided: Verbal  Provided to: Patient  Level of Understanding: Verbalized  03104 - PT Self Care/Mgmt Minutes: 10       OP Exercises       Row Name 02/28/25 1300             Subjective    Subjective Comments I am doing well, still wearing my glove.  -LB         Subjective Pain    Able to rate subjective pain? yes  -LB      Pre-Treatment Pain Level 0  -LB      Post-Treatment Pain Level 0  -LB                User Key  (r) = Recorded By, (t) = Taken By, (c) = Cosigned By      Initials Name Provider Type    LB  Nina Olea, PT Physical Therapist                                 PT OP Goals       Row Name 02/28/25 1300          Long Term Goals    LTG Date to Achieve 10/03/23  -LB     LTG 1 Pt will maintain LDex bioimpedance score WNL.  -LB     LTG 1 Progress Ongoing  -LB     LTG 2 Pt will demonstrate full AROM BUE post-operatively once cleared for mobility/ROM.  -LB     LTG 2 Progress Met  -LB     LTG 3 Pt will verbalize s/s of lymphedema and steps to prevention.  -LB     LTG 3 Progress Met  -LB               User Key  (r) = Recorded By, (t) = Taken By, (c) = Cosigned By      Initials Name Provider Type    LB Nina Olea, PT Physical Therapist                     PT Assessment/Plan       Row Name 02/28/25 1346          PT Assessment    Functional Limitations Other (comment)  RUE lymphedema  -LB     Impairments Impaired lymphatic circulation;Impaired flexibility;Edema;Muscle strength;Sensation  -LB     Assessment Comments Pt continues to demonstrate RUE lymphedema in R hand. However, symptoms are stable and controlled well with daily use of RUE compression glove 20-30 mmHg that was custom ordered in January. Glove is too large at wrist and does not provide adequate compression. We discussed possible options for modification to address inc elasticity at wrist and improve fit. Pt is compliant with MLD and daily compression wear. She has met 2/3 LTGs. She will benefit from continued skilled PT services to reassess RUE lymphedema in 3 months and will plan to re-ordering compression glove with inc forearm coverage.  -LB     Rehab Potential Good  -LB     Patient/caregiver participated in establishment of treatment plan and goals Yes  -LB     Patient would benefit from skilled therapy intervention Yes  -LB        PT Plan    PT Frequency Other (comment)  -LB     Predicted Duration of Therapy Intervention (PT) 6 month reassessment  -LB     Planned CPT's? PT EVAL LOW COMPLEXITY: 66276;PT RE-EVAL: 42122;PT THER PROC EA 15 MIN:  58927;PT THER ACT EA 15 MIN: 26808;PT MANUAL THERAPY EA 15 MIN: 27814;PT NEUROMUSC RE-EDUCATION EA 15 MIN: 49600;PT SELF CARE/HOME MGMT/TRAIN EA 15: 22075;PT BIS XTRACELL FLUID ANALYSIS: 00677  -LB     PT Plan Comments reassess in 6 months, did pt receive improved glove  -LB               User Key  (r) = Recorded By, (t) = Taken By, (c) = Cosigned By      Initials Name Provider Type    Nina Resendez, PT Physical Therapist                                 Time Calculation:   Start Time: 1315  Stop Time: 1330  Time Calculation (min): 15 min  Total Timed Code Minutes- PT: 10 minute(s)  Timed Charges  11715 - PT Self Care/Mgmt Minutes: 10  Total Minutes  Timed Charges Total Minutes: 10   Total Minutes: 10   Therapy Charges for Today       Code Description Service Date Service Provider Modifiers Qty    23976924580 HC PT SELF CARE/MGMT/TRAIN EA 15 MIN 2/28/2025 Nina Olea, PT GP 1                      Nina Olea PT  2/28/2025

## 2025-03-12 ENCOUNTER — PROCEDURE VISIT (OUTPATIENT)
Dept: SURGERY | Facility: CLINIC | Age: 60
End: 2025-03-12
Payer: OTHER GOVERNMENT

## 2025-03-12 VITALS
WEIGHT: 225.2 LBS | BODY MASS INDEX: 38.45 KG/M2 | SYSTOLIC BLOOD PRESSURE: 120 MMHG | OXYGEN SATURATION: 96 % | DIASTOLIC BLOOD PRESSURE: 70 MMHG | HEART RATE: 100 BPM | HEIGHT: 64 IN

## 2025-03-12 DIAGNOSIS — Z86.0100 HISTORY OF COLON POLYPS: Primary | ICD-10-CM

## 2025-03-12 RX ORDER — EXEMESTANE 25 MG/1
25 TABLET ORAL DAILY
COMMUNITY
Start: 2024-09-09

## 2025-03-12 NOTE — PROGRESS NOTES
SURGERY  Operative Note :  YOUNG Roblero  1965    Procedure Date: 03/12/25       Pre-op Diagnosis:  breast cancer, requiring chemo     Post-op Diagnosis:  same     Procedure:   powerport removal, left subclavian (in office)     Surgeon: Young     Assistant: none     Indications:  As above     Findings:   Port had migrated down into the breast dramatically, requiring that the incision be placed other than at insertion site.    no back bleeding from the capsular tract of the catheter.     Recommendations:   Routine recuperation and recovery     Technique:      Supine position, area prepped with Hibiclens and draped sterilely. 1/2% marcaine with epinephrine was used to anesthetize the region. Transverse elliptical excision at the site of the palpable port, carried out with a 15 blade and then dissection thru the subcutaneous tissue until the capsule at the hub was encountered. This was opened with the knife, and extended with scissors to an extent that allowed the port to be grasped with an Allis and pulled from the wound. The catheter was pulled out intact, with no back bleeding, and easy removal of the port itself.  Hemostasis was acquired via pressure.    The skin was closed with 3-0 vicryl interrupted's at the dermis, and 5-0 vicryl at the skin.  Exocuong Vidal MD  03/12/25

## 2025-03-31 ENCOUNTER — PREP FOR SURGERY (OUTPATIENT)
Dept: OTHER | Facility: HOSPITAL | Age: 60
End: 2025-03-31
Payer: OTHER GOVERNMENT

## 2025-03-31 DIAGNOSIS — Z86.0101 HISTORY OF ADENOMATOUS POLYP OF COLON: Primary | ICD-10-CM

## 2025-04-13 DIAGNOSIS — E78.5 HYPERLIPIDEMIA, UNSPECIFIED HYPERLIPIDEMIA TYPE: Chronic | ICD-10-CM

## 2025-04-15 RX ORDER — ATORVASTATIN CALCIUM 10 MG/1
10 TABLET, FILM COATED ORAL DAILY
Qty: 90 TABLET | Refills: 3 | OUTPATIENT
Start: 2025-04-15

## 2025-04-28 DIAGNOSIS — E78.5 HYPERLIPIDEMIA, UNSPECIFIED HYPERLIPIDEMIA TYPE: Chronic | ICD-10-CM

## 2025-04-28 RX ORDER — ATORVASTATIN CALCIUM 10 MG/1
10 TABLET, FILM COATED ORAL DAILY
Qty: 90 TABLET | Refills: 3 | OUTPATIENT
Start: 2025-04-28

## 2025-05-02 DIAGNOSIS — E78.5 HYPERLIPIDEMIA, UNSPECIFIED HYPERLIPIDEMIA TYPE: Chronic | ICD-10-CM

## 2025-05-02 RX ORDER — ATORVASTATIN CALCIUM 10 MG/1
10 TABLET, FILM COATED ORAL DAILY
Qty: 90 TABLET | Refills: 0 | Status: SHIPPED | OUTPATIENT
Start: 2025-05-02

## 2025-05-02 NOTE — TELEPHONE ENCOUNTER
Rx Refill Note  Requested Prescriptions     Pending Prescriptions Disp Refills    atorvastatin (LIPITOR) 10 MG tablet 90 tablet 0     Sig: Take 1 tablet by mouth Daily.      Last office visit with prescribing clinician: 12/29/2023   Last telemedicine visit with prescribing clinician: Visit date not found   Next office visit with prescribing clinician: 5/9/2025

## 2025-05-09 ENCOUNTER — OFFICE VISIT (OUTPATIENT)
Dept: INTERNAL MEDICINE | Facility: CLINIC | Age: 60
End: 2025-05-09
Payer: COMMERCIAL

## 2025-05-09 VITALS
DIASTOLIC BLOOD PRESSURE: 78 MMHG | TEMPERATURE: 96.9 F | BODY MASS INDEX: 38.41 KG/M2 | HEIGHT: 64 IN | SYSTOLIC BLOOD PRESSURE: 122 MMHG | WEIGHT: 225 LBS

## 2025-05-09 DIAGNOSIS — I89.0 LYMPHEDEMA DUE TO MALIGNANT NEOPLASM: ICD-10-CM

## 2025-05-09 DIAGNOSIS — Z85.3 HISTORY OF BREAST CANCER: ICD-10-CM

## 2025-05-09 DIAGNOSIS — E78.5 HYPERLIPIDEMIA, UNSPECIFIED HYPERLIPIDEMIA TYPE: Chronic | ICD-10-CM

## 2025-05-09 DIAGNOSIS — E66.812 CLASS 2 OBESITY DUE TO EXCESS CALORIES WITHOUT SERIOUS COMORBIDITY WITH BODY MASS INDEX (BMI) OF 38.0 TO 38.9 IN ADULT: ICD-10-CM

## 2025-05-09 DIAGNOSIS — Z13.1 SCREENING FOR DIABETES MELLITUS (DM): ICD-10-CM

## 2025-05-09 DIAGNOSIS — Z13.29 SCREENING FOR THYROID DISORDER: ICD-10-CM

## 2025-05-09 DIAGNOSIS — E66.09 CLASS 2 OBESITY DUE TO EXCESS CALORIES WITHOUT SERIOUS COMORBIDITY WITH BODY MASS INDEX (BMI) OF 38.0 TO 38.9 IN ADULT: ICD-10-CM

## 2025-05-09 DIAGNOSIS — C80.1 LYMPHEDEMA DUE TO MALIGNANT NEOPLASM: ICD-10-CM

## 2025-05-09 DIAGNOSIS — Z00.00 ANNUAL PHYSICAL EXAM: Primary | ICD-10-CM

## 2025-05-09 DIAGNOSIS — Z13.6 ENCOUNTER FOR SCREENING FOR CARDIOVASCULAR DISORDERS: ICD-10-CM

## 2025-05-09 LAB
ALBUMIN SERPL-MCNC: 4.2 G/DL (ref 3.5–5.2)
ALBUMIN/GLOB SERPL: 1.6 G/DL
ALP SERPL-CCNC: 72 U/L (ref 39–117)
ALT SERPL-CCNC: 18 U/L (ref 1–33)
AST SERPL-CCNC: 19 U/L (ref 1–32)
BASOPHILS # BLD AUTO: 0.03 10*3/MM3 (ref 0–0.2)
BASOPHILS NFR BLD AUTO: 0.5 % (ref 0–1.5)
BILIRUB SERPL-MCNC: 0.6 MG/DL (ref 0–1.2)
BUN SERPL-MCNC: 14 MG/DL (ref 8–23)
BUN/CREAT SERPL: 16.3 (ref 7–25)
CALCIUM SERPL-MCNC: 9.3 MG/DL (ref 8.6–10.5)
CHLORIDE SERPL-SCNC: 103 MMOL/L (ref 98–107)
CHOLEST SERPL-MCNC: 228 MG/DL (ref 0–200)
CO2 SERPL-SCNC: 26 MMOL/L (ref 22–29)
CREAT SERPL-MCNC: 0.86 MG/DL (ref 0.57–1)
EGFRCR SERPLBLD CKD-EPI 2021: 76.5 ML/MIN/1.73
EOSINOPHIL # BLD AUTO: 0.09 10*3/MM3 (ref 0–0.4)
EOSINOPHIL NFR BLD AUTO: 1.4 % (ref 0.3–6.2)
ERYTHROCYTE [DISTWIDTH] IN BLOOD BY AUTOMATED COUNT: 13.3 % (ref 12.3–15.4)
GLOBULIN SER CALC-MCNC: 2.7 GM/DL
GLUCOSE SERPL-MCNC: 85 MG/DL (ref 65–99)
HBA1C MFR BLD: 6 % (ref 4.8–5.6)
HCT VFR BLD AUTO: 43.1 % (ref 34–46.6)
HDLC SERPL-MCNC: 45 MG/DL (ref 40–60)
HGB BLD-MCNC: 13.9 G/DL (ref 12–15.9)
IMM GRANULOCYTES # BLD AUTO: 0.02 10*3/MM3 (ref 0–0.05)
IMM GRANULOCYTES NFR BLD AUTO: 0.3 % (ref 0–0.5)
LDLC SERPL CALC-MCNC: 172 MG/DL (ref 0–100)
LYMPHOCYTES # BLD AUTO: 2.23 10*3/MM3 (ref 0.7–3.1)
LYMPHOCYTES NFR BLD AUTO: 35.7 % (ref 19.6–45.3)
MCH RBC QN AUTO: 26.3 PG (ref 26.6–33)
MCHC RBC AUTO-ENTMCNC: 32.3 G/DL (ref 31.5–35.7)
MCV RBC AUTO: 81.6 FL (ref 79–97)
MONOCYTES # BLD AUTO: 0.57 10*3/MM3 (ref 0.1–0.9)
MONOCYTES NFR BLD AUTO: 9.1 % (ref 5–12)
NEUTROPHILS # BLD AUTO: 3.31 10*3/MM3 (ref 1.7–7)
NEUTROPHILS NFR BLD AUTO: 53 % (ref 42.7–76)
NRBC BLD AUTO-RTO: 0 /100 WBC (ref 0–0.2)
PLATELET # BLD AUTO: 206 10*3/MM3 (ref 140–450)
POTASSIUM SERPL-SCNC: 4.2 MMOL/L (ref 3.5–5.2)
PROT SERPL-MCNC: 6.9 G/DL (ref 6–8.5)
RBC # BLD AUTO: 5.28 10*6/MM3 (ref 3.77–5.28)
SODIUM SERPL-SCNC: 140 MMOL/L (ref 136–145)
TRIGL SERPL-MCNC: 64 MG/DL (ref 0–150)
TSH SERPL DL<=0.005 MIU/L-ACNC: 1.67 UIU/ML (ref 0.27–4.2)
VLDLC SERPL CALC-MCNC: 11 MG/DL (ref 5–40)
WBC # BLD AUTO: 6.25 10*3/MM3 (ref 3.4–10.8)

## 2025-05-09 RX ORDER — ATORVASTATIN CALCIUM 10 MG/1
10 TABLET, FILM COATED ORAL DAILY
Qty: 90 TABLET | Refills: 0 | Status: SHIPPED | OUTPATIENT
Start: 2025-05-09

## 2025-05-09 NOTE — ASSESSMENT & PLAN NOTE
Current recommendations according to the current Physical Activity Guidelines for Americans: adults need 150-300 minutes of physical exercise weekly. It is also recommended to perform two sessions of full body strength training exercise weekly which includes all major muscle groups including legs, hips, back, abdomen, chest, shoulders, and arms.   Current CDC recommendations for diet include following a diet that emphasizes fruits, vegetables, whole grains that is low in saturated fats and low in sugar intake.   Adults should consume at least 3 cup equivalents of fruit and vegetables daily. It is also beneficial to get 25 grams of fiber daily unless told otherwise by your healthcare provider    Anticipatory guidance given regarding health prevention/wellness, diet/exercise, tobacco/alcohol/drug education, exercise and wellbeing, covid 19 guidance, vaccination recommendations, and sexual health/STD education.   Recommended bi-yearly dental exams and regular vision examinations.    Per CDC recommendations each week the average adult needs 150 minutes (30 min workouts 3-5 times a week) of moderate - intensity physical activity and 2 days of muscle strengthening.

## 2025-05-09 NOTE — PROGRESS NOTES
Chief Complaint  Annual Exam     Subjective:      History of Present Illness {CC  Problem List  Visit  Diagnosis   Encounters  Notes  Medications  Labs  Result Review Imaging  Media :23}     Palma Roblero presents to [facility] for:    Patient or patient representative verbalized consent for the use of Ambient Listening during the visit with  ZAN Botello for chart documentation. 5/25/2025  11:27 EDT     History of Present Illness          The patient presents for a physical exam.    She has been under the care of an oncologist and radiologist for the past 19 months, with her most recent consultation occurring approximately one month ago. She has completed all necessary treatments and is currently in remission. However, she expresses reluctance to continue her medication regimen due to concerns about potential side effects, particularly joint-related issues. She has attempted to manage these symptoms with tart cherry, but without significant improvement.    She has undergone a lumpectomy and bilateral mastectomy, followed by reconstruction with expanders. She reports dissatisfaction with the size of her implants, desiring a slight increase. She has consulted with Dr. Hood, her gynecologist, who suggested that her insurance may cover the cost of additional surgery. She also reports discomfort related to the absence of nipples, which she was advised against tattooing due to infection risk. She has not been referred to a high-risk breast clinic and has not joined any support groups. She experiences lymphedema, which she manages with a glove.    She is scheduled for a colonoscopy on 06/16/2025. Her last dental and eye examinations were conducted two months ago. She uses glasses for computer work. She maintains an active lifestyle, including walking and jogging, and is considering incorporating weight training into her routine. She recently had a Pap smear  performed by Dr. Hood and has undergone bone density scans since starting her current medication. She reports no family history of diabetes, colon, ovarian, or prostate cancers, or breast cancer. She does not smoke and reports normal bowel and bladder function.    PAST SURGICAL HISTORY:  - Lumpectomy  - Bilateral mastectomy  - Reconstruction with expanders    SOCIAL HISTORY  She is  from her  and works full-time.    FAMILY HISTORY  She reports no family history of diabetes, colon, ovarian, or prostate cancers, or breast cancer.     Palma is here for coordination of medical care, to discuss health maintenance, disease prevention as well as to followup on medical problems.     Past Medical History:   Diagnosis Date    Allergic     Breast cancer 2006    Right infiltrating ductal carcinoma, grade I, T2N1, ER/AZ positive, 90% HER-2/julián negative    Cervical polyp     History of benign cervical polyp    Colon polyps     Drug therapy     Hx of radiation therapy     Hypercholesteremia     Hyperthyroidism     NO LONGER PROBLEM    PONV (postoperative nausea and vomiting)     Radiation 2007    Recurrent breast cancer 06/23/2023    right--chemo and s/p bilat. mastectomy    Sebaceous cyst     BACK     Family History   Problem Relation Age of Onset    Alzheimer's disease Mother     No Known Problems Father     Hyperlipidemia Sister     Hypertension Brother     No Known Problems Maternal Aunt     No Known Problems Paternal Aunt     Heart attack Paternal Uncle     Heart disease Paternal Uncle     Heart disease Paternal Uncle     Heart attack Paternal Uncle     No Known Problems Maternal Grandmother     No Known Problems Paternal Grandmother     Miscarriages / Stillbirths Daughter     ADD / ADHD Son     BRCA 1/2 Neg Hx     Breast cancer Neg Hx     Colon cancer Neg Hx     Endometrial cancer Neg Hx     Ovarian cancer Neg Hx     Malig Hyperthermia Neg Hx      Social History     Tobacco Use   Smoking Status Never    Smokeless Tobacco Never   Tobacco Comments        No Caffeine      Social History     Substance and Sexual Activity   Alcohol Use No       Patient Care Team:  Carmencita Hou APRN as PCP - General (Family Medicine)  Quyen Vidal MD as Referring Physician (General Surgery)  Zuly Reynolds MD as Consulting Physician (Cardiology)  Letitia Landis MD as Consulting Physician (Hematology and Oncology)     Social:  Marital status: . She feels safe at home  Employment: full time.    Dental visits: [facility] 2 times a year.  Dental health: good.     Brushes teeth 2 times a day, flosses occasionally.   Vision correction: glasses    Hearing: normal.  Activity level is moderate.   Exercises 3 per week.     Weight trend is     Health and Weight:   Weight trend is   Wt Readings from Last 4 Encounters:   05/09/25 102 kg (225 lb)   04/05/25 99.8 kg (220 lb)   03/12/25 102 kg (225 lb 3.2 oz)   02/28/25 103 kg (226 lb 6.4 oz)       Class 2 Severe Obesity (BMI >=35 and <=39.9). Obesity-related health conditions include the following: hypertension and dyslipidemias. Obesity is newly identified. BMI is is above average; BMI management plan is completed. We discussed low calorie, low carb based diet program, portion control, increasing exercise, management of depression/anxiety/stress to control compensatory eating, and Information on healthy weight added to patient's after visit summary.      Mental Health Check:   Depression screen: PHQ-2 Total Score:    PHQ-9 Total Score:   0    Blood Pressure: (Goal BP is SBP less than 140 / DBP less than 90)  BP Readings from Last 3 Encounters:   05/09/25 122/78   04/05/25 129/79   03/12/25 120/70        Health Maintenance Female:  Current GYN provider:   Last gynecology appointment:   Patient's last mammogram was 06/08/23  Last Completed Mammogram            Completed or No Longer Recommended       MAMMOGRAM  Discontinued        Frequency changed to Never automatically (Topic No Longer  Applies)    06/08/2023  Mammo Diagnostic Right With CAD    05/17/2023  Mammo Diagnostic Digital Tomosynthesis Right With CAD    04/14/2023  Mammo Screening Digital Tomosynthesis Bilateral With CAD    04/08/2022  Mammo Screening Digital Tomosynthesis Bilateral With CAD     Only the first 5 history entries have been loaded, but more history exists.                         Advised routine self-breast exams monthly.  Sexually active: No  Pap smears have been: benign   Dexa scan (65 for women and 70 for men with fractures) - DEXA Bone Density Axial (07/31/2024 3:19 PM)     Per ACOG Guidelines:  Age 21-29: screening every 3 years         30-65- pap and & HPV every 5 years         66 or older - three negative pap -done     - two negative HPV test in a row - done     - two neg Co-Test in a row within past 10 years - done  For patients who have had a hysterectomy and have no history of cervical cancer or BRITTANIE our recommendations are based on type of hysterectomy:  ?Total hysterectomy (with cervix removed) - We recommend that patients who have undergone total hysterectomy and have no history of cervical cancer or BRITTANIE not undergo screening for cervical cancer or screening for vaginal cancer.  Patients who have undergone a hysterectomy in which the cervix was removed and have never had any screening abnormality are at a very small risk of cervical cancer. Although it was once believed that patients without a uterus were at increased risk for vaginal cancer, studies show no association between total hysterectomy for benign disease and subsequent vaginal carcinoma.  ?Subtotal hysterectomy (cervix intact) - Patients who have undergone subtotal hysterectomy and have no history of BRITTANIE likely share the same risk of cervical cancer as patients with an intact uterus and cervix, and screening recommendations should follow the guidelines for average-risk and higher risk patients described above.     STI Screen:   [] HIV     [] Syphilis    [] Chlamydia/ Gonorrhea   - If tests ordered, patient was informed HIV results will not show up on my chart.   [x] Declines STD screen    Vaccines Due:   [] Prevnar 20     [] Flu  [] Shingrix (shingles: series of 2)   [] TDap  [] COVID (booster)     [] RSV  [] HPV series       [x] Declines vaccines today    Health Risk Evaluation:  1. Cardiovascular risk factors: hypertension, hyperlipidemia, obesity, sedentary life style, age.  2. Diabetes risk factors: obesity and sedentary life style.   3. Cancer risk factors: skin type associated with high risk of skin cancers.  4. Hepatitis  C screen: [] Due  [x] Completed :   5. Dexa scan (65 for women and 70 for men with fractures)     The 10-year ASCVD risk score (Marina DUNLAP, et al., 2019) is: 6.7%    Values used to calculate the score:      Age: 60 years      Sex: Female      Is Non- : Yes      Diabetic: No      Tobacco smoker: No      Systolic Blood Pressure: 122 mmHg      Is BP treated: No      HDL Cholesterol: 45 mg/dL      Total Cholesterol: 228 mg/dL     Colon cancer screen:  (Screening starts 44 y/o)  She has change in bowel habits.   Patient's last colonoscopy was This June 2025.   Last Completed Colonoscopy    This patient has no relevant Health Maintenance data.        She was advised to repeat in this year.  Family history Colon Cancer: [x] None    [] Yes:     Lung Cancer Screen: (Screening starts 50-81 y/o, if current or former smoke quit in past 15 years, 20 or more pack-years of smoking, continue to screen until age 80 or 15 years from quit date)   Low Dose CT scan is ordered as screening:   [x] Nonsmoker  [] History of smoking  [] Current    [] Hypertension   [] Hyperlipidemia  [] Diabetes    [x] Obesity  [] Family history   [] Current or hx tobacco use  [x] Sedentary lifestyle   [] Post-menopausal     Skin Cancer:   Regular Sunsceen: Advised  Routine self assessment of your skin, report any changes.   Always where sunglass when  outside with UV protection      I have reviewed patient's medical history, any new submitted information provided by patient or medical assistant and updated medical record.      Objective:      Physical Exam  Vitals reviewed.   Constitutional:       General: She is awake. She is not in acute distress.     Appearance: Normal appearance. She is well-groomed. She is not ill-appearing, toxic-appearing or diaphoretic.   HENT:      Head: Normocephalic.      Right Ear: Hearing normal.      Left Ear: Hearing normal.      Nose: Nose normal.      Mouth/Throat:      Lips: Pink.      Mouth: Mucous membranes are moist.   Eyes:      General: Lids are normal. Vision grossly intact.         Right eye: No foreign body, discharge or hordeolum.         Left eye: No foreign body, discharge or hordeolum.      Extraocular Movements: Extraocular movements intact.      Conjunctiva/sclera: Conjunctivae normal.      Pupils: Pupils are equal, round, and reactive to light. Pupils are equal.   Neck:      Thyroid: No thyroid mass, thyromegaly or thyroid tenderness.      Vascular: No carotid bruit or JVD.      Trachea: Trachea and phonation normal.   Cardiovascular:      Rate and Rhythm: Normal rate and regular rhythm.      Pulses: Normal pulses.           Radial pulses are 2+ on the right side and 2+ on the left side.        Dorsalis pedis pulses are 2+ on the right side and 2+ on the left side.      Heart sounds: Normal heart sounds, S1 normal and S2 normal. No murmur heard.     No friction rub. No gallop.   Pulmonary:      Effort: Pulmonary effort is normal. No respiratory distress.      Breath sounds: Normal breath sounds. No stridor, decreased air movement or transmitted upper airway sounds. No decreased breath sounds, wheezing, rhonchi or rales.   Chest:      Chest wall: No tenderness.   Breasts:     Breasts are asymmetrical.      Comments: Bilateral Nipples removed Asymmetry and healed scarring from surgical procedures due to breast  cancer.   Abdominal:      General: Abdomen is flat. Bowel sounds are normal. There is no distension.      Palpations: Abdomen is soft. There is no hepatomegaly, splenomegaly, mass or pulsatile mass.      Tenderness: There is no abdominal tenderness. There is no right CVA tenderness, left CVA tenderness, guarding or rebound.      Hernia: No hernia is present.   Musculoskeletal:         General: No swelling, tenderness, deformity or signs of injury. Normal range of motion.      Cervical back: Normal, full passive range of motion without pain and normal range of motion. No rigidity or tenderness.      Thoracic back: Normal.      Lumbar back: Normal.      Right lower leg: No edema.      Left lower leg: No edema.   Lymphadenopathy:      Head:      Right side of head: No submental, submandibular, tonsillar or preauricular adenopathy.      Left side of head: No submental, submandibular, tonsillar or preauricular adenopathy.      Cervical: No cervical adenopathy.      Right cervical: No superficial cervical adenopathy.     Left cervical: No superficial cervical adenopathy.   Skin:     General: Skin is warm and dry.      Capillary Refill: Capillary refill takes less than 2 seconds.      Coloration: Skin is not jaundiced or pale.      Findings: No bruising, erythema, lesion or rash.   Neurological:      General: No focal deficit present.      Mental Status: She is alert and oriented to person, place, and time. Mental status is at baseline.      Motor: No weakness.      Gait: Gait normal.   Psychiatric:         Attention and Perception: Attention and perception normal.         Mood and Affect: Mood and affect normal.         Speech: Speech normal.         Behavior: Behavior normal. Behavior is cooperative.         Thought Content: Thought content normal.         Cognition and Memory: Cognition and memory normal.         Judgment: Judgment normal.        Result Review  Data Reviewed:{ Labs  Result Review  Imaging  Med Tab   "Media :23}     Results         The following data was reviewed by: ZAN Botello on 05/09/2025  CMP          2/28/2025    13:56 5/9/2025    12:33   CMP   Glucose 92  85    BUN 17  14    Creatinine 0.85  0.86    EGFR 78.1  76.5    Sodium 141  140    Potassium 3.8  4.2    Chloride 104  103    Calcium 9.4  9.3    Total Protein 7.1  6.9    Albumin 4.0  4.2    Globulin 3.1  2.7    Total Bilirubin 0.6  0.6    Alkaline Phosphatase 70  72    AST (SGOT) 21  19    ALT (SGPT) 21  18    Albumin/Globulin Ratio 1.3  1.6    BUN/Creatinine Ratio 20.0  16.3    Anion Gap 11.2       CBC          9/13/2024    13:43 2/28/2025    13:56 5/9/2025    12:33   CBC   WBC 6.74  6.75  6.25    RBC 4.75  5.04  5.28    Hemoglobin 12.6  13.6  13.9    Hematocrit 39.7  42.2  43.1    MCV 83.6  83.7  81.6    MCH 26.5  27.0  26.3    MCHC 31.7  32.2  32.3    RDW 13.8  13.8  13.3    Platelets 165  195  206      CBC w/diff          8/23/2024    14:50 9/13/2024    13:43 2/28/2025    13:56   CBC w/Diff   WBC 5.46  6.74  6.75    RBC 4.99  4.75  5.04    Hemoglobin 13.2  12.6  13.6    Hematocrit 41.2  39.7  42.2    MCV 82.6  83.6  83.7    MCH 26.5  26.5  27.0    MCHC 32.0  31.7  32.2    RDW 13.9  13.8  13.8    Platelets 192  165  195    Neutrophil Rel % 59.2  63.6  56.5    Immature Granulocyte Rel % 0.4  0.3  0.1    Lymphocyte Rel % 31.0  23.6  33.0    Monocyte Rel % 7.0  10.2  8.0    Eosinophil Rel % 2.0  1.9  1.8    Basophil Rel % 0.4  0.4  0.6      Lipid Panel          5/9/2025    12:33   Lipid Panel   Total Cholesterol 228    Triglycerides 64    HDL Cholesterol 45    VLDL Cholesterol 11    LDL Cholesterol  172      TSH          5/9/2025    12:33   TSH   TSH 1.670      A1C Last 3 Results          5/9/2025    12:33   HGBA1C Last 3 Results   Hemoglobin A1C 6.00               Vital Signs:   /78 (BP Location: Left arm, Patient Position: Sitting, Cuff Size: Adult)   Temp 96.9 °F (36.1 °C) (Temporal)   Ht 162.6 cm (64.02\")   Wt 102 kg (225 lb)   " BMI 38.60 kg/m²             Requested Prescriptions     Signed Prescriptions Disp Refills    atorvastatin (LIPITOR) 10 MG tablet 90 tablet 0     Sig: Take 1 tablet by mouth Daily.       Routine medications provided by this office will also be refilled via pharmacy request.       Current Outpatient Medications:     acetaminophen (TYLENOL) 325 MG tablet, Take 2 tablets by mouth Every 4 (Four) Hours As Needed for Mild Pain., Disp: 60 tablet, Rfl: 0    atorvastatin (LIPITOR) 10 MG tablet, Take 1 tablet by mouth Daily., Disp: 90 tablet, Rfl: 0    exemestane (AROMASIN) 25 MG tablet, Take 1 tablet by mouth Daily., Disp: , Rfl:      Assessment and Plan:      Assessment and Plan {CC Problem List  Visit Diagnosis  ROS  Review (Popup)  Health Maintenance  Quality  BestPractice  Medications  SmartSets  SnapShot Encounters  Media :23}     Problem List Items Addressed This Visit       Hyperlipidemia (Chronic)    Relevant Medications    atorvastatin (LIPITOR) 10 MG tablet    Other Relevant Orders    Lipid Panel (Completed)    History of breast cancer          Overview    Added automatically from request for surgery [id number]        Lymphedema due to malignant neoplasm    Overview   Patient currently being treated at lymphedema clinic         Annual physical exam - Primary    Current Assessment & Plan   Current recommendations according to the current Physical Activity Guidelines for Americans: adults need 150-300 minutes of physical exercise weekly. It is also recommended to perform two sessions of full body strength training exercise weekly which includes all major muscle groups including legs, hips, back, abdomen, chest, shoulders, and arms.   Current CDC recommendations for diet include following a diet that emphasizes fruits, vegetables, whole grains that is low in saturated fats and low in sugar intake.   Adults should consume at least 3 cup equivalents of fruit and vegetables daily. It is also beneficial to get 25  grams of fiber daily unless told otherwise by your healthcare provider    Anticipatory guidance given regarding health prevention/wellness, diet/exercise, tobacco/alcohol/drug education, exercise and wellbeing, covid 19 guidance, vaccination recommendations, and sexual health/STD education.   Recommended bi-yearly dental exams and regular vision examinations.    Per CDC recommendations each week the average adult needs 150 minutes (30 min workouts 3-5 times a week) of moderate - intensity physical activity and 2 days of muscle strengthening.             Other Visit Diagnoses         Screening for thyroid disorder        Relevant Orders    TSH Rfx On Abnormal To Free T4 (Completed)      Screening for diabetes mellitus (DM)        Relevant Orders    Hemoglobin A1c (Completed)      Encounter for screening for cardiovascular disorders        Relevant Orders    Comprehensive Metabolic Panel (Completed)    CBC & Differential (Completed)                 1. Breast cancer.  - She has completed all treatments and is currently in remission.  - She has been advised to continue taking her medication for 5 years despite concerns about joint pain. Tart cherry supplements were discussed as a potential aid for joint pain.  - She was also informed about the option of prosthetic implants with a lower risk of encapsulation and was encouraged to consult with her insurance provider regarding coverage for reconstruction surgery.  - The possibility of nipple tattoos was discussed, but she was advised to consider the risk of infection.    2. Health maintenance.  - Her blood pressure readings are within the normal range.  - A colonoscopy is scheduled for 06/16/2025.  - She had a Pap smear recently with Dr. Hood, and no issues were reported. A bone density scan is due this year.  - She was advised to incorporate weight training into her exercise routine to build bone density and improve overall fitness. Laboratory tests will be conducted  today to monitor her cholesterol levels.    Follow-up  - The patient will follow up in 6 months.       Patient Counseling:    Diet:    Eat vegetables, fruits, whole grain, low-fat dairy, poultry, fish, beans, nontropical vegetable oils, and nuts, but avoid red meat (i.e., Mediterranean-style diet, DASH [Dietary Approaches to Stop Hypertension] diet).  Limit sugary drinks and sweets.  Limit saturated and trans fat to 5% to 6% of calories.  Limit sodium intake to 2,400 mg daily (about one teaspoon table salt [kosher/sea salt have less sodium per teaspoon]).    Exercise:   Engage in moderate-to-vigorous aerobic activity for at least 40 minutes (on average) three to four times each week.    Weight loss / Calorie Counting Apps:    Lose It!   MyFitness Pal   Works great when you try it with a partner/ friend    Wearables:   Activity tracker   Step tracker     Skin Care:  Practice Safe Sun  Use sun screen SPF >30 daily  Protect your eyes with sunglasses   Dermatologist for skin check regularly    Bone Health:   https://www.nof.org/patients/treatment/nutrition/    Substance Abuse:   Discussed cessation/primary prevention of tobacco, alcohol, or other drug use; driving or other dangerous activities under the influence; availability of treatment for abuse.     Sexuality:   Discussed sexually transmitted diseases, partner selection, use of condoms, avoidance of unintended pregnancy, sexuality  and contraceptive alternatives.     Injury prevention:   Discussed safety belts, safety helmets, smoke detector, smoking near bedding or upholstery.     Dental health:   Discussed importance of regular tooth brushing, flossing, and dental visits.    Colon Cancer screen (discussed benefits of screening colonoscopy)  Should usually start at 45 yoa.     Immunizations reviewed  Recommend Shingles vaccine at age 50 (Shingrix which is 2 doses)     Living Will  https://ag.[place].gov/publications/AG%20Publications/livingwillpacket.pdf    Please  complete your lab work today. Following review of results our office will be in contact with you for follow up care, medication changes or further instructions if needed. At that time if we need to schedule a follow up appointment one will be made at the time of the call.    Thank you for allowing us to care for you,  ZAN Botello    Follow Up {Instructions Charge Capture  Follow-up Communications :23}     Return in about 6 months (around 11/9/2025) for Recheck.      Patient was given instructions and counseling regarding her condition or for health maintenance advice. Please see specific information pulled into the AVS if appropriate.        Dragon disclaimer:   Much of this encounter note is an electronic transcription/translation of spoken language to printed text. The electronic translation of spoken language may permit erroneous, or at times, nonsensical words or phrases to be inadvertently transcribed; Although I have reviewed the note for such errors, some may still exist.     Additional Patient Counseling:       There are no Patient Instructions on file for this visit.

## 2025-05-12 ENCOUNTER — RESULTS FOLLOW-UP (OUTPATIENT)
Dept: INTERNAL MEDICINE | Facility: CLINIC | Age: 60
End: 2025-05-12
Payer: OTHER GOVERNMENT

## 2025-05-12 NOTE — PROGRESS NOTES
Ms Roblero,   I have reviewed your lab work. Your CMP shows stable liver and kidney function. Your thyroid function is within normal range. Your diabetes numbers are elevated. Please cut back on sugars, sweet and begin to exercise routinely. Your cholesterol is elevated. LDL goal is under 100. Triglyceride goal is under 150. I recommend following a lower saturated fat and lower refined carbohydrate diet.  Please work on dietary changes such as reducing added sugars, decreasing saturated fats, and increase foods with omega-3 fatty acids. Also, engaging in 30 minutes of exercise 5 times per week can improve your cholesterol.     Please make an appointment to see us again in three months so we can recheck these numbers and monitor your cholesterol and diabetes risk.     Please let us know if you have any further questions or concerns.  Thank you for allowing us to care for you,  ZAN Botello

## 2025-05-16 NOTE — TELEPHONE ENCOUNTER
Call pt [phone] and left detail message regarding lab results as well as Pt to call and sched a 3 month appt for 8/2025 to recheck diabetes and cholesterol numbers. Pt made aware she can log into [employer] to view same message left on vm

## 2025-05-27 NOTE — PROGRESS NOTES
Date of Office Visit: 2025  Encounter Provider: ZAN Mccoy  Place of Service: [facility] CARDIOLOGY  Patient Name: Palma Roblero  :1965    Chief complaint  Cardio oncology care     History of Present Illness  Patient is a 60 y.o. year old female  patient of Dr. Reynolds. Past medical history includes hyperlipidemia hypothyroidism with history of right-sided breast cancer treated since  with lumpectomy chemoradiation (received Taxol AC therapy) and 5 years of tamoxifen and then aromatase inhibitor therapy.  On 2023 she was found to have recurrent breast cancer right breast.  She underwent bilateral mastectomy 2023.  She has adjuvant chemotherapy with Taxol and Herceptin first dose 9/15/2023 with dose reduction subsequently of Taxol.  She completed chemotherapy (Herceptin) on 2024     Baseline echo performed on 2023 shows ejection fraction of 61.5% with GLS -21.9%, normal diastolic function no significant valvular heart disease.  Baseline troponin and proBNP were normal.  Echo 10/16/2023 showed ejection fraction 61% with GLS -23.9%.  Diastolic function was normal, nose no significant pulm hypertension or valvular dysfunction.  Echo 2024 showed an ejection fraction of 64%, GLS -22.3% no significant valvular dysfunction grade 1 diastolic function.  Will 2024 ejection fraction 62.3% GLS -20.5%, normal diastolic function.  Echo on 2024 with EF 59% GLS -22%. Echo completed today with results pending.    Interval history  Patient presents today for routine follow-up.  I will visit with her today and have reviewed her medical record.  Since last visit she has been doing well.  She is walking daily but notes that she was jogging prior to diagnosis.  She is trying to work back up to this.  She denies palpitations, shortness of breath, edema, dizziness, chest pain or chest pressure, fatigue, syncope or presyncope.  Blood pressure is  well-controlled.    Past Medical History:   Diagnosis Date    Allergic     Breast cancer 2006    Right infiltrating ductal carcinoma, grade I, T2N1, ER/KS positive, 90% HER-2/julián negative    Cervical polyp     History of benign cervical polyp    Colon polyps     Drug therapy     Hx of radiation therapy     Hypercholesteremia     Hyperthyroidism     NO LONGER PROBLEM    PONV (postoperative nausea and vomiting)     Radiation 2007    Recurrent breast cancer 06/23/2023    right--chemo and s/p bilat. mastectomy    Sebaceous cyst     BACK     Past Surgical History:   Procedure Laterality Date    BREAST BIOPSY Right 08/03/2006    stereotactic biopsy    BREAST LUMPECTOMY Right 08/18/2006    Right axillary sentinel node biopsy (positive), right axillay dissection, right needle lumpectomy-Dr. Quyen Vidal    BREAST RECONSTRUCTION Bilateral 8/16/2023    Procedure: BILATERAL PREPECTORAL PLACEMENT  TISSUE EXPANDER AND ALLODERM, ADJACENT LEFT BREAST TISSUE REARRANGEMENT;  Surgeon: Favian Brooke MD;  Location: [facility] OR;  Service: Plastics;  Laterality: Bilateral;    BREAST TISSUE EXPANDER REMOVAL INSERTION OF IMPLANT Bilateral 3/1/2024    Procedure: BILATERAL REMOVAL TISSUE EXPANDERS AND PLACEMENT OF IMPLANTS AND LEFT ADJACENT TISSUE REARRANGEMENT;  Surgeon: Favian Brooke MD;  Location: [facility] OR;  Service: Plastics;  Laterality: Bilateral;    CERVICAL CONE BIOPSY  2005    [facility]    COLONOSCOPY N/A 04/07/2014    3 mm cecal polyp, 6 mm ascending colon polyp, 8 mm transvese colon polyp-Dr. Quyen Vidal    COLONOSCOPY N/A 03/16/2009    Tortuous colon-Dr. Quyen Vidal    COLONOSCOPY N/A 06/11/2018    Procedure: COLONOSCOPY TO CECUM TO TERMINAL ILEUM WITH HOT SNARE POLYPECTOMY;  Surgeon: Quyen Vidal MD;  Location: [facility] ENDOSCOPY;  Service: Gastroenterology    COLPOSCOPY      DILATATION AND CURETTAGE  03/23/2005    EXCISION MASS TRUNK N/A 09/05/2017    Procedure: EXCISION OF SEBACEOUS CYST  FROM BACK ;  Surgeon: Rosendo Crum MD;  Location: [facility] MAIN OR;  Service:     MASTECTOMY W/ SENTINEL NODE BIOPSY Bilateral 8/16/2023    Procedure: BREAST MASTECTOMY BILATERAL;  Surgeon: Quyen Vidal MD;  Location: [facility] MAIN OR;  Service: General;  Laterality: Bilateral;    SHOULDER ROTATOR CUFF REPAIR Right 07/2022    VENOUS ACCESS DEVICE (PORT) INSERTION Left 10/06/2006    Left subclavian Mcnlec-b-Asum placement-Dr. Quyen Vidal    VENOUS ACCESS DEVICE (PORT) INSERTION N/A 8/16/2023    Procedure: with port placement using fluoroscopy and ultrasound;  Surgeon: Quyen Vidal MD;  Location: [facility] MAIN OR;  Service: General;  Laterality: N/A;    VENOUS ACCESS DEVICE (PORT) REMOVAL Left 05/29/2007    Left subclavian Btraff-q-Tubt removal-Dr. Quyen Vidal     Outpatient Medications Prior to Visit   Medication Sig Dispense Refill    acetaminophen (TYLENOL) 325 MG tablet Take 2 tablets by mouth Every 4 (Four) Hours As Needed for Mild Pain. 60 tablet 0    atorvastatin (LIPITOR) 10 MG tablet Take 1 tablet by mouth Daily. 90 tablet 0    exemestane (AROMASIN) 25 MG tablet Take 1 tablet by mouth Daily.       No facility-administered medications prior to visit.       Allergies as of 05/29/2025 - Reviewed 05/29/2025   Allergen Reaction Noted    Wound dressing adhesive Itching 03/12/2025     Social History     Socioeconomic History    Marital status:      Spouse name: Philip   Tobacco Use    Smoking status: Never    Smokeless tobacco: Never    Tobacco comments:          No Caffeine    Vaping Use    Vaping status: Never Used   Substance and Sexual Activity    Alcohol use: No    Drug use: No    Sexual activity: Not Currently     Partners: Male     Birth control/protection: None     Family History   Problem Relation Age of Onset    Alzheimer's disease Mother     No Known Problems Father     Hyperlipidemia Sister     Hypertension Brother     No Known Problems Maternal Aunt     No Known Problems Paternal Aunt   "   Heart attack Paternal Uncle     Heart disease Paternal Uncle     Heart disease Paternal Uncle     Heart attack Paternal Uncle     No Known Problems Maternal Grandmother     No Known Problems Paternal Grandmother     Miscarriages / Stillbirths Daughter     ADD / ADHD Son     BRCA 1/2 Neg Hx     Breast cancer Neg Hx     Colon cancer Neg Hx     Endometrial cancer Neg Hx     Ovarian cancer Neg Hx     Malig Hyperthermia Neg Hx      Review of Systems   Constitutional: Negative for malaise/fatigue.   Cardiovascular:  Negative for chest pain, claudication, dyspnea on exertion, leg swelling, near-syncope, orthopnea, palpitations, paroxysmal nocturnal dyspnea and syncope.   Respiratory:  Negative for shortness of breath.    Neurological:  Negative for brief paralysis, dizziness, headaches and light-headedness.   All other systems reviewed and are negative.       Objective:     Vitals:    05/29/25 0910   BP: 114/84   BP Location: Left arm   Patient Position: Sitting   Cuff Size: Large Adult   Pulse: 68   Weight: 102 kg (224 lb)   Height: 162.6 cm (64\")     Body mass index is 38.45 kg/m².    Vitals reviewed.   Constitutional:       General: Not in acute distress.     Appearance: Well-developed and not in distress. Not diaphoretic.   HENT:      Head: Normocephalic.   Pulmonary:      Effort: Pulmonary effort is normal. No respiratory distress.      Breath sounds: Normal breath sounds. No wheezing. No rhonchi. No rales.   Cardiovascular:      Normal rate. Regular rhythm.      Murmurs: There is no murmur.   Pulses:     Radial: 2+ bilaterally.  Edema:     Peripheral edema absent.   Skin:     General: Skin is warm and dry. There is no cyanosis.      Findings: No rash.   Neurological:      Mental Status: Alert and oriented to person, place, and time.   Psychiatric:         Behavior: Behavior normal. Behavior is cooperative.         Thought Content: Thought content normal.         Judgment: Judgment normal.       Lab Review:     Lab " Results   Component Value Date     05/09/2025     02/28/2025    K 4.2 05/09/2025    K 3.8 02/28/2025     05/09/2025     02/28/2025    CO2 26.0 05/09/2025    CO2 25.8 02/28/2025    BUN 14 05/09/2025    BUN 17 02/28/2025    CREATININE 0.86 05/09/2025    CREATININE 0.85 02/28/2025    EGFRIFNONA 82 11/01/2021    EGFRIFNONA 73 03/24/2021    EGFRIFAFRI 95 01/13/2022    EGFRIFAFRI 94 11/01/2021    GLUCOSE 85 05/09/2025    GLUCOSE 92 02/28/2025    CALCIUM 9.3 05/09/2025    CALCIUM 9.4 02/28/2025    ALBUMIN 4.2 05/09/2025    ALBUMIN 4.0 02/28/2025    BILITOT 0.6 05/09/2025    BILITOT 0.6 02/28/2025    AST 19 05/09/2025    AST 21 02/28/2025    ALT 18 05/09/2025    ALT 21 02/28/2025     Lab Results   Component Value Date    WBC 6.25 05/09/2025    WBC 6.75 02/28/2025    HGB 13.9 05/09/2025    HGB 13.6 02/28/2025    HCT 43.1 05/09/2025    HCT 42.2 02/28/2025    MCV 81.6 05/09/2025    MCV 83.7 02/28/2025     05/09/2025     02/28/2025     Lab Results   Component Value Date    PROBNP 21.9 07/03/2023     Lab Results   Component Value Date    TROPONINT 7 07/03/2023     Lab Results   Component Value Date    TSH 1.670 05/09/2025    TSH 1.360 11/30/2022      Lipid Panel          5/9/2025    12:33   Lipid Panel   Total Cholesterol 228    Triglycerides 64    HDL Cholesterol 45    VLDL Cholesterol 11    LDL Cholesterol  172           ECG 12 Lead    Date/Time: 5/29/2025 9:49 AM  Performed by: Griselda Portillo APRN    Authorized by: Griselda Portillo APRN  Comparison: compared with previous ECG   Similar to previous ECG  Rhythm: sinus rhythm  Rate: normal  BPM: 68  QRS axis: normal        Assessment:       Diagnosis Plan   1. Encounter for monitoring cardiotoxic drug therapy  Adult Transthoracic Echo Complete w/ Color, Spectral and Contrast if Necessary Per Protocol      2. Dyslipidemia  Adult Transthoracic Echo Complete w/ Color, Spectral and Contrast if Necessary Per Protocol      3. Malignant  neoplasm of overlapping sites of right breast in female, estrogen receptor positive  Adult Transthoracic Echo Complete w/ Color, Spectral and Contrast if Necessary Per Protocol        Plan:       1.  Cardio oncology care.  She has recurrent right-sided breast cancer and has a history of lumpectomy with TAC therapy as well as right-sided radiation therapy in 2007.  S/P bilateral mastectomy 8/2023, Taxol/Herceptin and then Aromasin.  This was followed by Herceptin for a year which was completed on 9/2024.  Echo in November 2024 did not show evidence of LV dysfunction.  Echocardiogram completed today with results pending.  2.  Recurrent right-sided breast cancer.  As above.  Additional recommendations per oncology.  Remains on Aromasin  3.  Hyperlipidemia.  Most recent lipid panel in May 2025 showed significantly increased LDL at 172.  She has resumed statin and also made dietary modifications.  She is following with PCP for this.  4.  Probable sleep apnea.  She has a STOP-BANG score 4.  She snores at night has daytime somnolence and no family history of sleep apnea.  It has been reviewed with her that sleep apnea can cause cardiomyopathy on its own as well as other cardiovascular events.  She continues to decline further evaluation for sleep apnea.      Time Spent: I spent 40 minutes caring for Palma on this date of service. This time includes time spent by me in the following activities: preparing for the visit, reviewing tests, performing a medically appropriate examination and/or evaluations, counseling and educating the patient/family/caregiver, ordering medications, tests, or procedures, documenting information in the medical record, and independently interpreting results and communicating that information with the patient/family/caregiver.   I spent 1 minutes on the separately reported service of ECG. This time is not included in the time used to support the E/M service also reported today.        Your  medication list            Accurate as of May 29, 2025  9:51 AM. If you have any questions, ask your nurse or doctor.                CONTINUE taking these medications        Instructions Last Dose Given Next Dose Due   acetaminophen 325 MG tablet  Commonly known as: TYLENOL      Take 2 tablets by mouth Every 4 (Four) Hours As Needed for Mild Pain.       atorvastatin 10 MG tablet  Commonly known as: LIPITOR      Take 1 tablet by mouth Daily.       exemestane 25 MG tablet  Commonly known as: AROMASIN      Take 1 tablet by mouth Daily.                Patient is no longer taking -.  I corrected the med list to reflect this.  I did not stop these medications.    Return in about 6 months (around 11/29/2025) for with Dr. Reynolds with echo prior.      Dictated utilizing Dragon dictation

## 2025-05-29 ENCOUNTER — HOSPITAL ENCOUNTER (OUTPATIENT)
Dept: CARDIOLOGY | Facility: HOSPITAL | Age: 60
Discharge: HOME | End: 2025-05-29
Admitting: INTERNAL MEDICINE
Payer: COMMERCIAL

## 2025-05-29 ENCOUNTER — OFFICE VISIT (OUTPATIENT)
Dept: CARDIOLOGY | Age: 60
End: 2025-05-29
Payer: COMMERCIAL

## 2025-05-29 VITALS
BODY MASS INDEX: 38.41 KG/M2 | SYSTOLIC BLOOD PRESSURE: 118 MMHG | HEART RATE: 77 BPM | DIASTOLIC BLOOD PRESSURE: 68 MMHG | HEIGHT: 64 IN | WEIGHT: 225 LBS | OXYGEN SATURATION: 93 %

## 2025-05-29 VITALS
HEART RATE: 68 BPM | HEIGHT: 64 IN | SYSTOLIC BLOOD PRESSURE: 114 MMHG | BODY MASS INDEX: 38.24 KG/M2 | DIASTOLIC BLOOD PRESSURE: 84 MMHG | WEIGHT: 224 LBS

## 2025-05-29 DIAGNOSIS — E78.5 DYSLIPIDEMIA: ICD-10-CM

## 2025-05-29 DIAGNOSIS — Z51.81 ENCOUNTER FOR MONITORING CARDIOTOXIC DRUG THERAPY: Primary | ICD-10-CM

## 2025-05-29 DIAGNOSIS — Z17.0 MALIGNANT NEOPLASM OF OVERLAPPING SITES OF RIGHT BREAST IN FEMALE, ESTROGEN RECEPTOR POSITIVE: ICD-10-CM

## 2025-05-29 DIAGNOSIS — Z79.899 ENCOUNTER FOR MONITORING CARDIOTOXIC DRUG THERAPY: ICD-10-CM

## 2025-05-29 DIAGNOSIS — Z79.899 ENCOUNTER FOR MONITORING CARDIOTOXIC DRUG THERAPY: Primary | ICD-10-CM

## 2025-05-29 DIAGNOSIS — Z51.81 ENCOUNTER FOR MONITORING CARDIOTOXIC DRUG THERAPY: ICD-10-CM

## 2025-05-29 DIAGNOSIS — C50.811 MALIGNANT NEOPLASM OF OVERLAPPING SITES OF RIGHT BREAST IN FEMALE, ESTROGEN RECEPTOR POSITIVE: ICD-10-CM

## 2025-05-29 LAB
AORTIC ARCH: 2.6 CM
AORTIC DIMENSIONLESS INDEX: 0.83 (DI)
ASCENDING AORTA: 2.6 CM
AV MEAN PRESS GRAD SYS DOP V1V2: 3.1 MMHG
AV VMAX SYS DOP: 111.8 CM/SEC
BH CV ECHO LEFT VENTRICLE GLOBAL LONGITUDINAL STRAIN: -21.1 %
BH CV ECHO MEAS - ACS: 1.84 CM
BH CV ECHO MEAS - AO MAX PG: 5 MMHG
BH CV ECHO MEAS - AO ROOT AREA (BSA CORRECTED): 1.3 CM2
BH CV ECHO MEAS - AO ROOT DIAM: 2.7 CM
BH CV ECHO MEAS - AO V2 VTI: 25.6 CM
BH CV ECHO MEAS - AVA(I,D): 2.39 CM2
BH CV ECHO MEAS - EDV(CUBED): 44.2 ML
BH CV ECHO MEAS - EDV(MOD-SP2): 64 ML
BH CV ECHO MEAS - EDV(MOD-SP4): 60 ML
BH CV ECHO MEAS - EF(MOD-SP2): 59.4 %
BH CV ECHO MEAS - EF(MOD-SP4): 66.7 %
BH CV ECHO MEAS - ESV(CUBED): 15.8 ML
BH CV ECHO MEAS - ESV(MOD-SP2): 26 ML
BH CV ECHO MEAS - ESV(MOD-SP4): 20 ML
BH CV ECHO MEAS - FS: 29 %
BH CV ECHO MEAS - IVS/LVPW: 1.06 CM
BH CV ECHO MEAS - IVSD: 1.2 CM
BH CV ECHO MEAS - LAT PEAK E' VEL: 9.8 CM/SEC
BH CV ECHO MEAS - LV DIASTOLIC VOL/BSA (35-75): 29.2 CM2
BH CV ECHO MEAS - LV MASS(C)D: 131.8 GRAMS
BH CV ECHO MEAS - LV MAX PG: 3.7 MMHG
BH CV ECHO MEAS - LV MEAN PG: 2 MMHG
BH CV ECHO MEAS - LV SYSTOLIC VOL/BSA (12-30): 9.7 CM2
BH CV ECHO MEAS - LV V1 MAX: 96.4 CM/SEC
BH CV ECHO MEAS - LV V1 VTI: 21.3 CM
BH CV ECHO MEAS - LVIDD: 3.5 CM
BH CV ECHO MEAS - LVIDS: 2.5 CM
BH CV ECHO MEAS - LVOT AREA: 2.9 CM2
BH CV ECHO MEAS - LVOT DIAM: 1.91 CM
BH CV ECHO MEAS - LVPWD: 1.13 CM
BH CV ECHO MEAS - MED PEAK E' VEL: 6.3 CM/SEC
BH CV ECHO MEAS - MV A DUR: 0.11 SEC
BH CV ECHO MEAS - MV A MAX VEL: 70.3 CM/SEC
BH CV ECHO MEAS - MV DEC SLOPE: 399.6 CM/SEC2
BH CV ECHO MEAS - MV DEC TIME: 0.17 SEC
BH CV ECHO MEAS - MV E MAX VEL: 71.8 CM/SEC
BH CV ECHO MEAS - MV E/A: 1.02
BH CV ECHO MEAS - MV MAX PG: 2.9 MMHG
BH CV ECHO MEAS - MV MEAN PG: 1.14 MMHG
BH CV ECHO MEAS - MV P1/2T: 55.7 MSEC
BH CV ECHO MEAS - MV V2 VTI: 25.1 CM
BH CV ECHO MEAS - MVA(P1/2T): 3.9 CM2
BH CV ECHO MEAS - MVA(VTI): 2.44 CM2
BH CV ECHO MEAS - PA ACC TIME: 0.09 SEC
BH CV ECHO MEAS - PA V2 MAX: 86.9 CM/SEC
BH CV ECHO MEAS - PULM A REVS DUR: 0.1 SEC
BH CV ECHO MEAS - PULM A REVS VEL: 45.7 CM/SEC
BH CV ECHO MEAS - PULM DIAS VEL: 37.6 CM/SEC
BH CV ECHO MEAS - PULM S/D: 1.08
BH CV ECHO MEAS - PULM SYS VEL: 40.5 CM/SEC
BH CV ECHO MEAS - QP/QS: 0.57
BH CV ECHO MEAS - RAP SYSTOLE: 3 MMHG
BH CV ECHO MEAS - RV MAX PG: 1.34 MMHG
BH CV ECHO MEAS - RV V1 MAX: 57.8 CM/SEC
BH CV ECHO MEAS - RV V1 VTI: 11.2 CM
BH CV ECHO MEAS - RVOT DIAM: 1.98 CM
BH CV ECHO MEAS - RVSP: 13.3 MMHG
BH CV ECHO MEAS - SUP REN AO DIAM: 1.9 CM
BH CV ECHO MEAS - SV(LVOT): 61.1 ML
BH CV ECHO MEAS - SV(MOD-SP2): 38 ML
BH CV ECHO MEAS - SV(MOD-SP4): 40 ML
BH CV ECHO MEAS - SV(RVOT): 34.7 ML
BH CV ECHO MEAS - SVI(LVOT): 29.7 ML/M2
BH CV ECHO MEAS - SVI(MOD-SP2): 18.5 ML/M2
BH CV ECHO MEAS - SVI(MOD-SP4): 19.5 ML/M2
BH CV ECHO MEAS - TAPSE (>1.6): 2.03 CM
BH CV ECHO MEAS - TR MAX PG: 10.3 MMHG
BH CV ECHO MEAS - TR MAX VEL: 160.2 CM/SEC
BH CV ECHO MEASUREMENTS AVERAGE E/E' RATIO: 8.92
BH CV XLRA - RV BASE: 2.33 CM
BH CV XLRA - RV LENGTH: 6.6 CM
BH CV XLRA - RV MID: 2.12 CM
BH CV XLRA - TDI S': 9.1 CM/SEC
LEFT ATRIUM VOLUME INDEX: 16.3 ML/M2
LV EF 3D SEGMENTATION: 64 %
LV EF BIPLANE MOD: 62.3 %
SINUS: 2.6 CM
STJ: 2.35 CM

## 2025-05-29 PROCEDURE — 93356 MYOCRD STRAIN IMG SPCKL TRCK: CPT

## 2025-05-29 PROCEDURE — 93306 TTE W/DOPPLER COMPLETE: CPT

## 2025-05-29 PROCEDURE — 25510000001 PERFLUTREN 6.52 MG/ML SUSPENSION 2 ML VIAL: Performed by: INTERNAL MEDICINE

## 2025-05-29 RX ADMIN — PERFLUTREN 2 ML: 6.52 INJECTION, SUSPENSION INTRAVENOUS at 09:10

## 2025-05-30 ENCOUNTER — RESULTS FOLLOW-UP (OUTPATIENT)
Dept: CARDIOLOGY | Age: 60
End: 2025-05-30
Payer: COMMERCIAL

## 2025-05-30 NOTE — TELEPHONE ENCOUNTER
Please let her know that echo looks good.  Everything stable.  Continue current medications and keep currently scheduled follow-up.

## 2025-06-16 ENCOUNTER — ANESTHESIA (OUTPATIENT)
Dept: GASTROENTEROLOGY | Facility: HOSPITAL | Age: 60
End: 2025-06-16
Payer: COMMERCIAL

## 2025-06-16 ENCOUNTER — ANESTHESIA EVENT (OUTPATIENT)
Dept: GASTROENTEROLOGY | Facility: HOSPITAL | Age: 60
End: 2025-06-16
Payer: COMMERCIAL

## 2025-06-16 ENCOUNTER — HOSPITAL ENCOUNTER (OUTPATIENT)
Facility: HOSPITAL | Age: 60
Setting detail: HOSPITAL OUTPATIENT SURGERY
Discharge: HOME | End: 2025-06-16
Attending: SURGERY | Admitting: SURGERY
Payer: COMMERCIAL

## 2025-06-16 VITALS
RESPIRATION RATE: 18 BRPM | HEIGHT: 64 IN | WEIGHT: 220.1 LBS | HEART RATE: 84 BPM | SYSTOLIC BLOOD PRESSURE: 109 MMHG | OXYGEN SATURATION: 100 % | BODY MASS INDEX: 37.58 KG/M2 | DIASTOLIC BLOOD PRESSURE: 73 MMHG

## 2025-06-16 DIAGNOSIS — Z86.0101 HISTORY OF ADENOMATOUS POLYP OF COLON: ICD-10-CM

## 2025-06-16 PROCEDURE — 25010000002 PROPOFOL 10 MG/ML EMULSION: Performed by: NURSE ANESTHETIST, CERTIFIED REGISTERED

## 2025-06-16 PROCEDURE — 25010000002 GLUCAGON (RDNA) PER 1 MG: Performed by: SURGERY

## 2025-06-16 PROCEDURE — 25810000003 LACTATED RINGERS PER 1000 ML: Performed by: SURGERY

## 2025-06-16 PROCEDURE — 25010000002 GLYCOPYRROLATE 0.2 MG/ML SOLUTION: Performed by: NURSE ANESTHETIST, CERTIFIED REGISTERED

## 2025-06-16 PROCEDURE — 88305 TISSUE EXAM BY PATHOLOGIST: CPT | Performed by: SURGERY

## 2025-06-16 PROCEDURE — 25010000002 LIDOCAINE 2% SOLUTION: Performed by: NURSE ANESTHETIST, CERTIFIED REGISTERED

## 2025-06-16 RX ORDER — SODIUM CHLORIDE 0.9 % (FLUSH) 0.9 %
10 SYRINGE (ML) INJECTION AS NEEDED
Status: DISCONTINUED | OUTPATIENT
Start: 2025-06-16 | End: 2025-06-16 | Stop reason: HOSPADM

## 2025-06-16 RX ORDER — GLYCOPYRROLATE 0.2 MG/ML
INJECTION INTRAMUSCULAR; INTRAVENOUS AS NEEDED
Status: DISCONTINUED | OUTPATIENT
Start: 2025-06-16 | End: 2025-06-16 | Stop reason: SURG

## 2025-06-16 RX ORDER — PROPOFOL 10 MG/ML
VIAL (ML) INTRAVENOUS AS NEEDED
Status: DISCONTINUED | OUTPATIENT
Start: 2025-06-16 | End: 2025-06-16 | Stop reason: SURG

## 2025-06-16 RX ORDER — LIDOCAINE HYDROCHLORIDE 10 MG/ML
0.5 INJECTION, SOLUTION INFILTRATION; PERINEURAL ONCE AS NEEDED
Status: DISCONTINUED | OUTPATIENT
Start: 2025-06-16 | End: 2025-06-16 | Stop reason: HOSPADM

## 2025-06-16 RX ORDER — IBUPROFEN 600 MG/1
TABLET ORAL AS NEEDED
Status: DISCONTINUED | OUTPATIENT
Start: 2025-06-16 | End: 2025-06-16 | Stop reason: HOSPADM

## 2025-06-16 RX ORDER — SODIUM CHLORIDE, SODIUM LACTATE, POTASSIUM CHLORIDE, CALCIUM CHLORIDE 600; 310; 30; 20 MG/100ML; MG/100ML; MG/100ML; MG/100ML
1000 INJECTION, SOLUTION INTRAVENOUS CONTINUOUS
Status: DISCONTINUED | OUTPATIENT
Start: 2025-06-16 | End: 2025-06-16 | Stop reason: HOSPADM

## 2025-06-16 RX ORDER — LIDOCAINE HYDROCHLORIDE 20 MG/ML
INJECTION, SOLUTION INFILTRATION; PERINEURAL AS NEEDED
Status: DISCONTINUED | OUTPATIENT
Start: 2025-06-16 | End: 2025-06-16 | Stop reason: SURG

## 2025-06-16 RX ADMIN — SODIUM CHLORIDE, POTASSIUM CHLORIDE, SODIUM LACTATE AND CALCIUM CHLORIDE 1000 ML: 600; 310; 30; 20 INJECTION, SOLUTION INTRAVENOUS at 07:33

## 2025-06-16 RX ADMIN — PROPOFOL 60 MG: 10 INJECTION, EMULSION INTRAVENOUS at 08:11

## 2025-06-16 RX ADMIN — LIDOCAINE HYDROCHLORIDE 60 MG: 20 INJECTION, SOLUTION INFILTRATION; PERINEURAL at 08:11

## 2025-06-16 RX ADMIN — PROPOFOL 10 MG: 10 INJECTION, EMULSION INTRAVENOUS at 08:19

## 2025-06-16 RX ADMIN — GLYCOPYRROLATE 0.2 MG: 0.2 INJECTION INTRAMUSCULAR; INTRAVENOUS at 08:07

## 2025-06-16 RX ADMIN — PROPOFOL 140 MCG/KG/MIN: 10 INJECTION, EMULSION INTRAVENOUS at 08:11

## 2025-06-16 NOTE — H&P
Cc: Endoscopy Visit    HPI: 60 y.o. female here for screening with history of 3 tubulovillous adenomas 2014, tubular adenoma 2018.  She also has a history of breast cancer.     Past Medical History:   Diagnosis Date    Allergic     Breast cancer 2006    Right infiltrating ductal carcinoma, grade I, T2N1, ER/NE positive, 90% HER-2/julián negative    Cervical polyp     History of benign cervical polyp    Colon polyps     BENIGN    History of COVID-19     Hx of radiation therapy 2006    Hypercholesteremia     Hyperthyroidism     NO LONGER PROBLEM    PONV (postoperative nausea and vomiting)     Radiation 2007    Recurrent breast cancer 06/23/2023    right--chemo and s/p bilat. mastectomy    Sebaceous cyst     BACK       Past Surgical History:   Procedure Laterality Date    BREAST BIOPSY Right 08/03/2006    stereotactic biopsy    BREAST LUMPECTOMY Right 08/18/2006    Right axillary sentinel node biopsy (positive), right axillay dissection, right needle lumpectomy-Dr. Quyen Vidal    BREAST RECONSTRUCTION Bilateral 8/16/2023    Procedure: BILATERAL PREPECTORAL PLACEMENT  TISSUE EXPANDER AND ALLODERM, ADJACENT LEFT BREAST TISSUE REARRANGEMENT;  Surgeon: Favian Brooke MD;  Location: [facility] OR;  Service: Plastics;  Laterality: Bilateral;    BREAST TISSUE EXPANDER REMOVAL INSERTION OF IMPLANT Bilateral 3/1/2024    Procedure: BILATERAL REMOVAL TISSUE EXPANDERS AND PLACEMENT OF IMPLANTS AND LEFT ADJACENT TISSUE REARRANGEMENT;  Surgeon: Favian Brooke MD;  Location: [facility] OR;  Service: Plastics;  Laterality: Bilateral;    CERVICAL CONE BIOPSY  2005    [facility]    COLONOSCOPY N/A 04/07/2014    3 mm cecal polyp, 6 mm ascending colon polyp, 8 mm transvese colon polyp-Dr. Quyen Vidal    COLONOSCOPY N/A 03/16/2009    Tortuous colon-Dr. Quyen Vidal    COLONOSCOPY N/A 06/11/2018    Procedure: COLONOSCOPY TO CECUM TO TERMINAL ILEUM WITH HOT SNARE POLYPECTOMY;  Surgeon: Quyen Vidal MD;  Location:   [facility];  Service: Gastroenterology    COLPOSCOPY      DILATATION AND CURETTAGE  03/23/2005    EXCISION MASS TRUNK N/A 09/05/2017    Procedure: EXCISION OF SEBACEOUS CYST FROM BACK ;  Surgeon: Rosendo Crum MD;  Location: [facility] MAIN OR;  Service:     MASTECTOMY W/ SENTINEL NODE BIOPSY Bilateral 8/16/2023    Procedure: BREAST MASTECTOMY BILATERAL;  Surgeon: Quyen Vidal MD;  Location: [facility] MAIN OR;  Service: General;  Laterality: Bilateral;    SHOULDER ROTATOR CUFF REPAIR Right 07/2022    VENOUS ACCESS DEVICE (PORT) INSERTION Left 10/06/2006    Left subclavian Kqakpv-g-Dpyv placement-Dr. Quyen Vidal    VENOUS ACCESS DEVICE (PORT) INSERTION N/A 8/16/2023    Procedure: with port placement using fluoroscopy and ultrasound;  Surgeon: Quyen Vidal MD;  Location: [facility] MAIN OR;  Service: General;  Laterality: N/A;    VENOUS ACCESS DEVICE (PORT) REMOVAL Left 05/29/2007    Left subclavian Qbifcv-n-Ifsq removal-Dr. Quyen Vidal       is allergic to wound dressing adhesive.       Medication List        ASK your doctor about these medications      acetaminophen 325 MG tablet  Commonly known as: TYLENOL  Take 2 tablets by mouth Every 4 (Four) Hours As Needed for Mild Pain.     atorvastatin 10 MG tablet  Commonly known as: LIPITOR  Take 1 tablet by mouth Daily.     exemestane 25 MG tablet  Commonly known as: AROMASIN              Family History   Problem Relation Age of Onset    Alzheimer's disease Mother     No Known Problems Father     Hyperlipidemia Sister     Hypertension Brother     No Known Problems Maternal Aunt     No Known Problems Paternal Aunt     Heart attack Paternal Uncle     Heart disease Paternal Uncle     Heart disease Paternal Uncle     Heart attack Paternal Uncle     No Known Problems Maternal Grandmother     No Known Problems Paternal Grandmother     Miscarriages / Stillbirths Daughter     ADD / ADHD Son     BRCA 1/2 Neg Hx     Breast cancer Neg Hx     Colon cancer Neg Hx     Endometrial  cancer Neg Hx     Ovarian cancer Neg Hx     Malig Hyperthermia Neg Hx        Social History     Socioeconomic History    Marital status:      Spouse name: Philip   Tobacco Use    Smoking status: Never    Smokeless tobacco: Never    Tobacco comments:          No Caffeine    Vaping Use    Vaping status: Never Used   Substance and Sexual Activity    Alcohol use: No    Drug use: No    Sexual activity: Not Currently     Partners: Male     Birth control/protection: None       Vitals:    06/16/25 0724   BP: 121/79   Pulse: 71   Resp: 22   SpO2: 97%       Body mass index is 37.78 kg/m².      Physical Exam    General: No acute distress  Lungs: No labored breathing, Pulse oximetry on room air is 97%.  Heart/EKG: RRR  Abdomen: no complaints of pain  Mental:  Awake, alert, and oriented    Imp:     Screening  history of 3 tubulovillous adenomas 2014, tubular adenoma 2018  history of breast cancer.      Plan:  ALESSANDRA Vidal MD  08:07 EDT

## 2025-06-16 NOTE — ANESTHESIA PREPROCEDURE EVALUATION
Anesthesia Evaluation     Patient summary reviewed and Nursing notes reviewed   history of anesthetic complications:  PONV               Airway   Mallampati: II  No difficulty expected  Dental    (+) partials    Pulmonary - negative pulmonary ROS   Cardiovascular     ECG reviewed  Rhythm: regular  Rate: normal    (+) hyperlipidemia      Neuro/Psych- negative ROS  GI/Hepatic/Renal/Endo    (+) morbid obesity, thyroid problem hyperthyroidism    Musculoskeletal (-) negative ROS    Abdominal    Substance History - negative use     OB/GYN negative ob/gyn ROS         Other      history of cancer active                Anesthesia Plan    ASA 3     MAC     (Partial dentures  BMI)  intravenous induction     Anesthetic plan, risks, benefits, and alternatives have been provided, discussed and informed consent has been obtained with: patient.    CODE STATUS:

## 2025-06-16 NOTE — DISCHARGE INSTRUCTIONS
For the next 24 hours patient needs to be with a responsible adult.    For 24 hours DO NOT drive, operate machinery, appliances, drink alcohol, make important decisions or sign legal documents.    Start with a light or bland diet if you are feeling sick to your stomach otherwise advance to regular diet as tolerated.    Follow recommendations on procedure report if provided by your doctor.    Call Dr Vidal for problems [phone]     Problems may include but not limited to: large amounts of bleeding, trouble breathing, repeated vomiting, severe unrelieved pain, fever or chills.

## 2025-06-16 NOTE — OP NOTE
Colonoscopy Procedure Note  Palma Roblero  1965  Date of Procedure: 06/16/25    Pre-operative Diagnosis:    Screening  history of 3 tubulovillous adenomas 2014, tubular adenoma 2018  history of breast cancer.     Post-operative Diagnosis:  3 mm cecal polyp    Procedure: Colonoscopy with polypectomy via cold biopsy forceps        Recommendations:   Colonoscopy in 5 years likely, based on path and prior polyps.  The office will call within the next  3-10 days with a final recommendation.  Keep a copy of the photographs of the procedure given to you today for possible need for reference in the future.      Surgeon: Young    Anesthetic: MAC per Giuseppe Bales MD via CRNA    Scope Withdrawal Time:  8 minutes  17 seconds    Procedure Details     MAC anesthesia was induced.  The 180 Colonoscopy was inserted blindly into the rectum and advanced to the cecum, with relative ease, but with some need for push thru, with looping,  without need for pressure, lift, or turning.    Cecum was identified by the appendiceal orifice and the ileocecal valve and photographed for documentation.      Prep quality was excellent.  A careful inspection was made as the scope was withdrawn, including a retroflexed view of the rectum; there was no suggestion of presence of angiodysplasias, colitis, only the one polyp without diverticula, with noted interventions.     Retroflexion in the rectum revealed no abnormalities.      Quyen Vidal MD  06/16/25

## 2025-06-16 NOTE — ANESTHESIA POSTPROCEDURE EVALUATION
Patient: Palma Roblero    Procedure Summary       Date: 06/16/25 Room / Location: [facility] ENDOSCOPY 1 / [facility] ENDOSCOPY    Anesthesia Start: 0807 Anesthesia Stop: 0832    Procedure: COLONOSCOPY to cecum with cold polypectomy Diagnosis:       History of adenomatous polyp of colon      (History of adenomatous polyp of colon [Z86.0101])    Surgeons: Quyen Vidal MD Provider: Giuseppe Bales MD    Anesthesia Type: MAC ASA Status: 3            Anesthesia Type: MAC    Vitals  Vitals Value Taken Time   /73 06/16/25 08:54   Temp     Pulse 85 06/16/25 08:55   Resp 18 06/16/25 08:54   SpO2 99 % 06/16/25 08:55   Vitals shown include unfiled device data.        Post Anesthesia Care and Evaluation    Patient location during evaluation: PACU  Patient participation: complete - patient participated  Level of consciousness: awake and alert  Pain management: adequate    Airway patency: patent  Anesthetic complications: No anesthetic complications    Cardiovascular status: acceptable  Respiratory status: acceptable  Hydration status: acceptable    Comments: --------------------            06/16/25               0854     --------------------   BP:       109/73     Pulse:      84       Resp:       18       SpO2:      100%     --------------------

## 2025-06-17 ENCOUNTER — RESULTS FOLLOW-UP (OUTPATIENT)
Dept: GASTROENTEROLOGY | Facility: HOSPITAL | Age: 60
End: 2025-06-17
Payer: COMMERCIAL

## 2025-06-17 LAB
CYTO UR: NORMAL
LAB AP CASE REPORT: NORMAL
PATH REPORT.FINAL DX SPEC: NORMAL
PATH REPORT.GROSS SPEC: NORMAL

## 2025-06-17 NOTE — PROGRESS NOTES
Nikki (endoscopy liaison),    Please call patient tto inform them of these findings and recommendations and ensure that any pamphlets that were to be given to the patient at the hospital were received.  Ensure that a letter is sent to the patient, recall method entered into the computer and the HM (Health Maintenance) section updated as to recommended endoscopy follow up.    Thanks  Dr Vidal    Colonoscopy Procedure Note  Palma Roblero  1965  Date of Procedure: 06/16/25     Pre-operative Diagnosis:    Screening  history of 3 tubulovillous adenomas 2014, tubular adenoma 2018  history of breast cancer.      Post-operative Diagnosis:  3 mm cecal polyp;  TUBULAR ADENOMA     Procedure: Colonoscopy with polypectomy via cold biopsy forceps                                         Recommendations:   Colonoscopy in 5 years likely, based on path and prior polyps.  The office will call within the next  3-10 days with a final recommendation.;  YES, 5 YEARS  Keep a copy of the photographs of the procedure given to you today for possible need for reference in the future.

## 2025-06-17 NOTE — TELEPHONE ENCOUNTER
Called and informed pt of cscope results.  Pt verb understanding.  Placed 5yr recall & updated the HM tab.

## 2025-07-07 NOTE — PROGRESS NOTES
Subjective   REASON FOR FOLLOWUP:  History of stage IIIA infiltrating ductal carcinoma of the right breast with 4 positive nodes, ER positive, HER-2/julián negative; treated with lumpectomy, status post 6 cycles of TAC. Received tamoxifen between February 2007 and March 2012. Femara was started in March 2012. Patient switched to Arimidex as of 06/21/2012 because of side effects with severe ankle joint pain with Femara.    2.   10 years of adjuvant therapy completed  March 2017.    3.   New diagnosis of carcinoma of the remaining right breast, needle biopsy 6/8/2023.  Tumor is HER2/julián positive and estrogen receptor positive/progesterone receptor negative.    4.   Bilateral mastectomies with implant reconstruction 8/16/2023    5.   Initiation of postop adjuvant chemotherapy with weekly Taxol and Herceptin.  First dose delivered 9/15/2023.     6.  Weekly Taxol and Herceptin completed 12/1/2023.    7.  Every 3-week Herceptin initiated 12/8/2023.    8.  Plan Hormonal therapy with Aromasin 25 mg daily.  This was prescribed on the visit of 1/19/2024 but the patient has not yet started taking the medication and wished to wait until after her reconstructive surgery.    9.  Reconstructive surgery with permanent breast implant insertions 3/1/2024      HISTORY OF PRESENT ILLNESS:   History of Present Illness Ms. Roblero is a 60 y.o.  female with the above noted history of stage IIIA carcinoma of the right breast, treated with lumpectomy, chemotherapy and radiation.  Following initial treatment with 6 cycles of TAC chemotherapy, she received extended hormonal therapy with 5 years of tamoxifen followed by 5 years of aromatase inhibitor.  She completed her 10 years of hormonal therapy in March 2017  .     She was seeing us annually and had not been seen since January of 2023 but when she underwent her breast imaging in May 2023 she had suspicious findings and ultimately underwent ultrasound-guided biopsy on 6/8/2023.   There were 2 separate biopsies in 1 of these areas was positive for HER2/julián overexpression by FISH.  Both tumors were ER positive and AK negative.    She was referred to Dr. Vidal who had performed her previous lumpectomy surgery in 2007 and currently the plan is for her to undergo bilateral mastectomies with reconstruction.    We had her seen by cardiac oncology due to her previous treatment with 6 cycles of TAC chemotherapy in 2007.  Thankfully her ejection fraction still seems to be normal at 61% based on her echocardiogram from 6/19/2023.    Dr. Vidal has scheduled the patient for radiographic staging with CT scan of the chest abdomen pelvis and a bone scan and the studies did not show any evidence of distant metastatic spread.    She had a bilateral mastectomies with implant reconstruction performed on 8/16/2023.  The tumor in the right breast was 3 x 1.5 x 1.2 cm.  Margins of resection were clear.  She also had placement of a left chest wall Mediport at the time of her surgery.    We recommended weekly Taxol and Herceptin for 12 weeks followed by continued Herceptin every 3 weeks for the remainder of the year of treatment.  She was estrogen receptor positive and will also be started on hormonal therapy once her chemotherapy is completed.    She completed her combined weekly Taxol and Herceptin in early December 2023 and is now receiving every 3-week Herceptin infusion with plans to continue this through September 2024 (12 months total).    INTERVAL HISTORY:  She continues on the aromasin.  Continues with arthralgias, however feels these have actually improved.  Also continues with hot flashes which remains stable and tolerable.  Does continue with lymphedema in her right arm, continues to follow with lymphedema clinic.  Had her port removed with Dr. Vidal 3/12/2025.  Denies nausea, vomiting, abdominal pain, bone pain, cough or shortness of breath.  Denies any new chest wall abnormalities.  She has no  "new concerns today.    Past Medical History, Past Surgical History, Social History, Family History have been reviewed and are without significant changes except as mentioned.      Medications:  The current medication list was reviewed in the EMR    ALLERGIES:    Allergies   Allergen Reactions    Wound Dressing Adhesive Itching         Objective      Vitals:    07/11/25 1510   BP: 106/72   Pulse: 97   Resp: 16   Temp: 98.4 °F (36.9 °C)   TempSrc: Infrared   SpO2: 98%   Weight: 101 kg (223 lb 3.2 oz)   Height: 162.6 cm (64.02\")   PainSc: 0-No pain             7/11/2025     3:13 PM   Current Status   ECOG score 0     Physical Exam   Constitutional: She is oriented to person, place, and time. She appears well-developed.   HENT:   Head: Normocephalic.   Eyes: Pupils are equal, round, and reactive to light. Conjunctivae are normal.   Neck: No JVD present. No thyromegaly present.   Cardiovascular: Normal rate and regular rhythm.   Pulmonary/Chest: Effort normal and breath sounds normal.   Abdominal: Soft. Bowel sounds are normal.   Musculoskeletal: Normal range of motion.   Neurological: She is alert and oriented to person, place, and time. She has normal reflexes.   Skin: Skin is warm and dry.   Psychiatric: Her behavior is normal. Judgment normal.        Chest wall exam:   Bilateral mastectomies with implant reconstructions. No palpable abnormalities surrounding implants, no axillary lymphadenopathy.         PATHOLOGY 8/16/2023  Final Diagnosis   1. Right Breast, Oriented Simple Skin Sparing Mastectomy (1,097 Grams): INVASIVE MAMMARY CARCINOMA,       NO SPECIAL TYPE (INVASIVE DUCTAL CARCINOMA).  A. Tumor site: Lower outer quadrant.               B. Histologic grade: Perez histologic score III (tubules = 3, nuclei = 2, mitosis = 3).               C. Tumor size: 30 x 15 x 12 mm.               D. Tumor focality: Single focus.               E. Ductal Carcinoma in-situ (DCIS): Not identified.               F. No lobular " neoplasia identified.               G. Overlying skin and nipple are uninvolved by tumor.               H. No definitive lymphovascular space invasion identified.               I. Focal perineural invasion is present.  J. Margins: Uninvolved by invasive and in-situ carcinoma.               1. Invasive carcinoma comes to within 7 mm of the anterior margin, 40 mm from the inferior margin,      60 mm from the posterior and lateral margins, 80 mm from the medial margin and 130 mm from the       superior margin of resection.               K. Lymph Nodes: Not submitted.  L. Hormone receptor status: ER positive, SC negative, HER2 positive by FISH (right medial biopsy),       Ki-67 35-40%  (performed on prior biopsy [id number]).  M. Pathologic stage: pT2, NX.     2. Left Breast, Oriented Simple Skin Sparing Mastectomy (1,593 Grams):   A. Benign unremarkable breast tissue, skin and nipple.     PATHOLOGY 6/8/2023  Final Diagnosis   1. Breast, Right Lateral 6:30 Position, 3 cm FN, Core Biopsy:               A. Invasive mammary carcinoma, no special type (ductal), Perez histologic grade II (tubules = 3, nuclei = 2,        mitoses = 2) measuring 6 mm maximally and involving four core fragments.  B. No definitive in-situ carcinoma is identified.  C. No definitive lymphovascular space invasion identified.     2. Breast, Right Medial 6:30 Position, 3 cm FN, Core Biopsy:                A. Invasive mammary carcinoma, no special type (ductal), Perez histologic grade II (tubules = 3, nuclei = 2,        mitoses = 1) measuring 10 mm maximally and involving three core fragments.  B. No definitive in-situ carcinoma is identified.  C. No definitive lymphovascular space invasion identified.     Echocardiogram 1/12/2024  Interpretation Summary       Left ventricular systolic function is normal. Calculated left ventricular EF = 64.3% Normal global longitudinal LV strain (GLS) = -22.3%. Left ventricle strain data was reviewed by the  physician and found to be accurate. Normal left ventricular cavity size and wall thickness noted. All left ventricular wall segments contract normally. Left ventricular diastolic function is consistent with (grade I) impaired relaxation    Limited 2D imaging of cardiac valves with normal valve function by Doppler      CT CHEST, ABDOMEN, AND PELVIS WITHOUT CONTRAST. 7/21/2023  FINDINGS:  CHEST: Right breast findings discussed on breast MRI 06/17/2023. There  is a cluster of nodular densities at the right axilla in close proximity  to a cluster of surgical clips and some of these may represent vessels  or lymphatics. Characterization is limited in the absence of IV  contrast. There is no definitive pathologically enlarged node present.  There are no pathologically enlarged nodes at the left axilla and there  is no subpectoral lymphadenopathy, and there is no lymphadenopathy at  the mediastinum or jovita given constraints of noncontrasted technique.  The nodularity along the pleura at the dependent aspect of the lower  chest is likely related to dependent atelectatic change. No definite  suspicious pulmonary nodules are seen. There are no pleural or  pericardial effusions.     ABDOMEN/PELVIS: Noncontrasted liver appears unremarkable. The  gallbladder appears unremarkable and there is no biliary dilatation.  Splenic size is normal. Noncontrasted pancreas, adrenals, and kidneys  appear unremarkable. There is no acute bowel abnormality. Appendix  appears within normal limits. The uterus is slightly lobular in contour  likely secondary to small fibroids. Noncontrasted adnexa appear  unremarkable. There is no free fluid or lymphadenopathy. There are mild  abdominal aortic atherosclerotic changes throughout the abdominal aorta  without aneurysmal dilatation. No suspicious bone lesion is seen.     IMPRESSION:  Given constraints of noncontrasted technique, there is no  convincing evidence for metastatic disease within the chest,  abdomen, or  pelvis.      Narrative & Impression   NUCLEAR MEDICINE WHOLE BODY BONE SCAN 7/14/2023     HISTORY: Breast cancer. Evaluate for metastatic disease.     TECHNIQUE:  19.1 mCi of 99m technetium MDP was administered intravenous  followed by 3 hour delayed phase whole body imaging with selected spot  views.     COMPARISON: None.     FINDINGS:  There is mild increased uptake at the right sternoclavicular  joint that is attributed to arthritis. Arthritic uptake is present in  both shoulders, knees, and mid feet.  There is also mild increased  lumbar spine uptake that is most likely degenerative/arthritic. Both  kidneys and the urinary bladder are visualized.     IMPRESSION:  Areas of mild uptake are typical for arthritis/degenerative  change and there is no scintigraphic evidence to suggest bony metastatic  disease.        MAMMO SCREENING DIGITAL TOMOSYNTHESIS BILATERAL W CAD- 4/14/2023   IMPRESSION:  1. There is an area of focal asymmetry in the middle third retroareolar  region of the right breast. Further evaluation with spot compression CC  and MLO mammographic and Tomosynthesis images and targeted right breast  sonography is recommended.  2. There are no findings suspicious for malignancy in the left breast.     BI-RADS Category 0: Incomplete. Needs additional imaging evaluation.    EXAMINATIONS: UNILATERAL RIGHT DIGITAL DIAGNOSTIC MAMMOGRAPHY WITH  TOMOSYNTHESIS AND LIMITED RIGHT BREAST SONOGRAPHY 5/17/2023  IMPRESSION:  There is an irregular 1 cm hypoechoic mass with internal peripheral  vascularity that is seen in the right breast at the 6:30 position on the  order of 3 cm from the nipple. It is immediately adjacent to a 1.9 cm  oval circumscribed hypoechoic mass. Together both lesions measure on the  order 2.7 cm in greatest dimension. Ultrasound guided biopsy of both  lesions is recommended. Given the close proximity of both lesions, both  lesions may be able to be sampled and submitted as one  specimen.     BI-RADS Category 4: Suspicious abnormality or suspicious finding. Biopsy  should be considered.    MRI BREAST BILATERAL W WO CONTRAST-  6/17/2023  IMPRESSION AND RECOMMENDATION:     1.  Adjacent irregular enhancing masses each measuring 1.6 cm at 6:00 to  7:00 in the middle right breast represent the 2 sites of biopsy-proven  malignancy. Adjacent suspicious enhancing foci and areas of nonmass  enhancement are identified, in addition to an irregular enhancing mass  and multiple additional enhancing foci centered at 2:00 to 3:00 in the  right breast, which are also suspicious. The 2 sites of biopsy-proven  malignancy and suspicious areas of enhancement together measure up to  approximately 8.2 cm in maximum dimension, as detailed above. Surgical  management is recommended.  2.  No MRI evidence of malignancy in the left breast.     BI-RADS Category 4: Suspicious      Assessment & Plan     *Stage IIIA infiltrating ductal carcinoma of the right breast   Treated with a lumpectomy, radiation, 6 cycles of TAC chemotherapy and ongoing hormonal therapy. She completed 10 years of adjuvant hormonal therapy with 5 years of tamoxifen followed by 5 years of Arimidex which she completed in 2017.  Carcinoma of the remaining right breast.  Tumor was positive for HER2/julián overexpression and positive for estrogen receptors and negative for progesterone receptors.  Bilateral mastectomies with immediate implant reconstruction 8/16/2023.  Mediport placement 8/16/2023  Echocardiogram from 6/19/2023 shows normal left ventricular ejection fraction of 61% (patient previously had 6 cycles of Adriamycin containing chemotherapy and will be needing Herceptin therapy postoperatively).  Plan for postop adjuvant treatment with weekly Taxol and Herceptin x12 weeks followed by continued single agent Herceptin every 3 weeks for completion of 12 months of therapy total.  Plan for hormonal therapy possibly with Aromasin to begin once Taxol  is completed.  9/14/2023: Patient returns for cycle 1 day 15 weekly Taxol and Herceptin.  Unfortunately she is neutropenic with an ANC of 800 and will not receive Taxol today but will receive Herceptin.  Her future Taxol doses will be modified to an 85% dose level.  12/1/2023: We will proceed with 12th and final weekly Taxol and  Herceptin in office today.    Initial dose of every 3-week Herceptin at 6 mg/kg delivered 12/8/2023.  Aromasin 25 mg prescribed in January 2024 but patient started taking this only in April 2024.  Removed with Dr. Vidal 3/12/2025.  7/11/2025: Continues aromasin.  Continues with arthralgias which have actually improved.  Also continues with hot flashes which remain stable and are tolerable.  No evidence of recurrent disease.     *Chemotherapy-induced neuropathy  11/17/2023: Patient continues to use cooling mitts and gloves with treatment.  She has been noticing some mild neuropathy developing in her toes of her bilateral feet.  She feels that this discomfort is more noticeable in the evening when she is trying to sleep.    7/11/2025: She discontinued the gabapentin, has not been having any issues with neuropathy lately.    *Right upper extremity lymphedema  11/17/2023: Patient has had history of lymphedema in her right upper extremity.  She does feel that in the past week or so her edema has been more noticeable to her.  She has been wearing her compression garments.  She has an appointment with lymphedema clinic next week on 12/8/2023.  12/29/2023: Continues compression garment and has followed up with the lymphedema clinic.    Lymphedema stable at this time, continues to follow with lymphedema clinic.    *Bone density  DEXA scan from July 2024 reviewed and bone density normal.  Repeat in 2 years which would be in July 2026    PLAN:   Continue aromasin.  Initiated January 2024.  Refill sent to pharmacy.  She discontinued gabapentin, not having any issues with neuropathy lately.  Port  removal with Dr. Vidal 3/12/2025.  DEXA scan due 7/2026  Continue to follow with cardio oncology   4 months MD.    Patient is on a high risk medication requiring close monitoring for toxicity.

## 2025-07-11 ENCOUNTER — LAB (OUTPATIENT)
Dept: LAB | Facility: HOSPITAL | Age: 60
End: 2025-07-11
Payer: COMMERCIAL

## 2025-07-11 ENCOUNTER — OFFICE VISIT (OUTPATIENT)
Dept: ONCOLOGY | Facility: CLINIC | Age: 60
End: 2025-07-11
Payer: COMMERCIAL

## 2025-07-11 VITALS
OXYGEN SATURATION: 98 % | SYSTOLIC BLOOD PRESSURE: 106 MMHG | TEMPERATURE: 98.4 F | RESPIRATION RATE: 16 BRPM | DIASTOLIC BLOOD PRESSURE: 72 MMHG | HEIGHT: 64 IN | BODY MASS INDEX: 38.1 KG/M2 | WEIGHT: 223.2 LBS | HEART RATE: 97 BPM

## 2025-07-11 DIAGNOSIS — Z17.0 MALIGNANT NEOPLASM OF OVERLAPPING SITES OF RIGHT BREAST IN FEMALE, ESTROGEN RECEPTOR POSITIVE: Primary | ICD-10-CM

## 2025-07-11 DIAGNOSIS — Z79.811 AROMATASE INHIBITOR USE: ICD-10-CM

## 2025-07-11 DIAGNOSIS — C50.811 MALIGNANT NEOPLASM OF OVERLAPPING SITES OF RIGHT BREAST IN FEMALE, ESTROGEN RECEPTOR POSITIVE: Primary | ICD-10-CM

## 2025-07-11 RX ORDER — EXEMESTANE 25 MG/1
25 TABLET ORAL DAILY
Qty: 90 TABLET | Refills: 3 | Status: SHIPPED | OUTPATIENT
Start: 2025-07-11

## 2025-08-15 ENCOUNTER — OFFICE VISIT (OUTPATIENT)
Dept: INTERNAL MEDICINE | Facility: CLINIC | Age: 60
End: 2025-08-15
Payer: COMMERCIAL

## 2025-08-15 VITALS
OXYGEN SATURATION: 95 % | WEIGHT: 221 LBS | TEMPERATURE: 96.9 F | SYSTOLIC BLOOD PRESSURE: 124 MMHG | HEIGHT: 64 IN | HEART RATE: 85 BPM | BODY MASS INDEX: 37.73 KG/M2 | DIASTOLIC BLOOD PRESSURE: 74 MMHG

## 2025-08-15 DIAGNOSIS — Z00.00 ROUTINE HEALTH MAINTENANCE: ICD-10-CM

## 2025-08-15 DIAGNOSIS — E78.5 HYPERLIPIDEMIA, UNSPECIFIED HYPERLIPIDEMIA TYPE: Primary | Chronic | ICD-10-CM

## 2025-08-15 DIAGNOSIS — R73.09 ELEVATED HEMOGLOBIN A1C: Chronic | ICD-10-CM

## 2025-08-16 LAB
ALBUMIN SERPL-MCNC: 4.5 G/DL (ref 3.5–5.2)
ALBUMIN/GLOB SERPL: 1.6 G/DL
ALP SERPL-CCNC: 77 U/L (ref 39–117)
ALT SERPL-CCNC: 21 U/L (ref 1–33)
AST SERPL-CCNC: 20 U/L (ref 1–32)
BASOPHILS # BLD AUTO: 0.04 10*3/MM3 (ref 0–0.2)
BASOPHILS NFR BLD AUTO: 0.7 % (ref 0–1.5)
BILIRUB SERPL-MCNC: 0.9 MG/DL (ref 0–1.2)
BUN SERPL-MCNC: 19 MG/DL (ref 8–23)
BUN/CREAT SERPL: 21.1 (ref 7–25)
CALCIUM SERPL-MCNC: 9.7 MG/DL (ref 8.6–10.5)
CHLORIDE SERPL-SCNC: 101 MMOL/L (ref 98–107)
CHOLEST SERPL-MCNC: 166 MG/DL (ref 0–200)
CO2 SERPL-SCNC: 26.6 MMOL/L (ref 22–29)
CREAT SERPL-MCNC: 0.9 MG/DL (ref 0.57–1)
EGFRCR SERPLBLD CKD-EPI 2021: 72.4 ML/MIN/1.73
EOSINOPHIL # BLD AUTO: 0.08 10*3/MM3 (ref 0–0.4)
EOSINOPHIL NFR BLD AUTO: 1.3 % (ref 0.3–6.2)
ERYTHROCYTE [DISTWIDTH] IN BLOOD BY AUTOMATED COUNT: 13.2 % (ref 12.3–15.4)
GLOBULIN SER CALC-MCNC: 2.9 GM/DL
GLUCOSE SERPL-MCNC: 83 MG/DL (ref 65–99)
HBA1C MFR BLD: 5.9 % (ref 4.8–5.6)
HCT VFR BLD AUTO: 44.2 % (ref 34–46.6)
HDLC SERPL-MCNC: 49 MG/DL (ref 40–60)
HGB BLD-MCNC: 14.3 G/DL (ref 12–15.9)
IMM GRANULOCYTES # BLD AUTO: 0.01 10*3/MM3 (ref 0–0.05)
IMM GRANULOCYTES NFR BLD AUTO: 0.2 % (ref 0–0.5)
LDLC SERPL CALC-MCNC: 106 MG/DL (ref 0–100)
LYMPHOCYTES # BLD AUTO: 2.1 10*3/MM3 (ref 0.7–3.1)
LYMPHOCYTES NFR BLD AUTO: 34.8 % (ref 19.6–45.3)
MCH RBC QN AUTO: 26.9 PG (ref 26.6–33)
MCHC RBC AUTO-ENTMCNC: 32.4 G/DL (ref 31.5–35.7)
MCV RBC AUTO: 83.1 FL (ref 79–97)
MONOCYTES # BLD AUTO: 0.57 10*3/MM3 (ref 0.1–0.9)
MONOCYTES NFR BLD AUTO: 9.4 % (ref 5–12)
NEUTROPHILS # BLD AUTO: 3.24 10*3/MM3 (ref 1.7–7)
NEUTROPHILS NFR BLD AUTO: 53.6 % (ref 42.7–76)
NRBC BLD AUTO-RTO: 0 /100 WBC (ref 0–0.2)
PLATELET # BLD AUTO: 202 10*3/MM3 (ref 140–450)
POTASSIUM SERPL-SCNC: 3.9 MMOL/L (ref 3.5–5.2)
PROT SERPL-MCNC: 7.4 G/DL (ref 6–8.5)
RBC # BLD AUTO: 5.32 10*6/MM3 (ref 3.77–5.28)
SODIUM SERPL-SCNC: 140 MMOL/L (ref 136–145)
TRIGL SERPL-MCNC: 54 MG/DL (ref 0–150)
VLDLC SERPL CALC-MCNC: 11 MG/DL (ref 5–40)
WBC # BLD AUTO: 6.04 10*3/MM3 (ref 3.4–10.8)

## 2025-08-18 ENCOUNTER — RESULTS FOLLOW-UP (OUTPATIENT)
Dept: INTERNAL MEDICINE | Facility: CLINIC | Age: 60
End: 2025-08-18
Payer: COMMERCIAL

## 2025-08-29 ENCOUNTER — HOSPITAL ENCOUNTER (OUTPATIENT)
Dept: PHYSICAL THERAPY | Facility: HOSPITAL | Age: 60
Setting detail: THERAPIES SERIES
Discharge: HOME | End: 2025-08-29
Payer: OTHER GOVERNMENT

## 2025-08-29 DIAGNOSIS — Z17.0 MALIGNANT NEOPLASM OF LOWER-OUTER QUADRANT OF RIGHT BREAST OF FEMALE, ESTROGEN RECEPTOR POSITIVE: ICD-10-CM

## 2025-08-29 DIAGNOSIS — I89.0 LYMPHEDEMA OF RIGHT UPPER EXTREMITY: ICD-10-CM

## 2025-08-29 DIAGNOSIS — Z90.13 HISTORY OF BILATERAL MASTECTOMY: Primary | ICD-10-CM

## 2025-08-29 DIAGNOSIS — C50.511 MALIGNANT NEOPLASM OF LOWER-OUTER QUADRANT OF RIGHT BREAST OF FEMALE, ESTROGEN RECEPTOR POSITIVE: ICD-10-CM

## 2025-08-29 PROCEDURE — 97535 SELF CARE MNGMENT TRAINING: CPT

## (undated) DEVICE — MEDI-VAC YANKAUER SUCTION HANDLE W/BULBOUS TIP: Brand: CARDINAL HEALTH

## (undated) DEVICE — APPL CHLORAPREP HI/LITE 26ML ORNG

## (undated) DEVICE — CONN TBG Y 5 IN 1 LF STRL

## (undated) DEVICE — COVER,MAYO STAND,STERILE: Brand: MEDLINE

## (undated) DEVICE — SNAR POLYP SENSATION STDOVL 27 240 BX40

## (undated) DEVICE — LN SMPL CO2 SHTRM SD STREAM W/M LUER

## (undated) DEVICE — JACKSON-PRATT 100CC BULB RESERVOIR: Brand: CARDINAL HEALTH

## (undated) DEVICE — SYR LUERLOK 5CC

## (undated) DEVICE — IRRIGATOR BULB ASEPTO 60CC STRL

## (undated) DEVICE — 3M™ IOBAN™ 2 ANTIMICROBIAL INCISE DRAPE 6650EZ: Brand: IOBAN™ 2

## (undated) DEVICE — KT ORCA ORCAPOD DISP STRL

## (undated) DEVICE — SINGLE-USE BIOPSY FORCEPS: Brand: RADIAL JAW 4

## (undated) DEVICE — SUT MNCRYL 4/0 PS2 18 IN

## (undated) DEVICE — ELECTRD BLD EZ CLN MOD XLNG 2.75IN

## (undated) DEVICE — Device: Brand: DEFENDO AIR/WATER/SUCTION AND BIOPSY VALVE

## (undated) DEVICE — SUT VIC 3/0 TIES 18IN J110T

## (undated) DEVICE — DRSNG SURESITE WNDW 4X4.5

## (undated) DEVICE — TUBING, SUCTION, 1/4" X 20', STRAIGHT: Brand: MEDLINE INDUSTRIES, INC.

## (undated) DEVICE — NEEDLE, QUINCKE 22GX3.5": Brand: MEDLINE INDUSTRIES, INC.

## (undated) DEVICE — SUT MNCRYL PLS ANTIB UD 4/0 PS2 18IN

## (undated) DEVICE — GLV SURG BIOGEL LTX PF 8 1/2

## (undated) DEVICE — NDL HYPO ECLPS SFTY 22G 1 1/2IN

## (undated) DEVICE — MSK ENDO PORT O2 POM ELITE CURAPLEX A/

## (undated) DEVICE — STPLR SKIN VISISTAT WD 35CT

## (undated) DEVICE — APPL CHLORAPREP W/TINT 26ML ORNG

## (undated) DEVICE — CANNULA,ADULT,SOFT-TOUCH,7'TUBE,UC: Brand: PENDING

## (undated) DEVICE — INTENDED FOR TISSUE SEPARATION, AND OTHER PROCEDURES THAT REQUIRE A SHARP SURGICAL BLADE TO PUNCTURE OR CUT.: Brand: BARD-PARKER ® STAINLESS STEEL BLADES

## (undated) DEVICE — CVR TRANSD CIV FLX TPR 11.9 TO 3.8X61CM

## (undated) DEVICE — ADAPT CLN BIOGUARD AIR/H2O DISP

## (undated) DEVICE — SYR LUERLOK 20CC BX/50

## (undated) DEVICE — ANTIBACTERIAL UNDYED BRAIDED (POLYGLACTIN 910), SYNTHETIC ABSORBABLE SUTURE: Brand: COATED VICRYL

## (undated) DEVICE — TUBING, SUCTION, 1/4" X 10', STRAIGHT: Brand: MEDLINE

## (undated) DEVICE — PATIENT RETURN ELECTRODE, SINGLE-USE, CONTACT QUALITY MONITORING, ADULT, WITH 9FT CORD, FOR PATIENTS WEIGING OVER 33LBS. (15KG): Brand: MEGADYNE

## (undated) DEVICE — TRAP FLD MINIVAC MEGADYNE 100ML

## (undated) DEVICE — INTENDED FOR TISSUE SEPARATION, AND OTHER PROCEDURES THAT REQUIRE A SHARP SURGICAL BLADE TO PUNCTURE OR CUT.: Brand: BARD-PARKER ® CARBON RIB-BACK BLADES

## (undated) DEVICE — SUT MNCRYL 3/0 PS2 18IN MCP497G

## (undated) DEVICE — SUT ETHLN 3/0 PS1 18IN 1663H

## (undated) DEVICE — SMOKE EVACUATION TUBING WITH 7/8 IN TO 1/4 IN REDUCER: Brand: BUFFALO FILTER

## (undated) DEVICE — TOTAL TRAY, 16FR 10ML SIL FOLEY, URN: Brand: MEDLINE

## (undated) DEVICE — DRP C/ARM 41X74IN

## (undated) DEVICE — SYR LUERLOK 30CC

## (undated) DEVICE — LOU MINOR PROCEDURE: Brand: MEDLINE INDUSTRIES, INC.

## (undated) DEVICE — SPNG LAP 18X18IN LF STRL PK/5

## (undated) DEVICE — TBG PENCL TELESCP MEGADYNE SMOKE EVAC 10FT

## (undated) DEVICE — LEGGINGS, PAIR, 31X48, STERILE: Brand: MEDLINE

## (undated) DEVICE — THE SINGLE USE ETRAP – POLYP TRAP IS USED FOR SUCTION RETRIEVAL OF ENDOSCOPICALLY REMOVED POLYPS.: Brand: ETRAP

## (undated) DEVICE — ADHS SKIN SURG TISS VISC PREMIERPRO EXOFIN HI/VISC FAST/DRY

## (undated) DEVICE — SUT PDS 2/0 SH 27IN Z317H

## (undated) DEVICE — Device

## (undated) DEVICE — THE TORRENT IRRIGATION SCOPE CONNECTOR IS USED WITH THE TORRENT IRRIGATION TUBING TO PROVIDE IRRIGATION FLUIDS SUCH AS STERILE WATER DURING GASTROINTESTINAL ENDOSCOPIC PROCEDURES WHEN USED IN CONJUNCTION WITH AN IRRIGATION PUMP (OR ELECTROSURGICAL UNIT).: Brand: TORRENT

## (undated) DEVICE — SENSR O2 OXIMAX FNGR A/ 18IN NONSTR

## (undated) DEVICE — SUT VIC 3/0 CT2 27IN J232H

## (undated) DEVICE — NEEDLE, QUINCKE, 22GX3.5": Brand: MEDLINE

## (undated) DEVICE — SUT PDS 2/0 CT2 27IN Z333H

## (undated) DEVICE — SYR LL TP 10ML STRL

## (undated) DEVICE — GLV SURG BIOGEL M LTX PF 6 1/2

## (undated) DEVICE — BNDG ELAS ELITE V/CLOSE 6IN 5YD LF STRL

## (undated) DEVICE — PENCL ES MEGADINE EZ/CLEAN BUTN W/HOLSTR 10FT

## (undated) DEVICE — CONTAINER,SPECIMEN,OR STERILE,4OZ: Brand: MEDLINE

## (undated) DEVICE — SPNG GZ WOVN 4X4IN 12PLY 10/BX STRL

## (undated) DEVICE — NDL HYPO PRECISIONGLIDE REG 25G 1 1/2

## (undated) DEVICE — BANDAGE,GAUZE,BULKEE II,4.5"X4.1YD,STRL: Brand: MEDLINE

## (undated) DEVICE — LABEL SHEET CUSTOM 2X2 YELLOW: Brand: MEDLINE INDUSTRIES, INC.

## (undated) DEVICE — SUT VIC 5/0 PS2 18IN J495H

## (undated) DEVICE — PK UNIV COMPL 40

## (undated) DEVICE — SUT SILK 2/0 FS BLK 18IN 685G

## (undated) DEVICE — KT DRP SPY/PHI W/ICG 25MG STRL